# Patient Record
Sex: MALE | Race: WHITE | NOT HISPANIC OR LATINO
[De-identification: names, ages, dates, MRNs, and addresses within clinical notes are randomized per-mention and may not be internally consistent; named-entity substitution may affect disease eponyms.]

---

## 2017-03-03 ENCOUNTER — APPOINTMENT (OUTPATIENT)
Dept: CARDIOLOGY | Facility: CLINIC | Age: 72
End: 2017-03-03

## 2017-03-03 VITALS
WEIGHT: 160 LBS | SYSTOLIC BLOOD PRESSURE: 131 MMHG | DIASTOLIC BLOOD PRESSURE: 69 MMHG | BODY MASS INDEX: 23.7 KG/M2 | HEART RATE: 84 BPM | HEIGHT: 69 IN

## 2017-03-10 ENCOUNTER — APPOINTMENT (OUTPATIENT)
Dept: ULTRASOUND IMAGING | Facility: HOSPITAL | Age: 72
End: 2017-03-10

## 2017-03-10 ENCOUNTER — RESULT REVIEW (OUTPATIENT)
Age: 72
End: 2017-03-10

## 2017-03-10 ENCOUNTER — OUTPATIENT (OUTPATIENT)
Dept: OUTPATIENT SERVICES | Facility: HOSPITAL | Age: 72
LOS: 1 days | End: 2017-03-10

## 2017-03-10 ENCOUNTER — INPATIENT (INPATIENT)
Facility: HOSPITAL | Age: 72
LOS: 1 days | Discharge: HOME | DRG: 616 | End: 2017-03-12
Attending: INTERNAL MEDICINE | Admitting: INTERNAL MEDICINE
Payer: MEDICARE

## 2017-03-10 VITALS
RESPIRATION RATE: 18 BRPM | DIASTOLIC BLOOD PRESSURE: 75 MMHG | SYSTOLIC BLOOD PRESSURE: 148 MMHG | OXYGEN SATURATION: 99 % | HEART RATE: 80 BPM

## 2017-03-10 DIAGNOSIS — E16.2 HYPOGLYCEMIA, UNSPECIFIED: ICD-10-CM

## 2017-03-10 DIAGNOSIS — Z98.890 OTHER SPECIFIED POSTPROCEDURAL STATES: Chronic | ICD-10-CM

## 2017-03-10 DIAGNOSIS — Z95.9 PRESENCE OF CARDIAC AND VASCULAR IMPLANT AND GRAFT, UNSPECIFIED: Chronic | ICD-10-CM

## 2017-03-10 DIAGNOSIS — Z95.0 PRESENCE OF CARDIAC PACEMAKER: Chronic | ICD-10-CM

## 2017-03-10 DIAGNOSIS — Z95.828 PRESENCE OF OTHER VASCULAR IMPLANTS AND GRAFTS: Chronic | ICD-10-CM

## 2017-03-10 DIAGNOSIS — M86.9 OSTEOMYELITIS, UNSPECIFIED: ICD-10-CM

## 2017-03-10 LAB
ALBUMIN SERPL ELPH-MCNC: 3.4 G/DL — SIGNIFICANT CHANGE UP (ref 3.3–5)
ALP SERPL-CCNC: 148 U/L — HIGH (ref 40–120)
ALT FLD-CCNC: 25 U/L RC — SIGNIFICANT CHANGE UP (ref 10–45)
ANION GAP SERPL CALC-SCNC: 12 MMOL/L — SIGNIFICANT CHANGE UP (ref 5–17)
ANION GAP SERPL CALC-SCNC: 12 MMOL/L — SIGNIFICANT CHANGE UP (ref 5–17)
APTT BLD: 38.4 SEC — HIGH (ref 27.5–37.4)
AST SERPL-CCNC: 24 U/L — SIGNIFICANT CHANGE UP (ref 10–40)
BASOPHILS # BLD AUTO: 0.1 K/UL — SIGNIFICANT CHANGE UP (ref 0–0.2)
BASOPHILS NFR BLD AUTO: 1.2 % — SIGNIFICANT CHANGE UP (ref 0–2)
BILIRUB SERPL-MCNC: 0.3 MG/DL — SIGNIFICANT CHANGE UP (ref 0.2–1.2)
BUN SERPL-MCNC: 28 MG/DL — HIGH (ref 7–23)
BUN SERPL-MCNC: 30 MG/DL — HIGH (ref 7–23)
CALCIUM SERPL-MCNC: 8.4 MG/DL — SIGNIFICANT CHANGE UP (ref 8.4–10.5)
CALCIUM SERPL-MCNC: 8.7 MG/DL — SIGNIFICANT CHANGE UP (ref 8.4–10.5)
CHLORIDE SERPL-SCNC: 104 MMOL/L — SIGNIFICANT CHANGE UP (ref 96–108)
CHLORIDE SERPL-SCNC: 105 MMOL/L — SIGNIFICANT CHANGE UP (ref 96–108)
CO2 SERPL-SCNC: 19 MMOL/L — LOW (ref 22–31)
CO2 SERPL-SCNC: 20 MMOL/L — LOW (ref 22–31)
CREAT SERPL-MCNC: 1.2 MG/DL — SIGNIFICANT CHANGE UP (ref 0.5–1.3)
CREAT SERPL-MCNC: 1.23 MG/DL — SIGNIFICANT CHANGE UP (ref 0.5–1.3)
EOSINOPHIL # BLD AUTO: 0.1 K/UL — SIGNIFICANT CHANGE UP (ref 0–0.5)
EOSINOPHIL NFR BLD AUTO: 1.3 % — SIGNIFICANT CHANGE UP (ref 0–6)
GLUCOSE SERPL-MCNC: 265 MG/DL — HIGH (ref 70–99)
GLUCOSE SERPL-MCNC: 333 MG/DL — HIGH (ref 70–99)
HCT VFR BLD CALC: 39 % — SIGNIFICANT CHANGE UP (ref 39–50)
HGB BLD-MCNC: 12.4 G/DL — LOW (ref 13–17)
INR BLD: 1.15 RATIO — SIGNIFICANT CHANGE UP (ref 0.88–1.16)
LYMPHOCYTES # BLD AUTO: 1.8 K/UL — SIGNIFICANT CHANGE UP (ref 1–3.3)
LYMPHOCYTES # BLD AUTO: 20.9 % — SIGNIFICANT CHANGE UP (ref 13–44)
MCHC RBC-ENTMCNC: 29.3 PG — SIGNIFICANT CHANGE UP (ref 27–34)
MCHC RBC-ENTMCNC: 31.7 GM/DL — LOW (ref 32–36)
MCV RBC AUTO: 92.4 FL — SIGNIFICANT CHANGE UP (ref 80–100)
MONOCYTES # BLD AUTO: 0.6 K/UL — SIGNIFICANT CHANGE UP (ref 0–0.9)
MONOCYTES NFR BLD AUTO: 6.8 % — SIGNIFICANT CHANGE UP (ref 2–14)
NEUTROPHILS # BLD AUTO: 5.9 K/UL — SIGNIFICANT CHANGE UP (ref 1.8–7.4)
NEUTROPHILS NFR BLD AUTO: 69.7 % — SIGNIFICANT CHANGE UP (ref 43–77)
PLATELET # BLD AUTO: 304 K/UL — SIGNIFICANT CHANGE UP (ref 150–400)
POTASSIUM SERPL-MCNC: 5 MMOL/L — SIGNIFICANT CHANGE UP (ref 3.5–5.3)
POTASSIUM SERPL-MCNC: 5.9 MMOL/L — HIGH (ref 3.5–5.3)
POTASSIUM SERPL-SCNC: 5 MMOL/L — SIGNIFICANT CHANGE UP (ref 3.5–5.3)
POTASSIUM SERPL-SCNC: 5.9 MMOL/L — HIGH (ref 3.5–5.3)
PROT SERPL-MCNC: 6.4 G/DL — SIGNIFICANT CHANGE UP (ref 6–8.3)
PROTHROM AB SERPL-ACNC: 12.6 SEC — SIGNIFICANT CHANGE UP (ref 10–13.1)
RBC # BLD: 4.23 M/UL — SIGNIFICANT CHANGE UP (ref 4.2–5.8)
RBC # FLD: 13.4 % — SIGNIFICANT CHANGE UP (ref 10.3–14.5)
SODIUM SERPL-SCNC: 135 MMOL/L — SIGNIFICANT CHANGE UP (ref 135–145)
SODIUM SERPL-SCNC: 137 MMOL/L — SIGNIFICANT CHANGE UP (ref 135–145)
WBC # BLD: 8.5 K/UL — SIGNIFICANT CHANGE UP (ref 3.8–10.5)
WBC # FLD AUTO: 8.5 K/UL — SIGNIFICANT CHANGE UP (ref 3.8–10.5)

## 2017-03-10 PROCEDURE — 99285 EMERGENCY DEPT VISIT HI MDM: CPT | Mod: 25,GC

## 2017-03-10 PROCEDURE — 93010 ELECTROCARDIOGRAM REPORT: CPT

## 2017-03-10 RX ORDER — LIDOCAINE HCL 20 MG/ML
0.2 VIAL (ML) INJECTION ONCE
Qty: 0 | Refills: 0 | Status: DISCONTINUED | OUTPATIENT
Start: 2017-03-10 | End: 2017-03-25

## 2017-03-10 RX ORDER — BUMETANIDE 0.25 MG/ML
1 INJECTION INTRAMUSCULAR; INTRAVENOUS DAILY
Qty: 0 | Refills: 0 | Status: DISCONTINUED | OUTPATIENT
Start: 2017-03-10 | End: 2017-03-11

## 2017-03-10 RX ORDER — INSULIN GLARGINE 100 [IU]/ML
30 INJECTION, SOLUTION SUBCUTANEOUS AT BEDTIME
Qty: 0 | Refills: 0 | Status: DISCONTINUED | OUTPATIENT
Start: 2017-03-10 | End: 2017-03-11

## 2017-03-10 RX ORDER — ALBUTEROL 90 UG/1
2.5 AEROSOL, METERED ORAL ONCE
Qty: 0 | Refills: 0 | Status: COMPLETED | OUTPATIENT
Start: 2017-03-10 | End: 2017-03-10

## 2017-03-10 RX ORDER — LISINOPRIL 2.5 MG/1
40 TABLET ORAL DAILY
Qty: 0 | Refills: 0 | Status: DISCONTINUED | OUTPATIENT
Start: 2017-03-10 | End: 2017-03-11

## 2017-03-10 RX ORDER — INSULIN GLARGINE 100 [IU]/ML
15 INJECTION, SOLUTION SUBCUTANEOUS ONCE
Qty: 0 | Refills: 0 | Status: COMPLETED | OUTPATIENT
Start: 2017-03-10 | End: 2017-03-10

## 2017-03-10 RX ORDER — DEXTROSE 50 % IN WATER 50 %
50 SYRINGE (ML) INTRAVENOUS ONCE
Qty: 0 | Refills: 0 | Status: COMPLETED | OUTPATIENT
Start: 2017-03-10 | End: 2017-03-10

## 2017-03-10 RX ORDER — SODIUM CHLORIDE 9 MG/ML
1000 INJECTION INTRAMUSCULAR; INTRAVENOUS; SUBCUTANEOUS
Qty: 0 | Refills: 0 | Status: DISCONTINUED | OUTPATIENT
Start: 2017-03-10 | End: 2017-03-11

## 2017-03-10 RX ORDER — POTASSIUM CHLORIDE 20 MEQ
10 PACKET (EA) ORAL DAILY
Qty: 0 | Refills: 0 | Status: DISCONTINUED | OUTPATIENT
Start: 2017-03-10 | End: 2017-03-11

## 2017-03-10 RX ORDER — SODIUM CHLORIDE 9 MG/ML
1000 INJECTION INTRAMUSCULAR; INTRAVENOUS; SUBCUTANEOUS ONCE
Qty: 0 | Refills: 0 | Status: COMPLETED | OUTPATIENT
Start: 2017-03-10 | End: 2017-03-10

## 2017-03-10 RX ORDER — SODIUM CHLORIDE 9 MG/ML
1000 INJECTION, SOLUTION INTRAVENOUS
Qty: 0 | Refills: 0 | Status: DISCONTINUED | OUTPATIENT
Start: 2017-03-10 | End: 2017-03-11

## 2017-03-10 RX ORDER — SODIUM CHLORIDE 9 MG/ML
1000 INJECTION, SOLUTION INTRAVENOUS
Qty: 0 | Refills: 0 | Status: DISCONTINUED | OUTPATIENT
Start: 2017-03-10 | End: 2017-03-10

## 2017-03-10 RX ORDER — SODIUM CHLORIDE 9 MG/ML
3 INJECTION INTRAMUSCULAR; INTRAVENOUS; SUBCUTANEOUS EVERY 8 HOURS
Qty: 0 | Refills: 0 | Status: DISCONTINUED | OUTPATIENT
Start: 2017-03-10 | End: 2017-03-25

## 2017-03-10 RX ORDER — DEXTROSE 50 % IN WATER 50 %
25 SYRINGE (ML) INTRAVENOUS ONCE
Qty: 0 | Refills: 0 | Status: DISCONTINUED | OUTPATIENT
Start: 2017-03-10 | End: 2017-03-11

## 2017-03-10 RX ORDER — HYDRALAZINE HCL 50 MG
5 TABLET ORAL
Qty: 0 | Refills: 0 | Status: DISCONTINUED | OUTPATIENT
Start: 2017-03-10 | End: 2017-03-11

## 2017-03-10 RX ORDER — GLUCAGON INJECTION, SOLUTION 0.5 MG/.1ML
1 INJECTION, SOLUTION SUBCUTANEOUS ONCE
Qty: 0 | Refills: 0 | Status: DISCONTINUED | OUTPATIENT
Start: 2017-03-10 | End: 2017-03-11

## 2017-03-10 RX ORDER — DEXTROSE 50 % IN WATER 50 %
12.5 SYRINGE (ML) INTRAVENOUS ONCE
Qty: 0 | Refills: 0 | Status: DISCONTINUED | OUTPATIENT
Start: 2017-03-10 | End: 2017-03-11

## 2017-03-10 RX ORDER — INSULIN LISPRO 100/ML
VIAL (ML) SUBCUTANEOUS
Qty: 0 | Refills: 0 | Status: DISCONTINUED | OUTPATIENT
Start: 2017-03-10 | End: 2017-03-11

## 2017-03-10 RX ORDER — DEXTROSE 50 % IN WATER 50 %
1 SYRINGE (ML) INTRAVENOUS ONCE
Qty: 0 | Refills: 0 | Status: DISCONTINUED | OUTPATIENT
Start: 2017-03-10 | End: 2017-03-11

## 2017-03-10 RX ORDER — INSULIN LISPRO 100/ML
VIAL (ML) SUBCUTANEOUS AT BEDTIME
Qty: 0 | Refills: 0 | Status: DISCONTINUED | OUTPATIENT
Start: 2017-03-10 | End: 2017-03-11

## 2017-03-10 RX ORDER — ATORVASTATIN CALCIUM 80 MG/1
20 TABLET, FILM COATED ORAL AT BEDTIME
Qty: 0 | Refills: 0 | Status: DISCONTINUED | OUTPATIENT
Start: 2017-03-10 | End: 2017-03-11

## 2017-03-10 RX ADMIN — ALBUTEROL 2.5 MILLIGRAM(S): 90 AEROSOL, METERED ORAL at 12:54

## 2017-03-10 RX ADMIN — SODIUM CHLORIDE 1000 MILLILITER(S): 9 INJECTION INTRAMUSCULAR; INTRAVENOUS; SUBCUTANEOUS at 12:53

## 2017-03-10 RX ADMIN — Medication 50 MILLILITER(S): at 12:22

## 2017-03-10 RX ADMIN — SODIUM CHLORIDE 50 MILLILITER(S): 9 INJECTION INTRAMUSCULAR; INTRAVENOUS; SUBCUTANEOUS at 22:29

## 2017-03-10 RX ADMIN — SODIUM CHLORIDE 70 MILLILITER(S): 9 INJECTION, SOLUTION INTRAVENOUS at 12:22

## 2017-03-10 RX ADMIN — INSULIN GLARGINE 15 UNIT(S): 100 INJECTION, SOLUTION SUBCUTANEOUS at 23:45

## 2017-03-10 RX ADMIN — Medication 1: at 18:58

## 2017-03-10 RX ADMIN — ATORVASTATIN CALCIUM 20 MILLIGRAM(S): 80 TABLET, FILM COATED ORAL at 22:29

## 2017-03-10 NOTE — ED PROVIDER NOTE - MEDICAL DECISION MAKING DETAILS
Attending MD Sanders: 71M with PMH DM, PVD, CAD s/p stent 2 months ago presents to the ED sent from ambulatory surgery center for AMS with hypoglycemia.  Reports that he was going to get his L 4th toe amputated today secondary to ongoing osteomyelitis.  BIBEMS.  At ASC his FS was 43, given amp D50 with immediate resolution of confusion.  Reports that his last meal was 9pm yesterday and was ice cream.  Reports this AM awoke and FS was 221 so took 5U Lantus.  Reports he is on Novolog but takes Lantus instead because it "works better".  Denies chest pain, shortness of breath, palpitations. Denies abdominal pain, nausea, vomiting, diarrhea, blood in stools. Denies dysuria, hematuria, change in urinary habits including frequency, urgency. Attending MD Sanders: 71M with PMH DM, PVD, CAD s/p stent 2 months ago presents to the ED sent from ambulatory surgery center for AMS with hypoglycemia.  Reports that he was going to get his L 4th toe amputated today secondary to ongoing osteomyelitis.  BIBEMS.  At ASC his FS was 43, given amp D50 with immediate resolution of confusion.  Reports that his last meal was 9pm yesterday and was ice cream.  Reports this AM awoke and FS was 221 so took 5U Lantus.  Reports he is on Novolog but takes Lantus instead because it "works better".  Denies chest pain, shortness of breath, palpitations. Denies abdominal pain, nausea, vomiting, diarrhea, blood in stools. Denies dysuria, hematuria, change in urinary habits including frequency, urgency. Denies fevers, chills.  Meds: Rosuvastatin, Plavix, Hydralazine, Klor Con, Coumadin. Reports finished antibiotic course yesterday (name unknown).  On exam, scattered bruises in different stages of healing on extermities, head NCAT, PERRL, FROM at neck, no tenderness to palpation or stepoffs along length of spine, lungs CTAB with good inspiratory effort, +S1S2, no m/r/g, +PM palpable under skin anterior chest, abdomen soft with +BS, NT, ND, no CVAT, moving all extremities with 5/5 strength bilateral upper and lower extremities, good and equal  strength bilaterally, +4th L toe with small nonhealing ulcer on lateral aspect <1cm, no purulent discharge, no surrounding erythema, no active bleeding; Plan: labs, EKG, serial FS, D50 now for FS 60s and po

## 2017-03-10 NOTE — H&P ADULT. - RS GEN PE MLT RESP DETAILS PC
normal/good air movement/airway patent/clear to auscultation bilaterally/respirations non-labored/no chest wall tenderness

## 2017-03-10 NOTE — ASU PATIENT PROFILE, ADULT - PMH
Chronic atrial fibrillation  on coumadin  CKD (chronic kidney disease) stage 3, GFR 30-59 ml/min    Coronary artery disease of native artery of native heart with stable angina pectoris    Diastolic congestive heart failure, unspecified congestive heart failure chronicity    Gout involving toe, unspecified cause, unspecified chronicity, unspecified laterality    HTN (hypertension), benign    Hyperlipidemia, unspecified hyperlipidemia type    PAD (peripheral artery disease)    Pressure ulcer, unspecified pressure ulcer stage  in lateral rt 4th toe both sides from toe compression  Smoker    Type 2 diabetes mellitus with other circulatory complication, with long-term current use of insulin

## 2017-03-10 NOTE — ASU PATIENT PROFILE, ADULT - PSH
Artificial cardiac pacemaker  2014  S/P angioplasty with stent  vascular stent- left leg  S/P aortobifemoral bypass surgery    S/P hernia repair

## 2017-03-10 NOTE — ED PROVIDER NOTE - ATTENDING CONTRIBUTION TO CARE
Attending MD Sanders: I personally have seen and examined this patient.  Resident note reviewed and agree on plan of care and except where noted.  See MDM for details.

## 2017-03-10 NOTE — ED PROVIDER NOTE - OBJECTIVE STATEMENT
71 year old male presents with confusion, brief and resolved. patient was at an ambulatory surgery center, about to get his left fourth digit removed for osteomyelitis, found to have blood sugar 43.  patient given amp of d50 with immediate resolution of symptoms.  Pt's last meal was 9pm and was ice cream. Woke up this morning and checked his blood sugar found it to be 221 so he took 5 units of lantus. Pt is also on novolog but states he took lantus because it works better. deneis headache, chest pain, nausea/vomiting, fever, cough, dysuria. Finished course of abx yesterday for the osteomyelitis. 71 year old male presents with confusion, brief and resolved. patient was at an ambulatory surgery center, about to get his left fourth digit removed for osteomyelitis, found to have blood sugar 43.  patient given amp of d50 with immediate resolution of symptoms.  Pt's last meal was 9pm and was ice cream. Woke up this morning and checked his blood sugar found it to be 221 so he took 5 units of lantus. Pt is also on novolog but states he took lantus because it works better. deneis headache, chest pain, nausea/vomiting, fever, cough, dysuria. Finished course of abx yesterday for the osteomyelitis.    pmd clari sebastian

## 2017-03-10 NOTE — H&P ADULT. - ASSESSMENT
71 year old male presents with AMS which is resolved. patient was at an ambulatory surgery center, about to get his left fourth digit removed for osteomyelitis, found to have blood sugar 43.  patient was given amp of d50 with immediate resolution of symptoms.  Pt's last meal was 9pm and was ice cream. Woke up this morning and checked his blood sugar found it to be 221 so he took 5 units of lantus. Pt is also on novolog but states he took lantus because it works better. denies headache, chest pain, nausea/vomiting, fever, cough, dysuria. Finished course of abx yesterday for the osteomyelitis.  States he takes Lantus at night and also 3-5 units in the morning.

## 2017-03-10 NOTE — ED ADULT NURSE NOTE - OBJECTIVE STATEMENT
71 yr old male to ed via ems s/p hypoglycemia. Pt type 1 diabetic. NPO before midnight last nite and took lantus this am. Arrived from presurgical with bs 43. 1 amp d50 given in 1000cc ns. Pt received awake alert and orientedx3. Responds approp to verbal and tactile stimuli. BS repeated 67. No slurred speech or facial droop.

## 2017-03-10 NOTE — ED PROVIDER NOTE - PROGRESS NOTE DETAILS
podiatry notified pt will be in cdu.  Pods: Francine Vergara pmd clari le[facility] office phone number off says to mango noonan at [phone]. phone rings for minutes without response will admit to unattached. podiatry notified pt will be in cdu.  Pods: Francine Vergara. pmd clari le[facility] office phone number off says to mango noonan at [phone]. phone rings for minutes without response. attempted multiple times over last half hour, will admit to unattached.

## 2017-03-11 DIAGNOSIS — E16.2 HYPOGLYCEMIA, UNSPECIFIED: ICD-10-CM

## 2017-03-11 DIAGNOSIS — I48.2 CHRONIC ATRIAL FIBRILLATION: ICD-10-CM

## 2017-03-11 DIAGNOSIS — G93.41 METABOLIC ENCEPHALOPATHY: ICD-10-CM

## 2017-03-11 DIAGNOSIS — E11.59 TYPE 2 DIABETES MELLITUS WITH OTHER CIRCULATORY COMPLICATIONS: ICD-10-CM

## 2017-03-11 DIAGNOSIS — M86.9 OSTEOMYELITIS, UNSPECIFIED: ICD-10-CM

## 2017-03-11 DIAGNOSIS — I50.30 UNSPECIFIED DIASTOLIC (CONGESTIVE) HEART FAILURE: ICD-10-CM

## 2017-03-11 DIAGNOSIS — N18.3 CHRONIC KIDNEY DISEASE, STAGE 3 (MODERATE): ICD-10-CM

## 2017-03-11 DIAGNOSIS — I10 ESSENTIAL (PRIMARY) HYPERTENSION: ICD-10-CM

## 2017-03-11 DIAGNOSIS — E78.5 HYPERLIPIDEMIA, UNSPECIFIED: ICD-10-CM

## 2017-03-11 DIAGNOSIS — I25.118 ATHEROSCLEROTIC HEART DISEASE OF NATIVE CORONARY ARTERY WITH OTHER FORMS OF ANGINA PECTORIS: ICD-10-CM

## 2017-03-11 LAB
ANION GAP SERPL CALC-SCNC: 11 MMOL/L — SIGNIFICANT CHANGE UP (ref 5–17)
ANION GAP SERPL CALC-SCNC: 17 MMOL/L — SIGNIFICANT CHANGE UP (ref 5–17)
APPEARANCE UR: CLEAR — SIGNIFICANT CHANGE UP
APTT BLD: 35 SEC — SIGNIFICANT CHANGE UP (ref 27.5–37.4)
BILIRUB UR-MCNC: NEGATIVE — SIGNIFICANT CHANGE UP
BLD GP AB SCN SERPL QL: NEGATIVE — SIGNIFICANT CHANGE UP
BUN SERPL-MCNC: 27 MG/DL — HIGH (ref 7–23)
BUN SERPL-MCNC: 29 MG/DL — HIGH (ref 7–23)
CALCIUM SERPL-MCNC: 8.9 MG/DL — SIGNIFICANT CHANGE UP (ref 8.4–10.5)
CALCIUM SERPL-MCNC: 9.6 MG/DL — SIGNIFICANT CHANGE UP (ref 8.4–10.5)
CHLORIDE SERPL-SCNC: 106 MMOL/L — SIGNIFICANT CHANGE UP (ref 96–108)
CHLORIDE SERPL-SCNC: 107 MMOL/L — SIGNIFICANT CHANGE UP (ref 96–108)
CO2 SERPL-SCNC: 14 MMOL/L — LOW (ref 22–31)
CO2 SERPL-SCNC: 22 MMOL/L — SIGNIFICANT CHANGE UP (ref 22–31)
COLOR SPEC: SIGNIFICANT CHANGE UP
CREAT SERPL-MCNC: 1.31 MG/DL — HIGH (ref 0.5–1.3)
CREAT SERPL-MCNC: 1.33 MG/DL — HIGH (ref 0.5–1.3)
DIFF PNL FLD: NEGATIVE — SIGNIFICANT CHANGE UP
GLUCOSE SERPL-MCNC: 141 MG/DL — HIGH (ref 70–99)
GLUCOSE SERPL-MCNC: 50 MG/DL — LOW (ref 70–99)
GLUCOSE UR QL: NEGATIVE — SIGNIFICANT CHANGE UP
HBA1C BLD-MCNC: 7.9 % — HIGH (ref 4–5.6)
HCT VFR BLD CALC: 38.2 % — LOW (ref 39–50)
HGB BLD-MCNC: 12.3 G/DL — LOW (ref 13–17)
INR BLD: 1.12 RATIO — SIGNIFICANT CHANGE UP (ref 0.88–1.16)
KETONES UR-MCNC: NEGATIVE — SIGNIFICANT CHANGE UP
LACTATE SERPL-SCNC: 1 MMOL/L — SIGNIFICANT CHANGE UP (ref 0.7–2)
LEUKOCYTE ESTERASE UR-ACNC: NEGATIVE — SIGNIFICANT CHANGE UP
MCHC RBC-ENTMCNC: 29.2 PG — SIGNIFICANT CHANGE UP (ref 27–34)
MCHC RBC-ENTMCNC: 32.2 GM/DL — SIGNIFICANT CHANGE UP (ref 32–36)
MCV RBC AUTO: 90.7 FL — SIGNIFICANT CHANGE UP (ref 80–100)
NITRITE UR-MCNC: NEGATIVE — SIGNIFICANT CHANGE UP
PH UR: 5 — SIGNIFICANT CHANGE UP (ref 4.8–8)
PLATELET # BLD AUTO: 311 K/UL — SIGNIFICANT CHANGE UP (ref 150–400)
POTASSIUM SERPL-MCNC: 4.5 MMOL/L — SIGNIFICANT CHANGE UP (ref 3.5–5.3)
POTASSIUM SERPL-MCNC: 4.8 MMOL/L — SIGNIFICANT CHANGE UP (ref 3.5–5.3)
POTASSIUM SERPL-SCNC: 4.5 MMOL/L — SIGNIFICANT CHANGE UP (ref 3.5–5.3)
POTASSIUM SERPL-SCNC: 4.8 MMOL/L — SIGNIFICANT CHANGE UP (ref 3.5–5.3)
PROT UR-MCNC: 30 MG/DL
PROTHROM AB SERPL-ACNC: 12.1 SEC — SIGNIFICANT CHANGE UP (ref 10–13.1)
RBC # BLD: 4.21 M/UL — SIGNIFICANT CHANGE UP (ref 4.2–5.8)
RBC # FLD: 14.6 % — HIGH (ref 10.3–14.5)
RH IG SCN BLD-IMP: NEGATIVE — SIGNIFICANT CHANGE UP
SODIUM SERPL-SCNC: 138 MMOL/L — SIGNIFICANT CHANGE UP (ref 135–145)
SODIUM SERPL-SCNC: 139 MMOL/L — SIGNIFICANT CHANGE UP (ref 135–145)
SP GR SPEC: 1.01 — LOW (ref 1.01–1.02)
UROBILINOGEN FLD QL: NEGATIVE — SIGNIFICANT CHANGE UP
WBC # BLD: 10.5 K/UL — SIGNIFICANT CHANGE UP (ref 3.8–10.5)
WBC # FLD AUTO: 10.5 K/UL — SIGNIFICANT CHANGE UP (ref 3.8–10.5)

## 2017-03-11 PROCEDURE — 88307 TISSUE EXAM BY PATHOLOGIST: CPT | Mod: 26

## 2017-03-11 PROCEDURE — 73630 X-RAY EXAM OF FOOT: CPT | Mod: 26,LT

## 2017-03-11 RX ORDER — LISINOPRIL 2.5 MG/1
40 TABLET ORAL DAILY
Qty: 0 | Refills: 0 | Status: DISCONTINUED | OUTPATIENT
Start: 2017-03-11 | End: 2017-03-12

## 2017-03-11 RX ORDER — DEXTROSE 10 % IN WATER 10 %
1000 INTRAVENOUS SOLUTION INTRAVENOUS
Qty: 0 | Refills: 0 | Status: DISCONTINUED | OUTPATIENT
Start: 2017-03-11 | End: 2017-03-11

## 2017-03-11 RX ORDER — POTASSIUM CHLORIDE 20 MEQ
10 PACKET (EA) ORAL DAILY
Qty: 0 | Refills: 0 | Status: DISCONTINUED | OUTPATIENT
Start: 2017-03-11 | End: 2017-03-12

## 2017-03-11 RX ORDER — HYDRALAZINE HCL 50 MG
5 TABLET ORAL
Qty: 0 | Refills: 0 | Status: DISCONTINUED | OUTPATIENT
Start: 2017-03-11 | End: 2017-03-12

## 2017-03-11 RX ORDER — ACETAMINOPHEN 500 MG
650 TABLET ORAL EVERY 6 HOURS
Qty: 0 | Refills: 0 | Status: DISCONTINUED | OUTPATIENT
Start: 2017-03-11 | End: 2017-03-12

## 2017-03-11 RX ORDER — SODIUM CHLORIDE 9 MG/ML
1000 INJECTION INTRAMUSCULAR; INTRAVENOUS; SUBCUTANEOUS
Qty: 0 | Refills: 0 | Status: DISCONTINUED | OUTPATIENT
Start: 2017-03-11 | End: 2017-03-12

## 2017-03-11 RX ORDER — GLUCAGON INJECTION, SOLUTION 0.5 MG/.1ML
1 INJECTION, SOLUTION SUBCUTANEOUS ONCE
Qty: 0 | Refills: 0 | Status: DISCONTINUED | OUTPATIENT
Start: 2017-03-11 | End: 2017-03-12

## 2017-03-11 RX ORDER — DEXTROSE 50 % IN WATER 50 %
25 SYRINGE (ML) INTRAVENOUS ONCE
Qty: 0 | Refills: 0 | Status: COMPLETED | OUTPATIENT
Start: 2017-03-11 | End: 2017-03-11

## 2017-03-11 RX ORDER — DEXTROSE 50 % IN WATER 50 %
12.5 SYRINGE (ML) INTRAVENOUS ONCE
Qty: 0 | Refills: 0 | Status: COMPLETED | OUTPATIENT
Start: 2017-03-11 | End: 2017-03-11

## 2017-03-11 RX ORDER — MORPHINE SULFATE 50 MG/1
2 CAPSULE, EXTENDED RELEASE ORAL EVERY 4 HOURS
Qty: 0 | Refills: 0 | Status: DISCONTINUED | OUTPATIENT
Start: 2017-03-11 | End: 2017-03-12

## 2017-03-11 RX ORDER — SODIUM CHLORIDE 9 MG/ML
1000 INJECTION, SOLUTION INTRAVENOUS
Qty: 0 | Refills: 0 | Status: DISCONTINUED | OUTPATIENT
Start: 2017-03-11 | End: 2017-03-11

## 2017-03-11 RX ORDER — INSULIN LISPRO 100/ML
VIAL (ML) SUBCUTANEOUS
Qty: 0 | Refills: 0 | Status: DISCONTINUED | OUTPATIENT
Start: 2017-03-11 | End: 2017-03-12

## 2017-03-11 RX ORDER — INSULIN LISPRO 100/ML
VIAL (ML) SUBCUTANEOUS AT BEDTIME
Qty: 0 | Refills: 0 | Status: DISCONTINUED | OUTPATIENT
Start: 2017-03-11 | End: 2017-03-12

## 2017-03-11 RX ADMIN — Medication 12.5 GRAM(S): at 09:33

## 2017-03-11 RX ADMIN — Medication 12.5 GRAM(S): at 11:57

## 2017-03-11 RX ADMIN — SODIUM CHLORIDE 100 MILLILITER(S): 9 INJECTION, SOLUTION INTRAVENOUS at 08:08

## 2017-03-11 RX ADMIN — Medication 25 GRAM(S): at 17:05

## 2017-03-11 RX ADMIN — Medication 5 MILLIGRAM(S): at 23:59

## 2017-03-11 RX ADMIN — Medication 25 GRAM(S): at 04:55

## 2017-03-11 RX ADMIN — Medication 50 MILLILITER(S): at 11:57

## 2017-03-11 RX ADMIN — SODIUM CHLORIDE 50 MILLILITER(S): 9 INJECTION INTRAMUSCULAR; INTRAVENOUS; SUBCUTANEOUS at 19:30

## 2017-03-11 RX ADMIN — BUMETANIDE 1 MILLIGRAM(S): 0.25 INJECTION INTRAMUSCULAR; INTRAVENOUS at 05:53

## 2017-03-11 RX ADMIN — Medication 5 MILLIGRAM(S): at 05:53

## 2017-03-11 RX ADMIN — LISINOPRIL 40 MILLIGRAM(S): 2.5 TABLET ORAL at 05:53

## 2017-03-11 RX ADMIN — Medication 25 GRAM(S): at 13:49

## 2017-03-11 RX ADMIN — SODIUM CHLORIDE 50 MILLILITER(S): 9 INJECTION, SOLUTION INTRAVENOUS at 05:03

## 2017-03-11 RX ADMIN — SODIUM CHLORIDE 50 MILLILITER(S): 9 INJECTION INTRAMUSCULAR; INTRAVENOUS; SUBCUTANEOUS at 23:18

## 2017-03-11 RX ADMIN — Medication 2: at 19:29

## 2017-03-11 NOTE — BRIEF OPERATIVE NOTE - OPERATION/FINDINGS
nonviable necrotic soft tissue and bone of left 4th toe. Healthy 4th metatarsal and proximal soft tissue structures.

## 2017-03-11 NOTE — BRIEF OPERATIVE NOTE - COMMENTS
Pt tolerated procedure and anesthesia well without complication. Pt transported to PACU in stable condition with neurovascular to L foot intact.

## 2017-03-11 NOTE — PROVIDER CONTACT NOTE (MEDICATION) - ACTION/TREATMENT ORDERED:
Dextrose 50%- 25mg IV given. Dextrose 5% IV fluids at 50 going. Two blood sugar came back over 100. Monitoring Blood sugar

## 2017-03-12 ENCOUNTER — TRANSCRIPTION ENCOUNTER (OUTPATIENT)
Age: 72
End: 2017-03-12

## 2017-03-12 VITALS
HEART RATE: 65 BPM | SYSTOLIC BLOOD PRESSURE: 133 MMHG | TEMPERATURE: 98 F | RESPIRATION RATE: 18 BRPM | DIASTOLIC BLOOD PRESSURE: 76 MMHG | OXYGEN SATURATION: 97 %

## 2017-03-12 LAB
ANION GAP SERPL CALC-SCNC: 14 MMOL/L — SIGNIFICANT CHANGE UP (ref 5–17)
BUN SERPL-MCNC: 33 MG/DL — HIGH (ref 7–23)
CALCIUM SERPL-MCNC: 8.9 MG/DL — SIGNIFICANT CHANGE UP (ref 8.4–10.5)
CHLORIDE SERPL-SCNC: 103 MMOL/L — SIGNIFICANT CHANGE UP (ref 96–108)
CO2 SERPL-SCNC: 16 MMOL/L — LOW (ref 22–31)
CREAT SERPL-MCNC: 1.46 MG/DL — HIGH (ref 0.5–1.3)
GLUCOSE SERPL-MCNC: 431 MG/DL — HIGH (ref 70–99)
GRAM STN FLD: SIGNIFICANT CHANGE UP
HCT VFR BLD CALC: 38.7 % — LOW (ref 39–50)
HGB BLD-MCNC: 12.2 G/DL — LOW (ref 13–17)
MAGNESIUM SERPL-MCNC: 1.9 MG/DL — SIGNIFICANT CHANGE UP (ref 1.6–2.6)
MCHC RBC-ENTMCNC: 28.6 PG — SIGNIFICANT CHANGE UP (ref 27–34)
MCHC RBC-ENTMCNC: 31.5 GM/DL — LOW (ref 32–36)
MCV RBC AUTO: 90.6 FL — SIGNIFICANT CHANGE UP (ref 80–100)
NIGHT BLUE STAIN TISS: SIGNIFICANT CHANGE UP
PLATELET # BLD AUTO: 266 K/UL — SIGNIFICANT CHANGE UP (ref 150–400)
POTASSIUM SERPL-MCNC: 5.1 MMOL/L — SIGNIFICANT CHANGE UP (ref 3.5–5.3)
POTASSIUM SERPL-SCNC: 5.1 MMOL/L — SIGNIFICANT CHANGE UP (ref 3.5–5.3)
RBC # BLD: 4.27 M/UL — SIGNIFICANT CHANGE UP (ref 4.2–5.8)
RBC # FLD: 14.8 % — HIGH (ref 10.3–14.5)
SODIUM SERPL-SCNC: 133 MMOL/L — LOW (ref 135–145)
SPECIMEN SOURCE: SIGNIFICANT CHANGE UP
SPECIMEN SOURCE: SIGNIFICANT CHANGE UP
WBC # BLD: 7.35 K/UL — SIGNIFICANT CHANGE UP (ref 3.8–10.5)
WBC # FLD AUTO: 7.35 K/UL — SIGNIFICANT CHANGE UP (ref 3.8–10.5)

## 2017-03-12 PROCEDURE — 94640 AIRWAY INHALATION TREATMENT: CPT

## 2017-03-12 PROCEDURE — 85610 PROTHROMBIN TIME: CPT

## 2017-03-12 PROCEDURE — 86850 RBC ANTIBODY SCREEN: CPT

## 2017-03-12 PROCEDURE — 86900 BLOOD TYPING SEROLOGIC ABO: CPT

## 2017-03-12 PROCEDURE — 73630 X-RAY EXAM OF FOOT: CPT

## 2017-03-12 PROCEDURE — 83735 ASSAY OF MAGNESIUM: CPT

## 2017-03-12 PROCEDURE — 84484 ASSAY OF TROPONIN QUANT: CPT

## 2017-03-12 PROCEDURE — 81001 URINALYSIS AUTO W/SCOPE: CPT

## 2017-03-12 PROCEDURE — 87116 MYCOBACTERIA CULTURE: CPT

## 2017-03-12 PROCEDURE — 83036 HEMOGLOBIN GLYCOSYLATED A1C: CPT

## 2017-03-12 PROCEDURE — 87102 FUNGUS ISOLATION CULTURE: CPT

## 2017-03-12 PROCEDURE — 87015 SPECIMEN INFECT AGNT CONCNTJ: CPT

## 2017-03-12 PROCEDURE — 96374 THER/PROPH/DIAG INJ IV PUSH: CPT

## 2017-03-12 PROCEDURE — 99285 EMERGENCY DEPT VISIT HI MDM: CPT | Mod: 25

## 2017-03-12 PROCEDURE — 88307 TISSUE EXAM BY PATHOLOGIST: CPT

## 2017-03-12 PROCEDURE — 87040 BLOOD CULTURE FOR BACTERIA: CPT

## 2017-03-12 PROCEDURE — 93005 ELECTROCARDIOGRAM TRACING: CPT

## 2017-03-12 PROCEDURE — 80053 COMPREHEN METABOLIC PANEL: CPT

## 2017-03-12 PROCEDURE — 85027 COMPLETE CBC AUTOMATED: CPT

## 2017-03-12 PROCEDURE — 85730 THROMBOPLASTIN TIME PARTIAL: CPT

## 2017-03-12 PROCEDURE — 86901 BLOOD TYPING SEROLOGIC RH(D): CPT

## 2017-03-12 PROCEDURE — 80048 BASIC METABOLIC PNL TOTAL CA: CPT

## 2017-03-12 PROCEDURE — V2790: CPT

## 2017-03-12 PROCEDURE — 83605 ASSAY OF LACTIC ACID: CPT

## 2017-03-12 PROCEDURE — 87206 SMEAR FLUORESCENT/ACID STAI: CPT

## 2017-03-12 PROCEDURE — 87070 CULTURE OTHR SPECIMN AEROBIC: CPT

## 2017-03-12 RX ORDER — SODIUM CHLORIDE 9 MG/ML
1000 INJECTION, SOLUTION INTRAVENOUS
Qty: 0 | Refills: 0 | Status: DISCONTINUED | OUTPATIENT
Start: 2017-03-12 | End: 2017-03-12

## 2017-03-12 RX ORDER — DEXTROSE 50 % IN WATER 50 %
25 SYRINGE (ML) INTRAVENOUS ONCE
Qty: 0 | Refills: 0 | Status: DISCONTINUED | OUTPATIENT
Start: 2017-03-12 | End: 2017-03-12

## 2017-03-12 RX ORDER — INSULIN LISPRO 100/ML
6 VIAL (ML) SUBCUTANEOUS
Qty: 0 | Refills: 0 | Status: DISCONTINUED | OUTPATIENT
Start: 2017-03-12 | End: 2017-03-12

## 2017-03-12 RX ORDER — DEXTROSE 50 % IN WATER 50 %
12.5 SYRINGE (ML) INTRAVENOUS ONCE
Qty: 0 | Refills: 0 | Status: DISCONTINUED | OUTPATIENT
Start: 2017-03-12 | End: 2017-03-12

## 2017-03-12 RX ORDER — INSULIN GLARGINE 100 [IU]/ML
12 INJECTION, SOLUTION SUBCUTANEOUS AT BEDTIME
Qty: 0 | Refills: 0 | Status: DISCONTINUED | OUTPATIENT
Start: 2017-03-12 | End: 2017-03-12

## 2017-03-12 RX ORDER — ACETAMINOPHEN 500 MG
2 TABLET ORAL
Qty: 0 | Refills: 0 | COMMUNITY
Start: 2017-03-12

## 2017-03-12 RX ORDER — DEXTROSE 50 % IN WATER 50 %
1 SYRINGE (ML) INTRAVENOUS ONCE
Qty: 0 | Refills: 0 | Status: DISCONTINUED | OUTPATIENT
Start: 2017-03-12 | End: 2017-03-12

## 2017-03-12 RX ORDER — INSULIN LISPRO 100/ML
2 VIAL (ML) SUBCUTANEOUS ONCE
Qty: 0 | Refills: 0 | Status: COMPLETED | OUTPATIENT
Start: 2017-03-12 | End: 2017-03-12

## 2017-03-12 RX ADMIN — Medication 2 UNIT(S): at 12:47

## 2017-03-12 RX ADMIN — Medication 1 TABLET(S): at 20:49

## 2017-03-12 RX ADMIN — Medication 6: at 12:01

## 2017-03-12 RX ADMIN — Medication 10 MILLIEQUIVALENT(S): at 11:45

## 2017-03-12 RX ADMIN — Medication 5: at 17:47

## 2017-03-12 RX ADMIN — SODIUM CHLORIDE 50 MILLILITER(S): 9 INJECTION INTRAMUSCULAR; INTRAVENOUS; SUBCUTANEOUS at 09:56

## 2017-03-12 RX ADMIN — Medication 2 UNIT(S): at 04:36

## 2017-03-12 RX ADMIN — Medication 6 UNIT(S): at 17:48

## 2017-03-12 RX ADMIN — LISINOPRIL 40 MILLIGRAM(S): 2.5 TABLET ORAL at 07:06

## 2017-03-12 RX ADMIN — Medication 5: at 07:04

## 2017-03-12 NOTE — DISCHARGE NOTE ADULT - NS AS DC FU INST LIST INST
yes yes/You will need to follow up with your cardiologist regarding scheduling your next echocardiogram.

## 2017-03-12 NOTE — DISCHARGE NOTE ADULT - ADDITIONAL INSTRUCTIONS
You will need to follow up with your endocrinologist, Dr. Tillman, [phone] within one week of discharge - please call to make an appointment.    You will hold your coumadin tonight, and have your INR checked tomorrow (3/13/17) by your primary medical doctor, and your Coumadin will be restarted accordingly. You will need to follow up with your endocrinologist, Dr. Tillman, [phone] within one week of discharge - please call to make an appointment.    You will hold your coumadin tonight, and have your INR checked tomorrow (3/13/17) by your primary medical doctor, and your Coumadin will be restarted tomorrow and adjusted accordingly.

## 2017-03-12 NOTE — DISCHARGE NOTE ADULT - HOSPITAL COURSE
To be completed by attending 71 year old male with PMH of HTN, T2DM, PAD, AF coumadin, CKD3,  presents with confusion, brief and resolved. patient was at an ambulatory surgery center, about to get his left fourth digit removed for osteomyelitis, found to have blood sugar 43.  patient given amp of d50 with immediate resolution of symptoms   Dx AMS, Hypoglycemia 43- D50    3/10 In the ER patient was placed on D10 drip,  FS at 1pm 296, BMP BS - 333   therefore IV Fluid D10/NS stopped - placed patient on NS at 50 cc/hr   3/10 continue to hold coumadin and plavix. Hypoglycemia likely as pt took extra dose of lantus last night. No need for endo consult per PMD now. continue to monitor FS   3/11 Night NP: S/P amputation pt  Received Humalog 2 units in OR, 1 hr after F.S. 79, pt give food and juice will change to Q 4 fingerstick to monitor for hypoglycemia, d/c Q 4 hr fingerstick, no further episode of hypoglycemia.

## 2017-03-12 NOTE — DISCHARGE NOTE ADULT - MEDICATION SUMMARY - MEDICATIONS TO STOP TAKING
I will STOP taking the medications listed below when I get home from the hospital:    Coumadin 5 mg oral tablet  -- 1 tab(s) by mouth once a day

## 2017-03-12 NOTE — DISCHARGE NOTE ADULT - MEDICATION SUMMARY - MEDICATIONS TO CHANGE
I will SWITCH the dose or number of times a day I take the medications listed below when I get home from the hospital:    Lantus 100 units/mL subcutaneous solution  -- 30 unit(s) subcutaneous once a day (at bedtime)

## 2017-03-12 NOTE — DISCHARGE NOTE ADULT - MEDICATION SUMMARY - MEDICATIONS TO TAKE
I will START or STAY ON the medications listed below when I get home from the hospital:    acetaminophen 325 mg oral tablet  -- 2 tab(s) by mouth every 6 hours, As needed, Mild Pain (1 - 3)  -- Indication: For Pain    ramipril 10 mg oral capsule  -- 1 cap(s) by mouth once a day  -- Indication: For HTN (hypertension), benign    Lantus 100 units/mL subcutaneous solution  -- 12 unit(s) subcutaneous once a day (at bedtime)  -- Indication: For Type 2 diabetes mellitus with other circulatory complication, with long-term current use of insulin    NovoLOG 100 units/mL subcutaneous solution  -- 6 unit(s) subcutaneous 3 times a day (before meals)  -- Patient states he has Novolog at home  -- Indication: For Type 2 diabetes mellitus with other circulatory complication, with long-term current use of insulin    rosuvastatin 5 mg oral tablet  -- 1 tab(s) by mouth once a day (at bedtime)  -- Indication: For Hyperlipidemia, unspecified hyperlipidemia type    clopidogrel 75 mg oral tablet  -- 1 tab(s) by mouth once a day (at bedtime)  -- Indication: For Coronary artery disease of native artery of native heart with stable angina pectoris    bumetanide 1 mg oral tablet  -- 1 tab(s) by mouth once a day  -- Indication: For Diuretic    Klor-Con 10 mEq oral tablet, extended release  -- 1 tab(s) by mouth once a day  -- Indication: For Supplement    amoxicillin-clavulanate 875 mg-125 mg oral tablet  -- 1 tab(s) by mouth 2 times a day  For a total of 10 days  -- Indication: For Osteomyelitis of toe of left foot    hydrALAZINE 10 mg oral tablet  -- 0.5 tab(s) by mouth 2 times a day  -- Indication: For HTN (hypertension), benign

## 2017-03-12 NOTE — DISCHARGE NOTE ADULT - PLAN OF CARE
left foot surgery Keep dressing clean, dry and intact until appointment with Dr. Vergara  - Follow up with Dr. Vergara early next week, call for an appointment at [phone]  - ambulate with surgical shoe  - finish course of antibiotics symptoms improved You will need to follow up with your endocrinologist, Dr. Tillman, [phone] within one week of discharge - please call to make an appointment. Coronary artery disease is a condition where the arteries the supply the heart muscle get clogges with fatty deposits & puts you at risk for a heart attack  Call your doctor if you have any new pain, pressure, or discomfort in the center of your chest, pain, tingling or discomfort in arms, back, neck, jaw, or stomach, shortness of breath, nausea, vomiting, burping or heartburn, sweating, cold and clammy skin, racing or abnormal heartbeat for more than 10 minutes or if they keep coming & going.  Call 911 and do not tr to get to hospital by care  You can help yourself with lefestyle changes (quitting smoking if you smoke), eat lots of fruits & vegetables & low fat dairy products, not a lot of meat & fatty foods, walk or some form of physical activity most days of the week, lose weight if you are overweight  Take your cardiac medication as prescribed to lower cholesterol, to lower blood pressure, aspirin to prevent blood clots, and diabetes control  Make sure to keep appointments with doctor for cardiac follow up care Avoid taking (NSAIDs) - (ex: Ibuprofen, Advil, Celebrex, Naprosyn)  Avoid taking any nephrotoxic agents (can harm kidneys) - Intravenous contrast for diagnostic testing, combination cold medications.  Have all medications adjusted for your renal function by your Health Care Provider.  Blood pressure control is important.  Take all medication as prescribed. Low salt diet  Activity as tolerated.  Take all medication as prescribed.  Follow up with your medical doctor for routine blood pressure monitoring at your next visit.  Notify your doctor if you have any of the following symptoms:   Dizziness, Lightheadedness, Blurry vision, Headache, Chest pain, Shortness of breath You will hold your coumadin tonight, and have your INR checked tomorrow (3/13/17) by your primary medical doctor, and your Coumadin will be restarted accordingly.  Atrial fibrillation is the most common heart rhythm problem.  The condition puts you at risk for has stroke and heart attack  It helps if you control your blood pressure, not drink more than 1-2 alcohol drinks per day, cut down on caffeine, getting treatment for over active thyroid gland, and get regular exercise  Call your doctor if you feel your heart racing or beating unusually, chest tightness or pain, lightheaded, faint, shortness of breath especially with exercise  It is important to take your heart medication as prescribed  You may be on anticoagulation which is very important to take as directed - you may need blood work to monitor drug levels HgA1C this admission - 7.9  Make sure you get your HgA1c checked every three months.  If you take oral diabetes medications, check your blood glucose two times a day.  If you take insulin, check your blood glucose before meals and at bedtime.  It's important not to skip any meals.  Keep a log of your blood glucose results and always take it with you to your doctor appointments.  Keep a list of your current medications including injectables and over the counter medications and bring this medication list with you to all your doctor appointments.  If you have not seen your ophthalmologist this year call for appointment.  Check your feet daily for redness, sores, or openings. Do not self treat. If no improvement in two days call your primary care physician for an appointment.  Low blood sugar (hypoglycemia) is a blood sugar below 70mg/dl. Check your blood sugar if you feel signs/symptoms of hypoglycemia. If your blood sugar is below 70 take 15 grams of carbohydrates (ex 4 oz of apple juice, 3-4 glucose tablets, or 4-6 oz of regular soda) wait 15 minutes and repeat blood sugar to make sure it comes up above 70.  If your blood sugar is above 70 and you are due for a meal, have a meal.  If you are not due for a meal have a snack.  This snack helps keeps your blood sugar at a safe range. You will need to follow up with your endocrinologist, Dr. Tillman, [phone] within one week of discharge - please call to make an appointment.  You stated that you have Lantus and Novolog insulins at home.  You will take Lantus 12units subcutaneous at bedtime, and Novolog 6 units subcutaneous before each meal.

## 2017-03-12 NOTE — DISCHARGE NOTE ADULT - CARE PROVIDER_API CALL
Blossom Tillman), EndocrinologyMetabDiabetes; Internal Medicine  [id number] [place] [address]  Phone: [phone]  Fax: [phone]

## 2017-03-12 NOTE — DISCHARGE NOTE ADULT - PATIENT PORTAL LINK FT
“You can access the FollowHealth Patient Portal, offered by [facility], by registering with the following website: [email or web address]”

## 2017-03-12 NOTE — DISCHARGE NOTE ADULT - SECONDARY DIAGNOSIS.
Osteomyelitis of toe of left foot Chronic atrial fibrillation Type 2 diabetes mellitus with other circulatory complication, with long-term current use of insulin HTN (hypertension), benign CKD (chronic kidney disease) stage 3, GFR 30-59 ml/min Coronary artery disease of native artery of native heart with stable angina pectoris

## 2017-03-15 LAB — SURGICAL PATHOLOGY STUDY: SIGNIFICANT CHANGE UP

## 2017-03-16 LAB
CULTURE RESULTS: SIGNIFICANT CHANGE UP
SPECIMEN SOURCE: SIGNIFICANT CHANGE UP

## 2017-04-12 LAB
CULTURE RESULTS: SIGNIFICANT CHANGE UP
SPECIMEN SOURCE: SIGNIFICANT CHANGE UP

## 2017-04-26 LAB
CULTURE RESULTS: SIGNIFICANT CHANGE UP
SPECIMEN SOURCE: SIGNIFICANT CHANGE UP

## 2019-02-03 ENCOUNTER — INPATIENT (INPATIENT)
Facility: HOSPITAL | Age: 74
LOS: 15 days | DRG: 853 | End: 2019-02-19
Attending: INTERNAL MEDICINE | Admitting: HOSPITALIST
Payer: MEDICARE

## 2019-02-03 VITALS
WEIGHT: 134.92 LBS | DIASTOLIC BLOOD PRESSURE: 62 MMHG | OXYGEN SATURATION: 98 % | RESPIRATION RATE: 19 BRPM | SYSTOLIC BLOOD PRESSURE: 106 MMHG | HEART RATE: 76 BPM

## 2019-02-03 DIAGNOSIS — A41.9 SEPSIS, UNSPECIFIED ORGANISM: ICD-10-CM

## 2019-02-03 DIAGNOSIS — Z29.9 ENCOUNTER FOR PROPHYLACTIC MEASURES, UNSPECIFIED: ICD-10-CM

## 2019-02-03 DIAGNOSIS — Z79.899 OTHER LONG TERM (CURRENT) DRUG THERAPY: ICD-10-CM

## 2019-02-03 DIAGNOSIS — E87.0 HYPEROSMOLALITY AND HYPERNATREMIA: ICD-10-CM

## 2019-02-03 DIAGNOSIS — I50.9 HEART FAILURE, UNSPECIFIED: ICD-10-CM

## 2019-02-03 DIAGNOSIS — R74.8 ABNORMAL LEVELS OF OTHER SERUM ENZYMES: ICD-10-CM

## 2019-02-03 DIAGNOSIS — Z98.890 OTHER SPECIFIED POSTPROCEDURAL STATES: Chronic | ICD-10-CM

## 2019-02-03 DIAGNOSIS — E87.3 ALKALOSIS: ICD-10-CM

## 2019-02-03 DIAGNOSIS — N17.9 ACUTE KIDNEY FAILURE, UNSPECIFIED: ICD-10-CM

## 2019-02-03 DIAGNOSIS — Z95.828 PRESENCE OF OTHER VASCULAR IMPLANTS AND GRAFTS: Chronic | ICD-10-CM

## 2019-02-03 DIAGNOSIS — Z95.0 PRESENCE OF CARDIAC PACEMAKER: Chronic | ICD-10-CM

## 2019-02-03 DIAGNOSIS — I48.2 CHRONIC ATRIAL FIBRILLATION: ICD-10-CM

## 2019-02-03 DIAGNOSIS — I24.8 OTHER FORMS OF ACUTE ISCHEMIC HEART DISEASE: ICD-10-CM

## 2019-02-03 DIAGNOSIS — S22.080A WEDGE COMPRESSION FRACTURE OF T11-T12 VERTEBRA, INITIAL ENCOUNTER FOR CLOSED FRACTURE: ICD-10-CM

## 2019-02-03 DIAGNOSIS — Z95.9 PRESENCE OF CARDIAC AND VASCULAR IMPLANT AND GRAFT, UNSPECIFIED: Chronic | ICD-10-CM

## 2019-02-03 DIAGNOSIS — M79.89 OTHER SPECIFIED SOFT TISSUE DISORDERS: ICD-10-CM

## 2019-02-03 LAB
ALBUMIN SERPL ELPH-MCNC: 2.2 G/DL — LOW (ref 3.3–5)
ALP SERPL-CCNC: 254 U/L — HIGH (ref 40–120)
ALT FLD-CCNC: 19 U/L — SIGNIFICANT CHANGE UP (ref 10–45)
ANION GAP SERPL CALC-SCNC: 10 MMOL/L — SIGNIFICANT CHANGE UP (ref 5–17)
APPEARANCE UR: ABNORMAL
APTT BLD: 47.6 SEC — HIGH (ref 27.5–36.3)
AST SERPL-CCNC: 16 U/L — SIGNIFICANT CHANGE UP (ref 10–40)
BASOPHILS # BLD AUTO: 0 K/UL — SIGNIFICANT CHANGE UP (ref 0–0.2)
BASOPHILS NFR BLD AUTO: 0.2 % — SIGNIFICANT CHANGE UP (ref 0–2)
BILIRUB SERPL-MCNC: 0.3 MG/DL — SIGNIFICANT CHANGE UP (ref 0.2–1.2)
BILIRUB UR-MCNC: NEGATIVE — SIGNIFICANT CHANGE UP
BUN SERPL-MCNC: 69 MG/DL — HIGH (ref 7–23)
CALCIUM SERPL-MCNC: 8.6 MG/DL — SIGNIFICANT CHANGE UP (ref 8.4–10.5)
CHLORIDE SERPL-SCNC: 105 MMOL/L — SIGNIFICANT CHANGE UP (ref 96–108)
CK SERPL-CCNC: 31 U/L — SIGNIFICANT CHANGE UP (ref 30–200)
CO2 SERPL-SCNC: 38 MMOL/L — HIGH (ref 22–31)
COLOR SPEC: YELLOW — SIGNIFICANT CHANGE UP
CREAT SERPL-MCNC: 1.78 MG/DL — HIGH (ref 0.5–1.3)
DIFF PNL FLD: ABNORMAL
EOSINOPHIL # BLD AUTO: 0.1 K/UL — SIGNIFICANT CHANGE UP (ref 0–0.5)
EOSINOPHIL NFR BLD AUTO: 0.6 % — SIGNIFICANT CHANGE UP (ref 0–6)
ERYTHROCYTE [SEDIMENTATION RATE] IN BLOOD: 66 MM/HR — HIGH (ref 0–20)
GAS PNL BLDV: SIGNIFICANT CHANGE UP
GLUCOSE SERPL-MCNC: 241 MG/DL — HIGH (ref 70–99)
GLUCOSE UR QL: NEGATIVE — SIGNIFICANT CHANGE UP
HCT VFR BLD CALC: 29.9 % — LOW (ref 39–50)
HGB BLD-MCNC: 9.1 G/DL — LOW (ref 13–17)
INR BLD: 3.29 RATIO — HIGH (ref 0.88–1.16)
KETONES UR-MCNC: NEGATIVE — SIGNIFICANT CHANGE UP
LACTATE BLDV-MCNC: 1.5 MMOL/L — SIGNIFICANT CHANGE UP (ref 0.7–2)
LEUKOCYTE ESTERASE UR-ACNC: ABNORMAL
LYMPHOCYTES # BLD AUTO: 1.3 K/UL — SIGNIFICANT CHANGE UP (ref 1–3.3)
LYMPHOCYTES # BLD AUTO: 8.8 % — LOW (ref 13–44)
MAGNESIUM SERPL-MCNC: 2.7 MG/DL — HIGH (ref 1.6–2.6)
MCHC RBC-ENTMCNC: 28.9 PG — SIGNIFICANT CHANGE UP (ref 27–34)
MCHC RBC-ENTMCNC: 30.4 GM/DL — LOW (ref 32–36)
MCV RBC AUTO: 95.1 FL — SIGNIFICANT CHANGE UP (ref 80–100)
MONOCYTES # BLD AUTO: 0.7 K/UL — SIGNIFICANT CHANGE UP (ref 0–0.9)
MONOCYTES NFR BLD AUTO: 4.5 % — SIGNIFICANT CHANGE UP (ref 2–14)
NEUTROPHILS # BLD AUTO: 13.1 K/UL — HIGH (ref 1.8–7.4)
NEUTROPHILS NFR BLD AUTO: 86 % — HIGH (ref 43–77)
NITRITE UR-MCNC: NEGATIVE — SIGNIFICANT CHANGE UP
PH UR: 7 — SIGNIFICANT CHANGE UP (ref 5–8)
PLATELET # BLD AUTO: 254 K/UL — SIGNIFICANT CHANGE UP (ref 150–400)
POTASSIUM SERPL-MCNC: 3.5 MMOL/L — SIGNIFICANT CHANGE UP (ref 3.5–5.3)
POTASSIUM SERPL-SCNC: 3.5 MMOL/L — SIGNIFICANT CHANGE UP (ref 3.5–5.3)
PROT SERPL-MCNC: 5.9 G/DL — LOW (ref 6–8.3)
PROT UR-MCNC: ABNORMAL
PROTHROM AB SERPL-ACNC: 38.9 SEC — HIGH (ref 10–12.9)
RBC # BLD: 3.15 M/UL — LOW (ref 4.2–5.8)
RBC # FLD: 18.2 % — HIGH (ref 10.3–14.5)
SODIUM SERPL-SCNC: 153 MMOL/L — HIGH (ref 135–145)
SP GR SPEC: 1.02 — SIGNIFICANT CHANGE UP (ref 1.01–1.02)
TROPONIN T, HIGH SENSITIVITY RESULT: 164 NG/L — HIGH (ref 0–51)
TROPONIN T, HIGH SENSITIVITY RESULT: 176 NG/L — HIGH (ref 0–51)
UROBILINOGEN FLD QL: NEGATIVE — SIGNIFICANT CHANGE UP
WBC # BLD: 15.3 K/UL — HIGH (ref 3.8–10.5)
WBC # FLD AUTO: 15.3 K/UL — HIGH (ref 3.8–10.5)

## 2019-02-03 PROCEDURE — 99223 1ST HOSP IP/OBS HIGH 75: CPT | Mod: GC

## 2019-02-03 PROCEDURE — 72131 CT LUMBAR SPINE W/O DYE: CPT | Mod: 26

## 2019-02-03 PROCEDURE — 71045 X-RAY EXAM CHEST 1 VIEW: CPT | Mod: 26

## 2019-02-03 PROCEDURE — 93971 EXTREMITY STUDY: CPT | Mod: 26,RT

## 2019-02-03 PROCEDURE — 99285 EMERGENCY DEPT VISIT HI MDM: CPT

## 2019-02-03 RX ORDER — SODIUM CHLORIDE 9 MG/ML
1000 INJECTION, SOLUTION INTRAVENOUS
Qty: 0 | Refills: 0 | Status: DISCONTINUED | OUTPATIENT
Start: 2019-02-03 | End: 2019-02-04

## 2019-02-03 RX ORDER — GLUCAGON INJECTION, SOLUTION 0.5 MG/.1ML
1 INJECTION, SOLUTION SUBCUTANEOUS ONCE
Qty: 0 | Refills: 0 | Status: DISCONTINUED | OUTPATIENT
Start: 2019-02-03 | End: 2019-02-06

## 2019-02-03 RX ORDER — DEXTROSE 50 % IN WATER 50 %
12.5 SYRINGE (ML) INTRAVENOUS ONCE
Qty: 0 | Refills: 0 | Status: DISCONTINUED | OUTPATIENT
Start: 2019-02-03 | End: 2019-02-06

## 2019-02-03 RX ORDER — DEXTROSE 50 % IN WATER 50 %
15 SYRINGE (ML) INTRAVENOUS ONCE
Qty: 0 | Refills: 0 | Status: DISCONTINUED | OUTPATIENT
Start: 2019-02-03 | End: 2019-02-06

## 2019-02-03 RX ORDER — COLLAGENASE CLOSTRIDIUM HIST. 250 UNIT/G
1 OINTMENT (GRAM) TOPICAL
Qty: 0 | Refills: 0 | COMMUNITY

## 2019-02-03 RX ORDER — DOXAZOSIN MESYLATE 4 MG
1 TABLET ORAL
Qty: 0 | Refills: 0 | COMMUNITY

## 2019-02-03 RX ORDER — ACETAMINOPHEN 500 MG
650 TABLET ORAL EVERY 6 HOURS
Qty: 0 | Refills: 0 | Status: DISCONTINUED | OUTPATIENT
Start: 2019-02-03 | End: 2019-02-19

## 2019-02-03 RX ORDER — SENNA PLUS 8.6 MG/1
2 TABLET ORAL AT BEDTIME
Qty: 0 | Refills: 0 | Status: DISCONTINUED | OUTPATIENT
Start: 2019-02-03 | End: 2019-02-06

## 2019-02-03 RX ORDER — ONDANSETRON 8 MG/1
4 TABLET, FILM COATED ORAL EVERY 8 HOURS
Qty: 0 | Refills: 0 | Status: DISCONTINUED | OUTPATIENT
Start: 2019-02-03 | End: 2019-02-07

## 2019-02-03 RX ORDER — MAGNESIUM HYDROXIDE 400 MG/1
30 TABLET, CHEWABLE ORAL
Qty: 0 | Refills: 0 | COMMUNITY

## 2019-02-03 RX ORDER — INSULIN GLARGINE 100 [IU]/ML
12 INJECTION, SOLUTION SUBCUTANEOUS
Qty: 0 | Refills: 0 | COMMUNITY

## 2019-02-03 RX ORDER — INSULIN LISPRO 100/ML
3 VIAL (ML) SUBCUTANEOUS EVERY 6 HOURS
Qty: 0 | Refills: 0 | Status: DISCONTINUED | OUTPATIENT
Start: 2019-02-03 | End: 2019-02-04

## 2019-02-03 RX ORDER — INSULIN ASPART 100 [IU]/ML
6 INJECTION, SOLUTION SUBCUTANEOUS
Qty: 0 | Refills: 0 | COMMUNITY

## 2019-02-03 RX ORDER — INSULIN GLARGINE 100 [IU]/ML
10 INJECTION, SOLUTION SUBCUTANEOUS AT BEDTIME
Qty: 0 | Refills: 0 | Status: DISCONTINUED | OUTPATIENT
Start: 2019-02-03 | End: 2019-02-05

## 2019-02-03 RX ORDER — MAGNESIUM OXIDE 400 MG ORAL TABLET 241.3 MG
1 TABLET ORAL
Qty: 0 | Refills: 0 | COMMUNITY

## 2019-02-03 RX ORDER — MICONAZOLE NITRATE 2 %
1 CREAM (GRAM) TOPICAL
Qty: 0 | Refills: 0 | COMMUNITY

## 2019-02-03 RX ORDER — FUROSEMIDE 40 MG
1 TABLET ORAL
Qty: 0 | Refills: 0 | COMMUNITY

## 2019-02-03 RX ORDER — DOCUSATE SODIUM 100 MG
100 CAPSULE ORAL
Qty: 0 | Refills: 0 | Status: DISCONTINUED | OUTPATIENT
Start: 2019-02-03 | End: 2019-02-06

## 2019-02-03 RX ORDER — DEXTROSE 50 % IN WATER 50 %
25 SYRINGE (ML) INTRAVENOUS ONCE
Qty: 0 | Refills: 0 | Status: DISCONTINUED | OUTPATIENT
Start: 2019-02-03 | End: 2019-02-06

## 2019-02-03 RX ORDER — ONDANSETRON 8 MG/1
8 TABLET, FILM COATED ORAL THREE TIMES A DAY
Qty: 0 | Refills: 0 | Status: DISCONTINUED | OUTPATIENT
Start: 2019-02-03 | End: 2019-02-06

## 2019-02-03 RX ORDER — VANCOMYCIN HCL 1 G
1000 VIAL (EA) INTRAVENOUS ONCE
Qty: 0 | Refills: 0 | Status: COMPLETED | OUTPATIENT
Start: 2019-02-03 | End: 2019-02-03

## 2019-02-03 RX ORDER — INSULIN LISPRO 100/ML
0 VIAL (ML) SUBCUTANEOUS
Qty: 0 | Refills: 0 | COMMUNITY

## 2019-02-03 RX ORDER — PIPERACILLIN AND TAZOBACTAM 4; .5 G/20ML; G/20ML
3.38 INJECTION, POWDER, LYOPHILIZED, FOR SOLUTION INTRAVENOUS ONCE
Qty: 0 | Refills: 0 | Status: COMPLETED | OUTPATIENT
Start: 2019-02-03 | End: 2019-02-03

## 2019-02-03 RX ORDER — SODIUM HYPOCHLORITE 0.125 %
1 SOLUTION, NON-ORAL MISCELLANEOUS
Qty: 0 | Refills: 0 | COMMUNITY

## 2019-02-03 RX ORDER — POTASSIUM CHLORIDE 20 MEQ
1 PACKET (EA) ORAL
Qty: 0 | Refills: 0 | COMMUNITY

## 2019-02-03 RX ORDER — ACETAMINOPHEN 500 MG
2 TABLET ORAL
Qty: 0 | Refills: 0 | COMMUNITY

## 2019-02-03 RX ORDER — METOPROLOL TARTRATE 50 MG
0.5 TABLET ORAL
Qty: 0 | Refills: 0 | COMMUNITY

## 2019-02-03 RX ORDER — CEFEPIME 1 G/1
1000 INJECTION, POWDER, FOR SOLUTION INTRAMUSCULAR; INTRAVENOUS EVERY 12 HOURS
Qty: 0 | Refills: 0 | Status: DISCONTINUED | OUTPATIENT
Start: 2019-02-03 | End: 2019-02-05

## 2019-02-03 RX ORDER — FAMOTIDINE 10 MG/ML
1 INJECTION INTRAVENOUS
Qty: 0 | Refills: 0 | COMMUNITY

## 2019-02-03 RX ORDER — SODIUM CHLORIDE 9 MG/ML
1000 INJECTION, SOLUTION INTRAVENOUS
Qty: 0 | Refills: 0 | Status: DISCONTINUED | OUTPATIENT
Start: 2019-02-03 | End: 2019-02-06

## 2019-02-03 RX ORDER — SODIUM CHLORIDE 9 MG/ML
500 INJECTION INTRAMUSCULAR; INTRAVENOUS; SUBCUTANEOUS ONCE
Qty: 0 | Refills: 0 | Status: DISCONTINUED | OUTPATIENT
Start: 2019-02-03 | End: 2019-02-03

## 2019-02-03 RX ORDER — VANCOMYCIN HCL 1 G
1000 VIAL (EA) INTRAVENOUS DAILY
Qty: 0 | Refills: 0 | Status: DISCONTINUED | OUTPATIENT
Start: 2019-02-03 | End: 2019-02-08

## 2019-02-03 RX ORDER — INSULIN LISPRO 100/ML
VIAL (ML) SUBCUTANEOUS EVERY 6 HOURS
Qty: 0 | Refills: 0 | Status: DISCONTINUED | OUTPATIENT
Start: 2019-02-03 | End: 2019-02-05

## 2019-02-03 RX ORDER — FERROUS SULFATE 325(65) MG
1 TABLET ORAL
Qty: 0 | Refills: 0 | COMMUNITY

## 2019-02-03 RX ADMIN — Medication 6: at 22:01

## 2019-02-03 RX ADMIN — SODIUM CHLORIDE 75 MILLILITER(S): 9 INJECTION, SOLUTION INTRAVENOUS at 19:29

## 2019-02-03 RX ADMIN — PIPERACILLIN AND TAZOBACTAM 3.38 GRAM(S): 4; .5 INJECTION, POWDER, LYOPHILIZED, FOR SOLUTION INTRAVENOUS at 15:56

## 2019-02-03 RX ADMIN — PIPERACILLIN AND TAZOBACTAM 200 GRAM(S): 4; .5 INJECTION, POWDER, LYOPHILIZED, FOR SOLUTION INTRAVENOUS at 14:29

## 2019-02-03 RX ADMIN — INSULIN GLARGINE 10 UNIT(S): 100 INJECTION, SOLUTION SUBCUTANEOUS at 22:00

## 2019-02-03 RX ADMIN — Medication 250 MILLIGRAM(S): at 15:56

## 2019-02-03 NOTE — ED ADULT NURSE NOTE - NSIMPLEMENTINTERV_GEN_ALL_ED
Implemented All Fall with Harm Risk Interventions:  [place] to call system. Call bell, personal items and telephone within reach. Instruct patient to call for assistance. Room bathroom lighting operational. Non-slip footwear when patient is off stretcher. Physically safe environment: no spills, clutter or unnecessary equipment. Stretcher in lowest position, wheels locked, appropriate side rails in place. Provide visual cue, wrist band, yellow gown, etc. Monitor gait and stability. Monitor for mental status changes and reorient to person, place, and time. Review medications for side effects contributing to fall risk. Reinforce activity limits and safety measures with patient and family. Provide visual clues: red socks.

## 2019-02-03 NOTE — H&P ADULT - PROBLEM SELECTOR PLAN 4
- F/u [facility] US - Compression fracture of the T 12 vertebrae  - Ortho c/s in the am. No need for urgent consult  - No acute pain/radiculopathy  - Will need a PT consult

## 2019-02-03 NOTE — ED PROVIDER NOTE - MEDICAL DECISION MAKING DETAILS
73M with hx of T2DM, HTN, HLD, CAD, Afib (on Coumadin), PPM, CKD (not on dialysis), PAD (s/p stents), G-tube (for decreased po intake), in-dwelling [place] presenting from nursing home for elevated white count and increased confusion. Plan - basics, blood cultures, ua/uc, trop, ekg, cxr, imaging to assess for extent of decubitus ulcer.

## 2019-02-03 NOTE — H&P ADULT - NSHPLABSRESULTS_GEN_ALL_CORE
The Labs were reviewed by me   The Radiology was reviewed by me    EKG tracing reviewed by me        153<H>  |  105  |  69<H>  ----------------------------<  241<H>  3.5   |  38<H>  |  1.78<H>    Ca    8.6      2019 13:07  Phos  3.0       Mg     2.7         TPro  5.9<L>  /  Alb  2.2<L>  /  TBili  0.3  /  DBili  x   /  AST  16  /  ALT  19  /  AlkPhos  254<H>      Magnesium, Serum: 2.7 mg/dL (19 @ 13:07)    Phosphorus Level, Serum: 3.0 mg/dL (19 @ 13:07)      PT/INR - ( 2019 13:07 )   PT: 38.9 sec;   INR: 3.29 ratio         PTT - ( 2019 13:07 )  PTT:47.6 sec              Urinalysis Basic - ( 2019 13:07 )    Color: Yellow / Appearance: Slightly Turbid / S.022 / pH: x  Gluc: x / Ketone: Negative  / Bili: Negative / Urobili: Negative   Blood: x / Protein: 30 mg/dL / Nitrite: Negative   Leuk Esterase: Large / RBC: 40 /hpf /  /HPF   Sq Epi: x / Non Sq Epi: 1 / Bacteria: Negative                              9.1    15.3  )-----------( 254      ( 2019 13:07 )             29.9     CAPILLARY BLOOD GLUCOSE      POCT Blood Glucose.: 319 mg/dL (2019 11:57)    Blood Gas Source Venous: Venous (19 @ 13:07)

## 2019-02-03 NOTE — H&P ADULT - ASSESSMENT
74 yo admitted for sepsis of unknown etiology with concerns for a hoskins infection or 2/2 to decubitus ulcers c/b SHINE. 72 yo male  CAD c/b CABG, A fib (on coumadin), CKD (not on HD), PAD s/p stents, PEG tube, in-dwelling [place], with a hx of an extended hospital stay s/p hip fracture (re-admitted to [facility] for de-cubitus ulcers) admitted for metabolic encephalopathy 2/2 sepsis 2/2 to catheter associated UTI vs infected sacral decubitus ulcer vs osteo. Patient noted to have SHINE superimposed on CKD with worsening anemia and hypernatremia and underlying osteomyelitis with thoracic fracture

## 2019-02-03 NOTE — H&P ADULT - PROBLEM SELECTOR PLAN 3
- Free water via PEG tube  - IVF - Hypernatremia 2/2 to poor po intake and volume depletion (1.3 L free water deficit)  - Free water via PEG tube (200 cc q 4 hrs)  - Gentle IVF hydration with 1/2 NS  - F/u BMP q 12 hrs now. Will change to BMP q Day once sodium downtrends

## 2019-02-03 NOTE — H&P ADULT - HISTORY OF PRESENT ILLNESS
74 yo male with CAD c/b CABG, A fib (on coumadin), CKD (not on HD), PAD s/p stents, PEG tube, in-dwelling [place] who presents to the nursing home for increased confusion and elevated WBC. History was taken from wife at bedside.	Per wife, patient had been fully functional up until November. He had a hypoglycemic episode, fell, and broke his hip in November. He was admitted to the hospital for surgery, which was uneventful and sent to rehab (in early December). During the rehab stay, he developed numerous ulcers, including a decubitus ulcer. He was sent back to [facility] for sepsis from the decubitus ulcer. He was treated with abx and returned to rehab for a second time. This second hospital course was complicated by acute renal failure requiring dialysis, IV abx, and "surgery" to clean the ulcer.    In the ED, he received Vanc/Zosyn. He was admitted to medicine for further management. 72 yo male with CAD c/b CABG, A fib (on coumadin), CKD stage 3, PAD s/p stents, PEG tube, in-dwelling [place] who presents to the nursing home for increased confusion and elevated WBC. History was taken from wife at bedside.	Per wife, patient had been fully functional up until November. He had a hypoglycemic episode, fell, and broke his hip in November. He was admitted to the hospital for surgery, which was uneventful and sent to rehab (in early December). During the rehab stay, he developed numerous ulcers, including a decubitus ulcer. He was sent back to [facility] for sepsis from the decubitus ulcer. He was treated with abx and returned to rehab for a second time. This second hospital course was complicated by acute renal failure requiring dialysis, IV abx, and "surgery" to clean the ulcer.    In the ED, he received Vanc/Zosyn. He was admitted to medicine for further management.

## 2019-02-03 NOTE — ED ADULT NURSE NOTE - CHIEF COMPLAINT QUOTE
wound to left arm and coccyx - coming from nursing home- as per md at [facility] wound is infected

## 2019-02-03 NOTE — H&P ADULT - PROBLEM SELECTOR PLAN 5
- Elevated cardiac enzymes in setting of sepsis  - Continue to monitor - Rate controlled for now  - INR supratherapeutic  - Will repeat INR in the am and dose Coumadin goals of 2-3. - likely 2/2 to contraction from volume depletion and compensation from respiratory acidosis  - Will re-check BMP in am  - IVF and free water for hydration

## 2019-02-03 NOTE — ED ADULT NURSE REASSESSMENT NOTE - NS ED NURSE REASSESS COMMENT FT1
Report given to holding NATHAN Kearns. Patient aware. Patient currently comfortable. back from US  Report given to holding NATHAN Kearns. Patient aware. Patient currently comfortable.

## 2019-02-03 NOTE — H&P ADULT - PROBLEM SELECTOR PLAN 7
- Elevated BNP  - Be conservative with fluid hydration for now - Elevated cardiac enzymes in setting of sepsis  - Continue to monitor - Elevated BNP, pulmonary edema, CXR, but clinically the patient does not look volume overloaded  - Will hold off on diuresis for now  - Echo  - Admit to Tele

## 2019-02-03 NOTE — H&P ADULT - PROBLEM SELECTOR PLAN 1
Vanc/Cefepime  F/u cultures  UA positive for blood and leukocyte esterase  - Given CT finding concerning for osteomyeltitis, will c/w ID in the am. - Given leukocytosis tachypnea, patient meets SIRS criteria with numerous sources including catheter associated UTI, decubitus ulcers, and osteomyelitis  - Will start on Vanc/Cefepime to cover for MRSA and pseudomonas   - F/u Blood cultures  - F/u Urine cultures  - Send of Lactate and ESR  - Change Tejeda  - Wound care C/s and ID c/s given Osteomyelitis in the am.

## 2019-02-03 NOTE — H&P ADULT - FAMILY HISTORY
No pertinent family history in first degree relatives Father  Still living? Unknown  Family history of hypertension, Age at diagnosis: Age Unknown

## 2019-02-03 NOTE — H&P ADULT - NSHPPHYSICALEXAM_GEN_ALL_CORE
ICU Vital Signs Last 24 Hrs  T(C): 36.1 (03 Feb 2019 12:20), Max: 36.1 (03 Feb 2019 12:20)  T(F): 97 (03 Feb 2019 12:20), Max: 97 (03 Feb 2019 12:20)  HR: 82 (03 Feb 2019 12:20) (76 - 82)  BP: 115/68 (03 Feb 2019 12:20) (106/62 - 115/68)  BP(mean): --  ABP: --  ABP(mean): --  RR: 26 (03 Feb 2019 12:20) (19 - 26)  SpO2: 98% (03 Feb 2019 12:20) (98% - 98%)    PHYSICAL EXAM:  GENERAL: resting in bed, uncomfortable, pulling off nasal cannula  HEAD:  Atraumatic, Normocephalic  EYES: EOMI, PERRLA, conjunctiva and sclera clear  NECK: Supple, No JVD  CHEST/LUNG: Clear to auscultation bilaterally; No wheeze  HEART: Regular rate and rhythm; No murmurs, rubs, or gallops  ABDOMEN: Soft, Nontender, Nondistended; Bowel sounds present  EXTREMITIES:  2+ Peripheral Pulses, No clubbing, cyanosis, or edema  PSYCH: Unable to assess  NEUROLOGY: non-focal, alert, oriented x 1  SKIN: eschar on right heal, + sacral decubitus ulcer,

## 2019-02-03 NOTE — H&P ADULT - NSHPSOCIALHISTORY_GEN_ALL_CORE
Former smoker (quit since Hip fracture)  Denies EtOH use or IVDU  Formally independent in all ADLs (worsening decline since Saturday)

## 2019-02-03 NOTE — ED PROVIDER NOTE - PHYSICAL EXAMINATION
GENERAL: cachectic, rectal temp of 97F  HEAD: NCAT; no C-spine tenderness, PERRLA  HEENT: Normal conjunctiva, oral mucosa dry  CARDIAC: paced, rate in 80s  PULM: lung sounds diminished bilaterally with soft crackles  GI: + G-tube; large surrounding erythematous rash, nontender; no pain to palpation of abdomen  NEURO: AAO x 1 (only to name)  MSK: moves all 4 extremities; well-healing right hip scar  SKIN: large decubitus ulcer stage 4, 10 cm x 10 cm; multiple eschars over lower extremities bilaterally; multiple excoriations over bilateral arms with areas of early skin breakdown  PSYCH: Appropriate mood and affect

## 2019-02-03 NOTE — H&P ADULT - PROBLEM SELECTOR PLAN 10
#DM  - Started on Lantus 6 and SSI  DVT: on coumadin for DVT prophylaxis  Diet: PEG feeds  Dispo: PT consult  Pending resolution of symptoms

## 2019-02-03 NOTE — ED ADULT NURSE REASSESSMENT NOTE - NS ED NURSE REASSESS COMMENT FT1
Report received from NATHAN Mensah. Patient speech garbled, difficult to understand but oriented to person and place. Patient with no current complaints. Admitted and awaiting bed assignment.

## 2019-02-03 NOTE — ED PROVIDER NOTE - OBJECTIVE STATEMENT
73M with hx of T2DM, HTN, HLD, CAD, Afib (on Coumadin), PPM, CKD (not on dialysis), PAD (s/p stents), G-tube (for decreased po intake), in-dwelling [place] presenting from nursing home for elevated white count and increased confusion. Wife at bedside providing history. Stated that patient was axo x3, performing ADLs up until December last year. Had a hypoglycemic event, had a traumatic fall leading to right hip fracture. Subsequently placed in rehab, developed a bed sore and became septic. Since then, has been in rehab. Patient currently denying any pain, except back pain.     PMD: Dr. Federico Sood (cell : [phone])

## 2019-02-03 NOTE — ED PROVIDER NOTE - ATTENDING CONTRIBUTION TO CARE
Patient presenting from nursing home for rising WBC count on outpatient labs.  History provided by wife.  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  Reportedly developed bed sores while recovering from surgical repair of hip (surgery/rehab not at [facility]), then became septic from bedsores and went into renal failure requiring dialysis.  Has had debridement procedure of sacral bedsore also at another hospital.  Now baseline is A&Ox1.  Patient stopped eating during same course and had G tube placed for nutrition.  Current rehab facility had been obtaining labs with rising WBC count - sent to Emergency Department for evaluation of source of infection.  No fevers at rehab however with reporting that he did not run fever with prior episode of sepsis at OSH.    Unable to obtain reliable ROS due to mental status.    Exam:  General: Patient chronically ill appearing, normal O2 sat on 2L NC but desats and becomes tachypneic if off otherwise vital signs within normal limits  HEENT: airway patent with moist mucous membranes  Cardiac: RRR S1/S2 no peripheral edema  Respiratory: lungs with faint crackles at bases  GI: abdomen soft, non tender, G tube in place  : no erythema/crepitus, hoskins catheter in place  Neuro: moving all extremities  Skin: stage IV pressure ulcer to sacrum with palpable bone present, unstageable pressure ulcers to bilateral heels and R shin.  splotchy erythmatous eruption on stomach which seems possibly fungal - was underneath moist bandages surounding g tube site  Psych: normal mood and affect    Patient from rehab facility with numerous pressure ulcers and rising WBC count concerning for infection, differential is extensive - UTI, pneumonia, sacral osteomyelitis.  Discussed with wife, given extent of pressure ulcerations long term prognosis of these wounds seems poor.  Plan for repeat labs, blood cultures, CXR, UA, UC, CT L/S spine to evaluate for osteomyelitis clinically, likely admission on IV antibiotics pending blood cultures also for ID consultation, wound care consultation.

## 2019-02-03 NOTE — ED ADULT NURSE NOTE - OBJECTIVE STATEMENT
73y m pt arrived via ems from rehab; emt pt broke hip and has been in rehab; pt has developed pressure ulcers and facility told emt pt white count is elevated; wife at bedside states pt fell and broke right hip 11/25/2018; taken to [facility] for surgery and the to rehab; while in rehab pt has developed pressure ulcers, became septic, kidneys shut down, had to have 3 days of dialysis, pt has been confused since katheryn; pt has stopped eating 73y m pt arrived via ems from rehab; emt pt broke hip and has been in rehab; pt has developed pressure ulcers and facility told emt pt white count is elevated; wife at bedside states pt fell and broke right hip 11/25/2018; taken to [facility] for surgery and the to rehab; while in rehab pt has developed pressure ulcers, became septic, kidneys shut down, had to have 3 days of dialysis, pt has been confused since katheryn; pt has stopped eating so feeding tube was placed x 2 weeks; pt had sacral pressure ulcer debrided 2 weeks ago; pt has hx of afib, pacemaker, quadruple bypass and diabetes; wife states sugar has been all over the place; area around peg is wet with rash; peg opening oozing yellow drainage 73y m pt arrived via ems from rehab; emt pt broke hip and has been in rehab; pt has developed pressure ulcers and facility told emt pt white count is elevated; wife at bedside states pt fell and broke right hip 11/25/2018; taken to [facility] for surgery and the to rehab; while in rehab pt has developed pressure ulcers, became septic, kidneys shut down, had to have 3 days of dialysis, pt has been confused since christmas; pt has stopped eating so feeding tube was placed x 2 weeks; pt had sacral pressure ulcer debrided 2 weeks ago; pt has hx of afib, pacemaker, quadruple bypass and diabetes; wife states sugar has been all over the place; area around peg is wet with rash; peg opening oozing yellow drainage; hoskins catheter in place from rehab; pus at meatus with some dark blood; cloudy yellow urine with sediment in tube; ekg done; finger stick done; iv placed; labs drawn per md order; d/c'd indwelling hoskins catheter; hoskins catheter replaced per md order using sterile technique; pt tolerated procedure well; 30ml dark yellow urine returned; skin dry with multiple bruises and skin tears; healing skin care to inside of right arm, lower right and left arms all with dressings; safety/comfort maintained; placed pt on left side to keep pressure off sacrum

## 2019-02-03 NOTE — H&P ADULT - PROBLEM SELECTOR PLAN 2
Dose Coumadin for Afib - SHINE on CKD stage 3 (GFR 48) with anemia, pre-renal azotemia 2/2 to po intake  - Likely 2/2 to poor po intake. Will send off urine lytes  - Will start IVF hydration and free water via peg tube  - Anemia likely anemia of chronic disease. F/u Iron Panel, Folic Acid, and stool guiac.  - trend Cr  - avoid nephrotoxic drugs, renally dose meds

## 2019-02-03 NOTE — ED ADULT TRIAGE NOTE - CHIEF COMPLAINT QUOTE
wound to left arm and coccyx - coming from nursing home- as per md at [facility] wound is infected

## 2019-02-03 NOTE — ED PROVIDER NOTE - PROGRESS NOTE DETAILS
Tong: patient tba for sepsis likely 2/2 decubitus ulcer. Keith: patient admitted for sepsis likely 2/2 decubitus ulcer. Stable for admission, has been on 2L NC, but vitals wnl since being here.

## 2019-02-04 LAB
ALBUMIN SERPL ELPH-MCNC: 2.2 G/DL — LOW (ref 3.3–5)
ALP SERPL-CCNC: 231 U/L — HIGH (ref 40–120)
ALT FLD-CCNC: 19 U/L — SIGNIFICANT CHANGE UP (ref 10–45)
ANION GAP SERPL CALC-SCNC: 13 MMOL/L — SIGNIFICANT CHANGE UP (ref 5–17)
APTT BLD: 45.8 SEC — HIGH (ref 27.5–36.3)
AST SERPL-CCNC: 16 U/L — SIGNIFICANT CHANGE UP (ref 10–40)
BASOPHILS # BLD AUTO: 0.01 K/UL — SIGNIFICANT CHANGE UP (ref 0–0.2)
BASOPHILS NFR BLD AUTO: 0.1 % — SIGNIFICANT CHANGE UP (ref 0–2)
BILIRUB SERPL-MCNC: 0.4 MG/DL — SIGNIFICANT CHANGE UP (ref 0.2–1.2)
BUN SERPL-MCNC: 60 MG/DL — HIGH (ref 7–23)
CALCIUM SERPL-MCNC: 8.4 MG/DL — SIGNIFICANT CHANGE UP (ref 8.4–10.5)
CHLORIDE SERPL-SCNC: 106 MMOL/L — SIGNIFICANT CHANGE UP (ref 96–108)
CO2 SERPL-SCNC: 34 MMOL/L — HIGH (ref 22–31)
CREAT SERPL-MCNC: 1.67 MG/DL — HIGH (ref 0.5–1.3)
CULTURE RESULTS: SIGNIFICANT CHANGE UP
EOSINOPHIL # BLD AUTO: 0.04 K/UL — SIGNIFICANT CHANGE UP (ref 0–0.5)
EOSINOPHIL NFR BLD AUTO: 0.2 % — SIGNIFICANT CHANGE UP (ref 0–6)
FERRITIN SERPL-MCNC: 262 NG/ML — SIGNIFICANT CHANGE UP (ref 30–400)
FOLATE SERPL-MCNC: >20 NG/ML — SIGNIFICANT CHANGE UP
GAS PNL BLDA: SIGNIFICANT CHANGE UP
GLUCOSE SERPL-MCNC: 54 MG/DL — LOW (ref 70–99)
HBA1C BLD-MCNC: 7.4 % — HIGH (ref 4–5.6)
HCT VFR BLD CALC: 29 % — LOW (ref 39–50)
HGB BLD-MCNC: 8.3 G/DL — LOW (ref 13–17)
IMM GRANULOCYTES NFR BLD AUTO: 0.4 % — SIGNIFICANT CHANGE UP (ref 0–1.5)
INR BLD: 2.64 RATIO — HIGH (ref 0.88–1.16)
IRON SATN MFR SERPL: 13 % — LOW (ref 16–55)
IRON SATN MFR SERPL: 18 UG/DL — LOW (ref 45–165)
LYMPHOCYTES # BLD AUTO: 1.35 K/UL — SIGNIFICANT CHANGE UP (ref 1–3.3)
LYMPHOCYTES # BLD AUTO: 7.9 % — LOW (ref 13–44)
MAGNESIUM SERPL-MCNC: 2.5 MG/DL — SIGNIFICANT CHANGE UP (ref 1.6–2.6)
MCHC RBC-ENTMCNC: 27.5 PG — SIGNIFICANT CHANGE UP (ref 27–34)
MCHC RBC-ENTMCNC: 28.6 GM/DL — LOW (ref 32–36)
MCV RBC AUTO: 96 FL — SIGNIFICANT CHANGE UP (ref 80–100)
MONOCYTES # BLD AUTO: 0.89 K/UL — SIGNIFICANT CHANGE UP (ref 0–0.9)
MONOCYTES NFR BLD AUTO: 5.2 % — SIGNIFICANT CHANGE UP (ref 2–14)
NEUTROPHILS # BLD AUTO: 14.7 K/UL — HIGH (ref 1.8–7.4)
NEUTROPHILS NFR BLD AUTO: 86.2 % — HIGH (ref 43–77)
PHOSPHATE SERPL-MCNC: 2.6 MG/DL — SIGNIFICANT CHANGE UP (ref 2.5–4.5)
PLATELET # BLD AUTO: 306 K/UL — SIGNIFICANT CHANGE UP (ref 150–400)
POTASSIUM SERPL-MCNC: 3.3 MMOL/L — LOW (ref 3.5–5.3)
POTASSIUM SERPL-SCNC: 3.3 MMOL/L — LOW (ref 3.5–5.3)
PROT SERPL-MCNC: 5.8 G/DL — LOW (ref 6–8.3)
PROTHROM AB SERPL-ACNC: 31 SEC — HIGH (ref 10–13.1)
RBC # BLD: 3.02 M/UL — LOW (ref 4.2–5.8)
RBC # FLD: 20.9 % — HIGH (ref 10.3–14.5)
SODIUM SERPL-SCNC: 153 MMOL/L — HIGH (ref 135–145)
SPECIMEN SOURCE: SIGNIFICANT CHANGE UP
TIBC SERPL-MCNC: 143 UG/DL — LOW (ref 220–430)
UIBC SERPL-MCNC: 125 UG/DL — SIGNIFICANT CHANGE UP (ref 110–370)
WBC # BLD: 17.06 K/UL — HIGH (ref 3.8–10.5)
WBC # FLD AUTO: 17.06 K/UL — HIGH (ref 3.8–10.5)

## 2019-02-04 PROCEDURE — 93306 TTE W/DOPPLER COMPLETE: CPT | Mod: 26

## 2019-02-04 PROCEDURE — 99233 SBSQ HOSP IP/OBS HIGH 50: CPT | Mod: GC

## 2019-02-04 PROCEDURE — 71045 X-RAY EXAM CHEST 1 VIEW: CPT | Mod: 26

## 2019-02-04 PROCEDURE — 99232 SBSQ HOSP IP/OBS MODERATE 35: CPT

## 2019-02-04 PROCEDURE — 99222 1ST HOSP IP/OBS MODERATE 55: CPT

## 2019-02-04 RX ORDER — DEXTROSE 50 % IN WATER 50 %
12.5 SYRINGE (ML) INTRAVENOUS ONCE
Qty: 0 | Refills: 0 | Status: COMPLETED | OUTPATIENT
Start: 2019-02-04 | End: 2019-02-04

## 2019-02-04 RX ORDER — SODIUM CHLORIDE 9 MG/ML
1000 INJECTION, SOLUTION INTRAVENOUS
Qty: 0 | Refills: 0 | Status: DISCONTINUED | OUTPATIENT
Start: 2019-02-04 | End: 2019-02-05

## 2019-02-04 RX ORDER — DEXTROSE 50 % IN WATER 50 %
25 SYRINGE (ML) INTRAVENOUS ONCE
Qty: 0 | Refills: 0 | Status: COMPLETED | OUTPATIENT
Start: 2019-02-04 | End: 2019-02-04

## 2019-02-04 RX ORDER — POTASSIUM CHLORIDE 20 MEQ
20 PACKET (EA) ORAL ONCE
Qty: 0 | Refills: 0 | Status: DISCONTINUED | OUTPATIENT
Start: 2019-02-04 | End: 2019-02-04

## 2019-02-04 RX ORDER — SODIUM HYPOCHLORITE 0.125 %
1 SOLUTION, NON-ORAL MISCELLANEOUS THREE TIMES A DAY
Qty: 0 | Refills: 0 | Status: DISCONTINUED | OUTPATIENT
Start: 2019-02-04 | End: 2019-02-19

## 2019-02-04 RX ORDER — LANOLIN ALCOHOL/MO/W.PET/CERES
5 CREAM (GRAM) TOPICAL AT BEDTIME
Qty: 0 | Refills: 0 | Status: DISCONTINUED | OUTPATIENT
Start: 2019-02-04 | End: 2019-02-04

## 2019-02-04 RX ORDER — POTASSIUM CHLORIDE 20 MEQ
10 PACKET (EA) ORAL
Qty: 0 | Refills: 0 | Status: COMPLETED | OUTPATIENT
Start: 2019-02-04 | End: 2019-02-04

## 2019-02-04 RX ADMIN — Medication 25 GRAM(S): at 15:04

## 2019-02-04 RX ADMIN — Medication 12.5 GRAM(S): at 07:24

## 2019-02-04 RX ADMIN — Medication 1: at 18:12

## 2019-02-04 RX ADMIN — Medication 100 MILLIEQUIVALENT(S): at 15:38

## 2019-02-04 RX ADMIN — CEFEPIME 100 MILLIGRAM(S): 1 INJECTION, POWDER, FOR SOLUTION INTRAMUSCULAR; INTRAVENOUS at 19:38

## 2019-02-04 RX ADMIN — SODIUM CHLORIDE 100 MILLILITER(S): 9 INJECTION, SOLUTION INTRAVENOUS at 15:31

## 2019-02-04 RX ADMIN — Medication 100 MILLIEQUIVALENT(S): at 17:53

## 2019-02-04 RX ADMIN — Medication 100 MILLIEQUIVALENT(S): at 14:23

## 2019-02-04 RX ADMIN — CEFEPIME 100 MILLIGRAM(S): 1 INJECTION, POWDER, FOR SOLUTION INTRAMUSCULAR; INTRAVENOUS at 05:40

## 2019-02-04 RX ADMIN — Medication 12.5 GRAM(S): at 14:42

## 2019-02-04 RX ADMIN — Medication 250 MILLIGRAM(S): at 19:37

## 2019-02-04 NOTE — PROGRESS NOTE ADULT - PROBLEM SELECTOR PLAN 4
- Compression fracture of the T 12 vertebrae  - Neurosurgery c/s  - No acute pain/radiculopathy  - F/u PT consult.

## 2019-02-04 NOTE — PROVIDER CONTACT NOTE (OTHER) - ASSESSMENT
Pt is in bed. No acute s/s of respiratory Pt is in bed. No acute s/s of respiratory distress. 113/55.

## 2019-02-04 NOTE — CONSULT NOTE ADULT - ASSESSMENT
A/P: 72 yo male  CAD c/b CABG, A fib (on coumadin), CKD (not on HD), PAD s/p stents, PEG tube, in-dwelling Te[place], with a hx of an extended hospital stay s/p hip fracture (re-admitted to [facility] for de-cubitus ulcers) admitted for metabolic encephalopathy 2/2 sepsis 2/2 to catheter associated UTI vs infected sacral decubitus ulcer vs osteo. Patient noted to have SHINE superimposed on CKD with worsening anemia and hypernatremia and underlying osteomyelitis with thoracic fracture    SACRAL stage 4 pressure injury- DAKINS packing  BUE w/ partial thickness skin tears- ADAPTIC  BLE PAD w/ Dry eschar/ gangrene- BETADINE  Consider Vascular  Consider SHERIE/PVR, XRay, Duplex, CT  BLE elevation  Abx per Medicine/ ID  Moisturize intact skin w/ SWEEN cream BID  con't Nutrition (as tolerated), Nutrition Consult  con't Offloading   con't Pericare  Care as per medicine will follow w/ you  Upon discharge f/u as outpatient at [facility] [address] [place] [address] [phone]  Seen w/ attng and D/w team  Thank you for this consult  Ledy Anderson PA-C CWS [id number]

## 2019-02-04 NOTE — CHART NOTE - NSCHARTNOTEFT_GEN_A_CORE
73y Male with multiple co-morbidities including chronic indwelling hoskins catheter and stage IV sacral decubitus ulcer presents with altered mental status and sepsis. Pt had a CT Lumbar Spine that showed sacral decubitus ulcer with underlying sacral osteomyelitis. Interventional Radiology was consulted for sacral bone biopsy. Case discussed with IR attending Dr. Manzo. Upon review of pt's labs and imaging, given that it's an open wound exposed to air, a bone biopsy would be very low yield at this time. If patient's clinical status doesn't improve with antibiotics IR can be reconsulted at that time for a bone biopsy.    Please call IR at extension [phone] with any questions, concerns, or issues regarding above.

## 2019-02-04 NOTE — CONSULT NOTE ADULT - SUBJECTIVE AND OBJECTIVE BOX
Yazmin Moses - [phone]    Patient is a 73y old  Male who presents with a chief complaint of Sepsis (2019 11:36)    HPI:  73M with CAD s/p stent, angioplasty, CABG, A fib (on coumadin), CKD stage 3, PAD s/p left leg stent, s/p aortobifemoral bypass.  He is s/p fall November c/b R hip fracture.  S/p ORIF.  Discharged to rehab.  Since then, course complicated development of several pressure ulcers.  Was on antibiotics and sent to [facility] at some point.  Details are not clear.  Yesterday, he was seen in the ER for altered mental status and leukocytosis.  Per the wife, he has not been himself since December.  Seen by wound care who noted large sacral decub with exposed bone.  CT performed shows sacral OM.  IR contacted for bone biopsy but not amenable for bone biopsy/culture.  Vancomycin/zosyn - changed to vancomycin/cefepime started.  ID asked to evaluate.    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 650 every 6 hours PRN  docusate sodium 100 two times a day  glucagon  Injectable 1 once PRN  insulin glargine Injectable (LANTUS) 10 at bedtime  insulin lispro (HumaLOG) corrective regimen sliding scale  every 6 hours  ondansetron   Solution 8 three times a day  senna 2 at bedtime    SOCIAL HISTORY:   former smoker; elevator consultant;     FAMILY HISTORY:  Family history of hypertension (Father)  mother  86 of stroke; father was 76 - unknown cause of death    REVIEW OF SYSTEMS  [  ] ROS unobtainable because:    [x  ] All other systems negative except as noted below:	    Constitutional:  [ ] fever [ ] chills  [x ] weight loss  [ ] weakness  Skin:  [ ] rash [ ] phlebitis	  Eyes: [ ] icterus [ ] pain  [ ] discharge	  ENMT: [ ] sore throat  [ ] thrush [ ] ulcers [ ] exudates  Respiratory: [ ] dyspnea [ ] hemoptysis [ ] cough [ ] sputum	  Cardiovascular:  [ ] chest pain [ ] palpitations [x ] edema	  Gastrointestinal:  [ ] nausea [ ] vomiting [ ] diarrhea [ ] constipation [ ] pain	  Genitourinary:  [ ] dysuria [ ] frequency [ ] hematuria [ ] discharge [ ] flank pain  [ ] incontinence  Musculoskeletal:  [ ] myalgias [ ] arthralgias [ ] arthritis  [ ] back pain  Neurological:  [ ] headache [ ] seizures  [x ] confusion/altered mental status  Psychiatric:  [ ] anxiety [ ] depression	  Hematology/Lymphatics:  [ ] lymphadenopathy  Endocrine:  [ ] adrenal [ ] thyroid  Allergic/Immunologic:	 [ ] transplant [ ] seasonal    Vital Signs Last 24 Hrs  T(F): 97.4 (19 @ 10:48), Max: 98 (19 @ 19:15)    Vital Signs Last 24 Hrs  HR: 118 (19 @ 10:48) (95 - 118)  BP: 93/59 (19 @ 10:48) (93/59 - 118/60)  RR: 19 (19 @ 10:48)  SpO2: 97% (19 @ 10:48) (97% - 100%)  Wt(kg): --    PHYSICAL EXAM:  General: non-toxic; wife at bedside  HEAD/EYES: anicteric  ENT:  supple  Cardiovascular:   S1, S2  Respiratory:  clear bilaterally  GI:  soft, non-tender, normal bowel sounds; peg  :  no hoskins  Musculoskeletal:  no synovitis  Neurologic:  awake; confused  Skin: was not able to evaluated sacral decub as patient in the hallway in ER; eschar R lower leg; R hip healing  Psychiatric:  confused  Vascular:  no phlebitis    Sedimentation Rate, Erythrocyte: 66 (19)    WBC Count: 17.06 (19 @ 08:56)  WBC Count: 15.3 (19 @ 13:07)                        8.3    17.06 )-----------( 306      ( 2019 08:56 )             29.0     153<H>  |  106  |  60<H>  ----------------------------<  54<L>  3.3<L>   |  34<H>  |  1.67<H>    Ca    8.4      2019 07:15  Phos  2.6     02-  Mg     2.5     -    TPro  5.8<L>  /  Alb  2.2<L>  /  TBili  0.4  /  DBili  x   /  AST  16  /  ALT  19  /  AlkPhos  231<H>  -  Urinalysis Basic - ( 2019 13:07 )  Color: Yellow / Appearance: Slightly Turbid / S.022 / pH: x  Gluc: x / Ketone: Negative  / Bili: Negative / Urobili: Negative   Blood: x / Protein: 30 mg/dL / Nitrite: Negative   Leuk Esterase: Large / RBC: 40 /hpf /  /HPF   Sq Epi: x / Non Sq Epi: 1 / Bacteria: Negative    MICROBIOLOGY:  BC/UC pending    RADIOLOGY:  imaging below personally reviewed    CT Lumbar Spine No Cont (19 @ 15:34) >  A large sacral decubitus ulcer is noted overlying the mid-lower sacrum and coccyx. Areas of cortical discontinuity are noted involving the dorsal sacrum most consistent with osteomyelitis.

## 2019-02-04 NOTE — PROGRESS NOTE ADULT - PROBLEM SELECTOR PLAN 1
- Given leukocytosis tachypnea, patient meets SIRS criteria with numerous sources including catheter associated UTI, decubitus ulcers, and osteomyelitis  - C/w Vanc/Cefepime to cover for MRSA and pseudomonas   - F/u Blood cultures  - F/u Urine cultures  - Send of Lactate and ESR  - Change Te[place]eda  - Wound care C/s and ID c/s given Osteomyelitis in the am. - Given leukocytosis tachypnea, patient meets SIRS criteria with numerous sources including catheter associated UTI, decubitus ulcers, and osteomyelitis  - C/w Vanc/Cefepime to cover for MRSA and pseudomonas   - F/u Blood cultures  - F/u Urine cultures  - Wound care C/s and ID c/s given Osteomyelitis in the am. Patient will need a bone bx for further abx management.

## 2019-02-04 NOTE — CONSULT NOTE ADULT - SUBJECTIVE AND OBJECTIVE BOX
p ([id number])     HPI:  74 yo male with CAD c/b CABG, A fib (on coumadin), CKD stage 3, PAD s/p stents, PEG tube, in-dwelling [place] who presents to the nursing home for increased confusion and elevated WBC. History was taken from wife at bedside.	Per wife, patient had been fully functional up until November. He had a hypoglycemic episode, fell, and broke his hip in November. He was admitted to the hospital for surgery, which was uneventful and sent to rehab (in early December). During the rehab stay, he developed numerous ulcers, including a decubitus ulcer. He was sent back to [facility] for sepsis from the decubitus ulcer. He was treated with abx and returned to rehab for a second time. This second hospital course was complicated by acute renal failure requiring dialysis, IV abx, and "surgery" to clean the ulcer.    In the ED, he received Vanc/Zosyn. He was admitted to medicine for further management. (03 Feb 2019 17:37)    Imaging:  Severe T12 compression fx of unknown age, sacral osteo      Exam:  AOx1, mumbling incoherently, MUSE anti gravity, intermittently FC, several skin ulcerations throughout body.    --Anticoagulation:    =====================  PAST MEDICAL HISTORY   CKD (chronic kidney disease) stage 3, GFR 30-59 ml/min  Pressure ulcer, unspecified pressure ulcer stage  Coronary artery disease of native artery of native heart with stable angina pectoris  Diastolic congestive heart failure, unspecified congestive heart failure chronicity  Smoker  Gout involving toe, unspecified cause, unspecified chronicity, unspecified laterality  PAD (peripheral artery disease)  Chronic atrial fibrillation  Hyperlipidemia, unspecified hyperlipidemia type  HTN (hypertension), benign  Type 2 diabetes mellitus with other circulatory complication, with long-term current use of insulin    PAST SURGICAL HISTORY   S/P angioplasty with stent  S/P hernia repair  Artificial cardiac pacemaker  S/P aortobifemoral bypass surgery        MEDICATIONS:  Antibiotics:  cefepime   IVPB 1000 milliGRAM(s) IV Intermittent every 12 hours  vancomycin  IVPB 1000 milliGRAM(s) IV Intermittent daily    Neuro:  acetaminophen   Tablet .. 650 milliGRAM(s) Oral every 6 hours PRN  ondansetron   Solution 8 milliGRAM(s) Oral three times a day  ondansetron Injectable 4 milliGRAM(s) IV Push every 8 hours PRN    Other:  dextrose 40% Gel 15 Gram(s) Oral once PRN  dextrose 5% + sodium chloride 0.45%. 1000 milliLiter(s) IV Continuous <Continuous>  dextrose 5%. 1000 milliLiter(s) IV Continuous <Continuous>  dextrose 50% Injectable 12.5 Gram(s) IV Push once  dextrose 50% Injectable 25 Gram(s) IV Push once  dextrose 50% Injectable 25 Gram(s) IV Push once  docusate sodium 100 milliGRAM(s) Oral two times a day  glucagon  Injectable 1 milliGRAM(s) IntraMuscular once PRN  insulin glargine Injectable (LANTUS) 10 Unit(s) SubCutaneous at bedtime  insulin lispro (HumaLOG) corrective regimen sliding scale   SubCutaneous every 6 hours  potassium chloride  10 mEq/100 mL IVPB 10 milliEquivalent(s) IV Intermittent every 1 hour  senna 2 Tablet(s) Oral at bedtime      SOCIAL HISTORY:   Occupation:   Marital Status:     FAMILY HISTORY:  Family history of hypertension (Father)  No pertinent family history in first degree relatives      ROS: Negative except per HPI    LABS:  PT/INR - ( 04 Feb 2019 08:53 )   PT: 31.0 sec;   INR: 2.64 ratio         PTT - ( 04 Feb 2019 08:53 )  PTT:45.8 sec                        8.3    17.06 )-----------( 306      ( 04 Feb 2019 08:56 )             29.0     02-04    153<H>  |  106  |  60<H>  ----------------------------<  54<L>  3.3<L>   |  34<H>  |  1.67<H>    Ca    8.4      04 Feb 2019 07:15  Phos  2.6     02-04  Mg     2.5     02-04    TPro  5.8<L>  /  Alb  2.2<L>  /  TBili  0.4  /  DBili  x   /  AST  16  /  ALT  19  /  AlkPhos  231<H>  02-04

## 2019-02-04 NOTE — CONSULT NOTE ADULT - SUBJECTIVE AND OBJECTIVE BOX
Wound SURGERY CONSULT NOTE    HPI:  72 yo male with CAD c/b CABG, A fib (on coumadin), CKD stage 3, PAD s/p stents, PEG tube, in-dwelling [place] who presents from the nursing home for increased confusion and elevated WBC.   Wife called and spoken to by MD and all questions asked and answered to her satisfaction.  Pt had been functional up until November. He had a hypoglycemic episode, fell, and broke his hip in November. He was admitted to the hospital for surgery, which was uneventful and sent to rehab (in early December). During the rehab stay, he developed numerous ulcers, including a decubitus ulcer. He was sent back to [facility] for sepsis from the decubitus ulcer. He was treated with abx and returned to rehab for a second time. This second hospital course was complicated by acute renal failure requiring dialysis, IV abx, and "surgery" to clean the ulcer.  Wound consult requested to assist w/ management of multiple wounds.  DSD placed awaiting consult. No redness, warmth, pain, f/c/s noted. Drainage not managed by dressing.  (+) odor.   (+)incontinent (+)sedentary. Offloading & pericare initiated upon admission.          PAST MEDICAL & SURGICAL HISTORY:  CKD (chronic kidney disease) stage 3, GFR 30-59 ml/min  Stage 4 Sacral pressure ulcer  s/p Lt 4th toe amp  Coronary artery disease of native artery of native heart with stable angina pectoris  Diastolic congestive heart failure, unspecified congestive heart failure chronicity  Gout involving toe, unspecified cause, unspecified chronicity, unspecified laterality  PAD (peripheral artery disease)  Chronic atrial fibrillation: on coumadin  Hyperlipidemia, unspecified hyperlipidemia type  HTN (hypertension), benign  Type 2 diabetes mellitus with other circulatory complication, with long-term current use of insulin  S/P angioplasty with stent: vascular stent- left leg  S/P hernia repair  s/p Artificial cardiac pacemaker:   S/P aortobifemoral bypass surgery      REVIEW OF SYSTEMS  Skin/ MSK: see HPI  All other systems negative    MEDICATIONS  (STANDING):  cefepime   IVPB 1000 milliGRAM(s) IV Intermittent every 12 hours  dextrose 5% + sodium chloride 0.45%. 1000 milliLiter(s) (100 mL/Hr) IV Continuous <Continuous>  dextrose 5%. 1000 milliLiter(s) (50 mL/Hr) IV Continuous <Continuous>  dextrose 50% Injectable 25 Gram(s) IV Push once  dextrose 50% Injectable 12.5 Gram(s) IV Push once  dextrose 50% Injectable 25 Gram(s) IV Push once  dextrose 50% Injectable 25 Gram(s) IV Push once  docusate sodium 100 milliGRAM(s) Oral two times a day  insulin glargine Injectable (LANTUS) 10 Unit(s) SubCutaneous at bedtime  insulin lispro (HumaLOG) corrective regimen sliding scale   SubCutaneous every 6 hours  ondansetron   Solution 8 milliGRAM(s) Oral three times a day  potassium chloride  10 mEq/100 mL IVPB 10 milliEquivalent(s) IV Intermittent every 1 hour  senna 2 Tablet(s) Oral at bedtime  vancomycin  IVPB 1000 milliGRAM(s) IV Intermittent daily    MEDICATIONS  (PRN):  acetaminophen   Tablet .. 650 milliGRAM(s) Oral every 6 hours PRN Temp greater or equal to 38C (100.4F)  dextrose 40% Gel 15 Gram(s) Oral once PRN Blood Glucose LESS THAN 70 milliGRAM(s)/deciliter  glucagon  Injectable 1 milliGRAM(s) IntraMuscular once PRN Glucose LESS THAN 70 milligrams/deciliter  ondansetron Injectable 4 milliGRAM(s) IV Push every 8 hours PRN Nausea and/or Vomiting    No Known Allergies    SOCIAL HISTORY:  ; pt at [facility]; Current smoker, Denies ETOH, drugs    FAMILY HISTORY:  Family history of hypertension (Father)      Vital Signs Last 24 Hrs  T(C): 36.3 (2019 10:48), Max: 36.7 (2019 19:15)  T(F): 97.4 (2019 10:48), Max: 98 (2019 19:15)  HR: 118 (2019 10:48) (95 - 118)  BP: 93/59 (2019 10:48) (93/59 - 118/60)  BP(mean): --  RR: 19 (2019 10:48) (16 - 21)  SpO2: 97% (2019 10:48) (97% - 100%)    NAD / Alert/ Confused  cachectic/  frail  WD/ WN/ Disheveled  in ED    Cardiovascular: RRR    Respiratory: CTA    Gastrointestinal soft NT/ND (+)BS  (+)PEG W/ fungal rash over abdominal wall on surrounding skin    Neurology  weakened strength & sensation grossly intact    Musculoskeletal/Vascular:  FROM x4  no BLE edema   no DP/PT pulses palpable  BLE equally cool  no acute ischemia noted    Multiple dry eschars- dry gangrene  Lt lateral heel  1cm x 1cm x 0cm  RLE shin 4.5cm x 2.5cm x 0cm  Rt dorsal foot 4cm x 3.5cm x 0cm  Rt Heel 5cm x 7cm x 0cm sunken  Rt Lateral malleolus 1cm x 1cm x 0cm  No odor, erythema, increased warmth, tenderness, induration, fluctuance    Skin:  frail,  ecchymosis w/o hematoma  BUE w/ multiple superficial partial thickness skin loss   scant serosanguinous drainage  No odor, erythema, increased warmth, tenderness, induration, fluctuance    Sacral stage 4 pressure injury  exposed bone & slough w/ necrotic dermis  (+)seropurulent drainage  (+) odor  No, erythema, increased warmth, tenderness, induration, fluctuance          LABS:      153<H>  |  106  |  60<H>  ----------------------------<  54<L>  3.3<L>   |  34<H>  |  1.67<H>    Ca    8.4      2019 07:15  Phos  2.6       Mg     2.5         TPro  5.8<L>  /  Alb  2.2<L>  /  TBili  0.4  /  DBili  x   /  AST  16  /  ALT  19  /  AlkPhos  231<H>                            8.3    17.06 )-----------( 306      ( 2019 08:56 )             29.0     PT/INR - ( 2019 08:53 )   PT: 31.0 sec;   INR: 2.64 ratio    PTT - ( 2019 08:53 )  PTT:45.8 sec      Urinalysis Basic - ( 2019 13:07 )    Color: Yellow / Appearance: Slightly Turbid / S.022 / pH: x  Gluc: x / Ketone: Negative  / Bili: Negative / Urobili: Negative   Blood: x / Protein: 30 mg/dL / Nitrite: Negative   Leuk Esterase: Large / RBC: 40 /hpf /  /HPF   Sq Epi: x / Non Sq Epi: 1 / Bacteria: Negative        RADIOLOGY & ADDITIONAL STUDIES:      < from: CT Lumbar Spine No Cont (19 @ 13:36) >    IMPRESSION:    The patient's described sacral decubitus ulcer does not appear to be   covered on this lumbar spine CT (which extends to cover the upper   sacrum). The upper sacrum appears grossly unremarkable. Consider   follow-up sacral CT or MRI if clinically warranted.     Severe compression fracture of T12.    Chronic bilateral spondylolysis defects are present at the L5-S1 level   without associated spondylolisthesis.    Free fluid within the pelvis is of unclear etiology. Consider follow-up   dedicated abdomen and pelvic imaging if clinically warranted.    < from: Xray Chest 1 View- PORTABLE-Urgent (19 @ 03:31) >  IMPRESSION: The patient is status post CABG and heart valve repair. A   left cardiac device is noted. There is no pneumothorax. There are small   bilateral pleural effusions with associated pulmonary edema demonstrating   overall interval improvement since the prior study.

## 2019-02-04 NOTE — CONSULT NOTE ADULT - ATTENDING COMMENTS
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  PEG with surrounding excoriation  Currently, patient in ED  Exam in an ED room confirms cachexia  There  is a Stage 4 sacral decubitus with bone in wound, and 25% necrotic tissue present  Both legs are cool with bilat shotty femoral pulses, and no distal pulses noted   There is a healed left 4th toe amputation  Wife reports a h/o "leg stents", confirming a clinical Dx of PVD with wounds of legs and heels    Wife appraised of patient's status and need for f/u  Contact info provided 74 yo diabetic male, current smoker,  a/w SIRS, from rehab  History obtained from wife by phone- patient not able  In November 2018, patient underwent ORIF of right hip Fx at OSH  and subsequently has had a downhill course, c/b readmission for debridement of sacral decubitus, weight loss, PEG insertion and multiple wounds  he is currently non ambulatory and confused  [place] in place  He continues to smoke on a daily basis  left arm has been swollen since CABG June 2018, after which , 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    Wife appraised of patient's status and need for f/u  Contact info provided 72 yo diabetic male, current smoker,  a/w SIRS, from rehab  History obtained from wife by phone- patient not able  In November 2018, patient underwent ORIF of right hip Fx at OSH  and subsequently has had a downhill course, c/b readmission for debridement of sacral decubitus, weight loss, PEG insertion and multiple wounds  Wife reports that patient was discharged "with 4 bedsores" following right ORIF, done Nov 26, 2018  he is currently non ambulatory and confused  [place] in place  He continues to smoke on a daily basis  left arm has been swollen since CABG June 2018, after which , 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    Wife appraised of patient's status and need for f/u  Contact info provided 72 yo diabetic male, current smoker,  a/w SIRS, from rehab  History obtained from wife by phone- patient not able  Had AF bypass in past and a CABG, several months ago  Following this , in November 2018, patient underwent ORIF of right hip Fx at OSH  and subsequently has had a downhill course, c/b readmission for debridement of sacral decubitus, weight loss, PEG insertion and multiple wounds  Wife reports that patient was discharged "with 4 bedsores" following right ORIF, done Nov 26, 2018  he is currently non ambulatory and confused  [place] in place  He continues to smoke on a daily basis  left arm has been swollen since CABG June 2018, after which , 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    Wife appraised of patient's status and need for f/u  Contact info provided  I spoke with Dr Sood , LARRY, today by phone(2/5/19)

## 2019-02-04 NOTE — PROGRESS NOTE ADULT - PROBLEM SELECTOR PLAN 7
- Elevated BNP, pulmonary edema, CXR, but clinically the patient does not look volume overloaded  - Will hold off on diuresis for now  - F/u Echo  - Admit to Tele

## 2019-02-04 NOTE — PROGRESS NOTE ADULT - PROBLEM SELECTOR PLAN 2
- SHINE on CKD stage 3 (GFR 48) with anemia, pre-renal azotemia 2/2 to po intake  - Likely 2/2 to poor po intake. Will send off urine lytes  - C/w IIVF hydration and free water via peg tube  - Anemia likely anemia of chronic disease. F/u Iron Panel, Folic Acid, and stool guiac.  - trend Cr  - avoid nephrotoxic drugs, renally dose meds - SHINE on CKD stage 3 (GFR 48) with anemia, pre-renal azotemia 2/2 to po intake  - Likely 2/2 to poor po intake. Will send off urine lytes  - C/w IIVF hydration and free water via peg tube  - Anemia likely anemia of chronic disease.  - trend Cr  - avoid nephrotoxic drugs, renally dose meds - SHINE on CKD stage 3 (GFR 48) with anemia, pre-renal azotemia 2/2 to po intake  - Likely 2/2 to poor po intake. Will send off urine lytes  - C/w IVF hydration and free water via peg tube  - Anemia likely anemia of chronic disease.  - trend Cr  - avoid nephrotoxic drugs, renally dose meds

## 2019-02-04 NOTE — PROGRESS NOTE ADULT - PROBLEM SELECTOR PLAN 3
- Hypernatremia 2/2 to poor po intake and volume depletion (1.3 L free water deficit)  - Free water via PEG tube (200 cc q 4 hrs)  - Gentle IVF hydration with 1/2 NS  - F/u BMP q 12 hrs now. Will change to BMP q Day once sodium downtrends

## 2019-02-04 NOTE — PROGRESS NOTE ADULT - ATTENDING COMMENTS
Pt with multiple comorbidities, chronic wounds including stage 4 sacral decub, presents with worsening mental status found to have sepsis 2/2 OM, UTI. started on vanc, cefepime  -appreciate wound care, IR, ID consult. Continue IV abx, and obtain wound culture  -appreciate nsg recs on T12 compression fracture  -PT consult once pt more alert, oriented    Kimberly Moses MD  Division of Hospital Medicine  Pager: [phone]  Office: [phone]

## 2019-02-04 NOTE — CONSULT NOTE ADULT - ASSESSMENT
73M with multiple medical problems (CAD, CABG, PAD, aorto-bifemoral bypass, CKD), R hip fracture s/p ORIF, s/p PEG/hoskins here with sacral osteomyelitis with sepsis - likely source is osteomyelitis.  Not clear if this more of a chronic OM or acute.  Per IR, bone biopsy not going to be done.  Still think it may help determine if acute or chronic.  Need records from [facility] (cultures, radiology, etc).    Suggest:  - vancomycin okay for now  - cefepime okay for now as well  - follow vancomycin levels  - trend inflammatory markers  - f/u BC/UC  - please obtain old records    above d/w [id number]

## 2019-02-04 NOTE — CONSULT NOTE ADULT - ASSESSMENT
Yoni Catsle  73M CAD c/b CABG, A fib (on coumadin), CKD, PAD s/p stents, PEG tube, in-dwelling [place], with a hx of an extended hospital stay s/p hip fracture (re-admitted to [place] for de-cubitus ulcers) admitted for sepsis 2/2 to catheter associated UTI vs infected sacral decubitus ulcer vs osteo. Patient noted to have SHINE superimposed on CKD with worsening anemia and hypernatremia and underlying osteomyelitis found to have severe T12 compression fracture. On exam AOx1, MUSE ag, mumbling incoherently.  - Given patient's poor health, not a surgical candidate  - Patient cannot get MRI due to pacer  - Pain control, TLSO brace for comfort PRN  - Care per primary team for sepsis and sacral decub

## 2019-02-04 NOTE — CHART NOTE - NSCHARTNOTEFT_GEN_A_CORE
I was called to the bedside as pt was hypoxic to S02 of 60% on NC 2L and HR of 30-40s bpm. On examination, pt is AAOx1, denies any symptoms. His vitals were: afebrile, HR 88 bpm, /55 mmHg, RR 18,and SO2 100% on 6L; decreased airflow b/l, no crackles or wheezings; irregular rate and rhythm with normal S1 and S2 and no gallops; No JVD, and no LE edema. EKG was done which showed afib w/o RVR. ABG was done which showed pH 7.5, HCO3 34, and CO2 45. Lactate of CXR was ordered. Currently, pt is asymptomatic, sating well on 2L of oxygen. Will continue to monitor on fluids and day to follow up.

## 2019-02-04 NOTE — PROGRESS NOTE ADULT - PROBLEM SELECTOR PLAN 5
- likely 2/2 to contraction from volume depletion and compensation from respiratory acidosis  - Will re-check BMP in am  - IVF and free water for hydration

## 2019-02-04 NOTE — PROGRESS NOTE ADULT - PROBLEM SELECTOR PLAN 6
- Rate controlled for now  - INR supratherapeutic  - Will repeat INR in the am and dose Coumadin goals of 2-3.

## 2019-02-04 NOTE — PROGRESS NOTE ADULT - ASSESSMENT
72 yo male  CAD c/b CABG, A fib (on coumadin), CKD (not on HD), PAD s/p stents, PEG tube, in-dwelling [place], with a hx of an extended hospital stay s/p hip fracture (re-admitted to [place] for de-cubitus ulcers) admitted for metabolic encephalopathy 2/2 sepsis 2/2 to catheter associated UTI vs infected sacral decubitus ulcer vs osteo. Patient noted to have SHINE superimposed on CKD with worsening anemia and hypernatremia and underlying osteomyelitis with thoracic fracture 74 yo male  CAD c/b CABG, A fib (on coumadin), CKD (not on HD), PAD s/p stents, PEG tube, in-dwelling [place], with a hx of an extended hospital stay s/p hip fracture (re-admitted to [place] for de-cubitus ulcers) admitted for metabolic encephalopathy 2/2 sepsis 2/2 to catheter associated UTI vs infected sacral decubitus ulcer vs osteo. Patient noted to have SHINE superimposed on CKD with worsening anemia and hypernatremia and underlying osteomyelitis with thoracic fracture.

## 2019-02-04 NOTE — PROGRESS NOTE ADULT - PROBLEM SELECTOR PLAN 10
#DM  - Started on Lantus 6 and SSI    DVT: on coumadin for DVT prophylaxis  Diet: PEG feeds  Dispo: PT consult  Pending resolution of symptoms #DM  - C/w on Lantus 6 and SSI    DVT: on coumadin for DVT prophylaxis  Diet: PEG feeds  Dispo: PT consult  Pending resolution of symptoms

## 2019-02-04 NOTE — PROGRESS NOTE ADULT - SUBJECTIVE AND OBJECTIVE BOX
BASIA GLORIA  73y  Male      Subjective:     No acute overnight events.  This am, patient continues       Meds    MEDICATIONS  (STANDING):  cefepime   IVPB 1000 milliGRAM(s) IV Intermittent every 12 hours  dextrose 5%. 1000 milliLiter(s) (50 mL/Hr) IV Continuous <Continuous>  dextrose 50% Injectable 12.5 Gram(s) IV Push once  dextrose 50% Injectable 25 Gram(s) IV Push once  dextrose 50% Injectable 25 Gram(s) IV Push once  docusate sodium 100 milliGRAM(s) Oral two times a day  insulin glargine Injectable (LANTUS) 10 Unit(s) SubCutaneous at bedtime  insulin lispro (HumaLOG) corrective regimen sliding scale   SubCutaneous every 6 hours  insulin lispro Injectable (HumaLOG) 3 Unit(s) SubCutaneous every 6 hours  ondansetron   Solution 8 milliGRAM(s) Oral three times a day  potassium chloride    Tablet ER 20 milliEquivalent(s) Oral once  senna 2 Tablet(s) Oral at bedtime  sodium chloride 0.45%. 1000 milliLiter(s) (75 mL/Hr) IV Continuous <Continuous>  vancomycin  IVPB 1000 milliGRAM(s) IV Intermittent daily    MEDICATIONS  (PRN):  acetaminophen   Tablet .. 650 milliGRAM(s) Oral every 6 hours PRN Temp greater or equal to 38C (100.4F)  dextrose 40% Gel 15 Gram(s) Oral once PRN Blood Glucose LESS THAN 70 milliGRAM(s)/deciliter  glucagon  Injectable 1 milliGRAM(s) IntraMuscular once PRN Glucose LESS THAN 70 milligrams/deciliter  ondansetron Injectable 4 milliGRAM(s) IV Push every 8 hours PRN Nausea and/or Vomiting        Vital Signs Last 24 Hrs  T(C): 36.6 (04 Feb 2019 04:15), Max: 36.7 (03 Feb 2019 19:15)  T(F): 97.9 (04 Feb 2019 04:15), Max: 98 (03 Feb 2019 19:15)  HR: 96 (04 Feb 2019 04:15) (76 - 97)  BP: 118/60 (04 Feb 2019 04:15) (98/61 - 118/60)  BP(mean): --  RR: 17 (04 Feb 2019 04:15) (16 - 26)  SpO2: 97% (04 Feb 2019 04:15) (97% - 100%)    PHYSICAL EXAM:  GENERAL: NAD, well-groomed, well-developed  HEENT - NC/AT, pupils equal and reactive to light,  ; Moist mucous membranes, Good dentition, No lesions  NECK: Supple, No JVD  CHEST/LUNG: Clear to auscultation bilaterally; No rales, rhonchi, wheezing  HEART: Regular rate and rhythm; No murmurs, rubs, or gallops  ABDOMEN: Soft, Nontender, Nondistended; Bowel sounds present  EXTREMITIES:  2+ Peripheral Pulses, No clubbing, cyanosis, or edema  NEURO:  No Focal deficits, sensory and motor intact  SKIN: No rashes or lesions    Consultant(s) Notes Reviewed:  [x ] YES  [ ] NO  Care Discussed with Consultants/Other Providers [ x] YES  [ ] NO    LABS:          RADIOLOGY & ADDITIONAL TESTS: FAIZANLANAN  73y  Male      Subjective:     No acute overnight events.  This am, patient continues to feel better. He is oriented x2. Denies f/c/n/v/d/HA.      Meds    MEDICATIONS  (STANDING):  cefepime   IVPB 1000 milliGRAM(s) IV Intermittent every 12 hours  dextrose 5%. 1000 milliLiter(s) (50 mL/Hr) IV Continuous <Continuous>  dextrose 50% Injectable 12.5 Gram(s) IV Push once  dextrose 50% Injectable 25 Gram(s) IV Push once  dextrose 50% Injectable 25 Gram(s) IV Push once  docusate sodium 100 milliGRAM(s) Oral two times a day  insulin glargine Injectable (LANTUS) 10 Unit(s) SubCutaneous at bedtime  insulin lispro (HumaLOG) corrective regimen sliding scale   SubCutaneous every 6 hours  insulin lispro Injectable (HumaLOG) 3 Unit(s) SubCutaneous every 6 hours  ondansetron   Solution 8 milliGRAM(s) Oral three times a day  potassium chloride    Tablet ER 20 milliEquivalent(s) Oral once  senna 2 Tablet(s) Oral at bedtime  sodium chloride 0.45%. 1000 milliLiter(s) (75 mL/Hr) IV Continuous <Continuous>  vancomycin  IVPB 1000 milliGRAM(s) IV Intermittent daily    MEDICATIONS  (PRN):  acetaminophen   Tablet .. 650 milliGRAM(s) Oral every 6 hours PRN Temp greater or equal to 38C (100.4F)  dextrose 40% Gel 15 Gram(s) Oral once PRN Blood Glucose LESS THAN 70 milliGRAM(s)/deciliter  glucagon  Injectable 1 milliGRAM(s) IntraMuscular once PRN Glucose LESS THAN 70 milligrams/deciliter  ondansetron Injectable 4 milliGRAM(s) IV Push every 8 hours PRN Nausea and/or Vomiting    Vital Signs Last 24 Hrs  T(C): 36.6 (2019 04:15), Max: 36.7 (2019 19:15)  T(F): 97.9 (2019 04:15), Max: 98 (2019 19:15)  HR: 96 (2019 04:15) (76 - 97)  BP: 118/60 (2019 04:15) (98/61 - 118/60)  BP(mean): --  RR: 17 (2019 04:15) (16 - 26)  SpO2: 97% (2019 04:15) (97% - 100%)    PHYSICAL EXAM:  GENERAL: NAD, well-groomed, well-developed  HEENT - NC/AT, pupils equal and reactive to light  NECK: Supple, No JVD  CHEST/LUNG: Clear to auscultation bilaterally; No rales, rhonchi, wheezing  HEART: Regular rate and rhythm; No murmurs, rubs, or gallops  ABDOMEN: Soft, Nontender, Nondistended; Bowel sounds present  EXTREMITIES:  2+ Peripheral Pulses, No clubbing, cyanosis, or edema  NEURO: Alert and oriented by 2  SKIN: No rashes or lesions    Consultant(s) Notes Reviewed:  [x ] YES  [ ] NO  Care Discussed with Consultants/Other Providers [ x] YES  [ ] NO    LABS:      The Labs were reviewed by me   The Radiology was reviewed by me    EKG tracing reviewed by me        153<H>  |  106  |  60<H>  ----------------------------<  54<L>  3.3<L>   |  34<H>  |  1.67<H>      153<H>  |  105  |  69<H>  ----------------------------<  241<H>  3.5   |  38<H>  |  1.78<H>    Ca    8.4      2019 07:15  Ca    8.6      2019 13:07  Phos  2.6       Mg     2.5         TPro  5.8<L>  /  Alb  2.2<L>  /  TBili  0.4  /  DBili  x   /  AST  16  /  ALT  19  /  AlkPhos  231<H>    TPro  5.9<L>  /  Alb  2.2<L>  /  TBili  0.3  /  DBili  x   /  AST  16  /  ALT  19  /  AlkPhos  254<H>      Magnesium, Serum: 2.5 mg/dL (19 @ 07:15)  Magnesium, Serum: 2.7 mg/dL (19 @ 13:07)    Phosphorus Level, Serum: 2.6 mg/dL (19 @ 07:15)  Phosphorus Level, Serum: 3.0 mg/dL (19 @ 13:07)      PT/INR - ( 2019 08:53 )   PT: 31.0 sec;   INR: 2.64 ratio         PTT - ( 2019 08:53 )  PTT:45.8 sec              Urinalysis Basic - ( 2019 13:07 )    Color: Yellow / Appearance: Slightly Turbid / S.022 / pH: x  Gluc: x / Ketone: Negative  / Bili: Negative / Urobili: Negative   Blood: x / Protein: 30 mg/dL / Nitrite: Negative   Leuk Esterase: Large / RBC: 40 /hpf /  /HPF   Sq Epi: x / Non Sq Epi: 1 / Bacteria: Negative      ABG - ( 2019 01:43 )  pH, Arterial: 7.50  pH, Blood: x     /  pCO2: 45    /  pO2: 196   / HCO3: 34    / Base Excess: 10.1  /  SaO2: 100                                     8.3    17.06 )-----------( 306      ( 2019 08:56 )             29.0                         9.1    15.3  )-----------( 254      ( 2019 13:07 )             29.9     CAPILLARY BLOOD GLUCOSE      POCT Blood Glucose.: 99 mg/dL (2019 07:58)  POCT Blood Glucose.: 105 mg/dL (2019 07:39)  POCT Blood Glucose.: 69 mg/dL (2019 06:58)  POCT Blood Glucose.: 241 mg/dL (2019 01:10)  POCT Blood Glucose.: 437 mg/dL (2019 21:41)  POCT Blood Glucose.: 319 mg/dL (2019 11:57)    Blood Gas Source Venous: Venous (19 @ 13:07)        RADIOLOGY & ADDITIONAL TESTS: FAIZANLANAN  73y  Male      Subjective:     No acute overnight events.  This am, patient continues to feel better. He is oriented x2. Denies f/c/n/v/d/HA.      Meds    MEDICATIONS  (STANDING):  cefepime   IVPB 1000 milliGRAM(s) IV Intermittent every 12 hours  dextrose 5%. 1000 milliLiter(s) (50 mL/Hr) IV Continuous <Continuous>  dextrose 50% Injectable 12.5 Gram(s) IV Push once  dextrose 50% Injectable 25 Gram(s) IV Push once  dextrose 50% Injectable 25 Gram(s) IV Push once  docusate sodium 100 milliGRAM(s) Oral two times a day  insulin glargine Injectable (LANTUS) 10 Unit(s) SubCutaneous at bedtime  insulin lispro (HumaLOG) corrective regimen sliding scale   SubCutaneous every 6 hours  insulin lispro Injectable (HumaLOG) 3 Unit(s) SubCutaneous every 6 hours  ondansetron   Solution 8 milliGRAM(s) Oral three times a day  potassium chloride    Tablet ER 20 milliEquivalent(s) Oral once  senna 2 Tablet(s) Oral at bedtime  sodium chloride 0.45%. 1000 milliLiter(s) (75 mL/Hr) IV Continuous <Continuous>  vancomycin  IVPB 1000 milliGRAM(s) IV Intermittent daily    MEDICATIONS  (PRN):  acetaminophen   Tablet .. 650 milliGRAM(s) Oral every 6 hours PRN Temp greater or equal to 38C (100.4F)  dextrose 40% Gel 15 Gram(s) Oral once PRN Blood Glucose LESS THAN 70 milliGRAM(s)/deciliter  glucagon  Injectable 1 milliGRAM(s) IntraMuscular once PRN Glucose LESS THAN 70 milligrams/deciliter  ondansetron Injectable 4 milliGRAM(s) IV Push every 8 hours PRN Nausea and/or Vomiting    Vital Signs Last 24 Hrs  T(C): 36.6 (2019 04:15), Max: 36.7 (2019 19:15)  T(F): 97.9 (2019 04:15), Max: 98 (2019 19:15)  HR: 96 (2019 04:15) (76 - 97)  BP: 118/60 (2019 04:15) (98/61 - 118/60)  BP(mean): --  RR: 17 (2019 04:15) (16 - 26)  SpO2: 97% (2019 04:15) (97% - 100%)    PHYSICAL EXAM:  GENERAL: NAD, well-groomed, well-developed  HEENT - NC/AT, pupils equal and reactive to light  NECK: Supple, No JVD  CHEST/LUNG: Clear to auscultation bilaterally; No rales, rhonchi, wheezing  HEART: Regular rate and rhythm; No murmurs, rubs, or gallops  ABDOMEN: Soft, Nontender, Nondistended; Bowel sounds present  EXTREMITIES:  2+ Peripheral Pulses, No clubbing, cyanosis, or edema  NEURO: Alert and oriented by 2  SKIN: No rashes or lesions; sacral decub ulcer that is erythematous    Consultant(s) Notes Reviewed:  [x ] YES  [ ] NO  Care Discussed with Consultants/Other Providers [ x] YES  [ ] NO    LABS:      The Labs were reviewed by me   The Radiology was reviewed by me    EKG tracing reviewed by me        153<H>  |  106  |  60<H>  ----------------------------<  54<L>  3.3<L>   |  34<H>  |  1.67<H>      153<H>  |  105  |  69<H>  ----------------------------<  241<H>  3.5   |  38<H>  |  1.78<H>    Ca    8.4      2019 07:15  Ca    8.6      2019 13:07  Phos  2.6       Mg     2.5         TPro  5.8<L>  /  Alb  2.2<L>  /  TBili  0.4  /  DBili  x   /  AST  16  /  ALT  19  /  AlkPhos  231<H>    TPro  5.9<L>  /  Alb  2.2<L>  /  TBili  0.3  /  DBili  x   /  AST  16  /  ALT  19  /  AlkPhos  254<H>      Magnesium, Serum: 2.5 mg/dL (19 @ 07:15)  Magnesium, Serum: 2.7 mg/dL (19 @ 13:07)    Phosphorus Level, Serum: 2.6 mg/dL (19 @ 07:15)  Phosphorus Level, Serum: 3.0 mg/dL (19 @ 13:07)      PT/INR - ( 2019 08:53 )   PT: 31.0 sec;   INR: 2.64 ratio         PTT - ( 2019 08:53 )  PTT:45.8 sec              Urinalysis Basic - ( 2019 13:07 )    Color: Yellow / Appearance: Slightly Turbid / S.022 / pH: x  Gluc: x / Ketone: Negative  / Bili: Negative / Urobili: Negative   Blood: x / Protein: 30 mg/dL / Nitrite: Negative   Leuk Esterase: Large / RBC: 40 /hpf /  /HPF   Sq Epi: x / Non Sq Epi: 1 / Bacteria: Negative      ABG - ( 2019 01:43 )  pH, Arterial: 7.50  pH, Blood: x     /  pCO2: 45    /  pO2: 196   / HCO3: 34    / Base Excess: 10.1  /  SaO2: 100                                     8.3    17.06 )-----------( 306      ( 2019 08:56 )             29.0                         9.1    15.3  )-----------( 254      ( 2019 13:07 )             29.9     CAPILLARY BLOOD GLUCOSE      POCT Blood Glucose.: 99 mg/dL (2019 07:58)  POCT Blood Glucose.: 105 mg/dL (2019 07:39)  POCT Blood Glucose.: 69 mg/dL (2019 06:58)  POCT Blood Glucose.: 241 mg/dL (2019 01:10)  POCT Blood Glucose.: 437 mg/dL (2019 21:41)  POCT Blood Glucose.: 319 mg/dL (2019 11:57)    Blood Gas Source Venous: Venous (19 @ 13:07)        RADIOLOGY & ADDITIONAL TESTS:

## 2019-02-05 DIAGNOSIS — N17.9 ACUTE KIDNEY FAILURE, UNSPECIFIED: ICD-10-CM

## 2019-02-05 DIAGNOSIS — E11.649 TYPE 2 DIABETES MELLITUS WITH HYPOGLYCEMIA WITHOUT COMA: ICD-10-CM

## 2019-02-05 DIAGNOSIS — E87.6 HYPOKALEMIA: ICD-10-CM

## 2019-02-05 LAB
ALBUMIN SERPL ELPH-MCNC: 2.1 G/DL — LOW (ref 3.3–5)
ALP SERPL-CCNC: 197 U/L — HIGH (ref 40–120)
ALT FLD-CCNC: 14 U/L — SIGNIFICANT CHANGE UP (ref 10–45)
ANION GAP SERPL CALC-SCNC: 10 MMOL/L — SIGNIFICANT CHANGE UP (ref 5–17)
ANION GAP SERPL CALC-SCNC: 11 MMOL/L — SIGNIFICANT CHANGE UP (ref 5–17)
APTT BLD: 40.8 SEC — HIGH (ref 27.5–36.3)
AST SERPL-CCNC: 15 U/L — SIGNIFICANT CHANGE UP (ref 10–40)
BASOPHILS # BLD AUTO: 0 K/UL — SIGNIFICANT CHANGE UP (ref 0–0.2)
BASOPHILS NFR BLD AUTO: 0.1 % — SIGNIFICANT CHANGE UP (ref 0–2)
BILIRUB SERPL-MCNC: 0.5 MG/DL — SIGNIFICANT CHANGE UP (ref 0.2–1.2)
BUN SERPL-MCNC: 51 MG/DL — HIGH (ref 7–23)
BUN SERPL-MCNC: 55 MG/DL — HIGH (ref 7–23)
CALCIUM SERPL-MCNC: 7.7 MG/DL — LOW (ref 8.4–10.5)
CALCIUM SERPL-MCNC: 8.2 MG/DL — LOW (ref 8.4–10.5)
CHLORIDE SERPL-SCNC: 109 MMOL/L — HIGH (ref 96–108)
CHLORIDE SERPL-SCNC: 110 MMOL/L — HIGH (ref 96–108)
CO2 SERPL-SCNC: 32 MMOL/L — HIGH (ref 22–31)
CO2 SERPL-SCNC: 33 MMOL/L — HIGH (ref 22–31)
CREAT SERPL-MCNC: 1.68 MG/DL — HIGH (ref 0.5–1.3)
CREAT SERPL-MCNC: 1.78 MG/DL — HIGH (ref 0.5–1.3)
EOSINOPHIL # BLD AUTO: 0.1 K/UL — SIGNIFICANT CHANGE UP (ref 0–0.5)
EOSINOPHIL NFR BLD AUTO: 0.2 % — SIGNIFICANT CHANGE UP (ref 0–6)
GAS PNL BLDV: SIGNIFICANT CHANGE UP
GAS PNL BLDV: SIGNIFICANT CHANGE UP
GLUCOSE SERPL-MCNC: 166 MG/DL — HIGH (ref 70–99)
GLUCOSE SERPL-MCNC: 32 MG/DL — CRITICAL LOW (ref 70–99)
HCT VFR BLD CALC: 29.1 % — LOW (ref 39–50)
HGB BLD-MCNC: 8.9 G/DL — LOW (ref 13–17)
INR BLD: 2.44 RATIO — HIGH (ref 0.88–1.16)
LYMPHOCYTES # BLD AUTO: 1.3 K/UL — SIGNIFICANT CHANGE UP (ref 1–3.3)
LYMPHOCYTES # BLD AUTO: 5.6 % — LOW (ref 13–44)
MAGNESIUM SERPL-MCNC: 2.3 MG/DL — SIGNIFICANT CHANGE UP (ref 1.6–2.6)
MAGNESIUM SERPL-MCNC: 2.4 MG/DL — SIGNIFICANT CHANGE UP (ref 1.6–2.6)
MCHC RBC-ENTMCNC: 28.6 PG — SIGNIFICANT CHANGE UP (ref 27–34)
MCHC RBC-ENTMCNC: 30.4 GM/DL — LOW (ref 32–36)
MCV RBC AUTO: 94 FL — SIGNIFICANT CHANGE UP (ref 80–100)
MONOCYTES # BLD AUTO: 1 K/UL — HIGH (ref 0–0.9)
MONOCYTES NFR BLD AUTO: 4 % — SIGNIFICANT CHANGE UP (ref 2–14)
NEUTROPHILS # BLD AUTO: 21.3 K/UL — HIGH (ref 1.8–7.4)
NEUTROPHILS NFR BLD AUTO: 90.1 % — HIGH (ref 43–77)
PHOSPHATE SERPL-MCNC: 2.7 MG/DL — SIGNIFICANT CHANGE UP (ref 2.5–4.5)
PHOSPHATE SERPL-MCNC: 2.8 MG/DL — SIGNIFICANT CHANGE UP (ref 2.5–4.5)
PLATELET # BLD AUTO: 277 K/UL — SIGNIFICANT CHANGE UP (ref 150–400)
POTASSIUM SERPL-MCNC: 3 MMOL/L — LOW (ref 3.5–5.3)
POTASSIUM SERPL-MCNC: 3.6 MMOL/L — SIGNIFICANT CHANGE UP (ref 3.5–5.3)
POTASSIUM SERPL-SCNC: 3 MMOL/L — LOW (ref 3.5–5.3)
POTASSIUM SERPL-SCNC: 3.6 MMOL/L — SIGNIFICANT CHANGE UP (ref 3.5–5.3)
PROT SERPL-MCNC: 5.8 G/DL — LOW (ref 6–8.3)
PROTHROM AB SERPL-ACNC: 28.6 SEC — HIGH (ref 10–12.9)
RBC # BLD: 3.1 M/UL — LOW (ref 4.2–5.8)
RBC # FLD: 18 % — HIGH (ref 10.3–14.5)
SODIUM SERPL-SCNC: 152 MMOL/L — HIGH (ref 135–145)
SODIUM SERPL-SCNC: 153 MMOL/L — HIGH (ref 135–145)
WBC # BLD: 23.6 K/UL — HIGH (ref 3.8–10.5)
WBC # FLD AUTO: 23.6 K/UL — HIGH (ref 3.8–10.5)

## 2019-02-05 PROCEDURE — 99223 1ST HOSP IP/OBS HIGH 75: CPT | Mod: GC

## 2019-02-05 PROCEDURE — 99233 SBSQ HOSP IP/OBS HIGH 50: CPT | Mod: GC

## 2019-02-05 PROCEDURE — 99232 SBSQ HOSP IP/OBS MODERATE 35: CPT

## 2019-02-05 PROCEDURE — 71045 X-RAY EXAM CHEST 1 VIEW: CPT | Mod: 26

## 2019-02-05 RX ORDER — ERTAPENEM SODIUM 1 G/1
1000 INJECTION, POWDER, LYOPHILIZED, FOR SOLUTION INTRAMUSCULAR; INTRAVENOUS ONCE
Qty: 0 | Refills: 0 | Status: COMPLETED | OUTPATIENT
Start: 2019-02-05 | End: 2019-02-05

## 2019-02-05 RX ORDER — DEXTROSE 50 % IN WATER 50 %
50 SYRINGE (ML) INTRAVENOUS ONCE
Qty: 0 | Refills: 0 | Status: DISCONTINUED | OUTPATIENT
Start: 2019-02-05 | End: 2019-02-06

## 2019-02-05 RX ORDER — ERTAPENEM SODIUM 1 G/1
1000 INJECTION, POWDER, LYOPHILIZED, FOR SOLUTION INTRAMUSCULAR; INTRAVENOUS EVERY 24 HOURS
Qty: 0 | Refills: 0 | Status: DISCONTINUED | OUTPATIENT
Start: 2019-02-06 | End: 2019-02-08

## 2019-02-05 RX ORDER — DEXTROSE 10 % IN WATER 10 %
1000 INTRAVENOUS SOLUTION INTRAVENOUS
Qty: 0 | Refills: 0 | Status: DISCONTINUED | OUTPATIENT
Start: 2019-02-05 | End: 2019-02-05

## 2019-02-05 RX ORDER — DEXTROSE 50 % IN WATER 50 %
25 SYRINGE (ML) INTRAVENOUS ONCE
Qty: 0 | Refills: 0 | Status: COMPLETED | OUTPATIENT
Start: 2019-02-05 | End: 2019-02-05

## 2019-02-05 RX ORDER — ACETAMINOPHEN 500 MG
1000 TABLET ORAL ONCE
Qty: 0 | Refills: 0 | Status: COMPLETED | OUTPATIENT
Start: 2019-02-05 | End: 2019-02-05

## 2019-02-05 RX ORDER — POTASSIUM CHLORIDE 20 MEQ
20 PACKET (EA) ORAL
Qty: 0 | Refills: 0 | Status: COMPLETED | OUTPATIENT
Start: 2019-02-05 | End: 2019-02-05

## 2019-02-05 RX ORDER — DEXTROSE 50 % IN WATER 50 %
50 SYRINGE (ML) INTRAVENOUS ONCE
Qty: 0 | Refills: 0 | Status: COMPLETED | OUTPATIENT
Start: 2019-02-05 | End: 2019-02-05

## 2019-02-05 RX ORDER — DEXTROSE 10 % IN WATER 10 %
1000 INTRAVENOUS SOLUTION INTRAVENOUS
Qty: 0 | Refills: 0 | Status: DISCONTINUED | OUTPATIENT
Start: 2019-02-05 | End: 2019-02-06

## 2019-02-05 RX ORDER — ERTAPENEM SODIUM 1 G/1
INJECTION, POWDER, LYOPHILIZED, FOR SOLUTION INTRAMUSCULAR; INTRAVENOUS
Qty: 0 | Refills: 0 | Status: DISCONTINUED | OUTPATIENT
Start: 2019-02-05 | End: 2019-02-08

## 2019-02-05 RX ADMIN — Medication 400 MILLIGRAM(S): at 13:40

## 2019-02-05 RX ADMIN — Medication 50 MILLILITER(S): at 15:29

## 2019-02-05 RX ADMIN — Medication 1000 MILLIGRAM(S): at 15:52

## 2019-02-05 RX ADMIN — Medication 75 MILLILITER(S): at 16:15

## 2019-02-05 RX ADMIN — Medication 1 APPLICATION(S): at 16:15

## 2019-02-05 RX ADMIN — Medication 25 GRAM(S): at 06:14

## 2019-02-05 RX ADMIN — Medication 50 MILLIEQUIVALENT(S): at 17:54

## 2019-02-05 RX ADMIN — Medication 250 MILLIGRAM(S): at 18:01

## 2019-02-05 RX ADMIN — Medication 1 APPLICATION(S): at 05:35

## 2019-02-05 RX ADMIN — CEFEPIME 100 MILLIGRAM(S): 1 INJECTION, POWDER, FOR SOLUTION INTRAMUSCULAR; INTRAVENOUS at 05:34

## 2019-02-05 RX ADMIN — Medication 50 MILLIEQUIVALENT(S): at 20:27

## 2019-02-05 RX ADMIN — INSULIN GLARGINE 10 UNIT(S): 100 INJECTION, SOLUTION SUBCUTANEOUS at 00:05

## 2019-02-05 RX ADMIN — Medication 50 MILLIEQUIVALENT(S): at 22:50

## 2019-02-05 RX ADMIN — Medication 1 APPLICATION(S): at 00:04

## 2019-02-05 RX ADMIN — Medication 50 MILLILITER(S): at 13:03

## 2019-02-05 RX ADMIN — Medication 1 APPLICATION(S): at 22:23

## 2019-02-05 RX ADMIN — ERTAPENEM SODIUM 120 MILLIGRAM(S): 1 INJECTION, POWDER, LYOPHILIZED, FOR SOLUTION INTRAMUSCULAR; INTRAVENOUS at 15:49

## 2019-02-05 RX ADMIN — Medication 3: at 00:04

## 2019-02-05 NOTE — DIETITIAN INITIAL EVALUATION ADULT. - PROBLEM SELECTOR PLAN 4
- Compression fracture of the T 12 vertebrae  - Ortho c/s in the am. No need for urgent consult  - No acute pain/radiculopathy  - Will need a PT consult

## 2019-02-05 NOTE — DIETITIAN INITIAL EVALUATION ADULT. - PROBLEM SELECTOR PLAN 7
- Elevated BNP, pulmonary edema, CXR, but clinically the patient does not look volume overloaded  - Will hold off on diuresis for now  - Echo  - Admit to Tele

## 2019-02-05 NOTE — DIETITIAN INITIAL EVALUATION ADULT. - ETIOLOGY
in setting of increased needs due to sepsis inadequate protein-energy intake with increased protein-energy needs in setting of sepsis

## 2019-02-05 NOTE — DIETITIAN INITIAL EVALUATION ADULT. - OTHER INFO
Pt seen for nutrition consult. Pt's tube feeds currently held for f/u abdominal x-ray to evaluate PEG. As per pt's chart from rehab facility, pt weight noted as 136 pounds (1/30). Pt's current dosing weight noted as 134.15 pounds. No reported nausea/vomiting, diarrhea/constipation. Last noted bowel movement 2/5. Pt's wife denied pt taking any nutritional supplement PTA. NKFA. Pt seen for nutrition consult. Pt's tube feeds currently held for f/u abdominal x-ray to evaluate PEG. Pt's wife reports pt's usual body weight as 180 pounds. As per pt's chart from rehab facility, pt weight noted as 136 pounds (1/30). Pt's current dosing weight noted as 134.15 pounds. No reported nausea/vomiting, diarrhea/constipation. Last noted bowel movement 2/5. Pt's wife denied pt taking any nutritional supplement PTA. NKFA.

## 2019-02-05 NOTE — CONSULT NOTE ADULT - PROBLEM SELECTOR RECOMMENDATION 9
Patient with SHINE on CKD in setting of hypotension and sepsis. Baseline Scr. is unknown. On review of previous labs, last Scr. was noted to be elevated at 1.46 in 3/2017. During current admission, Scr. remains stable at 1.78 today. UA shows proteinuria with hematuria and pyuria. Patient with hemodynamically mediated SHINE due to volume depletion? Patient with underlying CKD with Scr. at baseline?? Recommend to start on IV NS for volume resuscitation for volume resuscitation( can run it through different peripheral IV line) and IV antibiotics. Check renal ultrasound when patient is stable. Monitor Scr, I/O, and electrolytes. Avoid NSAIDs, RCAs, and ACE-I/ARBs at this time.

## 2019-02-05 NOTE — DIETITIAN INITIAL EVALUATION ADULT. - NS AS NUTRI INTERV ENTERAL NUTRITION
Recommend Glucerna 1.2 via PEG @ 65 ml/hr x 24hrs to provide 1560 ml formula, 1872 kcal/d, 93 gm protein/day, and 1256 ml free water (this provides 30 kcal/day, and 1.5 gm protein based on current weight of 60.7kg). Pt receiving 200 ml free every 4 hours, recommend adding 100 ml free water every 8 hours. Recommend Glucerna 1.2 via PEG @ 65 ml/hr x 24hrs to provide 1560 ml formula, 1872 kcal/d, 93 gm protein/day, and 1256 ml free water (this provides 30 kcal/day, and 1.5 gm protein based on current weight of 60.7kg). Pt receiving 200 ml free water every 4 hours, recommend changing to 100 ml free water every 8 hours (total of 1556ml free water).

## 2019-02-05 NOTE — PROGRESS NOTE ADULT - PROBLEM SELECTOR PLAN 6
- likely 2/2 to contraction from volume depletion and compensation from respiratory acidosis  - IVF and free water for hydration

## 2019-02-05 NOTE — PROGRESS NOTE ADULT - ATTENDING COMMENTS
* late entry  pt seen and examined with Dr. Meléndez and Dr. Schwarz on 2/5 9:30am  RRT called for unresponsiveness 2/2 severe hypoglycemia - D50 given and started on D10  Continue IV abx for OM  wound care, ID, NSG input appreciated  PT consult pending  poor prognosis    Kimberly Moses MD  Division of Hospital Medicine  Pager: [phone]  Office: [phone]

## 2019-02-05 NOTE — PROGRESS NOTE ADULT - SUBJECTIVE AND OBJECTIVE BOX
f/u OM sacrum    Interval History/ROS:  events noted.  s/p RRT due to hypoglycemia.  no fever.  Unable to obtain ROS - patient sedated, confused.    PAST MEDICAL & SURGICAL HISTORY:  PAD s/p S/P aortobifemoral bypass surgery; S/P angioplasty with stent: vascular stent- left leg  CAD s/p angioplasty/stent; CABG  PPM   CKD (chronic kidney disease) stage 3, GFR 30-59 ml/min  Diastolic congestive heart failure, unspecified congestive heart failure chronicity  Gout  Chronic atrial fibrillation: on coumadin  Hyperlipidemia  HTN   Type 2 diabetes mellitus  OM s/p L 4th toe amputation  Pressure ulcer, unspecified pressure ulcer stage: in lateral rt 4th toe both sides from toe compression  S/P hernia repair    Allergies  No Known Allergies    ANTIMICROBIALS:    cefepime   IVPB 1000 every 12 hours (-)  vancomycin  IVPB 1000 daily (2/3-)    MEDICATIONS  (STANDING):  acetaminophen  IVPB .. 1000 once  docusate sodium 100 two times a day  ondansetron   Solution 8 three times a day  senna 2 at bedtime    Vital Signs Last 24 Hrs  T(F): 97.9 (19 @ 11:50), Max: 98.1 (19 @ 04:19)  HR: 83 (19 @ 11:50)  BP: 116/91 (19 @ 11:50)  RR: 20 (19 @ 11:50)  SpO2: 99% (19 @ 07:44) (95% - 100%)    PHYSICAL EXAM:  General: non-toxic; sleeping in bed  HEAD/EYES: anicteric  ENT:  supple  Cardiovascular:   S1, S2  Respiratory:  clear bilaterally  GI:  soft, non-tender, normal bowel sounds; peg  :  catheter  Musculoskeletal:  no synovitis  Neurologic:  awake; confused  Skin:  large; deep; purulent sacral ulcer  eschar R lower leg; R hip healing  Psychiatric:  confused  Vascular:  no phlebitis - PIV L shoulder                        8.3    17.06 )-----------( 306      ( 2019 08:56 )             29.0 -    153  |  106  |  60  ----------------------------<  54  3.3   |  34  |  1.67  Ca    8.4      2019 07:15Phos  2.6     Mg     2.5       TPro  5.8  /  Alb  2.2  /  TBili  0.4  /  DBili  x   /  AST  16  /  ALT  19  /  AlkPhos  231      Sedimentation Rate, Erythrocyte: 66 (19)    TPro  5.8<L>  /  Alb  2.2<L>  /  TBili  0.4  /  DBili  x   /  AST  16  /  ALT  19  /  AlkPhos  231<H>    Urinalysis Basic - ( 2019 13:07 )  Color: Yellow / Appearance: Slightly Turbid / S.022 / pH: x  Gluc: x / Ketone: Negative  / Bili: Negative / Urobili: Negative   Blood: x / Protein: 30 mg/dL / Nitrite: Negative   Leuk Esterase: Large / RBC: 40 /hpf /  /HPF   Sq Epi: x / Non Sq Epi: 1 / Bacteria: Negative    MICROBIOLOGY:  Culture - Blood (19 @ 17:52)    Specimen Source: .Blood Blood-Venous    Culture Results:   No growth to date.    Culture - Blood (19 @ 17:52)    Specimen Source: .Blood Blood-Peripheral    Culture Results:   No growth to date.    Culture - Urine (19 @ 16:44)    Specimen Source: .Urine Catheterized    Culture Results:   <10,000 CFU/ml Normal Urogenital nany present    RADIOLOGY:  Xray Chest 1 View- PORTABLE-Urgent (19 @ 03:31) >  IMPRESSION: The patient is status post CABG and heart valve repair. A left cardiac device is noted. There is no pneumothorax. There are small bilateral pleural effusions with associated pulmonary edema demonstrating overall interval improvement since the prior study.     CT Lumbar Spine No Cont (19 @ 15:34) >  A large sacral decubitus ulcer is noted overlying the mid-lower sacrum and coccyx. Areas of cortical discontinuity are noted involving the dorsal sacrum most consistent with osteomyelitis. No periosteal reaction   is visualized. No large fluid collections are appreciated within the limitations of this noncontrast study.  Severe compression deformity of the T12 vertebral body with mild retropulsion of bone with a mild bony central canal stenosis as previously described. Bilateral spondylolysis defects are again noted at theL5-S1 levels. Multilevel spondylosis. Diffuse demineralization of the bones.  Atheromatous disease with redemonstration of aortobifemoral bypass which is incompletely evaluated with absence of intravenous contrast.  Fluid within the pelvis ofunknown etiology. Tejeda catheter balloon within the bladder.  The patient is status post right hip replacement.  IMPRESSION:  A large sacral decubitus ulcer is noted overlying the mid-lower sacrum and coccyx. Areas of cortical discontinuity are noted involving the dorsal sacrum most consistent with osteomyelitis.

## 2019-02-05 NOTE — DIETITIAN INITIAL EVALUATION ADULT. - ENERGY NEEDS
Height: 5 feet 9 inches, Weight: 134 pounds  BMI: 19.7 kg/m2 IBW: 160 pounds (+/-10%), %IBW: 83%  Pertinent Info: Pt is a 74 yo male with PMH of CAD c/b CABG, Afib, CKD stage 3, PAD s/p stents, PEG tube, in-dwelling hoskins, and T2DM. Pt admitted with sepsis. Pt currently not receiving tube feeds due to PEG malfunction. Pt with SHINE on CKD with worsening anemia and hypernatremia, thoracic spine fracture with RRT due to hypoglycemia. Pt being followed by wound care. As per flowsheet edema 2+ left arm, and pressure injuries: sacrum unstageable, right heel unstageable, top right foot unstageable, right shin unstageable, and left lateral heel unstageable noted at this time.

## 2019-02-05 NOTE — STUDENT SIGN OFF DOCUMENT - DOCUMENTS STUDENTS ARE SIGNED OFF ON
Vital Signs/Assessment and Intervention/Input and Output/Plan of Care Dietitian Initial Evaluation/Patient Profile/Provider Contact Note

## 2019-02-05 NOTE — PROGRESS NOTE ADULT - PROBLEM SELECTOR PLAN 10
DVT: on coumadin for DVT prophylaxis  Diet: PEG feeds  Dispo: PT consult  Pending resolution of symptoms

## 2019-02-05 NOTE — DIETITIAN INITIAL EVALUATION ADULT. - ORAL INTAKE PTA
Pt with PEG tube receiving Suplena @ 75 ml/hr x 16 hours. Pt's wife reports pt with loose stools on Suplena./n/a Pt with PEG tube receiving Suplena @ 75 ml/hr x 16 hours per transfer records from [facility]. Pt receiving liquid protein 30ml. Pt's wife reports pt with loose stools on Suplena./n/a

## 2019-02-05 NOTE — PROGRESS NOTE ADULT - SUBJECTIVE AND OBJECTIVE BOX
BASIA GLORIA  73y  Male      Subjective:           Meds    MEDICATIONS  (STANDING):  cefepime   IVPB 1000 milliGRAM(s) IV Intermittent every 12 hours  Dakins Solution - 1/4 Strength 1 Application(s) Topical three times a day  dextrose 5% + sodium chloride 0.45%. 1000 milliLiter(s) (100 mL/Hr) IV Continuous <Continuous>  dextrose 5%. 1000 milliLiter(s) (50 mL/Hr) IV Continuous <Continuous>  dextrose 50% Injectable 12.5 Gram(s) IV Push once  dextrose 50% Injectable 25 Gram(s) IV Push once  dextrose 50% Injectable 25 Gram(s) IV Push once  docusate sodium 100 milliGRAM(s) Oral two times a day  ondansetron   Solution 8 milliGRAM(s) Oral three times a day  senna 2 Tablet(s) Oral at bedtime  vancomycin  IVPB 1000 milliGRAM(s) IV Intermittent daily    MEDICATIONS  (PRN):  acetaminophen   Tablet .. 650 milliGRAM(s) Oral every 6 hours PRN Temp greater or equal to 38C (100.4F)  dextrose 40% Gel 15 Gram(s) Oral once PRN Blood Glucose LESS THAN 70 milliGRAM(s)/deciliter  glucagon  Injectable 1 milliGRAM(s) IntraMuscular once PRN Glucose LESS THAN 70 milligrams/deciliter  ondansetron Injectable 4 milliGRAM(s) IV Push every 8 hours PRN Nausea and/or Vomiting        Vital Signs Last 24 Hrs  T(C): 36.6 (05 Feb 2019 07:44), Max: 36.7 (04 Feb 2019 22:35)  T(F): 97.9 (05 Feb 2019 07:44), Max: 98.1 (05 Feb 2019 04:19)  HR: 84 (05 Feb 2019 07:44) (80 - 118)  BP: 120/62 (05 Feb 2019 07:44) (93/59 - 122/58)  BP(mean): --  RR: 20 (05 Feb 2019 07:44) (18 - 31)  SpO2: 99% (05 Feb 2019 07:44) (95% - 100%)    PHYSICAL EXAM:  GENERAL: NAD, well-groomed, well-developed  HEENT - NC/AT, pupils equal and reactive to light,  ; Moist mucous membranes, Good dentition, No lesions  NECK: Supple, No JVD  CHEST/LUNG: Clear to auscultation bilaterally; No rales, rhonchi, wheezing  HEART: Regular rate and rhythm; No murmurs, rubs, or gallops  ABDOMEN: Soft, Nontender, Nondistended; Bowel sounds present  EXTREMITIES:  2+ Peripheral Pulses, No clubbing, cyanosis, or edema  NEURO:  No Focal deficits, sensory and motor intact  SKIN: No rashes or lesions    Consultant(s) Notes Reviewed:  [x ] YES  [ ] NO  Care Discussed with Consultants/Other Providers [ x] YES  [ ] NO    LABS:          RADIOLOGY & ADDITIONAL TESTS: BASIA GLORIA  73y  Male      Subjective:     Overnight: Patient was aggitated, but re-directable. Patient placed on soft mittens. Patient was also hypoglycemic around 6 am.    Upon my examination, patient was not aroussable to sternal rub.         Meds    MEDICATIONS  (STANDING):  cefepime   IVPB 1000 milliGRAM(s) IV Intermittent every 12 hours  Dakins Solution - 1/4 Strength 1 Application(s) Topical three times a day  dextrose 5% + sodium chloride 0.45%. 1000 milliLiter(s) (100 mL/Hr) IV Continuous <Continuous>  dextrose 5%. 1000 milliLiter(s) (50 mL/Hr) IV Continuous <Continuous>  dextrose 50% Injectable 12.5 Gram(s) IV Push once  dextrose 50% Injectable 25 Gram(s) IV Push once  dextrose 50% Injectable 25 Gram(s) IV Push once  docusate sodium 100 milliGRAM(s) Oral two times a day  ondansetron   Solution 8 milliGRAM(s) Oral three times a day  senna 2 Tablet(s) Oral at bedtime  vancomycin  IVPB 1000 milliGRAM(s) IV Intermittent daily    MEDICATIONS  (PRN):  acetaminophen   Tablet .. 650 milliGRAM(s) Oral every 6 hours PRN Temp greater or equal to 38C (100.4F)  dextrose 40% Gel 15 Gram(s) Oral once PRN Blood Glucose LESS THAN 70 milliGRAM(s)/deciliter  glucagon  Injectable 1 milliGRAM(s) IntraMuscular once PRN Glucose LESS THAN 70 milligrams/deciliter  ondansetron Injectable 4 milliGRAM(s) IV Push every 8 hours PRN Nausea and/or Vomiting        Vital Signs Last 24 Hrs  T(C): 36.6 (05 Feb 2019 07:44), Max: 36.7 (04 Feb 2019 22:35)  T(F): 97.9 (05 Feb 2019 07:44), Max: 98.1 (05 Feb 2019 04:19)  HR: 84 (05 Feb 2019 07:44) (80 - 118)  BP: 120/62 (05 Feb 2019 07:44) (93/59 - 122/58)  BP(mean): --  RR: 20 (05 Feb 2019 07:44) (18 - 31)  SpO2: 99% (05 Feb 2019 07:44) (95% - 100%)    PHYSICAL EXAM:  GENERAL: gasping for air  HEENT - NC/AT, pupils equal and reactive to light; dry membranes  NECK: Supple, No JVD  CHEST/LUNG: soft breath sounds  HEART: Regular rate and rhythm  ABDOMEN: NABSx4  EXTREMITIES:  2+ Peripheral Pulses, No clubbing, cyanosis, or edema  NEURO:  patient not responsive, not communicative to sternal rub  SKIN: No rashes or lesions    Consultant(s) Notes Reviewed:  [x ] YES  [ ] NO  Care Discussed with Consultants/Other Providers [ x] YES  [ ] NO    LABS:      The Labs were reviewed by me   The Radiology was reviewed by me    EKG tracing reviewed by me    02-05    153<H>  |  109<H>  |  55<H>  ----------------------------<  32<LL>  3.0<L>   |  33<H>  |  1.78<H>  02-04    153<H>  |  106  |  60<H>  ----------------------------<  54<L>  3.3<L>   |  34<H>  |  1.67<H>  02-03    153<H>  |  105  |  69<H>  ----------------------------<  241<H>  3.5   |  38<H>  |  1.78<H>    Ca    8.2<L>      05 Feb 2019 15:08  Ca    8.4      04 Feb 2019 07:15  Ca    8.6      03 Feb 2019 13:07  Phos  2.7     02-05  Mg     2.4     02-05    TPro  5.8<L>  /  Alb  2.1<L>  /  TBili  0.5  /  DBili  x   /  AST  15  /  ALT  14  /  AlkPhos  197<H>  02-05  TPro  5.8<L>  /  Alb  2.2<L>  /  TBili  0.4  /  DBili  x   /  AST  16  /  ALT  19  /  AlkPhos  231<H>  02-04  TPro  5.9<L>  /  Alb  2.2<L>  /  TBili  0.3  /  DBili  x   /  AST  16  /  ALT  19  /  AlkPhos  254<H>  02-03    Magnesium, Serum: 2.4 mg/dL (02-05-19 @ 15:08)  Magnesium, Serum: 2.5 mg/dL (02-04-19 @ 07:15)  Magnesium, Serum: 2.7 mg/dL (02-03-19 @ 13:07)    Phosphorus Level, Serum: 2.7 mg/dL (02-05-19 @ 15:08)  Phosphorus Level, Serum: 2.6 mg/dL (02-04-19 @ 07:15)  Phosphorus Level, Serum: 3.0 mg/dL (02-03-19 @ 13:07)      PT/INR - ( 05 Feb 2019 15:08 )   PT: 28.6 sec;   INR: 2.44 ratio         PTT - ( 05 Feb 2019 15:08 )  PTT:40.8 sec                ABG - ( 04 Feb 2019 01:43 )  pH, Arterial: 7.50  pH, Blood: x     /  pCO2: 45    /  pO2: 196   / HCO3: 34    / Base Excess: 10.1  /  SaO2: 100                                     8.9    23.6  )-----------( 277      ( 05 Feb 2019 15:08 )             29.1                         8.3    17.06 )-----------( 306      ( 04 Feb 2019 08:56 )             29.0                         9.1    15.3  )-----------( 254      ( 03 Feb 2019 13:07 )             29.9     CAPILLARY BLOOD GLUCOSE      POCT Blood Glucose.: 173 mg/dL (05 Feb 2019 15:31)  POCT Blood Glucose.: 42 mg/dL (05 Feb 2019 15:09)  POCT Blood Glucose.: 47 mg/dL (05 Feb 2019 15:07)  POCT Blood Glucose.: 127 mg/dL (05 Feb 2019 10:04)  POCT Blood Glucose.: 187 mg/dL (05 Feb 2019 09:18)  POCT Blood Glucose.: 28 mg/dL (05 Feb 2019 09:08)  POCT Blood Glucose.: 27 mg/dL (05 Feb 2019 09:07)  POCT Blood Glucose.: 131 mg/dL (05 Feb 2019 06:26)  POCT Blood Glucose.: 35 mg/dL (05 Feb 2019 06:00)  POCT Blood Glucose.: 52 mg/dL (05 Feb 2019 05:52)  POCT Blood Glucose.: 36 mg/dL (05 Feb 2019 05:50)  POCT Blood Glucose.: 269 mg/dL (04 Feb 2019 23:38)  POCT Blood Glucose.: 159 mg/dL (04 Feb 2019 17:20)    Blood Gas Source Venous: Venous (02-03-19 @ 13:07)        RADIOLOGY & ADDITIONAL TESTS:

## 2019-02-05 NOTE — CONSULT NOTE ADULT - SUBJECTIVE AND OBJECTIVE BOX
[facility] Division of Kidney Diseases & Hypertension  INITIAL CONSULT NOTE  [phone]--------------------------------------------------------------------------------    HPI: 73 year old male with h/o CKD, CAD, DM, HTN was sent from nursing home for altered mental status. Patient is currently admitted for management of sepsis secondary to infected decubiti ulcer/UTI. Nephrology was consulted for SHINE and metabolic alkalosis. On review of previous labs in [facility], SCr. was noted to be WNL in 2016. However during admission to [facility] in March 2017, Scr. was noted to be elevated with last Scr. on discharge being 1.46.     Patient seen and examined. History is limited due to mental status. On chart review, patient had history of admission to [facility] recently for infected decubiti ulcer and had episode of SHINE requiring dialysis. No NSAIDs, diuretics, or contrast was noted to be given during this admission. No episodes of vomiting or diarrhea as per nurse.         PAST HISTORY  --------------------------------------------------------------------------------  PAST MEDICAL & SURGICAL HISTORY:  CKD (chronic kidney disease) stage 3, GFR 30-59 ml/min  Pressure ulcer, unspecified pressure ulcer stage: in lateral rt 4th toe both sides from toe compression  Coronary artery disease of native artery of native heart with stable angina pectoris  Diastolic congestive heart failure, unspecified congestive heart failure chronicity  Smoker  Gout involving toe, unspecified cause, unspecified chronicity, unspecified laterality  PAD (peripheral artery disease)  Chronic atrial fibrillation: on coumadin  Hyperlipidemia, unspecified hyperlipidemia type  HTN (hypertension), benign  Type 2 diabetes mellitus with other circulatory complication, with long-term current use of insulin  S/P angioplasty with stent: vascular stent- left leg  S/P hernia repair  Artificial cardiac pacemaker: 2014  S/P aortobifemoral bypass surgery    FAMILY HISTORY:  Family history of hypertension (Father)    PAST SOCIAL HISTORY:    ALLERGIES & MEDICATIONS  --------------------------------------------------------------------------------  Allergies    No Known Allergies    Intolerances      Standing Inpatient Medications  Dakins Solution - 1/4 Strength 1 Application(s) Topical three times a day  dextrose 10%. 1000 milliLiter(s) IV Continuous <Continuous>  dextrose 5%. 1000 milliLiter(s) IV Continuous <Continuous>  dextrose 50% Injectable 50 milliLiter(s) IV Push once  dextrose 50% Injectable 12.5 Gram(s) IV Push once  dextrose 50% Injectable 25 Gram(s) IV Push once  dextrose 50% Injectable 25 Gram(s) IV Push once  docusate sodium 100 milliGRAM(s) Oral two times a day  ertapenem  IVPB      ondansetron   Solution 8 milliGRAM(s) Oral three times a day  potassium chloride  20 mEq/100 mL IVPB 20 milliEquivalent(s) IV Intermittent every 2 hours  senna 2 Tablet(s) Oral at bedtime  vancomycin  IVPB 1000 milliGRAM(s) IV Intermittent daily    PRN Inpatient Medications  acetaminophen   Tablet .. 650 milliGRAM(s) Oral every 6 hours PRN  dextrose 40% Gel 15 Gram(s) Oral once PRN  glucagon  Injectable 1 milliGRAM(s) IntraMuscular once PRN  ondansetron Injectable 4 milliGRAM(s) IV Push every 8 hours PRN      REVIEW OF SYSTEMS  --------------------------------------------------------------------------------  Unable to obtain.    VITALS/PHYSICAL EXAM  --------------------------------------------------------------------------------  T(C): 36.6 (02-05-19 @ 11:50), Max: 36.7 (02-04-19 @ 22:35)  HR: 83 (02-05-19 @ 11:50) (80 - 84)  BP: 116/91 (02-05-19 @ 11:50) (115/62 - 122/58)  RR: 20 (02-05-19 @ 11:50) (18 - 31)  SpO2: 99% (02-05-19 @ 07:44) (95% - 100%)  Wt(kg): --        02-05-19 @ 07:01  -  02-05-19 @ 16:36  --------------------------------------------------------  IN: 0 mL / OUT: 350 mL / NET: -350 mL      Physical Exam:  	Gen: Elderly male  	HEENT: Dry mucus membranes   	Pulm: CTA B/L  	CV:  S1S2  	Abd: +BS, soft               : hoskins present with dark colored urine present.   	Ext: No B/L Lower ext edema; healed ulcers present over LE.   	Neuro: Sedated.   	Skin: Warm and dry    LABS/STUDIES  --------------------------------------------------------------------------------              8.9    23.6  >-----------<  277      [02-05-19 @ 15:08]              29.1     153  |  109  |  55  ----------------------------<  32      [02-05-19 @ 15:08]  3.0   |  33  |  1.78        Ca     8.2     [02-05-19 @ 15:08]      Mg     2.4     [02-05-19 @ 15:08]      Phos  2.7     [02-05-19 @ 15:08]    TPro  5.8  /  Alb  2.1  /  TBili  0.5  /  DBili  x   /  AST  15  /  ALT  14  /  AlkPhos  197  [02-05-19 @ 15:08]    PT/INR: PT 28.6 , INR 2.44       [02-05-19 @ 15:08]  PTT: 40.8       [02-05-19 @ 15:08]      Creatinine Trend:  SCr 1.78 [02-05 @ 15:08]  SCr 1.67 [02-04 @ 07:15]  SCr 1.78 [02-03 @ 13:07]    Urinalysis - [02-03-19 @ 13:07]      Color Yellow / Appearance Slightly Turbid / SG 1.022 / pH 7.0      Gluc Negative / Ketone Negative  / Bili Negative / Urobili Negative       Blood Moderate / Protein 30 mg/dL / Leuk Est Large / Nitrite Negative      RBC 40 /  / Hyaline 21 / Gran  / Sq Epi  / Non Sq Epi 1 / Bacteria Negative      Iron 18, TIBC 143, %sat 13      [02-04-19 @ 08:58]  Ferritin 262      [02-04-19 @ 08:58]  HbA1c 7.4      [02-04-19 @ 08:56]

## 2019-02-05 NOTE — PROGRESS NOTE ADULT - PROBLEM SELECTOR PLAN 1
- Given leukocytosis tachypnea, patient meets SIRS criteria with numerous sources including catheter associated UTI, decubitus ulcers, and osteomyelitis  - C/w Vanc/Cefepime to cover for MRSA and pseudomonas   - F/u Blood cultures  - F/u Urine cultures  - Wound care C/s and ID c/s given Osteomyelitis in the am. Will get wound culture and work on records for outpatient.

## 2019-02-05 NOTE — CONSULT NOTE ADULT - PROBLEM SELECTOR RECOMMENDATION 4
Patient with hypernatremia in setting of impaired free water intake. Latest serum sodium elevated but stable at 153. Patient currently on IV D10. Can also give free water via PEG tube as well. Monitor serum sodium level.

## 2019-02-05 NOTE — PROGRESS NOTE ADULT - PROBLEM SELECTOR PLAN 2
- SHINE on CKD stage 3 (GFR 48) with anemia, pre-renal azotemia 2/2 to po intake  - Likely 2/2 to poor po intake. Will send off urine lytes  - C/w IVF hydration and free water via peg tube  - Anemia likely anemia of chronic disease.  - trend Cr  - avoid nephrotoxic drugs, renally dose meds

## 2019-02-05 NOTE — DIETITIAN INITIAL EVALUATION ADULT. - ADHERENCE
Pt with PMH of T2DM, as per wife, pt's finger sticks checked twice daily. Pt's wife unable to report blood sugar ranged. Pt taking lantus and novolog PTA./n/a

## 2019-02-05 NOTE — CONSULT NOTE ADULT - PROBLEM SELECTOR RECOMMENDATION 2
Patient with elevated serum CO2 level in setting of volume depletion. Serum CO2 noted to be elevated at 38 which then improved to 33 today. ABG done on 2/4 shows metabolic alkalosis. Patient most likely with metabolic alkalosis secondary to volume depletion. Check urine chloride and urine potassium. Recommend to start on IV NS for volume resuscitation. However would need aggressive potassium repletion to help with correction of alkalosis. Monitor serum CO2 level.

## 2019-02-05 NOTE — CONSULT NOTE ADULT - PROBLEM SELECTOR RECOMMENDATION 3
Patient with hypokalemia in setting of metabolic alkalosis. Latest serum K noted to be low at 3. Check urine potassium to determine whether there is renal loss of potassium. Continue to monitor serum K and supplement as needed.

## 2019-02-05 NOTE — PROGRESS NOTE ADULT - ASSESSMENT
73M with multiple medical problems (CAD, CABG, PAD, aorto-bifemoral bypass, CKD), R hip fracture s/p ORIF, s/p PEG/hoskins here with sacral osteomyelitis with sepsis - likely source is osteomyelitis.  Not clear if this more of a chronic OM or acute.  Per IR, bone biopsy not going to be done.  Still think it may help determine if acute or chronic.  Need records from [facility] (cultures, radiology, etc).      Suggest:  - vancomycin dose, however, please check trough  - change cefepime to ertapenem 1g daily  - please send culture of purulent drainage  - trend inflammatory markers  - please obtain old records    above d/w housestaff on 5monti today

## 2019-02-05 NOTE — CONSULT NOTE ADULT - ATTENDING COMMENTS
73 year old male with sacral osteomyelitis, SHINE, hypokalemia, metabolic alkalosis     Hypernatremia- volume depleted. Along with D10 for hypoglycemia, will benefit from Na containing solution since hypovolemic as well- start LR at 100cc/hr.     Hypokalemia- sec to diuretic use/ alkalosis. IF able to use PEG- Give 40 meq KCL q2 hrs times 2 doses, otherwise needs iv KCl 10 meq/hr times 3 doses     SHINE - Baseline creat in 2017 was 1.48. IN 2018, was admitted to [facility] with SHINE needing RRT. Don't have records from there. currently admitted with a creat of 1.78 which maybe near his baseline. Nonetheless, he appears dry and will need iv fluids.     avoid cefepime in this elderly man with renal failure as can accumulate quickly and cause AMS.

## 2019-02-05 NOTE — PROGRESS NOTE ADULT - PROBLEM SELECTOR PLAN 3
- Hypoglycemic this am. All insulin medications held on patient.  - C/w hypoglycemia protocol  - Restart tube feeds after gastrografin x-ray

## 2019-02-05 NOTE — CHART NOTE - NSCHARTNOTEFT_GEN_A_CORE
74 yo male  CAD c/b CABG, A fib (on coumadin), CKD (not on HD), PAD s/p stents, PEG tube, in-dwelling [place], with a hx of an extended hospital stay s/p hip fracture (re-admitted to [facility] for de-cubitus ulcers) admitted for metabolic encephalopathy 2/2 sepsis 2/2 to catheter associated UTI vs sacral decubitus ulcer with underlying sacral osteomyelitis. Patient noted to have SHINE superimposed on CKD with worsening anemia and hypernatremia, thoracic spine fracture with RRT called for hypoglycemia to the 20's now on D10 at 50 cc/hr.       PAST MEDICAL & SURGICAL HISTORY:  CKD (chronic kidney disease) stage 3, GFR 30-59 ml/min  Pressure ulcer, unspecified pressure ulcer stage: in lateral rt 4th toe both sides from toe compression  Coronary artery disease of native artery of native heart with stable angina pectoris  Diastolic congestive heart failure, unspecified congestive heart failure chronicity  Smoker  Gout involving toe, unspecified cause, unspecified chronicity, unspecified laterality  PAD (peripheral artery disease)  Chronic atrial fibrillation: on coumadin  Hyperlipidemia, unspecified hyperlipidemia type  HTN (hypertension), benign  Type 2 diabetes mellitus with other circulatory complication, with long-term current use of insulin  S/P angioplasty with stent: vascular stent- left leg  S/P hernia repair  Artificial cardiac pacemaker: 2014  S/P aortobifemoral bypass surgery      Social Hx:     Allergies: Allergies    No Known Allergies    Intolerances        Medications Standing   MEDICATIONS  (STANDING):  acetaminophen  IVPB .. 1000 milliGRAM(s) IV Intermittent once  cefepime   IVPB 1000 milliGRAM(s) IV Intermittent every 12 hours  Dakins Solution - 1/4 Strength 1 Application(s) Topical three times a day  dextrose 10%. 1000 milliLiter(s) (50 mL/Hr) IV Continuous <Continuous>  dextrose 5%. 1000 milliLiter(s) (50 mL/Hr) IV Continuous <Continuous>  dextrose 50% Injectable 12.5 Gram(s) IV Push once  dextrose 50% Injectable 25 Gram(s) IV Push once  dextrose 50% Injectable 25 Gram(s) IV Push once  docusate sodium 100 milliGRAM(s) Oral two times a day  ondansetron   Solution 8 milliGRAM(s) Oral three times a day  senna 2 Tablet(s) Oral at bedtime  vancomycin  IVPB 1000 milliGRAM(s) IV Intermittent daily      Medications PRN   MEDICATIONS  (PRN):  acetaminophen   Tablet .. 650 milliGRAM(s) Oral every 6 hours PRN Temp greater or equal to 38C (100.4F)  dextrose 40% Gel 15 Gram(s) Oral once PRN Blood Glucose LESS THAN 70 milliGRAM(s)/deciliter  glucagon  Injectable 1 milliGRAM(s) IntraMuscular once PRN Glucose LESS THAN 70 milligrams/deciliter  ondansetron Injectable 4 milliGRAM(s) IV Push every 8 hours PRN Nausea and/or Vomiting      Review of Systems  unable to answer ROS questions    PHYSICAL EXAM:  GENERAL: responsive only to namecall  HEAD:  Atraumatic, Normocephalic  EYES: PERRLA, conjunctiva and sclera clear  NECK: Supple, no lymphadenopathy, no JVD  CHEST/LUNG: CTAB; No wheezes, rales, or rhonchi; appears to be using accessory muscles to breathe, but saturating 100% on 3L NC  HEART: Regular rate and rhythm; No murmurs, rubs, or gallops  ABDOMEN: Soft, non-tender, non-distended; normal bowel sounds, no organomegaly  EXTREMITIES:  2+ peripheral pulses b/l, No clubbing, cyanosis, or edema  NEUROLOGY: A&O x 3, no focal deficits  SKIN: Skin rashes/peeling with evident wounds over extremities,         LABS   CBC                       8.3    17.06 )-----------( 306      ( 04 Feb 2019 08:56 )             29.0     CMP 02-04    153<H>  |  106  |  60<H>  ----------------------------<  54<L>  3.3<L>   |  34<H>  |  1.67<H>    Ca    8.4      04 Feb 2019 07:15  Phos  2.6     02-04  Mg     2.5     02-04    TPro  5.8<L>  /  Alb  2.2<L>  /  TBili  0.4  /  DBili  x   /  AST  16  /  ALT  19  /  AlkPhos  231<H>  02-04    PT/INR - ( 04 Feb 2019 08:53 )   PT: 31.0 sec;   INR: 2.64 ratio         PTT - ( 04 Feb 2019 08:53 )  PTT:45.8 sec    Radiology:     Assessment:     74 yo male  CAD c/b CABG, A fib (on coumadin), CKD (not on HD), PAD s/p stents, PEG tube, in-dwelling [place], with a hx of an extended hospital stay s/p hip fracture (re-admitted to [facility] for de-cubitus ulcers) admitted for metabolic encephalopathy 2/2 sepsis 2/2 to catheter associated UTI vs sacral decubitus ulcer with underlying sacral osteomyelitis. Patient noted to have SHINE superimposed on CKD with worsening anemia and hypernatremia, thoracic spine fracture with RRT called for hypoglycemia to the 20's now on D10 at 50 cc/hr.     #Sepsis  - Given leukocytosis and tachypnea, patient meets SIRS criteria  - Sources including catheter associated UTI, decubitus ulcers, and osteomyelitis  - C/w Vanc/Cefepime to cover for MRSA and pseudomonas   - F/u Blood cultures  - F/u Urine cultures  - Wound care C/s and ID c/s given Osteomyelitis in the am. Patient will need a bone bx for further abx management.      Problem/Plan - 2:  ·  Problem: Acute kidney injury superimposed on CKD.  Plan: - SHINE on CKD stage 3 (GFR 48) with anemia, pre-renal azotemia 2/2 to po intake  - Likely 2/2 to poor po intake. Will send off urine lytes  - C/w IVF hydration and free water via peg tube  - Anemia likely anemia of chronic disease.  - trend Cr  - avoid nephrotoxic drugs, renally dose meds.      Problem/Plan - 3:  ·  Problem: Hypernatremia.  Plan: - Hypernatremia 2/2 to poor po intake and volume depletion (1.3 L free water deficit)  - Free water via PEG tube (200 cc q 4 hrs)  - Gentle IVF hydration with 1/2 NS  - F/u BMP q 12 hrs now. Will change to BMP q Day once sodium downtrends.      Problem/Plan - 4:  ·  Problem: Compression fracture of T12 vertebra.  Plan: - Compression fracture of the T 12 vertebrae  - Neurosurgery c/s  - No acute pain/radiculopathy  - F/u PT consult.      Problem/Plan - 5:  ·  Problem: Metabolic alkalosis.  Plan: - likely 2/2 to contraction from volume depletion and compensation from respiratory acidosis  - Will re-check BMP in am  - IVF and free water for hydration.      Problem/Plan - 6:  Problem: Chronic atrial fibrillation. Plan: - Rate controlled for now  - INR supratherapeutic  - Will repeat INR in the am and dose Coumadin goals of 2-3.     Problem/Plan - 7:  ·  Problem: Heart failure.  Plan: - Elevated BNP, pulmonary edema, CXR, but clinically the patient does not look volume overloaded  - Will hold off on diuresis for now  - F/u Echo  - Admit to Tele.      Problem/Plan - 8:  ·  Problem: Left arm swelling.  Plan: - F/u ultrasound to r/o DVT.      Problem/Plan - 9:  ·  Problem: Medication management.  Plan: - F/u medication reconciliation in the AM. Family uncertain about medications.      Problem/Plan - 10:  Problem: Need for prophylactic measure. Plan; #DM  - C/w on Lantus 6 and SSI    DVT: on coumadin for DVT prophylaxis  Diet: PEG feeds  Dispo: PT consult  Pending resolution of symptoms.    Plan: 72 yo male  CAD c/b CABG, A fib (on coumadin), CKD (not on HD), PAD s/p stents, PEG tube, in-dwelling [place], with a hx of an extended hospital stay s/p hip fracture (re-admitted to [facility] for de-cubitus ulcers) admitted for metabolic encephalopathy 2/2 sepsis 2/2 to catheter associated UTI vs sacral decubitus ulcer with underlying sacral osteomyelitis. Patient noted to have SHINE superimposed on CKD with worsening anemia and hypernatremia, thoracic spine fracture with RRT called for hypoglycemia to the 20's now on D10 at 50 cc/hr.       PAST MEDICAL & SURGICAL HISTORY:  CKD (chronic kidney disease) stage 3, GFR 30-59 ml/min  Pressure ulcer, unspecified pressure ulcer stage: in lateral rt 4th toe both sides from toe compression  Coronary artery disease of native artery of native heart with stable angina pectoris  Diastolic congestive heart failure, unspecified congestive heart failure chronicity  Smoker  Gout involving toe, unspecified cause, unspecified chronicity, unspecified laterality  PAD (peripheral artery disease)  Chronic atrial fibrillation: on coumadin  Hyperlipidemia, unspecified hyperlipidemia type  HTN (hypertension), benign  Type 2 diabetes mellitus with other circulatory complication, with long-term current use of insulin  S/P angioplasty with stent: vascular stent- left leg  S/P hernia repair  Artificial cardiac pacemaker: 2014  S/P aortobifemoral bypass surgery      Social Hx:     Allergies: Allergies    No Known Allergies    Intolerances        Medications Standing   MEDICATIONS  (STANDING):  acetaminophen  IVPB .. 1000 milliGRAM(s) IV Intermittent once  cefepime   IVPB 1000 milliGRAM(s) IV Intermittent every 12 hours  Dakins Solution - 1/4 Strength 1 Application(s) Topical three times a day  dextrose 10%. 1000 milliLiter(s) (50 mL/Hr) IV Continuous <Continuous>  dextrose 5%. 1000 milliLiter(s) (50 mL/Hr) IV Continuous <Continuous>  dextrose 50% Injectable 12.5 Gram(s) IV Push once  dextrose 50% Injectable 25 Gram(s) IV Push once  dextrose 50% Injectable 25 Gram(s) IV Push once  docusate sodium 100 milliGRAM(s) Oral two times a day  ondansetron   Solution 8 milliGRAM(s) Oral three times a day  senna 2 Tablet(s) Oral at bedtime  vancomycin  IVPB 1000 milliGRAM(s) IV Intermittent daily      Medications PRN   MEDICATIONS  (PRN):  acetaminophen   Tablet .. 650 milliGRAM(s) Oral every 6 hours PRN Temp greater or equal to 38C (100.4F)  dextrose 40% Gel 15 Gram(s) Oral once PRN Blood Glucose LESS THAN 70 milliGRAM(s)/deciliter  glucagon  Injectable 1 milliGRAM(s) IntraMuscular once PRN Glucose LESS THAN 70 milligrams/deciliter  ondansetron Injectable 4 milliGRAM(s) IV Push every 8 hours PRN Nausea and/or Vomiting      Review of Systems  unable to answer ROS questions    PHYSICAL EXAM:  GENERAL: responsive only to name call  HEAD:  Atraumatic, Normocephalic  EYES: PERRLA, conjunctiva and sclera clear  NECK: Supple, no lymphadenopathy, no JVD  CHEST/LUNG: CTAB; No wheezes, rales, or rhonchi; appears to be using accessory muscles to breathe, but saturating 100% on 3L NC  HEART: Regular rate and rhythm; No murmurs, rubs, or gallops  ABDOMEN: Soft, non-tender, non-distended; normal bowel sounds, no organomegaly  BACK: sacral decubitus covered with gauze  EXTREMITIES:  2+ peripheral pulses b/l, No clubbing, cyanosis, or edema  NEUROLOGY: A&O x 3, no focal deficits  SKIN: Skin rashes/peeling with evident wounds over extremities        LABS   CBC                       8.3    17.06 )-----------( 306      ( 04 Feb 2019 08:56 )             29.0     CMP 02-04    153<H>  |  106  |  60<H>  ----------------------------<  54<L>  3.3<L>   |  34<H>  |  1.67<H>    Ca    8.4      04 Feb 2019 07:15  Phos  2.6     02-04  Mg     2.5     02-04    TPro  5.8<L>  /  Alb  2.2<L>  /  TBili  0.4  /  DBili  x   /  AST  16  /  ALT  19  /  AlkPhos  231<H>  02-04    PT/INR - ( 04 Feb 2019 08:53 )   PT: 31.0 sec;   INR: 2.64 ratio         PTT - ( 04 Feb 2019 08:53 )  PTT:45.8 sec    Radiology:     Assessment:     72 yo male  CAD c/b CABG, A fib (on coumadin), CKD (not on HD), PAD s/p stents, PEG tube, in-dwelling [place], with a hx of an extended hospital stay s/p hip fracture (re-admitted to [facility] for de-cubitus ulcers) admitted for metabolic encephalopathy 2/2 sepsis 2/2 to catheter associated UTI vs sacral decubitus ulcer with underlying sacral osteomyelitis. Patient noted to have SHINE superimposed on CKD with worsening anemia and hypernatremia, thoracic spine fracture with RRT called for hypoglycemia to the 20's now on D10 at 50 cc/hr.     #Sepsis  - Given leukocytosis and tachypnea, patient meets SIRS criteria  - Sources include catheter associated UTI and sacral decubitus ulcer with underlying osteomyelitis  - will send wound culture of sacral decubitus ulcer  - ID recs appreciated- will continue Vancomycin, switch Cefepime to Ertapenem; will attempt to obtain records from [facility] in [place] to determine if chronic osteomyelitis (as per wife, OM developed after hip replacement in December 2018)  - IR recs appreciated for bone biopsy- low yield given the wound is open, exposed to air; can re-consult if remains septic on antibiotics  - F/u Blood and Urine cultures    #Hypoglycemia  - s/p RRT for hypoglycemia to 20's, with rise to 187 after D50 pushes  - continue on D10 at 50 cc until PEG tube confirmed  - keep off ISS for now   - monitor FS q 4  - patient with PEG In place for feeds- keep off feeds for now until placement confirmed    #Acute kidney injury superimposed on CKD.   - SHINE on CKD stage 3 (GFR 48) with anemia, pre-renal azotemia 2/2 to po intake  - Likely 2/2 to poor po intake  - f/u urine lytes   - continue IVF D510 at 50 cc/hr  - trend Cr  - patient with chronic indwelling [place]os[place] for retention, changed today     #Anemia  - Anemia likely anemia of chronic disease (MCV normocytic)   - avoid nephrotoxic drugs, renally dose meds  - iron studies demonstrate low total iron, low TIBC, and low ferritin  -consider iron replacement therapy     #Hypernatremia  - Hypernatremia 2/2 to poor po intake and volume depletion (1.3 L free water deficit)  - continue D10 at 50 cc/hr     #Compression fracture of T12 vertebra.    - Compression fracture of the T12 vertebrae  - Neurosurgery c/s- not a surgical candidate, medical management at this time   - No acute pain/radiculopathy  - F/u PT consult.   - control pain with IV Tylenol (avoid narcotic medications)     #Metabolic alkalosis  - likely 2/2 to contraction from volume depletion and compensation from respiratory acidosis  - f/u all repeat labs including CBC, BMP, INR/PT/PTT, Mag, Phos, VBG (ABG if cannot obtain VBG)    #Chronic atrial fibrillation  - Rate controlled for now  - INR supratherapeutic  - Will repeat INR in the am and dose Coumadin goals of 2-3.    #Heart failure  - Elevated BNP, pulmonary edema, CXR, but clinically the patient does not look volume overloaded  - Will hold off on diuresis for now  - F/u Echo    #Need for prophylactic measure  -DVT ppx: supratherapeutic on coumadin  -Diet: PEG feeds, hold for now to confirm PEG placement   -Dispo: PT consult, Pending resolution of symptoms.    #GOC  -family would like patient to be full code for now; as per discussion with wife, she will talk with her family members and bring in patient's advanced directives.     Anita Scanlon, PGY1  Pager [phone] 74 yo male  CAD c/b CABG, A fib (on coumadin), CKD (not on HD), PAD s/p stents, PEG tube, in-dwelling [place], with a hx of an extended hospital stay s/p hip fracture (re-admitted to [facility] for de-cubitus ulcers) admitted for metabolic encephalopathy 2/2 sepsis 2/2 to catheter associated UTI vs sacral decubitus ulcer with underlying sacral osteomyelitis. Patient noted to have SHINE superimposed on CKD with worsening anemia and hypernatremia, thoracic spine fracture with RRT called for hypoglycemia to the 20's now on D10 at 50 cc/hr.       PAST MEDICAL & SURGICAL HISTORY:  CKD (chronic kidney disease) stage 3, GFR 30-59 ml/min  Pressure ulcer, unspecified pressure ulcer stage: in lateral rt 4th toe both sides from toe compression  Coronary artery disease of native artery of native heart with stable angina pectoris  Diastolic congestive heart failure, unspecified congestive heart failure chronicity  Smoker  Gout involving toe, unspecified cause, unspecified chronicity, unspecified laterality  PAD (peripheral artery disease)  Chronic atrial fibrillation: on coumadin  Hyperlipidemia, unspecified hyperlipidemia type  HTN (hypertension), benign  Type 2 diabetes mellitus with other circulatory complication, with long-term current use of insulin  S/P angioplasty with stent: vascular stent- left leg  S/P hernia repair  Artificial cardiac pacemaker: 2014  S/P aortobifemoral bypass surgery      Social Hx:     Allergies: Allergies    No Known Allergies    Intolerances        Medications Standing   MEDICATIONS  (STANDING):  acetaminophen  IVPB .. 1000 milliGRAM(s) IV Intermittent once  cefepime   IVPB 1000 milliGRAM(s) IV Intermittent every 12 hours  Dakins Solution - 1/4 Strength 1 Application(s) Topical three times a day  dextrose 10%. 1000 milliLiter(s) (50 mL/Hr) IV Continuous <Continuous>  dextrose 5%. 1000 milliLiter(s) (50 mL/Hr) IV Continuous <Continuous>  dextrose 50% Injectable 12.5 Gram(s) IV Push once  dextrose 50% Injectable 25 Gram(s) IV Push once  dextrose 50% Injectable 25 Gram(s) IV Push once  docusate sodium 100 milliGRAM(s) Oral two times a day  ondansetron   Solution 8 milliGRAM(s) Oral three times a day  senna 2 Tablet(s) Oral at bedtime  vancomycin  IVPB 1000 milliGRAM(s) IV Intermittent daily      Medications PRN   MEDICATIONS  (PRN):  acetaminophen   Tablet .. 650 milliGRAM(s) Oral every 6 hours PRN Temp greater or equal to 38C (100.4F)  dextrose 40% Gel 15 Gram(s) Oral once PRN Blood Glucose LESS THAN 70 milliGRAM(s)/deciliter  glucagon  Injectable 1 milliGRAM(s) IntraMuscular once PRN Glucose LESS THAN 70 milligrams/deciliter  ondansetron Injectable 4 milliGRAM(s) IV Push every 8 hours PRN Nausea and/or Vomiting      Review of Systems  unable to answer ROS questions    PHYSICAL EXAM:  GENERAL: responsive only to name call  HEAD:  Atraumatic, Normocephalic  EYES: PERRLA, conjunctiva and sclera clear  NECK: Supple, no lymphadenopathy, no JVD  CHEST/LUNG: CTAB; No wheezes, rales, or rhonchi; appears to be using accessory muscles to breathe, but saturating 100% on 3L NC  HEART: Regular rate and rhythm; No murmurs, rubs, or gallops  ABDOMEN: Soft, non-tender, non-distended; normal bowel sounds, no organomegaly  BACK: sacral decubitus covered with gauze  EXTREMITIES:  2+ peripheral pulses b/l, No clubbing, cyanosis, or edema  NEUROLOGY: A&O x 3, no focal deficits  SKIN: Skin rashes/peeling with evident wounds over extremities        LABS   CBC                       8.3    17.06 )-----------( 306      ( 04 Feb 2019 08:56 )             29.0     CMP 02-04    153<H>  |  106  |  60<H>  ----------------------------<  54<L>  3.3<L>   |  34<H>  |  1.67<H>    Ca    8.4      04 Feb 2019 07:15  Phos  2.6     02-04  Mg     2.5     02-04    TPro  5.8<L>  /  Alb  2.2<L>  /  TBili  0.4  /  DBili  x   /  AST  16  /  ALT  19  /  AlkPhos  231<H>  02-04    PT/INR - ( 04 Feb 2019 08:53 )   PT: 31.0 sec;   INR: 2.64 ratio         PTT - ( 04 Feb 2019 08:53 )  PTT:45.8 sec    Radiology:     Assessment:     74 yo male  CAD c/b CABG, A fib (on coumadin), CKD (not on HD), PAD s/p stents, PEG tube, in-dwelling [place], with a hx of an extended hospital stay s/p hip fracture (re-admitted to [facility] for de-cubitus ulcers) admitted for metabolic encephalopathy 2/2 sepsis 2/2 to catheter associated UTI vs sacral decubitus ulcer with underlying sacral osteomyelitis. Patient noted to have SHINE superimposed on CKD with worsening anemia and hypernatremia, thoracic spine fracture with RRT called for hypoglycemia to the 20's now on D10 at 50 cc/hr.     #Sepsis  - Given leukocytosis and tachypnea, patient meets SIRS criteria  - Sources include catheter associated UTI and sacral decubitus ulcer with underlying osteomyelitis  - will send wound culture of sacral decubitus ulcer  - ID recs appreciated- will continue Vancomycin, switch Cefepime to Ertapenem; will attempt to obtain records from [facility] in [place] to determine if chronic osteomyelitis (as per wife, OM developed after hip replacement in December 2018)  - IR recs appreciated for bone biopsy- low yield given the wound is open, exposed to air; can re-consult if remains septic on antibiotics  - F/u Blood and Urine cultures    #Sacral decubitus ulcer with underlying osteomyelitis  - continue Abx regimen as above  - wound care following with dressings recommended  - wound care also recommending possible vascular surgery consult given decreased femoral pulses     #Hypoglycemia  - s/p RRT for hypoglycemia to 20's, with rise to 187 after D50 pushes  - continue on D10 at 50 cc until PEG tube confirmed  - keep off ISS for now   - monitor FS q 4  - patient with PEG In place for feeds- keep off feeds for now until placement confirmed    #Acute kidney injury superimposed on CKD.   - SHINE on CKD stage 3 (GFR 48) with anemia, pre-renal azotemia 2/2 to po intake  - Likely 2/2 to poor po intake  - f/u urine lytes   - continue IVF D510 at 50 cc/hr  - trend Cr  - patient with chronic indwelling [place]os[place] for retention, changed today     #Anemia  - Anemia likely anemia of chronic disease (MCV normocytic)   - avoid nephrotoxic drugs, renally dose meds  - iron studies demonstrate low total iron, low TIBC, and low ferritin  -consider iron replacement therapy     #Hypernatremia  - Hypernatremia 2/2 to poor po intake and volume depletion (1.3 L free water deficit)  - continue D10 at 50 cc/hr     #Compression fracture of T12 vertebra.    - Compression fracture of the T12 vertebrae  - Neurosurgery c/s- not a surgical candidate, medical management at this time   - No acute pain/radiculopathy  - F/u PT consult.   - control pain with IV Tylenol (avoid narcotic medications)     #Metabolic alkalosis  - likely 2/2 to contraction from volume depletion and compensation from respiratory acidosis  - f/u all repeat labs including CBC, BMP, INR/PT/PTT, Mag, Phos, VBG (ABG if cannot obtain VBG)    #Chronic atrial fibrillation  - Rate controlled for now  - INR supratherapeutic  - Will repeat INR in the am and dose Coumadin goals of 2-3.    #Heart failure  - Elevated BNP, pulmonary edema, CXR, but clinically the patient does not look volume overloaded  - Will hold off on diuresis for now  - F/u Echo    #Need for prophylactic measure  -DVT ppx: supratherapeutic on coumadin  -Diet: PEG feeds, hold for now to confirm PEG placement   -Dispo: PT consult, Pending resolution of symptoms.    #GOC  -family would like patient to be full code for now; as per discussion with wife, she will talk with her family members and bring in patient's advanced directives.     Anita Scanlon, PGY1  Pager [phone] 72 yo male  CAD c/b CABG, A fib (on coumadin), CKD (not on HD), PAD s/p stents, PEG tube, in-dwelling [place], with a hx of an extended hospital stay s/p hip fracture (re-admitted to [facility] for de-cubitus ulcers) admitted for metabolic encephalopathy 2/2 sepsis 2/2 to catheter associated UTI vs sacral decubitus ulcer with underlying sacral osteomyelitis. Patient noted to have SHINE superimposed on CKD with worsening anemia and hypernatremia, thoracic spine fracture with RRT called for hypoglycemia to the 20's now on D10 at 50 cc/hr.       PAST MEDICAL & SURGICAL HISTORY:  CKD (chronic kidney disease) stage 3, GFR 30-59 ml/min  Pressure ulcer, unspecified pressure ulcer stage: in lateral rt 4th toe both sides from toe compression  Coronary artery disease of native artery of native heart with stable angina pectoris  Diastolic congestive heart failure, unspecified congestive heart failure chronicity  Smoker  Gout involving toe, unspecified cause, unspecified chronicity, unspecified laterality  PAD (peripheral artery disease)  Chronic atrial fibrillation: on coumadin  Hyperlipidemia, unspecified hyperlipidemia type  HTN (hypertension), benign  Type 2 diabetes mellitus with other circulatory complication, with long-term current use of insulin  S/P angioplasty with stent: vascular stent- left leg  S/P hernia repair  Artificial cardiac pacemaker: 2014  S/P aortobifemoral bypass surgery      Social Hx:     Allergies: Allergies    No Known Allergies    Intolerances        Medications Standing   MEDICATIONS  (STANDING):  acetaminophen  IVPB .. 1000 milliGRAM(s) IV Intermittent once  cefepime   IVPB 1000 milliGRAM(s) IV Intermittent every 12 hours  Dakins Solution - 1/4 Strength 1 Application(s) Topical three times a day  dextrose 10%. 1000 milliLiter(s) (50 mL/Hr) IV Continuous <Continuous>  dextrose 5%. 1000 milliLiter(s) (50 mL/Hr) IV Continuous <Continuous>  dextrose 50% Injectable 12.5 Gram(s) IV Push once  dextrose 50% Injectable 25 Gram(s) IV Push once  dextrose 50% Injectable 25 Gram(s) IV Push once  docusate sodium 100 milliGRAM(s) Oral two times a day  ondansetron   Solution 8 milliGRAM(s) Oral three times a day  senna 2 Tablet(s) Oral at bedtime  vancomycin  IVPB 1000 milliGRAM(s) IV Intermittent daily      Medications PRN   MEDICATIONS  (PRN):  acetaminophen   Tablet .. 650 milliGRAM(s) Oral every 6 hours PRN Temp greater or equal to 38C (100.4F)  dextrose 40% Gel 15 Gram(s) Oral once PRN Blood Glucose LESS THAN 70 milliGRAM(s)/deciliter  glucagon  Injectable 1 milliGRAM(s) IntraMuscular once PRN Glucose LESS THAN 70 milligrams/deciliter  ondansetron Injectable 4 milliGRAM(s) IV Push every 8 hours PRN Nausea and/or Vomiting      Review of Systems  unable to answer ROS questions    PHYSICAL EXAM:  GENERAL: responsive only to name call  HEAD:  Atraumatic, Normocephalic  EYES: PERRLA, conjunctiva and sclera clear  NECK: Supple, no lymphadenopathy, no JVD  CHEST/LUNG: CTAB; No wheezes, rales, or rhonchi; appears to be using accessory muscles to breathe, but saturating 100% on 3L NC  HEART: Regular rate and rhythm; No murmurs, rubs, or gallops  ABDOMEN: Soft, non-tender, non-distended; normal bowel sounds, no organomegaly  BACK: sacral decubitus covered with gauze  EXTREMITIES:  2+ peripheral pulses b/l, No clubbing, cyanosis, or edema  NEUROLOGY: A&O x 3, no focal deficits  SKIN: Skin rashes/peeling with evident wounds over extremities        LABS   CBC                       8.3    17.06 )-----------( 306      ( 04 Feb 2019 08:56 )             29.0     CMP 02-04    153<H>  |  106  |  60<H>  ----------------------------<  54<L>  3.3<L>   |  34<H>  |  1.67<H>    Ca    8.4      04 Feb 2019 07:15  Phos  2.6     02-04  Mg     2.5     02-04    TPro  5.8<L>  /  Alb  2.2<L>  /  TBili  0.4  /  DBili  x   /  AST  16  /  ALT  19  /  AlkPhos  231<H>  02-04    PT/INR - ( 04 Feb 2019 08:53 )   PT: 31.0 sec;   INR: 2.64 ratio         PTT - ( 04 Feb 2019 08:53 )  PTT:45.8 sec    Radiology:     Assessment:     72 yo male  CAD c/b CABG, A fib (on coumadin), CKD (not on HD), PAD s/p stents, PEG tube, in-dwelling [place], with a hx of an extended hospital stay s/p hip fracture (re-admitted to [facility] for de-cubitus ulcers) admitted for metabolic encephalopathy 2/2 sepsis 2/2 to catheter associated UTI vs sacral decubitus ulcer with underlying sacral osteomyelitis. Patient noted to have SHINE superimposed on CKD with worsening anemia and hypernatremia, thoracic spine fracture with RRT called for hypoglycemia to the 20's now on D10 at 50 cc/hr.     #Sepsis  - Given leukocytosis and tachypnea, patient meets SIRS criteria  - Sources include catheter associated UTI and sacral decubitus ulcer with underlying osteomyelitis  - will send wound culture of sacral decubitus ulcer  - ID recs appreciated- will continue Vancomycin, switch Cefepime to Ertapenem; will attempt to obtain records from [facility] in [place] to determine if chronic osteomyelitis (as per wife, OM developed after hip replacement in December 2018)  - IR recs appreciated for bone biopsy- low yield given the wound is open, exposed to air; can re-consult if remains septic on antibiotics  - F/u Blood and Urine cultures    #Sacral decubitus ulcer with underlying osteomyelitis  - continue Abx regimen as above  - wound care following with dressings recommended  - wound care also recommending possible vascular surgery consult given decreased femoral pulses     #Hypoglycemia  - s/p RRT for hypoglycemia to 20's, with rise to 187 after D50 pushes  - continue on D10 at 50 cc until PEG tube confirmed  - keep off ISS for now   - monitor FS q 4  - patient with PEG In place for feeds- keep off feeds for now until placement confirmed    #Acute kidney injury superimposed on CKD.   - SHINE on CKD stage 3 (GFR 48) with anemia, pre-renal azotemia 2/2 to po intake  - Likely 2/2 to poor po intake  - f/u urine lytes   - continue IVF D510 at 50 cc/hr  - trend Cr  - patient with chronic indwelling hoskins for retention, changed today   - nephrology consulted: hypokalemia, hypernatremia with metabolic alkalosis and respiratory compensation (inc PCO2 of 58 on ABG)- mineralcorticoid excess?     #Anemia  - Anemia likely anemia of chronic disease (MCV normocytic)   - avoid nephrotoxic drugs, renally dose meds  - iron studies demonstrate low total iron, low TIBC, and low ferritin  -consider iron replacement therapy     #Hypernatremia  - Hypernatremia 2/2 to poor po intake and volume depletion (1.3 L free water deficit)  - continue D10 at 50 cc/hr     #Compression fracture of T12 vertebra.    - Compression fracture of the T12 vertebrae  - Neurosurgery c/s- not a surgical candidate, medical management at this time   - No acute pain/radiculopathy  - F/u PT consult.   - control pain with IV Tylenol (avoid narcotic medications)     #Metabolic alkalosis  - likely 2/2 to contraction from volume depletion and compensation from respiratory acidosis  - f/u all repeat labs including CBC, BMP, INR/PT/PTT, Mag, Phos, VBG (ABG if cannot obtain VBG)    #Chronic atrial fibrillation  - Rate controlled for now  - INR supratherapeutic  - Will repeat INR in the am and dose Coumadin goals of 2-3.    #Heart failure  - Elevated BNP, pulmonary edema, CXR, but clinically the patient does not look volume overloaded  - Will hold off on diuresis for now  - F/u Echo    #Need for prophylactic measure  -DVT ppx: supratherapeutic on coumadin  -Diet: PEG feeds, hold for now to confirm PEG placement   -Dispo: PT consult, Pending resolution of symptoms.    #GOC  -family would like patient to be full code for now; as per discussion with wife, she will talk with her family members and bring in patient's advanced directives.     Anita Scanlon, PGY1  Pager [phone] 72 yo male  CAD c/b CABG, A fib (on coumadin), CKD (not on HD), PAD s/p stents, PEG tube, in-dwelling [place], with a hx of an extended hospital stay s/p hip fracture (re-admitted to [facility] for de-cubitus ulcers) admitted for metabolic encephalopathy 2/2 sepsis 2/2 to catheter associated UTI vs sacral decubitus ulcer with underlying sacral osteomyelitis. Patient noted to have SHINE superimposed on CKD with worsening anemia and hypernatremia, thoracic spine fracture with RRT called for hypoglycemia to the 20's now on D10 at 50 cc/hr.       PAST MEDICAL & SURGICAL HISTORY:  CKD (chronic kidney disease) stage 3, GFR 30-59 ml/min  Pressure ulcer, unspecified pressure ulcer stage: in lateral rt 4th toe both sides from toe compression  Coronary artery disease of native artery of native heart with stable angina pectoris  Diastolic congestive heart failure, unspecified congestive heart failure chronicity  Smoker  Gout involving toe, unspecified cause, unspecified chronicity, unspecified laterality  PAD (peripheral artery disease)  Chronic atrial fibrillation: on coumadin  Hyperlipidemia, unspecified hyperlipidemia type  HTN (hypertension), benign  Type 2 diabetes mellitus with other circulatory complication, with long-term current use of insulin  S/P angioplasty with stent: vascular stent- left leg  S/P hernia repair  Artificial cardiac pacemaker: 2014  S/P aortobifemoral bypass surgery      Social Hx:     Allergies: Allergies    No Known Allergies    Intolerances        Medications Standing   MEDICATIONS  (STANDING):  acetaminophen  IVPB .. 1000 milliGRAM(s) IV Intermittent once  cefepime   IVPB 1000 milliGRAM(s) IV Intermittent every 12 hours  Dakins Solution - 1/4 Strength 1 Application(s) Topical three times a day  dextrose 10%. 1000 milliLiter(s) (50 mL/Hr) IV Continuous <Continuous>  dextrose 5%. 1000 milliLiter(s) (50 mL/Hr) IV Continuous <Continuous>  dextrose 50% Injectable 12.5 Gram(s) IV Push once  dextrose 50% Injectable 25 Gram(s) IV Push once  dextrose 50% Injectable 25 Gram(s) IV Push once  docusate sodium 100 milliGRAM(s) Oral two times a day  ondansetron   Solution 8 milliGRAM(s) Oral three times a day  senna 2 Tablet(s) Oral at bedtime  vancomycin  IVPB 1000 milliGRAM(s) IV Intermittent daily      Medications PRN   MEDICATIONS  (PRN):  acetaminophen   Tablet .. 650 milliGRAM(s) Oral every 6 hours PRN Temp greater or equal to 38C (100.4F)  dextrose 40% Gel 15 Gram(s) Oral once PRN Blood Glucose LESS THAN 70 milliGRAM(s)/deciliter  glucagon  Injectable 1 milliGRAM(s) IntraMuscular once PRN Glucose LESS THAN 70 milligrams/deciliter  ondansetron Injectable 4 milliGRAM(s) IV Push every 8 hours PRN Nausea and/or Vomiting      Review of Systems  unable to answer ROS questions    PHYSICAL EXAM:  GENERAL: responsive only to name call  HEAD:  Atraumatic, Normocephalic  EYES: PERRLA, conjunctiva and sclera clear  NECK: Supple, no lymphadenopathy, no JVD  CHEST/LUNG: CTAB; No wheezes, rales, or rhonchi; appears to be using accessory muscles to breathe, but saturating 100% on 3L NC  HEART: Regular rate and rhythm; No murmurs, rubs, or gallops  ABDOMEN: Soft, non-tender, non-distended; normal bowel sounds, no organomegaly  BACK: sacral decubitus covered with gauze  EXTREMITIES:  2+ peripheral pulses b/l, No clubbing, cyanosis, or edema  NEUROLOGY: A&O x 3, no focal deficits  SKIN: Skin rashes/peeling with evident wounds over extremities        LABS   CBC                       8.3    17.06 )-----------( 306      ( 04 Feb 2019 08:56 )             29.0     CMP 02-04    153<H>  |  106  |  60<H>  ----------------------------<  54<L>  3.3<L>   |  34<H>  |  1.67<H>    Ca    8.4      04 Feb 2019 07:15  Phos  2.6     02-04  Mg     2.5     02-04    TPro  5.8<L>  /  Alb  2.2<L>  /  TBili  0.4  /  DBili  x   /  AST  16  /  ALT  19  /  AlkPhos  231<H>  02-04    PT/INR - ( 04 Feb 2019 08:53 )   PT: 31.0 sec;   INR: 2.64 ratio         PTT - ( 04 Feb 2019 08:53 )  PTT:45.8 sec    Radiology:     Assessment:     72 yo male  CAD c/b CABG, A fib (on coumadin), CKD (not on HD), PAD s/p stents, PEG tube, in-dwelling [place], with a hx of an extended hospital stay s/p hip fracture (re-admitted to [facility] for de-cubitus ulcers) admitted for metabolic encephalopathy 2/2 sepsis 2/2 to catheter associated UTI vs sacral decubitus ulcer with underlying sacral osteomyelitis. Patient noted to have SHINE superimposed on CKD with worsening anemia and hypernatremia, thoracic spine fracture with RRT called for hypoglycemia to the 20's now on D10 at 50 cc/hr.     #Sepsis  - Given leukocytosis and tachypnea, patient meets SIRS criteria  - Sources include catheter associated UTI and sacral decubitus ulcer with underlying osteomyelitis  - will send wound culture of sacral decubitus ulcer  - ID recs appreciated- will continue Vancomycin, switch Cefepime to Ertapenem; will attempt to obtain records from [facility] in [place] to determine if chronic osteomyelitis (as per wife, OM developed after hip replacement in December 2018)  - IR recs appreciated for bone biopsy- low yield given the wound is open, exposed to air; can re-consult if remains septic on antibiotics  - F/u Blood and Urine cultures    #Sacral decubitus ulcer with underlying osteomyelitis  - continue Abx regimen as above  - wound care following with dressings recommended  - wound care also recommending possible vascular surgery consult given decreased femoral pulses     #Hypoglycemia  - s/p RRT for hypoglycemia to 20's, with rise to 187 after D50 pushes  - continue on D10 at 50 cc until PEG tube confirmed  - keep off ISS for now   - monitor FS q 2  - patient with PEG In place for feeds- keep off feeds for now until placement confirmed    #Acute kidney injury superimposed on CKD.   - SHINE on CKD stage 3 (GFR 48) with anemia, pre-renal azotemia 2/2 to po intake  - Likely 2/2 to poor po intake  - f/u urine lytes   - continue IVF D510 at 50 cc/hr  - trend Cr  - patient with chronic indwelling hoskins for retention, changed today   - nephrology consulted: hypokalemia, hypernatremia with metabolic alkalosis and respiratory compensation (inc PCO2 of 58 on ABG)- mineralcorticoid excess?     #Anemia  - Anemia likely anemia of chronic disease (MCV normocytic)   - avoid nephrotoxic drugs, renally dose meds  - iron studies demonstrate low total iron, low TIBC, and low ferritin  -consider iron replacement therapy     #Hypernatremia  - Hypernatremia 2/2 to poor po intake and volume depletion (1.3 L free water deficit)  - continue D10 at 50 cc/hr     #Compression fracture of T12 vertebra.    - Compression fracture of the T12 vertebrae  - Neurosurgery c/s- not a surgical candidate, medical management at this time   - No acute pain/radiculopathy  - F/u PT consult.   - control pain with IV Tylenol (avoid narcotic medications)     #Metabolic alkalosis  - likely 2/2 to contraction from volume depletion and compensation from respiratory acidosis  - f/u all repeat labs including CBC, BMP, INR/PT/PTT, Mag, Phos, VBG (ABG if cannot obtain VBG)- q 8 hour activatable labs    #Chronic atrial fibrillation  - Rate controlled for now  - INR supratherapeutic  - Will repeat INR in the am and dose Coumadin goals of 2-3.    #Heart failure  - Elevated BNP, pulmonary edema, CXR, but clinically the patient does not look volume overloaded  - Will hold off on diuresis for now  - F/u Echo    #Need for prophylactic measure  -DVT ppx: supratherapeutic on coumadin  -Diet: PEG feeds, hold for now to confirm PEG placement   -Dispo: PT consult, Pending resolution of symptoms.    #GOC  -family would like patient to be full code for now; as per discussion with wife, she will talk with her family members and bring in patient's advanced directives.     Anita Scanlon, PGY1  Pager [phone] 74 yo male  CAD c/b CABG, A fib (on coumadin), CKD (not on HD), PAD s/p stents, PEG tube, in-dwelling [place], with a hx of an extended hospital stay s/p hip fracture (re-admitted to [facility] for de-cubitus ulcers) admitted for metabolic encephalopathy 2/2 sepsis 2/2 to catheter associated UTI vs sacral decubitus ulcer with underlying sacral osteomyelitis. Patient noted to have SHINE superimposed on CKD with worsening anemia and hypernatremia, thoracic spine fracture with RRT called for hypoglycemia to the 20's now on D10 at 50 cc/hr.       PAST MEDICAL & SURGICAL HISTORY:  CKD (chronic kidney disease) stage 3, GFR 30-59 ml/min  Pressure ulcer, unspecified pressure ulcer stage: in lateral rt 4th toe both sides from toe compression  Coronary artery disease of native artery of native heart with stable angina pectoris  Diastolic congestive heart failure, unspecified congestive heart failure chronicity  Smoker  Gout involving toe, unspecified cause, unspecified chronicity, unspecified laterality  PAD (peripheral artery disease)  Chronic atrial fibrillation: on coumadin  Hyperlipidemia, unspecified hyperlipidemia type  HTN (hypertension), benign  Type 2 diabetes mellitus with other circulatory complication, with long-term current use of insulin  S/P angioplasty with stent: vascular stent- left leg  S/P hernia repair  Artificial cardiac pacemaker: 2014  S/P aortobifemoral bypass surgery      Social Hx:     Allergies: Allergies    No Known Allergies    Intolerances        Medications Standing   MEDICATIONS  (STANDING):  acetaminophen  IVPB .. 1000 milliGRAM(s) IV Intermittent once  cefepime   IVPB 1000 milliGRAM(s) IV Intermittent every 12 hours  Dakins Solution - 1/4 Strength 1 Application(s) Topical three times a day  dextrose 10%. 1000 milliLiter(s) (50 mL/Hr) IV Continuous <Continuous>  dextrose 5%. 1000 milliLiter(s) (50 mL/Hr) IV Continuous <Continuous>  dextrose 50% Injectable 12.5 Gram(s) IV Push once  dextrose 50% Injectable 25 Gram(s) IV Push once  dextrose 50% Injectable 25 Gram(s) IV Push once  docusate sodium 100 milliGRAM(s) Oral two times a day  ondansetron   Solution 8 milliGRAM(s) Oral three times a day  senna 2 Tablet(s) Oral at bedtime  vancomycin  IVPB 1000 milliGRAM(s) IV Intermittent daily      Medications PRN   MEDICATIONS  (PRN):  acetaminophen   Tablet .. 650 milliGRAM(s) Oral every 6 hours PRN Temp greater or equal to 38C (100.4F)  dextrose 40% Gel 15 Gram(s) Oral once PRN Blood Glucose LESS THAN 70 milliGRAM(s)/deciliter  glucagon  Injectable 1 milliGRAM(s) IntraMuscular once PRN Glucose LESS THAN 70 milligrams/deciliter  ondansetron Injectable 4 milliGRAM(s) IV Push every 8 hours PRN Nausea and/or Vomiting      Review of Systems  unable to answer ROS questions    PHYSICAL EXAM:  GENERAL: responsive only to name call  HEAD:  Atraumatic, Normocephalic  EYES: conjunctiva and sclera clear  NECK: Supple, no lymphadenopathy, no JVD  CHEST/LUNG: CTAB; No wheezes, rales, or rhonchi; appears to be using accessory muscles to breathe, but saturating 100% on 3L NC  HEART: Regular rate and rhythm; No murmurs, rubs, or gallops  ABDOMEN: Soft, non-tender, non-distended; normal bowel sounds, no organomegaly  BACK: sacral decubitus covered with gauze  EXTREMITIES:  No clubbing, cyanosis, or edema , pos chronic skin changes   SKIN: Skin rashes/peeling with evident wounds over extremities with necrotic scabs.          LABS   CBC                       8.3    17.06 )-----------( 306      ( 04 Feb 2019 08:56 )             29.0     CMP 02-04    153<H>  |  106  |  60<H>  ----------------------------<  54<L>  3.3<L>   |  34<H>  |  1.67<H>    Ca    8.4      04 Feb 2019 07:15  Phos  2.6     02-04  Mg     2.5     02-04    TPro  5.8<L>  /  Alb  2.2<L>  /  TBili  0.4  /  DBili  x   /  AST  16  /  ALT  19  /  AlkPhos  231<H>  02-04    PT/INR - ( 04 Feb 2019 08:53 )   PT: 31.0 sec;   INR: 2.64 ratio         PTT - ( 04 Feb 2019 08:53 )  PTT:45.8 sec    Radiology:     Assessment:     74 yo male  CAD c/b CABG, A fib (on coumadin), CKD (not on HD), PAD s/p stents, PEG tube, in-dwelling [place], with a hx of an extended hospital stay s/p hip fracture (re-admitted to [facility] for de-cubitus ulcers) admitted for metabolic encephalopathy 2/2 sepsis 2/2 to catheter associated UTI vs sacral decubitus ulcer with underlying sacral osteomyelitis. Patient noted to have SHINE superimposed on CKD with worsening anemia and hypernatremia, thoracic spine fracture with RRT called for hypoglycemia to the 20's now on D10 at 50 cc/hr.     #Sepsis  - Given leukocytosis and tachypnea, patient meets SIRS criteria  - Sources include catheter associated UTI and sacral decubitus ulcer with underlying osteomyelitis  - will send wound culture of sacral decubitus ulcer  - ID recs appreciated- will continue Vancomycin, switch Cefepime to Ertapenem; will attempt to obtain records from [facility] in [place] to determine if chronic osteomyelitis (as per wife, OM developed after hip replacement in December 2018)  - IR recs appreciated for bone biopsy- low yield given the wound is open, exposed to air; can re-consult if remains septic on antibiotics  - F/u Blood and Urine cultures    #Sacral decubitus ulcer with underlying osteomyelitis  - continue Abx regimen as above  - wound care following with dressings recommended  - wound care also recommending possible vascular surgery consult given decreased femoral pulses     #Hypoglycemia  - s/p RRT for hypoglycemia to 20's, with rise to 187 after D50 pushes  - continue on D10 at 50 cc until PEG tube confirmed  - keep off ISS for now   - monitor FS q 2  - patient with PEG In place for feeds- keep off feeds for now until placement confirmed    #Acute kidney injury superimposed on CKD.   - SHINE on CKD stage 3 (GFR 48) with anemia, pre-renal azotemia 2/2 to po intake  - Likely 2/2 to poor po intake  - f/u urine lytes   - continue IVF D510 at 50 cc/hr  - trend Cr  - patient with chronic indwelling hoskins for retention, changed today   - nephrology consulted: hypokalemia, hypernatremia with metabolic alkalosis and respiratory compensation (inc PCO2 of 58 on ABG)- mineralcorticoid excess?     #Anemia  - Anemia likely anemia of chronic disease (MCV normocytic)   - avoid nephrotoxic drugs, renally dose meds  - iron studies demonstrate low total iron, low TIBC, and low ferritin  -consider iron replacement therapy     #Hypernatremia  - Hypernatremia 2/2 to poor po intake and volume depletion (1.3 L free water deficit)  - continue D10 at 50 cc/hr     #Compression fracture of T12 vertebra.    - Compression fracture of the T12 vertebrae  - Neurosurgery c/s- not a surgical candidate, medical management at this time   - No acute pain/radiculopathy  - F/u PT consult.   - control pain with IV Tylenol (avoid narcotic medications)     #Metabolic alkalosis  - likely 2/2 to contraction from volume depletion and compensation from respiratory acidosis  - f/u all repeat labs including CBC, BMP, INR/PT/PTT, Mag, Phos, VBG (ABG if cannot obtain VBG)- q 8 hour activatable labs    #Chronic atrial fibrillation  - Rate controlled for now  - INR = 2.44/ since UNSURE if PEG tube is working / will confirm about PEG tube and if working will restart Coumadin tomorrow and monitor INR   - Will repeat INR in the am and dose Coumadin goals of 2-3.    #Heart failure  - Elevated BNP, pulmonary edema, CXR, but clinically the patient does not look volume overloaded  - Will hold off on diuresis for now  - F/u Echo    #Need for prophylactic measure  -DVT ppx: supratherapeutic on coumadin  -Diet: PEG feeds, hold for now to confirm PEG placement   -Dispo: PT consult, Pending resolution of symptoms.    #GOC  -family would like patient to be full code for now; as per discussion with wife, she will talk with her family members and bring in patient's advanced directives.     Anita Scanlon, PGY1  Pager [phone]

## 2019-02-05 NOTE — DIETITIAN INITIAL EVALUATION ADULT. - PROBLEM SELECTOR PLAN 2
- SHINE on CKD stage 3 (GFR 48) with anemia, pre-renal azotemia 2/2 to po intake  - Likely 2/2 to poor po intake. Will send off urine lytes  - Will start IVF hydration and free water via peg tube  - Anemia likely anemia of chronic disease. F/u Iron Panel, Folic Acid, and stool guiac.  - trend Cr  - avoid nephrotoxic drugs, renally dose meds

## 2019-02-05 NOTE — CONSULT NOTE ADULT - ASSESSMENT
73 year old male with SHINE on CKD, hypokalemia and metabolic alkalosis in setting of hypotension and sepsis.

## 2019-02-05 NOTE — PROGRESS NOTE ADULT - ASSESSMENT
74 yo male  CAD c/b CABG, A fib (on coumadin), CKD (not on HD), PAD s/p stents, PEG tube, in-dwelling [place], with a hx of an extended hospital stay s/p hip fracture (re-admitted to [facility] for de-cubitus ulcers) admitted for metabolic encephalopathy 2/2 sepsis 2/2 to catheter associated UTI vs infected sacral decubitus ulcer vs osteo. Patient noted to have SHINE superimposed on CKD with worsening anemia and hypernatremia and underlying osteomyelitis with thoracic fracture.

## 2019-02-05 NOTE — DIETITIAN INITIAL EVALUATION ADULT. - SIGNS/SYMPTOMS
Pt's feeds being held, pt with wounds, severe muscle/fat loss, weight loss of 25% in 3 months, sepsis, unstageable wounds

## 2019-02-05 NOTE — CHART NOTE - NSCHARTNOTEFT_GEN_A_CORE
MAR RRT Note:    Time of RRT: 9:12 AM    74 yo M PMHx CAD s/p CABG, PAD s/p stents, atrial fibrillation (on coumadin), CKD, PEG tube, in-dwelling [place], extended hospital stay s/p hip fracture (re-admitted to [facility] for de-cubitus ulcers) admitted for metabolic encephalopathy 2/2 sepsis 2/2 multiple potential sources of infection (catheter associated UTI, sacral decubitus ulcer, possible osteomyelitis), SHINE on CKD, electrolyte abnormalities and FFT.     RRT was called for hypoglycemia of 20s with limited IV access. During RRT glucagon and D50 was given. VS hemodynamically stable. He was mildly hypoxic with poor wave form and started on nasal cannula. During RRT was responsive, however not at baseline mental status per primary team. FSG improved to 187 and RRT was ended.     Plan:  [ ] Closely monitor FSG, will likely need D5 mIVF until PEG tube is reassessed  [ ] Repeat FSG at 15 minutes, closely monitor throughout day.  [ ] Readdress PEG tube  [ ] Will need additional IV access  [ ] Consider ABG given change in reported mental status and mild tachypnea     Deedee Luz MD  Internal Medicine, PGY-3  Pager [phone]

## 2019-02-05 NOTE — DIETITIAN INITIAL EVALUATION ADULT. - PROBLEM SELECTOR PLAN 1
- Given leukocytosis tachypnea, patient meets SIRS criteria with numerous sources including catheter associated UTI, decubitus ulcers, and osteomyelitis  - Will start on Vanc/Cefepime to cover for MRSA and pseudomonas   - F/u Blood cultures  - F/u Urine cultures  - Send of Lactate and ESR  - Change [place]  - Wound care C/s and ID c/s given Osteomyelitis in the am.

## 2019-02-05 NOTE — DIETITIAN INITIAL EVALUATION ADULT. - PHYSICAL APPEARANCE
Pt's wife consented to Nutrition Focused Physical Assessment, performed with Dietitian and dietetic intern. Findings include: severe temporal and clavicle muscle loss and severe orbital and buccal fat wasting./underweight

## 2019-02-05 NOTE — CHART NOTE - NSCHARTNOTEFT_GEN_A_CORE
Upon Nutritional Assessment by the Registered Dietitian your patient was determined to meet criteria / has evidence of the following diagnosis/diagnoses:          [ ]  Mild Protein Calorie Malnutrition        [ ]  Moderate Protein Calorie Malnutrition        [ x] Severe Protein Calorie Malnutrition        [ ] Unspecified Protein Calorie Malnutrition        [ ] Underweight / BMI <19        [ ] Morbid Obesity / BMI > 40      Findings as based on:  [x ] Comprehensive nutrition assessment   [ ] Nutrition Focused Physical Exam  [x ] Other: severe muscle/fat loss, weight loss of 25% in 3 months, sepsis, unstageable pressure injuries      Nutrition Plan/Recommendations:  1) Recommend Glucerna 1.2 via PEG @ 65 ml/hr x 24hrs to provide 1560 ml formula. Recommend changing to 100 ml free water every 8 hours (total of 1556ml free water). 2) Recommend nephrovite once daily and vitamin C for pressure injuries.        PROVIDER Section:     By signing this assessment you are acknowledging and agree with the diagnosis/diagnoses assigned by the Registered Dietitian    Comments:

## 2019-02-06 DIAGNOSIS — M46.28 OSTEOMYELITIS OF VERTEBRA, SACRAL AND SACROCOCCYGEAL REGION: ICD-10-CM

## 2019-02-06 DIAGNOSIS — I48.2 CHRONIC ATRIAL FIBRILLATION: ICD-10-CM

## 2019-02-06 DIAGNOSIS — E16.2 HYPOGLYCEMIA, UNSPECIFIED: ICD-10-CM

## 2019-02-06 DIAGNOSIS — E87.8 OTHER DISORDERS OF ELECTROLYTE AND FLUID BALANCE, NOT ELSEWHERE CLASSIFIED: ICD-10-CM

## 2019-02-06 DIAGNOSIS — D64.9 ANEMIA, UNSPECIFIED: ICD-10-CM

## 2019-02-06 LAB
ALBUMIN SERPL ELPH-MCNC: 2 G/DL — LOW (ref 3.3–5)
ALP SERPL-CCNC: 203 U/L — HIGH (ref 40–120)
ALT FLD-CCNC: 13 U/L — SIGNIFICANT CHANGE UP (ref 10–45)
ANION GAP SERPL CALC-SCNC: 9 MMOL/L — SIGNIFICANT CHANGE UP (ref 5–17)
AST SERPL-CCNC: 13 U/L — SIGNIFICANT CHANGE UP (ref 10–40)
BILIRUB SERPL-MCNC: 0.4 MG/DL — SIGNIFICANT CHANGE UP (ref 0.2–1.2)
BUN SERPL-MCNC: 50 MG/DL — HIGH (ref 7–23)
CALCIUM SERPL-MCNC: 8.2 MG/DL — LOW (ref 8.4–10.5)
CHLORIDE SERPL-SCNC: 107 MMOL/L — SIGNIFICANT CHANGE UP (ref 96–108)
CHLORIDE UR-SCNC: <35 MMOL/L — SIGNIFICANT CHANGE UP
CO2 SERPL-SCNC: 32 MMOL/L — HIGH (ref 22–31)
CREAT ?TM UR-MCNC: 108 MG/DL — SIGNIFICANT CHANGE UP
CREAT SERPL-MCNC: 1.63 MG/DL — HIGH (ref 0.5–1.3)
GAS PNL BLDV: SIGNIFICANT CHANGE UP
GLUCOSE BLDC GLUCOMTR-MCNC: 290 MG/DL — HIGH (ref 70–99)
GLUCOSE BLDC GLUCOMTR-MCNC: 367 MG/DL — HIGH (ref 70–99)
GLUCOSE BLDC GLUCOMTR-MCNC: 425 MG/DL — HIGH (ref 70–99)
GLUCOSE BLDC GLUCOMTR-MCNC: 426 MG/DL — HIGH (ref 70–99)
GLUCOSE SERPL-MCNC: 332 MG/DL — HIGH (ref 70–99)
HCT VFR BLD CALC: 28.1 % — LOW (ref 39–50)
HGB BLD-MCNC: 8.9 G/DL — LOW (ref 13–17)
INR BLD: 2.74 RATIO — HIGH (ref 0.88–1.16)
MAGNESIUM SERPL-MCNC: 2.2 MG/DL — SIGNIFICANT CHANGE UP (ref 1.6–2.6)
MCHC RBC-ENTMCNC: 29.4 PG — SIGNIFICANT CHANGE UP (ref 27–34)
MCHC RBC-ENTMCNC: 31.5 GM/DL — LOW (ref 32–36)
MCV RBC AUTO: 93.4 FL — SIGNIFICANT CHANGE UP (ref 80–100)
OSMOLALITY UR: 485 MOS/KG — SIGNIFICANT CHANGE UP (ref 300–900)
PHOSPHATE SERPL-MCNC: 3.2 MG/DL — SIGNIFICANT CHANGE UP (ref 2.5–4.5)
PLATELET # BLD AUTO: 311 K/UL — SIGNIFICANT CHANGE UP (ref 150–400)
POTASSIUM SERPL-MCNC: 4.3 MMOL/L — SIGNIFICANT CHANGE UP (ref 3.5–5.3)
POTASSIUM SERPL-SCNC: 4.3 MMOL/L — SIGNIFICANT CHANGE UP (ref 3.5–5.3)
POTASSIUM UR-SCNC: 43 MMOL/L — SIGNIFICANT CHANGE UP
PROT SERPL-MCNC: 5.6 G/DL — LOW (ref 6–8.3)
PROTHROM AB SERPL-ACNC: 32.5 SEC — HIGH (ref 10–12.9)
RBC # BLD: 3.01 M/UL — LOW (ref 4.2–5.8)
RBC # FLD: 18 % — HIGH (ref 10.3–14.5)
SODIUM SERPL-SCNC: 148 MMOL/L — HIGH (ref 135–145)
SODIUM UR-SCNC: <20 MMOL/L — SIGNIFICANT CHANGE UP
WBC # BLD: 21.1 K/UL — HIGH (ref 3.8–10.5)
WBC # FLD AUTO: 21.1 K/UL — HIGH (ref 3.8–10.5)

## 2019-02-06 PROCEDURE — 99232 SBSQ HOSP IP/OBS MODERATE 35: CPT | Mod: GC

## 2019-02-06 PROCEDURE — 99223 1ST HOSP IP/OBS HIGH 75: CPT

## 2019-02-06 PROCEDURE — 99232 SBSQ HOSP IP/OBS MODERATE 35: CPT

## 2019-02-06 PROCEDURE — 49465 FLUORO EXAM OF G/COLON TUBE: CPT

## 2019-02-06 PROCEDURE — 76770 US EXAM ABDO BACK WALL COMP: CPT | Mod: 26

## 2019-02-06 RX ORDER — POLYETHYLENE GLYCOL 3350 17 G/17G
17 POWDER, FOR SOLUTION ORAL DAILY
Qty: 0 | Refills: 0 | Status: DISCONTINUED | OUTPATIENT
Start: 2019-02-06 | End: 2019-02-19

## 2019-02-06 RX ORDER — SODIUM CHLORIDE 9 MG/ML
1000 INJECTION, SOLUTION INTRAVENOUS
Qty: 0 | Refills: 0 | Status: DISCONTINUED | OUTPATIENT
Start: 2019-02-06 | End: 2019-02-06

## 2019-02-06 RX ORDER — DEXTROSE 50 % IN WATER 50 %
25 SYRINGE (ML) INTRAVENOUS ONCE
Qty: 0 | Refills: 0 | Status: DISCONTINUED | OUTPATIENT
Start: 2019-02-06 | End: 2019-02-19

## 2019-02-06 RX ORDER — SODIUM CHLORIDE 9 MG/ML
1000 INJECTION INTRAMUSCULAR; INTRAVENOUS; SUBCUTANEOUS
Qty: 0 | Refills: 0 | Status: DISCONTINUED | OUTPATIENT
Start: 2019-02-06 | End: 2019-02-07

## 2019-02-06 RX ORDER — WARFARIN SODIUM 2.5 MG/1
5 TABLET ORAL ONCE
Qty: 0 | Refills: 0 | Status: COMPLETED | OUTPATIENT
Start: 2019-02-06 | End: 2019-02-06

## 2019-02-06 RX ORDER — DEXTROSE 50 % IN WATER 50 %
15 SYRINGE (ML) INTRAVENOUS ONCE
Qty: 0 | Refills: 0 | Status: DISCONTINUED | OUTPATIENT
Start: 2019-02-06 | End: 2019-02-19

## 2019-02-06 RX ORDER — SENNA PLUS 8.6 MG/1
5 TABLET ORAL AT BEDTIME
Qty: 0 | Refills: 0 | Status: DISCONTINUED | OUTPATIENT
Start: 2019-02-06 | End: 2019-02-19

## 2019-02-06 RX ORDER — DEXTROSE 10 % IN WATER 10 %
1000 INTRAVENOUS SOLUTION INTRAVENOUS
Qty: 0 | Refills: 0 | Status: DISCONTINUED | OUTPATIENT
Start: 2019-02-06 | End: 2019-02-06

## 2019-02-06 RX ORDER — INSULIN GLARGINE 100 [IU]/ML
12 INJECTION, SOLUTION SUBCUTANEOUS AT BEDTIME
Qty: 0 | Refills: 0 | Status: DISCONTINUED | OUTPATIENT
Start: 2019-02-06 | End: 2019-02-07

## 2019-02-06 RX ORDER — DEXTROSE 50 % IN WATER 50 %
12.5 SYRINGE (ML) INTRAVENOUS ONCE
Qty: 0 | Refills: 0 | Status: DISCONTINUED | OUTPATIENT
Start: 2019-02-06 | End: 2019-02-19

## 2019-02-06 RX ORDER — SODIUM CHLORIDE 9 MG/ML
1000 INJECTION, SOLUTION INTRAVENOUS
Qty: 0 | Refills: 0 | Status: DISCONTINUED | OUTPATIENT
Start: 2019-02-06 | End: 2019-02-19

## 2019-02-06 RX ORDER — SODIUM CHLORIDE 9 MG/ML
500 INJECTION INTRAMUSCULAR; INTRAVENOUS; SUBCUTANEOUS ONCE
Qty: 0 | Refills: 0 | Status: COMPLETED | OUTPATIENT
Start: 2019-02-06 | End: 2019-02-06

## 2019-02-06 RX ORDER — INSULIN LISPRO 100/ML
VIAL (ML) SUBCUTANEOUS EVERY 6 HOURS
Qty: 0 | Refills: 0 | Status: DISCONTINUED | OUTPATIENT
Start: 2019-02-06 | End: 2019-02-07

## 2019-02-06 RX ORDER — GLUCAGON INJECTION, SOLUTION 0.5 MG/.1ML
1 INJECTION, SOLUTION SUBCUTANEOUS ONCE
Qty: 0 | Refills: 0 | Status: DISCONTINUED | OUTPATIENT
Start: 2019-02-06 | End: 2019-02-19

## 2019-02-06 RX ADMIN — WARFARIN SODIUM 5 MILLIGRAM(S): 2.5 TABLET ORAL at 23:32

## 2019-02-06 RX ADMIN — Medication 6: at 18:32

## 2019-02-06 RX ADMIN — Medication 1 APPLICATION(S): at 07:02

## 2019-02-06 RX ADMIN — INSULIN GLARGINE 12 UNIT(S): 100 INJECTION, SOLUTION SUBCUTANEOUS at 23:29

## 2019-02-06 RX ADMIN — Medication 3: at 23:29

## 2019-02-06 RX ADMIN — Medication 1 APPLICATION(S): at 16:49

## 2019-02-06 RX ADMIN — SODIUM CHLORIDE 100 MILLILITER(S): 9 INJECTION, SOLUTION INTRAVENOUS at 03:09

## 2019-02-06 RX ADMIN — Medication 1 APPLICATION(S): at 23:32

## 2019-02-06 RX ADMIN — POLYETHYLENE GLYCOL 3350 17 GRAM(S): 17 POWDER, FOR SOLUTION ORAL at 16:51

## 2019-02-06 RX ADMIN — SODIUM CHLORIDE 100 MILLILITER(S): 9 INJECTION, SOLUTION INTRAVENOUS at 06:50

## 2019-02-06 RX ADMIN — ERTAPENEM SODIUM 120 MILLIGRAM(S): 1 INJECTION, POWDER, LYOPHILIZED, FOR SOLUTION INTRAMUSCULAR; INTRAVENOUS at 16:48

## 2019-02-06 RX ADMIN — Medication 250 MILLIGRAM(S): at 18:33

## 2019-02-06 RX ADMIN — SODIUM CHLORIDE 75 MILLILITER(S): 9 INJECTION, SOLUTION INTRAVENOUS at 12:34

## 2019-02-06 NOTE — PROGRESS NOTE ADULT - PROBLEM SELECTOR PLAN 5
- Compression fracture of the T 12 vertebrae  - Neurosurgery c/s  - No acute pain/radiculopathy  - F/u PT consult. - Hypernatremia 2/2 to poor po intake and volume depletion (1.3 L free water deficit)  - continue D10 at 50 cc/hr *hypernatremia and hypokalemia due to poor PO intake and metabolic alkalosis   - Hypernatremia 2/2 to poor po intake and volume depletion (1.3 L free water deficit)  - continue LR at 100 cc/hr  - Na went from 152 --> 148 in 9 hours, can dec rate of LR to 75 cc/hr  - replete potassium as needed, normalized to 3.6 today *hypernatremia and hypokalemia due to poor PO intake and metabolic alkalosis   - continue LR at 100 cc/hr; appropriately correcting Na   - replete potassium as needed, normalized to 3.6 today

## 2019-02-06 NOTE — PROGRESS NOTE ADULT - PROBLEM SELECTOR PLAN 6
- likely 2/2 to contraction from volume depletion and compensation from respiratory acidosis  - IVF and free water for hydration - Elevated BNP, pulmonary edema, CXR, but clinically the patient does not look volume overloaded  - Will hold off on diuresis for now  - F/u Echo - Elevated BNP, pulmonary edema, CXR, but clinically the patient does not look volume overloaded  - Will hold off on diuresis for now  - ECHO was inconclusive given difficult exam, can re-order as patient is more cooperative now

## 2019-02-06 NOTE — PROGRESS NOTE ADULT - PROBLEM SELECTOR PLAN 3
Patient with hypokalemia in setting of metabolic alkalosis which has resolved. Continue to monitor serum K and supplement as needed.

## 2019-02-06 NOTE — PROGRESS NOTE ADULT - PROBLEM SELECTOR PLAN 2
Patient with metabolic alkalosis in setting of volume depletion. Latest serum CO2 remains elevated but stable at 32. Urine chloride is less than 35 which is suggestive of contraction/saline responsive alkalosis. Recommend to continue with LR @ 100 cc/hr. Monitor serum CO2 level.

## 2019-02-06 NOTE — CONSULT NOTE ADULT - SUBJECTIVE AND OBJECTIVE BOX
CHIEF COMPLAINT:Patient is a 73y old  Male who presents with a chief complaint of Sepsis (06 Feb 2019 14:35)      HPI:  74 yo male with CAD c/b CABG, A fib (on coumadin), CKD stage 3, PAD s/p stents, PEG tube, in-dwelling [place] who presents to the nursing home for increased confusion and elevated WBC. History was taken from wife at bedside.	Per wife, patient had been fully functional up until November. He had a hypoglycemic episode, fell, and broke his hip in November. He was admitted to the hospital for surgery, which was uneventful and sent to rehab (in early December). During the rehab stay, he developed numerous ulcers, including a decubitus ulcer. He was sent back to [facility] for sepsis from the decubitus ulcer. He was treated with abx and returned to rehab for a second time. This second hospital course was complicated by acute renal failure requiring dialysis, IV abx, and "surgery" to clean the ulcer.    In the ED, he received Vanc/Zosyn. He was admitted to medicine for further management. (03 Feb 2019 17:37)      PAST MEDICAL & SURGICAL HISTORY:  CKD (chronic kidney disease) stage 3, GFR 30-59 ml/min  Pressure ulcer, unspecified pressure ulcer stage: in lateral rt 4th toe both sides from toe compression  Coronary artery disease of native artery of native heart with stable angina pectoris  Diastolic congestive heart failure, unspecified congestive heart failure chronicity  Smoker  Gout involving toe, unspecified cause, unspecified chronicity, unspecified laterality  PAD (peripheral artery disease)  Chronic atrial fibrillation: on coumadin  Hyperlipidemia, unspecified hyperlipidemia type  HTN (hypertension), benign  Type 2 diabetes mellitus with other circulatory complication, with long-term current use of insulin  S/P angioplasty with stent: vascular stent- left leg  S/P hernia repair  Artificial cardiac pacemaker: 2014  S/P aortobifemoral bypass surgery      MEDICATIONS  (STANDING):  Dakins Solution - 1/4 Strength 1 Application(s) Topical three times a day  dextrose 5%. 1000 milliLiter(s) (50 mL/Hr) IV Continuous <Continuous>  dextrose 50% Injectable 12.5 Gram(s) IV Push once  dextrose 50% Injectable 25 Gram(s) IV Push once  dextrose 50% Injectable 25 Gram(s) IV Push once  ertapenem  IVPB      ertapenem  IVPB 1000 milliGRAM(s) IV Intermittent every 24 hours  insulin glargine Injectable (LANTUS) 12 Unit(s) SubCutaneous at bedtime  insulin lispro (HumaLOG) corrective regimen sliding scale   SubCutaneous every 6 hours  polyethylene glycol 3350 17 Gram(s) Oral daily  senna Syrup 5 milliLiter(s) Oral at bedtime  vancomycin  IVPB 1000 milliGRAM(s) IV Intermittent daily  warfarin 5 milliGRAM(s) Oral once    MEDICATIONS  (PRN):  acetaminophen   Tablet .. 650 milliGRAM(s) Oral every 6 hours PRN Temp greater or equal to 38C (100.4F)  dextrose 40% Gel 15 Gram(s) Oral once PRN Blood Glucose LESS THAN 70 milliGRAM(s)/deciliter  glucagon  Injectable 1 milliGRAM(s) IntraMuscular once PRN Glucose LESS THAN 70 milligrams/deciliter  ondansetron Injectable 4 milliGRAM(s) IV Push every 8 hours PRN Nausea and/or Vomiting      FAMILY HISTORY:  Family history of hypertension (Father)      SOCIAL HISTORY:    [ ] Non-smoker  [ ] Smoker  [ ] Alcohol    Allergies    No Known Allergies    Intolerances    	    REVIEW OF SYSTEMS: ? dementia  CONSTITUTIONAL: No fever, weight loss, or fatigue  EYES: No eye pain, visual disturbances, or discharge  ENT:  No difficulty hearing, tinnitus, vertigo; No sinus or throat pain  NECK: No pain or stiffness  RESPIRATORY: No cough, wheezing, chills or hemoptysis;+ Shortness of Breath  CARDIOVASCULAR: No chest pain, palpitations, passing out, dizziness, or leg swelling  GASTROINTESTINAL: No abdominal or epigastric pain. No nausea, vomiting, or hematemesis; No diarrhea or constipation. No melena or hematochezia.  GENITOURINARY: No dysuria, frequency, hematuria, or incontinence  NEUROLOGICAL: No headaches, memory loss, loss of strength, numbness, or tremors  SKIN: No itching, burning, rashes, or lesions   LYMPH Nodes: No enlarged glands  ENDOCRINE: No heat or cold intolerance; No hair loss  MUSCULOSKELETAL: No joint pain or swelling; No muscle, back, or extremity pain  PSYCHIATRIC: No depression, anxiety, mood swings, or difficulty sleeping  HEME/LYMPH: No easy bruising, or bleeding gums  ALLERGY AND IMMUNOLOGIC: No hives or eczema	    [ ] All others negative	  [ ] Unable to obtain    PHYSICAL EXAM:  T(C): 36.3 (02-06-19 @ 06:08), Max: 36.8 (02-05-19 @ 21:14)  HR: 89 (02-06-19 @ 06:08) (77 - 89)  BP: 124/79 (02-06-19 @ 06:08) (110/62 - 124/79)  RR: 18 (02-06-19 @ 06:08) (18 - 18)  SpO2: 99% (02-06-19 @ 06:08) (97% - 99%)  Wt(kg): --  I&O's Summary    05 Feb 2019 07:01  -  06 Feb 2019 07:00  --------------------------------------------------------  IN: 0 mL / OUT: 1000 mL / NET: -1000 mL    06 Feb 2019 07:01  -  06 Feb 2019 18:22  --------------------------------------------------------  IN: 230 mL / OUT: 250 mL / NET: -20 mL        Appearance: Normal	  HEENT:   Normal oral mucosa, PERRL, EOMI	  Lymphatic: No lymphadenopathy  Cardiovascular: Normal S1 S2, No JVD,+ murmurs, +edema  Respiratory: decrease bs	  Psychiatry: A & O x 3, Mood & affect appropriate  Gastrointestinal:  Soft, Non-tender, + BS	, + peg  Skin: No rashes, No ecchymoses, No cyanosis	  Neurologic: Non-focal  Extremities: Normal range of motion, No clubbing, cyanosis or edema  Vascular: Peripheral pulses palpable 2+ bilaterally    TELEMETRY: 	    ECG:  	  RADIOLOGY:  OTHER: 	  	  LABS:	 	    CARDIAC MARKERS:                              8.7    20.0  )-----------( 261      ( 06 Feb 2019 07:00 )             29.2     02-06    148<H>  |  107  |  50<H>  ----------------------------<  332<H>  4.3   |  32<H>  |  1.63<H>    Ca    8.2<L>      06 Feb 2019 07:00  Phos  3.2     02-06  Mg     2.2     02-06    TPro  5.6<L>  /  Alb  2.0<L>  /  TBili  0.4  /  DBili  x   /  AST  13  /  ALT  13  /  AlkPhos  203<H>  02-06    proBNP:   Lipid Profile:   HgA1c:   TSH:   PT/INR - ( 06 Feb 2019 15:40 )   PT: 32.5 sec;   INR: 2.74 ratio         PTT - ( 05 Feb 2019 15:08 )  PTT:40.8 sec    PREVIOUS DIAGNOSTIC TESTING:    < from: Xray Chest 1 View- PORTABLE-Urgent (02.05.19 @ 16:04) >  Bilateral pleuraleffusions and dense left retrocardiac opacity which   represent atelectasis or pneumonia are unchanged. Stable pulmonary   vascular congestion. No pneumothorax.    < from: CT Lumbar Spine No Cont (02.03.19 @ 15:34) >  A large sacral decubitus ulcer is noted overlying the mid-lower sacrum   and coccyx. Areas of cortical discontinuity are noted involving the   dorsal sacrum most consistent with osteomyelitis.    < from: 12 Lead ECG (02.04.19 @ 01:15) >  Diagnosis Line Atrial-sensed ventricular-paced rhythm  ABNORMAL ECG    < end of copied text >

## 2019-02-06 NOTE — PROGRESS NOTE ADULT - ATTENDING COMMENTS
74 yo male  CAD c/b CABG, A fib (on coumadin), CKD (not on HD), PAD s/p stents, PEG tube, in-dwelling [place], with a hx of an extended hospital stay s/p hip fracture (re-admitted to [facility] for de-cubitus ulcers) admitted for metabolic encephalopathy 2/2 sepsis 2/2 to catheter associated UTI vs infected sacral decubitus ulcer vs osteo. Patient noted to have SHINE superimposed on CKD with worsening anemia and hypernatremia and underlying osteomyelitis with thoracic fracture.    Plan: Continue LR 75/h, free water thru PEG tube will help correct hypernatremia. Resume PEG feeds, continue IV Invancz 1 gram/day and   IV Vancomycin for sacral decubitus. PT eval. SHINE from dehydration improving. Elevated WBC count likely from sacral decubitus. Wound care on   case. Out of bed daily. CXR portable in AM to reeval hypoxemia.  Discussed plan with resident at bedside in detail. Nikhil changed this admission. 72 yo male  CAD c/b CABG, A fib (on coumadin), CKD (not on HD), PAD s/p stents, PEG tube, in-dwelling [place], with a hx of an extended hospital stay s/p hip fracture (re-admitted to [facility] for de-cubitus ulcers) admitted for metabolic encephalopathy 2/2 sepsis 2/2 to catheter associated UTI vs infected sacral decubitus ulcer vs osteo. Patient noted to have SHINE superimposed on CKD with worsening anemia and hypernatremia and underlying osteomyelitis with thoracic fracture.    Plan: Hold LR given PVC and pleural effusions seen on CXR.  Free water thru PEG tube will help correct hypernatremia. Resume PEG feeds, continue IV Invancz 1 gram/day and IV Vancomycin for sacral decubitus. PT eval. SHINE from dehydration improving. Elevated WBC count likely from sacral decubitus. Wound care on case. Out of bed daily. CXR portable in AM to reeval hypoxemia.  Discussed plan with resident at bedside in detail. Nikhil changed this admission.

## 2019-02-06 NOTE — PROGRESS NOTE ADULT - PROBLEM SELECTOR PLAN 7
- Rate controlled for now  - INR supratherapeutic  - Will repeat INR in the am and dose Coumadin goals of 2-3. - Rate controlled for now  - INR = 2.44/ since UNSURE if PEG tube is working / will confirm about PEG tube and if working will restart Coumadin tomorrow and monitor INR   - Will repeat INR in the am and dose Coumadin goals of 2-3. - Rate controlled for now  - INR = 2.44; repeat this AM and re-dose Coumadin - Rate controlled for now  - INR = 2.44  -hold off on Coumadin given unclear PEG placement; no heparin gtt as of now as INR is therapeutic

## 2019-02-06 NOTE — CONSULT NOTE ADULT - ASSESSMENT
72 yo man with a hx of CAD s/p CABG, Afib on coumadin, CKD stage 3, PAD, aortobifem bypass ~2012 by Dr. 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 sacral decubitus w/ osteomyelitis, with cool right leg and chronic ulcers to the foot, heel, and shin.    - please obtain SHERIE/PVRs  - will plan for CT angio abdomen with runoff to assess lower extremity vasculature once Cr improved  - care per primary team  - vascular will cont to follow, [phone]    Patient discussed with Dr. Hooks and Dr. Zamora

## 2019-02-06 NOTE — PROGRESS NOTE ADULT - PROBLEM SELECTOR PLAN 10
DVT: on coumadin for DVT prophylaxis  Diet: PEG feeds  Dispo: PT consult  Pending resolution of symptoms - Compression fracture of the T12 vertebrae  - Neurosurgery c/s- not a surgical candidate, medical management at this time   - No acute pain/radiculopathy    #Need for prophylactic measure  -DVT ppx: supratherapeutic on coumadin  -Diet: PEG feeds, hold for now to confirm PEG placement   -Dispo: PT consult, Pending resolution of symptoms.    #GOC  -family would like patient to be full code for now; as per discussion with wife, she will talk with her family members and bring in patient's advanced directives.       - F/u PT consult.   - control pain with IV Tylenol (avoid narcotic medications) - Compression fracture of the T12 vertebrae  - Neurosurgery c/s- not a surgical candidate, medical management at this time   - No acute pain/radiculopathy  - F/u PT consult.   - control pain with IV Tylenol (avoid narcotic medications)    #Need for prophylactic measure  -DVT ppx: supratherapeutic on coumadin  -Diet: PEG feeds, hold for now to confirm PEG placement   -Dispo: PT consult, Pending resolution of symptoms.    #GOC  -family would like patient to be full code for now; as per discussion with wife, she will talk with her family members and bring in patient's advanced directives.

## 2019-02-06 NOTE — PROGRESS NOTE ADULT - ASSESSMENT
72 yo male  CAD c/b CABG, A fib (on coumadin), CKD (not on HD), PAD s/p stents, PEG tube, in-dwelling [place], with a hx of an extended hospital stay s/p hip fracture (re-admitted to [facility] for de-cubitus ulcers) admitted for metabolic encephalopathy 2/2 sepsis 2/2 to catheter associated UTI vs infected sacral decubitus ulcer vs osteo. Patient noted to have SHINE superimposed on CKD with worsening anemia and hypernatremia and underlying osteomyelitis with thoracic fracture.

## 2019-02-06 NOTE — PROGRESS NOTE ADULT - PROBLEM SELECTOR PLAN 4
Patient with hypernatremia in setting of impaired free water intake and volume depletion. Latest serum sodium has improved to 148 today. Recommend to continue with free water supplementation via PEG tube. Monitor serum sodium level.

## 2019-02-06 NOTE — PROGRESS NOTE ADULT - PROBLEM SELECTOR PLAN 8
- Elevated BNP, pulmonary edema, CXR, but clinically the patient does not look volume overloaded  - Will hold off on diuresis for now  - F/u Echo  - Admit to Tele - s/p RRT for hypoglycemia to 20's, with rise to 187 after D50 pushes  - continue on D10 at 50 cc until PEG tube confirmed  - keep off ISS for now   - monitor FS q 2  - patient with PEG In place for feeds- keep off feeds for now until placement confirmed - s/p RRT for hypoglycemia to 20's, with rise to 187 after D50 pushes  - now s/p D10 drip with FS elevation to 300's  - drip stopped, monitor FS q 4 hrs  - will check PEG function prior to resuming feeds   - keep off ISS for now   - monitor FS q 2 - s/p RRT for hypoglycemia to 20's, with rise to 187 after D50 pushes  - now s/p D10 drip with FS elevation to 300's  - drip stopped, monitor FS q 4 hrs  - will check PEG function prior to resuming feeds   - keep off ISS for now   - monitor FS q 4

## 2019-02-06 NOTE — PROGRESS NOTE ADULT - PROBLEM SELECTOR PLAN 4
- Hypernatremia 2/2 to poor po intake and volume depletion (1.3 L free water deficit)  - Free water via PEG tube (200 cc q 4 hrs)  - Gentle IVF hydration with 1/2 NS  - F/u BMP q 12 hrs now. Will change to BMP q Day once sodium downtrends - likely 2/2 to contraction from volume depletion and compensation from respiratory acidosis  - f/u all repeat labs including CBC, BMP, INR/PT/PTT, Mag, Phos, VBG (ABG if cannot obtain VBG)- q 8 hour activatable labs -pH of 7.43 with inc bicarb   - likely 2/2 to SHINE caused by contraction from volume depletion as well as urinary chloride loss, with inc renal bicarb absorption  -ABG demonstrating appropriate respiratory compensation, however improved this AM, with bicarb and pCO2 downtrending  - f/u all repeat labs including CBC, BMP, INR/PT/PTT, Mag, Phos, VBG (ABG if cannot obtain VBG)- q 8 hours  -continue fluid resuscitation with LR as above to fix SHINE -pH of 7.43 with inc bicarb   -ABG demonstrating appropriate respiratory compensation, however improved this AM, with bicarb and pCO2 downtrending  -continue fluid resuscitation with LR as above

## 2019-02-06 NOTE — PROGRESS NOTE ADULT - ASSESSMENT
73M with multiple medical problems (CAD, CABG, PAD, aorto-bifemoral bypass, CKD), R hip fracture s/p ORIF, s/p PEG/hoskins here with sacral osteomyelitis with sepsis - likely source is osteomyelitis.  As per medicine team, patient was hospitalized at [facility] and sacral wound was debrided.  Polymicrobial (E coli, Kpna, enterococcus - all "pan-sensitive").  bone scan was negative at that time late December for OM.  Still would like bone biopsy.      Suggest:  - continue vancomycin and ertapenem 1g daily  - trend vancomycin trough and inflammatory makers  - please send bone for path and culture     above d/w housestaff on 5monti

## 2019-02-06 NOTE — PROGRESS NOTE ADULT - PROBLEM SELECTOR PLAN 9
- F/u ultrasound to r/o DVT - Anemia likely anemia of chronic disease (MCV normocytic)   - avoid nephrotoxic drugs, renally dose meds  - iron studies demonstrate low total iron, low TIBC, and low ferritin  -consider iron replacement therapy - Anemia likely anemia of chronic disease (MCV normocytic)   - avoid nephrotoxic drugs, renally dose meds  - iron studies demonstrate low total iron, low TIBC, and low ferritin  - consider iron replacement therapy - Anemia likely anemia of chronic disease (MCV normocytic)   - iron studies demonstrate low total iron, low TIBC, and normal ferritin

## 2019-02-06 NOTE — PROGRESS NOTE ADULT - PROBLEM SELECTOR PLAN 1
- Given leukocytosis tachypnea, patient meets SIRS criteria with numerous sources including catheter associated UTI, decubitus ulcers, and osteomyelitis  - C/w Vanc/Cefepime to cover for MRSA and pseudomonas   - F/u Blood cultures  - F/u Urine cultures  - Wound care C/s and ID c/s given Osteomyelitis in the am. Will get wound culture and work on records for outpatient. - continue Abx regimen as above  - wound care following with dressings recommended  - wound care also recommending possible vascular surgery consult given decreased femoral pulses - CT scan of lumbar spine demonstrating osteo underlying sacral decubitus ulcer, likely cause of sepsis and as below  - IR consulted- no bone bx at this time given low utility as it is exposed to air   - ID recs appreciated- will continue Vancomycin, switch Cefepime to Ertapenem; will attempt to obtain records from [facility] to determine if chronic osteomyelitis (as per wife, OM developed after hip replacement in December 2018)  - wound care following with dressings recommended  - wound care also recommending possible vascular surgery consult given decreased femoral pulses  - will send sacral wound culture - CT scan of lumbar spine demonstrating osteo underlying sacral decubitus ulcer, likely cause of sepsis and as below  - IR consulted- no bone bx at this time given low utility as it is exposed to air   - ID recs appreciated- will continue Vancomycin, switch Cefepime to Ertapenem; will attempt to obtain records from [facility] to determine if chronic osteomyelitis (as per wife, OM developed after hip replacement in December 2018)  - wound care following with dressings recommended; may need plastic surgery   - will send sacral wound culture  - dec peripheral pulses with cold extremities- check arterial dopplers and call vascular given hx of LE stenting - CT scan of lumbar spine demonstrating osteo underlying sacral decubitus ulcer, likely cause of sepsis and as below  - IR consulted- no bone bx at this time given low utility as it is exposed to air   - ID recs appreciated- will continue Vancomycin, switch Cefepime to Ertapenem; will attempt to obtain records from [facility] to determine if chronic osteomyelitis (as per wife, OM developed after hip replacement in December 2018)  - wound care following with dressings recommended; may need plastic surgery   - will send sacral wound culture  - dec peripheral pulses with cold extremities- check arterial dopplers and call vascular given hx of LE stenting (stent to the left SFA, POBA to the left distal anterior tibial and left distal dorsalis pedis in 2016, as well as aorto-fem bypass 7-8 yrs ago) - CT scan of lumbar spine demonstrating osteo underlying sacral decubitus ulcer, likely cause of sepsis and as below  - IR consulted- no bone bx at this time given low utility as it is exposed to air   - ID recs appreciated- will continue Vancomycin, switch Cefepime to Ertapenem; as per discussion with physician at [facility], patient had debridement of sacral wound on 12/29, with cx demonstrating pan-sensitive E. coli, Klebsiella, and Enterococcus; sacral bone scan at that time did not demonstrate OM.   - wound care following with dressings recommended; may need plastic surgery   - will send sacral wound culture  - dec peripheral pulses with cold extremities- check arterial dopplers and call vascular given hx of LE stenting (stent to the left SFA, POBA to the left distal anterior tibial and left distal dorsalis pedis in 2016, as well as aorto-fem bypass 7-8 yrs ago)

## 2019-02-06 NOTE — CONSULT NOTE ADULT - SUBJECTIVE AND OBJECTIVE BOX
CONSULT RESULT - SURGERY    Consulting surgical team: Vascular, [phone]  Consulting attending: Dr. Hooks    HPI:  74 yo man with a hx of CAD s/p CABG, Afib on coumadin, CKD stage 3, PAD, aortobifem bypass ~2012 by Dr. Guallpa at [facility], CEA (patient and wife unsure of side or date), POBA L distal AT/DP (2016) SFA stent (2017), left 4th toe amputation, presenting from nursing home with AMS and leukocytosis believed to be due to UTI vs. sacral decubitus ulcer with underlying osteomyelitis. The patient was reportedly fully functional and employed until November 2018, when he fell and broke his hip. Since that time, he has been discharged to rehab and readmitted several times, including an admission at [facility] for sepsis 2/2 sacral ulcer complicated by SHINE requiring HD. He is not currently requiring HD. Throughout the course of these admissions, he has requiring placement of PEG and chronic hoskins. The patient has been seen by ID and wound care, and is currently receiving vancomycin ertapenem and dakins solution to the sacral decubitus ulcer. Blood and urine cultures have thus far been negative.    The patient's wife is at bedside but is unable to provide details about his history. She does report he has lost a great deal of weight over the course of this illness. The patient currently denies any complaints including pain, numbness, or weakness in the legs.      PAST MEDICAL HISTORY:  CKD (chronic kidney disease) stage 3, GFR 30-59 ml/min  Pressure ulcer, unspecified pressure ulcer stage  Coronary artery disease of native artery of native heart with stable angina pectoris  Diastolic congestive heart failure, unspecified congestive heart failure chronicity  Smoker  Gout involving toe, unspecified cause, unspecified chronicity, unspecified laterality  Gouty nephropathy  PAD (peripheral artery disease)  Chronic atrial fibrillation  Hyperlipidemia, unspecified hyperlipidemia type  HTN (hypertension), benign  Type 2 diabetes mellitus with other circulatory complication, with long-term current use of insulin      PAST SURGICAL HISTORY:  S/P angioplasty with stent  S/P hernia repair  Artificial cardiac pacemaker  S/P aortobifemoral bypass surgery  CEA      MEDICATIONS:  acetaminophen   Tablet .. 650 milliGRAM(s) Oral every 6 hours PRN  Dakins Solution - 1/4 Strength 1 Application(s) Topical three times a day  dextrose 40% Gel 15 Gram(s) Oral once PRN  dextrose 5%. 1000 milliLiter(s) IV Continuous <Continuous>  dextrose 50% Injectable 12.5 Gram(s) IV Push once  dextrose 50% Injectable 25 Gram(s) IV Push once  dextrose 50% Injectable 25 Gram(s) IV Push once  ertapenem  IVPB      ertapenem  IVPB 1000 milliGRAM(s) IV Intermittent every 24 hours  glucagon  Injectable 1 milliGRAM(s) IntraMuscular once PRN  insulin lispro (HumaLOG) corrective regimen sliding scale   SubCutaneous every 6 hours  ondansetron Injectable 4 milliGRAM(s) IV Push every 8 hours PRN  polyethylene glycol 3350 17 Gram(s) Oral daily  senna Syrup 5 milliLiter(s) Oral at bedtime  vancomycin  IVPB 1000 milliGRAM(s) IV Intermittent daily      ALLERGIES:  No Known Allergies      VITALS & I/Os:  Vital Signs Last 24 Hrs  T(C): 36.3 (06 Feb 2019 06:08), Max: 36.8 (05 Feb 2019 21:14)  T(F): 97.4 (06 Feb 2019 06:08), Max: 98.3 (05 Feb 2019 21:14)  HR: 89 (06 Feb 2019 06:08) (77 - 89)  BP: 124/79 (06 Feb 2019 06:08) (110/62 - 124/79)  BP(mean): --  RR: 18 (06 Feb 2019 06:08) (18 - 18)  SpO2: 99% (06 Feb 2019 06:08) (97% - 99%)    I&O's Summary    05 Feb 2019 07:01  -  06 Feb 2019 07:00  --------------------------------------------------------  IN: 0 mL / OUT: 1000 mL / NET: -1000 mL    06 Feb 2019 07:01  -  06 Feb 2019 14:35  --------------------------------------------------------  IN: 150 mL / OUT: 0 mL / NET: 150 mL        PHYSICAL EXAM:  GEN: NAD, resting quietly, cachectic  PULM: symmetric chest rise bilaterally, no increased WOB  CV: regular rate  ABD: soft, ND, PEG in place with surrounding erythematous rash, well-healed midline laparotomy incision and L inguinal hernia incision  EXTR:  Right: dry ulcers to anterior chin, dorsum of foot, lateral malleolus, and heel, cool to touch, dopplerable PT signal, no DP signal  Left: dopplerable PT signal, no DP signal, no wounds or ulcers  Palpable femoral pulses bilaterally, no popliteal pulses    LABS:                        8.7    20.0  )-----------( 261      ( 06 Feb 2019 07:00 )             29.2     02-06    148<H>  |  107  |  50<H>  ----------------------------<  332<H>  4.3   |  32<H>  |  1.63<H>    Ca    8.2<L>      06 Feb 2019 07:00  Phos  3.2     02-06  Mg     2.2     02-06    TPro  5.6<L>  /  Alb  2.0<L>  /  TBili  0.4  /  DBili  x   /  AST  13  /  ALT  13  /  AlkPhos  203<H>  02-06    Lactate: Lactate, Blood: 1.2 mmol/L (02-05 @ 16:35)   02-05 @ 21:49  1.4  02-05 @ 14:59  1.3    PT/INR - ( 05 Feb 2019 15:08 )   PT: 28.6 sec;   INR: 2.44 ratio      PTT - ( 05 Feb 2019 15:08 )  PTT:40.8 sec      IMAGING:  no new imaging

## 2019-02-06 NOTE — PROGRESS NOTE ADULT - PROBLEM SELECTOR PLAN 3
- Hypoglycemic this am. All insulin medications held on patient.  - C/w hypoglycemia protocol  - Restart tube feeds after gastrografin x-ray - SHINE on CKD stage 3 (GFR 48) with anemia, pre-renal azotemia 2/2 to po intake  - Likely 2/2 to poor po intake  - f/u urine lytes   - continue IVF D510 at 50 cc/hr  - trend Cr  - patient with chronic indwelling hoskins for retention, changed today   - nephrology consulted: hypokalemia, hypernatremia with metabolic alkalosis and respiratory compensation (inc PCO2 of 58 on ABG)- mineralcorticoid excess? - SHINE on CKD stage 3 (GFR 48) with anemia, pre-renal azotemia 2/2 to po intake  - FeNa demonstrating pre-renal  - Nephrology recs appreciated- pre-renal SHINE likely causing metabolic alkalosis (also with urinary chloride and potassium loss)  - fluid resuscitation with LR at 75 cc/hr - SHINE on CKD stage 3 (GFR 48)  - likely 2/2 to dec PO intake/ACE-i use causing pre-renal SHINE - causing urinary chloride loss with inc bicarb resorption leading to metabolica alkalosis with respiratory compensation as seen on ABG   - Nephrology recs appreciated- pre-renal SHINE likely causing metabolic alkalosis  - Renal U/S demonstrating no pathology   - fluid resuscitation with LR at 100 cc/hr  - f/u all repeat labs including CBC, BMP, INR/PT/PTT, Mag, Phos, VBG (ABG if cannot obtain VBG)- q 8 hours

## 2019-02-06 NOTE — PROGRESS NOTE ADULT - PROBLEM SELECTOR PLAN 1
Patient with SHINE on CKD in setting of hypotension and sepsis. Baseline Scr. is unknown. Scr. on admission was elevated at 1.78 which then improved to 1.63 today. Patient is non oliguric. Urine electrolytes are suggestive of pre renal azotemia. Patient with hemodynamically mediated SHINE due to volume depletion. Recommend to continue with LR @ 100 cc/hr. Monitor Scr, I/O, and electrolytes daily. Avoid NSAIDs, RCAs, and other nephrotoxins.

## 2019-02-06 NOTE — PROGRESS NOTE ADULT - SUBJECTIVE AND OBJECTIVE BOX
Anita Scanlon, PGY1   Contact/Pager - [phone] / [phone]    SUBJECTIVE / OVERNIGHT EVENTS:  -no acute events overnight  -denies any complaints including CP, SOB, abdominal pain, N/V, diarrhea, constipation, headache, confusion, fever/chills      MEDICATIONS  (STANDING):  Dakins Solution - 1/4 Strength 1 Application(s) Topical three times a day  dextrose 5%. 1000 milliLiter(s) (50 mL/Hr) IV Continuous <Continuous>  dextrose 50% Injectable 50 milliLiter(s) IV Push once  dextrose 50% Injectable 12.5 Gram(s) IV Push once  dextrose 50% Injectable 25 Gram(s) IV Push once  dextrose 50% Injectable 25 Gram(s) IV Push once  docusate sodium 100 milliGRAM(s) Oral two times a day  ertapenem  IVPB      ertapenem  IVPB 1000 milliGRAM(s) IV Intermittent every 24 hours  lactated ringers. 1000 milliLiter(s) (100 mL/Hr) IV Continuous <Continuous>  ondansetron   Solution 8 milliGRAM(s) Oral three times a day  senna 2 Tablet(s) Oral at bedtime  vancomycin  IVPB 1000 milliGRAM(s) IV Intermittent daily    MEDICATIONS  (PRN):  acetaminophen   Tablet .. 650 milliGRAM(s) Oral every 6 hours PRN Temp greater or equal to 38C (100.4F)  dextrose 40% Gel 15 Gram(s) Oral once PRN Blood Glucose LESS THAN 70 milliGRAM(s)/deciliter  glucagon  Injectable 1 milliGRAM(s) IntraMuscular once PRN Glucose LESS THAN 70 milligrams/deciliter  ondansetron Injectable 4 milliGRAM(s) IV Push every 8 hours PRN Nausea and/or Vomiting      Allergies    No Known Allergies    Intolerances          Vital Signs Last 24 Hrs  T(C): 36.3 (06 Feb 2019 06:08), Max: 36.8 (05 Feb 2019 21:14)  T(F): 97.4 (06 Feb 2019 06:08), Max: 98.3 (05 Feb 2019 21:14)  HR: 89 (06 Feb 2019 06:08) (77 - 89)  BP: 124/79 (06 Feb 2019 06:08) (110/62 - 124/79)  BP(mean): --  RR: 18 (06 Feb 2019 06:08) (18 - 20)  SpO2: 99% (06 Feb 2019 06:08) (97% - 99%)  CAPILLARY BLOOD GLUCOSE      POCT Blood Glucose.: 305 mg/dL (06 Feb 2019 05:58)  POCT Blood Glucose.: 266 mg/dL (06 Feb 2019 03:32)  POCT Blood Glucose.: 246 mg/dL (06 Feb 2019 01:32)  POCT Blood Glucose.: 195 mg/dL (05 Feb 2019 23:30)  POCT Blood Glucose.: 178 mg/dL (05 Feb 2019 21:28)  POCT Blood Glucose.: 142 mg/dL (05 Feb 2019 19:53)  POCT Blood Glucose.: 124 mg/dL (05 Feb 2019 17:38)  POCT Blood Glucose.: 173 mg/dL (05 Feb 2019 15:31)  POCT Blood Glucose.: 42 mg/dL (05 Feb 2019 15:09)  POCT Blood Glucose.: 47 mg/dL (05 Feb 2019 15:07)  POCT Blood Glucose.: 127 mg/dL (05 Feb 2019 10:04)  POCT Blood Glucose.: 187 mg/dL (05 Feb 2019 09:18)  POCT Blood Glucose.: 28 mg/dL (05 Feb 2019 09:08)  POCT Blood Glucose.: 27 mg/dL (05 Feb 2019 09:07)    I&O's Summary    05 Feb 2019 07:01  -  06 Feb 2019 07:00  --------------------------------------------------------  IN: 0 mL / OUT: 1000 mL / NET: -1000 mL          PHYSICAL EXAM:  GENERAL: NAD, well-developed  HEAD:  AT, NC  EYES: EOMI, PERRLA, conjunctiva and sclera clear  ENMT: Airway patent. MMM. Good dentition, no lesions.  NECK: Supple, No JVD  CHEST/LUNG: CTABL; No wheezing, rhonci, or rales  HEART: RRR; Normal S1, S2. No murmurs, rubs, or gallops  ABDOMEN: Soft, NT, ND; Bowel sounds present. No organomegaly  EXTREMITIES:  2+ Peripheral Pulses, No clubbing, cyanosis, or edema  PSYCH: AAOx3  NEUROLOGY: non-focal  SKIN: Warm, dry, intact; No rashes or lesions      LABS:                        8.9    23.6  )-----------( 277      ( 05 Feb 2019 15:08 )             29.1     02-06    148<H>  |  107  |  50<H>  ----------------------------<  332<H>  4.3   |  32<H>  |  1.63<H>    Ca    8.2<L>      06 Feb 2019 07:00  Phos  3.2     02-06  Mg     2.2     02-06    TPro  5.6<L>  /  Alb  2.0<L>  /  TBili  0.4  /  DBili  x   /  AST  13  /  ALT  13  /  AlkPhos  203<H>  02-06    LIVER FUNCTIONS - ( 06 Feb 2019 07:00 )  Alb: 2.0 g/dL / Pro: 5.6 g/dL / ALK PHOS: 203 U/L / ALT: 13 U/L / AST: 13 U/L / GGT: x           PT/INR - ( 05 Feb 2019 15:08 )   PT: 28.6 sec;   INR: 2.44 ratio         PTT - ( 05 Feb 2019 15:08 )  PTT:40.8 sec            Culture - Blood (collected 03 Feb 2019 17:52)  Source: .Blood Blood-Venous  Preliminary Report (04 Feb 2019 18:01):    No growth to date.    Culture - Blood (collected 03 Feb 2019 17:52)  Source: .Blood Blood-Peripheral  Preliminary Report (04 Feb 2019 18:01):    No growth to date.    Culture - Urine (collected 03 Feb 2019 16:44)  Source: .Urine Catheterized  Final Report (04 Feb 2019 22:08):    <10,000 CFU/ml Normal Urogenital nany present          RADIOLOGY & ADDITIONAL TESTS:    Imaging Personally Reviewed: YES    Consultant(s) Notes Reviewed: YES    Care Discussed with Consultants/Other Providers: YES Anita Scanlon, PGY1   Contact/Pager - [phone] / [phone]    SUBJECTIVE / OVERNIGHT EVENTS:  -no acute events overnight  -patient is more awake and alert this AM; complains of dry mouth but no pain   -denies any complaints including CP, SOB, abdominal pain, N/V, diarrhea, constipation, headache, confusion, fever/chills      MEDICATIONS  (STANDING):  Dakins Solution - 1/4 Strength 1 Application(s) Topical three times a day  dextrose 5%. 1000 milliLiter(s) (50 mL/Hr) IV Continuous <Continuous>  dextrose 50% Injectable 50 milliLiter(s) IV Push once  dextrose 50% Injectable 12.5 Gram(s) IV Push once  dextrose 50% Injectable 25 Gram(s) IV Push once  dextrose 50% Injectable 25 Gram(s) IV Push once  docusate sodium 100 milliGRAM(s) Oral two times a day  ertapenem  IVPB      ertapenem  IVPB 1000 milliGRAM(s) IV Intermittent every 24 hours  lactated ringers. 1000 milliLiter(s) (100 mL/Hr) IV Continuous <Continuous>  ondansetron   Solution 8 milliGRAM(s) Oral three times a day  senna 2 Tablet(s) Oral at bedtime  vancomycin  IVPB 1000 milliGRAM(s) IV Intermittent daily    MEDICATIONS  (PRN):  acetaminophen   Tablet .. 650 milliGRAM(s) Oral every 6 hours PRN Temp greater or equal to 38C (100.4F)  dextrose 40% Gel 15 Gram(s) Oral once PRN Blood Glucose LESS THAN 70 milliGRAM(s)/deciliter  glucagon  Injectable 1 milliGRAM(s) IntraMuscular once PRN Glucose LESS THAN 70 milligrams/deciliter  ondansetron Injectable 4 milliGRAM(s) IV Push every 8 hours PRN Nausea and/or Vomiting      Allergies    No Known Allergies    Intolerances          Vital Signs Last 24 Hrs  T(C): 36.3 (06 Feb 2019 06:08), Max: 36.8 (05 Feb 2019 21:14)  T(F): 97.4 (06 Feb 2019 06:08), Max: 98.3 (05 Feb 2019 21:14)  HR: 89 (06 Feb 2019 06:08) (77 - 89)  BP: 124/79 (06 Feb 2019 06:08) (110/62 - 124/79)  BP(mean): --  RR: 18 (06 Feb 2019 06:08) (18 - 20)  SpO2: 99% (06 Feb 2019 06:08) (97% - 99%)  CAPILLARY BLOOD GLUCOSE      POCT Blood Glucose.: 305 mg/dL (06 Feb 2019 05:58)  POCT Blood Glucose.: 266 mg/dL (06 Feb 2019 03:32)  POCT Blood Glucose.: 246 mg/dL (06 Feb 2019 01:32)  POCT Blood Glucose.: 195 mg/dL (05 Feb 2019 23:30)  POCT Blood Glucose.: 178 mg/dL (05 Feb 2019 21:28)  POCT Blood Glucose.: 142 mg/dL (05 Feb 2019 19:53)  POCT Blood Glucose.: 124 mg/dL (05 Feb 2019 17:38)  POCT Blood Glucose.: 173 mg/dL (05 Feb 2019 15:31)  POCT Blood Glucose.: 42 mg/dL (05 Feb 2019 15:09)  POCT Blood Glucose.: 47 mg/dL (05 Feb 2019 15:07)  POCT Blood Glucose.: 127 mg/dL (05 Feb 2019 10:04)  POCT Blood Glucose.: 187 mg/dL (05 Feb 2019 09:18)  POCT Blood Glucose.: 28 mg/dL (05 Feb 2019 09:08)  POCT Blood Glucose.: 27 mg/dL (05 Feb 2019 09:07)    I&O's Summary    05 Feb 2019 07:01  -  06 Feb 2019 07:00  --------------------------------------------------------  IN: 0 mL / OUT: 1000 mL / NET: -1000 mL          PHYSICAL EXAM:  GENERAL: AAO x 3 this AM  HEAD:  Atraumatic, Normocephalic  EYES: conjunctiva and sclera clear  NECK: Supple, no lymphadenopathy, no JVD  CHEST/LUNG: CTAB; No wheezes, rales, or rhonchi  HEART: Regular rate and rhythm; No murmurs, rubs, or gallops  ABDOMEN: Soft, non-tender, non-distended; normal bowel sounds, no organomegaly  BACK: sacral decubitus covered with gauze  EXTREMITIES:  No clubbing, cyanosis, or edema , pos chronic skin changes   SKIN: Skin rashes/peeling with evident wounds over extremities with necrotic scabs.       LABS:                        8.9    23.6  )-----------( 277      ( 05 Feb 2019 15:08 )             29.1     02-06    148<H>  |  107  |  50<H>  ----------------------------<  332<H>  4.3   |  32<H>  |  1.63<H>    Ca    8.2<L>      06 Feb 2019 07:00  Phos  3.2     02-06  Mg     2.2     02-06    TPro  5.6<L>  /  Alb  2.0<L>  /  TBili  0.4  /  DBili  x   /  AST  13  /  ALT  13  /  AlkPhos  203<H>  02-06    LIVER FUNCTIONS - ( 06 Feb 2019 07:00 )  Alb: 2.0 g/dL / Pro: 5.6 g/dL / ALK PHOS: 203 U/L / ALT: 13 U/L / AST: 13 U/L / GGT: x           PT/INR - ( 05 Feb 2019 15:08 )   PT: 28.6 sec;   INR: 2.44 ratio         PTT - ( 05 Feb 2019 15:08 )  PTT:40.8 sec            Culture - Blood (collected 03 Feb 2019 17:52)  Source: .Blood Blood-Venous  Preliminary Report (04 Feb 2019 18:01):    No growth to date.    Culture - Blood (collected 03 Feb 2019 17:52)  Source: .Blood Blood-Peripheral  Preliminary Report (04 Feb 2019 18:01):    No growth to date.    Culture - Urine (collected 03 Feb 2019 16:44)  Source: .Urine Catheterized  Final Report (04 Feb 2019 22:08):    <10,000 CFU/ml Normal Urogenital nany present          RADIOLOGY & ADDITIONAL TESTS:    Imaging Personally Reviewed: YES    < from: US Kidney and Bladder (02.06.19 @ 09:54) >  IMPRESSION:     Renal parenchymal disease. No hydronephrosis. Trace perinephric fluid   bilaterally.    Trace to small abdominal pelvic free fluid.    Cholelithiasis. 3 mm gallbladder polyp. Follow-upcan be performed in one   year to assess for interval change in size.      < end of copied text >      Consultant(s) Notes Reviewed: YES    Care Discussed with Consultants/Other Providers: YES

## 2019-02-06 NOTE — CONSULT NOTE ADULT - ASSESSMENT
74 yo male with hx of htn ,cad, s/p cabg/chronic a.fib on ac ,s/p ppm with ?sepsis and found to have increasing sob on the floor.  transfer to tele  cardiac enzyme  agree with broad spectrum abx for possible pneumoniae.  ac for a.fib  will adjust cardiac meds

## 2019-02-06 NOTE — PROGRESS NOTE ADULT - SUBJECTIVE AND OBJECTIVE BOX
f/u OM sacrum    Interval History/ROS:  awake and more alert today.  wife at bedside.  some pain of the backside.  no fever.  Remainder of ROS otherwise negative.    PAST MEDICAL & SURGICAL HISTORY:  PAD s/p S/P aortobifemoral bypass surgery; S/P angioplasty with stent: vascular stent- left leg ()  CAD s/p angioplasty/stent; CABG 2018  PPM   CKD (chronic kidney disease) stage 3, GFR 30-59 ml/min  Diastolic congestive heart failure, unspecified congestive heart failure chronicity  Gout  Chronic atrial fibrillation: on coumadin  Hyperlipidemia  HTN   Type 2 diabetes mellitus  OM s/p L 4th toe amputation  Pressure ulcer, unspecified pressure ulcer stage: in lateral rt 4th toe both sides from toe compression  S/P hernia repair    Allergies  No Known Allergies    ANTIMICROBIALS:    vancomycin  IVPB 1000 daily (2/3-)  ertapenem  IVPB 1000 every 24 hours    MEDICATIONS  (STANDING):  insulin lispro (HumaLOG) corrective regimen sliding scale  every 6 hours  polyethylene glycol 3350 17 daily  senna Syrup 5 at bedtime    Vital Signs Last 24 Hrs  T(F): 97.4 (19 @ 06:08), Max: 98.3 (19 @ 21:14)  HR: 89 (19 @ 06:08)  BP: 124/79 (19 @ 06:08)  RR: 18 (19 @ 06:08)  SpO2: 99% (19 @ 06:08) (97% - 99%)    PHYSICAL EXAM:  General: non-toxic; in bed  HEAD/EYES: anicteric  ENT:  supple  Cardiovascular:   S1, S2  Respiratory:  clear bilaterally  GI:  soft, non-tender, normal bowel sounds; peg  :  catheter  Musculoskeletal:  no synovitis  Neurologic:  awake  Skin:  multiple eschars of the legs b/l; did not look at sacral ulcer today  R hip healing  Psychiatric:  affect normal  Vascular:  no phlebitis - PIV L shoulder                          8.7    20.0  )-----------( 261      ( 2019 07:00 )             29.2 02-    148  |  107  |  50  ----------------------------<  332  4.3   |  32  |  1.63  Ca    8.2      2019 07:00Phos  3.2     02Mg     2.2       TPro  5.6  /  Alb  2.0  /  TBili  0.4  /  DBili  x   /  AST  13  /  ALT  13  /  AlkPhos  203      Sedimentation Rate, Erythrocyte: 66 (-)    TPro  5.8<L>  /  Alb  2.2<L>  /  TBili  0.4  /  DBili  x   /  AST  16  /  ALT  19  /  AlkPhos  231<H>    Urinalysis Basic - ( 2019 13:07 )  Color: Yellow / Appearance: Slightly Turbid / S.022 / pH: x  Gluc: x / Ketone: Negative  / Bili: Negative / Urobili: Negative   Blood: x / Protein: 30 mg/dL / Nitrite: Negative   Leuk Esterase: Large / RBC: 40 /hpf /  /HPF   Sq Epi: x / Non Sq Epi: 1 / Bacteria: Negative    MICROBIOLOGY:  Culture - Blood (19 @ 17:52)    Specimen Source: .Blood Blood-Venous    Culture Results:   No growth to date.    Culture - Blood (19 @ 17:52)    Specimen Source: .Blood Blood-Peripheral    Culture Results:   No growth to date.    Culture - Urine (19 @ 16:44)    Specimen Source: .Urine Catheterized    Culture Results:   <10,000 CFU/ml Normal Urogenital nany present    RADIOLOGY:  US Kidney and Bladder (19 @ 09:54) >  IMPRESSION:  Renal parenchymal disease. No hydronephrosis. Trace perinephric fluid bilaterally.  Trace to small abdominal pelvic free fluid.  Cholelithiasis. 3 mm gallbladder polyp. Follow-upcan be performed in one year to assess for interval change in size.    Xray Chest 1 View- PORTABLE-Urgent (19 @ 03:31) >  IMPRESSION: The patient is status post CABG and heart valve repair. A left cardiac device is noted. There is no pneumothorax. There are small bilateral pleural effusions with associated pulmonary edema demonstrating overall interval improvement since the prior study.     CT Lumbar Spine No Cont (19 @ 15:34) >  A large sacral decubitus ulcer is noted overlying the mid-lower sacrum and coccyx. Areas of cortical discontinuity are noted involving the dorsal sacrum most consistent with osteomyelitis. No periosteal reaction   is visualized. No large fluid collections are appreciated within the limitations of this noncontrast study.  Severe compression deformity of the T12 vertebral body with mild retropulsion of bone with a mild bony central canal stenosis as previously described. Bilateral spondylolysis defects are again noted at theL5-S1 levels. Multilevel spondylosis. Diffuse demineralization of the bones.  Atheromatous disease with redemonstration of aortobifemoral bypass which is incompletely evaluated with absence of intravenous contrast.  Fluid within the pelvis ofunknown etiology. Tejeda catheter balloon within the bladder.  The patient is status post right hip replacement.  IMPRESSION:  A large sacral decubitus ulcer is noted overlying the mid-lower sacrum and coccyx. Areas of cortical discontinuity are noted involving the dorsal sacrum most consistent with osteomyelitis.

## 2019-02-06 NOTE — PROGRESS NOTE ADULT - SUBJECTIVE AND OBJECTIVE BOX
[facility] Division of Kidney Diseases & Hypertension  FOLLOW UP NOTE  [phone]--------------------------------------------------------------------------------  Chief Complaint: SHINE       24 hour events/subjective:    Patient seen and examined.  No acute events noted overnight.     PAST HISTORY  --------------------------------------------------------------------------------  No significant changes to PMH, PSH, FHx, SHx, unless otherwise noted    ALLERGIES & MEDICATIONS  --------------------------------------------------------------------------------  Allergies    No Known Allergies    Intolerances      Standing Inpatient Medications  Dakins Solution - 1/4 Strength 1 Application(s) Topical three times a day  dextrose 5%. 1000 milliLiter(s) IV Continuous <Continuous>  dextrose 5%. 1000 milliLiter(s) IV Continuous <Continuous>  dextrose 50% Injectable 12.5 Gram(s) IV Push once  dextrose 50% Injectable 25 Gram(s) IV Push once  dextrose 50% Injectable 25 Gram(s) IV Push once  ertapenem  IVPB      ertapenem  IVPB 1000 milliGRAM(s) IV Intermittent every 24 hours  insulin lispro (HumaLOG) corrective regimen sliding scale   SubCutaneous every 6 hours  polyethylene glycol 3350 17 Gram(s) Oral daily  senna Syrup 5 milliLiter(s) Oral at bedtime  vancomycin  IVPB 1000 milliGRAM(s) IV Intermittent daily    PRN Inpatient Medications  acetaminophen   Tablet .. 650 milliGRAM(s) Oral every 6 hours PRN  dextrose 40% Gel 15 Gram(s) Oral once PRN  glucagon  Injectable 1 milliGRAM(s) IntraMuscular once PRN  ondansetron Injectable 4 milliGRAM(s) IV Push every 8 hours PRN      REVIEW OF SYSTEMS  --------------------------------------------------------------------------------  Gen: No  fevers/chills  Skin: No rashes  Head/Eyes/Ears/Mouth: No headache; Normal hearing; Normal vision w/o blurriness  Respiratory: No dyspnea, cough, wheezing, hemoptysis  CV: No chest pain, PND, orthopnea  GI: No abdominal pain, diarrhea, constipation, nausea, vomiting  : No increased frequency, dysuria, hematuria, nocturia  MSK: No joint pain/swelling; no back pain; no edema  Neuro: No dizziness/lightheadedness, weakness, seizures, numbness, tingling      All other systems were reviewed and are negative, except as noted.    VITALS/PHYSICAL EXAM  --------------------------------------------------------------------------------  T(C): 36.3 (02-06-19 @ 06:08), Max: 36.8 (02-05-19 @ 21:14)  HR: 89 (02-06-19 @ 06:08) (77 - 89)  BP: 124/79 (02-06-19 @ 06:08) (110/62 - 124/79)  RR: 18 (02-06-19 @ 06:08) (18 - 18)  SpO2: 99% (02-06-19 @ 06:08) (97% - 99%)  Wt(kg): --        02-05-19 @ 07:01  -  02-06-19 @ 07:00  --------------------------------------------------------  IN: 0 mL / OUT: 1000 mL / NET: -1000 mL    02-06-19 @ 07:01  -  02-06-19 @ 13:06  --------------------------------------------------------  IN: 150 mL / OUT: 0 mL / NET: 150 mL      Physical Exam:  	  Gen: Elderly male  	HEENT: Dry mucus membranes   	Pulm: CTA B/L  	CV:  S1S2  	Abd: +BS, soft               : hoskins present with dark colored urine present.   	Ext: No B/L Lower ext edema; healed ulcers present over LE.   	Neuro: Sedated.   	Skin: Warm and dry    LABS/STUDIES  --------------------------------------------------------------------------------              8.7    20.0  >-----------<  261      [02-06-19 @ 07:00]              29.2     148  |  107  |  50  ----------------------------<  332      [02-06-19 @ 07:00]  4.3   |  32  |  1.63        Ca     8.2     [02-06-19 @ 07:00]      Mg     2.2     [02-06-19 @ 07:00]      Phos  3.2     [02-06-19 @ 07:00]    TPro  5.6  /  Alb  2.0  /  TBili  0.4  /  DBili  x   /  AST  13  /  ALT  13  /  AlkPhos  203  [02-06-19 @ 07:00]    PT/INR: PT 28.6 , INR 2.44       [02-05-19 @ 15:08]  PTT: 40.8       [02-05-19 @ 15:08]      Creatinine Trend:  SCr 1.63 [02-06 @ 07:00]  SCr 1.68 [02-05 @ 21:43]  SCr 1.78 [02-05 @ 15:08]  SCr 1.67 [02-04 @ 07:15]  SCr 1.78 [02-03 @ 13:07]    Urinalysis - [02-03-19 @ 13:07]      Color Yellow / Appearance Slightly Turbid / SG 1.022 / pH 7.0      Gluc Negative / Ketone Negative  / Bili Negative / Urobili Negative       Blood Moderate / Protein 30 mg/dL / Leuk Est Large / Nitrite Negative      RBC 40 /  / Hyaline 21 / Gran  / Sq Epi  / Non Sq Epi 1 / Bacteria Negative    Urine Creatinine 108      [02-06-19 @ 03:09]  Urine Sodium <20      [02-06-19 @ 03:09]  Urine Potassium 43      [02-06-19 @ 04:32]  Urine Chloride <35      [02-06-19 @ 04:32]  Urine Osmolality 485      [02-06-19 @ 03:09]    Iron 18, TIBC 143, %sat 13      [02-04-19 @ 08:58]  Ferritin 262      [02-04-19 @ 08:58]  HbA1c 7.4      [02-04-19 @ 08:56]

## 2019-02-06 NOTE — PROGRESS NOTE ADULT - PROBLEM SELECTOR PLAN 2
- SHINE on CKD stage 3 (GFR 48) with anemia, pre-renal azotemia 2/2 to po intake  - Likely 2/2 to poor po intake. Will send off urine lytes  - C/w IVF hydration and free water via peg tube  - Anemia likely anemia of chronic disease.  - trend Cr  - avoid nephrotoxic drugs, renally dose meds - Given leukocytosis and tachypnea, patient meets SIRS criteria  - Sources include catheter associated UTI and sacral decubitus ulcer with underlying osteomyelitis  - will send wound culture of sacral decubitus ulcer  - ID recs appreciated- will continue Vancomycin, switch Cefepime to Ertapenem; will attempt to obtain records from [facility] to determine if chronic osteomyelitis (as per wife, OM developed after hip replacement in December 2018)  - IR recs appreciated for bone biopsy- low yield given the wound is open, exposed to air; can re-consult if remains septic on antibiotics  - F/u Blood and Urine cultures - Given leukocytosis and tachypnea, patient meets SIRS criteria- likely contributing to original encephalopathy which has improved (AO x 3)  - likely 2/2 to OM underlying sacral decubitus (see management as above)  - will send wound culture of sacral decubitus ulcer  - Blood and urine cx from 2/3 show NGTD

## 2019-02-07 DIAGNOSIS — E11.649 TYPE 2 DIABETES MELLITUS WITH HYPOGLYCEMIA WITHOUT COMA: ICD-10-CM

## 2019-02-07 LAB
ALBUMIN SERPL ELPH-MCNC: 2.2 G/DL — LOW (ref 3.3–5)
ALP SERPL-CCNC: 241 U/L — HIGH (ref 40–120)
ALT FLD-CCNC: 17 U/L — SIGNIFICANT CHANGE UP (ref 10–45)
ANION GAP SERPL CALC-SCNC: 12 MMOL/L — SIGNIFICANT CHANGE UP (ref 5–17)
ANION GAP SERPL CALC-SCNC: 16 MMOL/L — SIGNIFICANT CHANGE UP (ref 5–17)
APTT BLD: 40.7 SEC — HIGH (ref 27.5–36.3)
AST SERPL-CCNC: 19 U/L — SIGNIFICANT CHANGE UP (ref 10–40)
BASOPHILS # BLD AUTO: 0 K/UL — SIGNIFICANT CHANGE UP (ref 0–0.2)
BASOPHILS # BLD AUTO: 0.02 K/UL — SIGNIFICANT CHANGE UP (ref 0–0.2)
BASOPHILS NFR BLD AUTO: 0 % — SIGNIFICANT CHANGE UP (ref 0–2)
BASOPHILS NFR BLD AUTO: 0.1 % — SIGNIFICANT CHANGE UP (ref 0–2)
BILIRUB SERPL-MCNC: 0.3 MG/DL — SIGNIFICANT CHANGE UP (ref 0.2–1.2)
BUN SERPL-MCNC: 50 MG/DL — HIGH (ref 7–23)
BUN SERPL-MCNC: 51 MG/DL — HIGH (ref 7–23)
CALCIUM SERPL-MCNC: 8.1 MG/DL — LOW (ref 8.4–10.5)
CALCIUM SERPL-MCNC: 8.3 MG/DL — LOW (ref 8.4–10.5)
CHLORIDE SERPL-SCNC: 107 MMOL/L — SIGNIFICANT CHANGE UP (ref 96–108)
CHLORIDE SERPL-SCNC: 109 MMOL/L — HIGH (ref 96–108)
CK MB BLD-MCNC: 14.4 % — HIGH (ref 0–3.5)
CK MB CFR SERPL CALC: 2.6 NG/ML — SIGNIFICANT CHANGE UP (ref 0–6.7)
CK MB CFR SERPL CALC: 2.8 NG/ML — SIGNIFICANT CHANGE UP (ref 0–6.7)
CK SERPL-CCNC: 18 U/L — LOW (ref 30–200)
CK SERPL-CCNC: 26 U/L — LOW (ref 30–200)
CO2 SERPL-SCNC: 27 MMOL/L — SIGNIFICANT CHANGE UP (ref 22–31)
CO2 SERPL-SCNC: 29 MMOL/L — SIGNIFICANT CHANGE UP (ref 22–31)
CREAT SERPL-MCNC: 1.66 MG/DL — HIGH (ref 0.5–1.3)
CREAT SERPL-MCNC: 1.76 MG/DL — HIGH (ref 0.5–1.3)
EOSINOPHIL # BLD AUTO: 0 K/UL — SIGNIFICANT CHANGE UP (ref 0–0.5)
EOSINOPHIL # BLD AUTO: 0.16 K/UL — SIGNIFICANT CHANGE UP (ref 0–0.5)
EOSINOPHIL NFR BLD AUTO: 0 % — SIGNIFICANT CHANGE UP (ref 0–6)
EOSINOPHIL NFR BLD AUTO: 0.8 % — SIGNIFICANT CHANGE UP (ref 0–6)
GLUCOSE BLDC GLUCOMTR-MCNC: 100 MG/DL — HIGH (ref 70–99)
GLUCOSE BLDC GLUCOMTR-MCNC: 120 MG/DL — HIGH (ref 70–99)
GLUCOSE BLDC GLUCOMTR-MCNC: 124 MG/DL — HIGH (ref 70–99)
GLUCOSE BLDC GLUCOMTR-MCNC: 134 MG/DL — HIGH (ref 70–99)
GLUCOSE BLDC GLUCOMTR-MCNC: 138 MG/DL — HIGH (ref 70–99)
GLUCOSE BLDC GLUCOMTR-MCNC: 157 MG/DL — HIGH (ref 70–99)
GLUCOSE BLDC GLUCOMTR-MCNC: 220 MG/DL — HIGH (ref 70–99)
GLUCOSE BLDC GLUCOMTR-MCNC: 30 MG/DL — CRITICAL LOW (ref 70–99)
GLUCOSE BLDC GLUCOMTR-MCNC: 35 MG/DL — CRITICAL LOW (ref 70–99)
GLUCOSE BLDC GLUCOMTR-MCNC: 38 MG/DL — CRITICAL LOW (ref 70–99)
GLUCOSE BLDC GLUCOMTR-MCNC: 42 MG/DL — CRITICAL LOW (ref 70–99)
GLUCOSE SERPL-MCNC: 29 MG/DL — CRITICAL LOW (ref 70–99)
GLUCOSE SERPL-MCNC: 309 MG/DL — HIGH (ref 70–99)
HCT VFR BLD CALC: 29.5 % — LOW (ref 39–50)
HGB BLD-MCNC: 8.5 G/DL — LOW (ref 13–17)
IMM GRANULOCYTES NFR BLD AUTO: 0.4 % — SIGNIFICANT CHANGE UP (ref 0–1.5)
INR BLD: 3.11 RATIO — HIGH (ref 0.88–1.16)
LACTATE SERPL-SCNC: 2.5 MMOL/L — HIGH (ref 0.7–2)
LYMPHOCYTES # BLD AUTO: 11 % — LOW (ref 13–44)
LYMPHOCYTES # BLD AUTO: 14 % — SIGNIFICANT CHANGE UP (ref 13–44)
LYMPHOCYTES # BLD AUTO: 2.18 K/UL — SIGNIFICANT CHANGE UP (ref 1–3.3)
LYMPHOCYTES # BLD AUTO: SIGNIFICANT CHANGE UP (ref 1–3.3)
MAGNESIUM SERPL-MCNC: 2.2 MG/DL — SIGNIFICANT CHANGE UP (ref 1.6–2.6)
MAGNESIUM SERPL-MCNC: 2.2 MG/DL — SIGNIFICANT CHANGE UP (ref 1.6–2.6)
MCHC RBC-ENTMCNC: 27.8 PG — SIGNIFICANT CHANGE UP (ref 27–34)
MCHC RBC-ENTMCNC: 28.8 GM/DL — LOW (ref 32–36)
MCV RBC AUTO: 96.4 FL — SIGNIFICANT CHANGE UP (ref 80–100)
MONOCYTES # BLD AUTO: 0.7 K/UL — SIGNIFICANT CHANGE UP (ref 0–0.9)
MONOCYTES # BLD AUTO: 1.04 K/UL — HIGH (ref 0–0.9)
MONOCYTES NFR BLD AUTO: 3 % — SIGNIFICANT CHANGE UP (ref 2–14)
MONOCYTES NFR BLD AUTO: 5.2 % — SIGNIFICANT CHANGE UP (ref 2–14)
NEUTROPHILS # BLD AUTO: 16.36 K/UL — HIGH (ref 1.8–7.4)
NEUTROPHILS # BLD AUTO: 18.8 K/UL — HIGH (ref 1.8–7.4)
NEUTROPHILS NFR BLD AUTO: 82.5 % — HIGH (ref 43–77)
NEUTROPHILS NFR BLD AUTO: 83 % — HIGH (ref 43–77)
PHOSPHATE SERPL-MCNC: 2.2 MG/DL — LOW (ref 2.5–4.5)
PHOSPHATE SERPL-MCNC: 3.1 MG/DL — SIGNIFICANT CHANGE UP (ref 2.5–4.5)
PLATELET # BLD AUTO: 353 K/UL — SIGNIFICANT CHANGE UP (ref 150–400)
POTASSIUM SERPL-MCNC: 3.7 MMOL/L — SIGNIFICANT CHANGE UP (ref 3.5–5.3)
POTASSIUM SERPL-MCNC: 4.6 MMOL/L — SIGNIFICANT CHANGE UP (ref 3.5–5.3)
POTASSIUM SERPL-SCNC: 3.7 MMOL/L — SIGNIFICANT CHANGE UP (ref 3.5–5.3)
POTASSIUM SERPL-SCNC: 4.6 MMOL/L — SIGNIFICANT CHANGE UP (ref 3.5–5.3)
PROT SERPL-MCNC: 5.9 G/DL — LOW (ref 6–8.3)
PROTHROM AB SERPL-ACNC: 36.7 SEC — HIGH (ref 10–13.1)
RBC # BLD: 3.06 M/UL — LOW (ref 4.2–5.8)
RBC # FLD: 20.2 % — HIGH (ref 10.3–14.5)
SODIUM SERPL-SCNC: 150 MMOL/L — HIGH (ref 135–145)
SODIUM SERPL-SCNC: 150 MMOL/L — HIGH (ref 135–145)
TROPONIN T, HIGH SENSITIVITY RESULT: 162 NG/L — HIGH (ref 0–51)
VANCOMYCIN TROUGH SERPL-MCNC: 20.5 UG/ML — HIGH (ref 10–20)
VANCOMYCIN TROUGH SERPL-MCNC: 21.1 UG/ML — HIGH (ref 10–20)
WBC # BLD: 19.84 K/UL — HIGH (ref 3.8–10.5)
WBC # FLD AUTO: 19.84 K/UL — HIGH (ref 3.8–10.5)

## 2019-02-07 PROCEDURE — 71250 CT THORAX DX C-: CPT | Mod: 26

## 2019-02-07 PROCEDURE — 99222 1ST HOSP IP/OBS MODERATE 55: CPT

## 2019-02-07 PROCEDURE — 99232 SBSQ HOSP IP/OBS MODERATE 35: CPT

## 2019-02-07 RX ORDER — DEXTROSE 10 % IN WATER 10 %
1000 INTRAVENOUS SOLUTION INTRAVENOUS
Qty: 0 | Refills: 0 | Status: DISCONTINUED | OUTPATIENT
Start: 2019-02-07 | End: 2019-02-08

## 2019-02-07 RX ORDER — INSULIN LISPRO 100/ML
VIAL (ML) SUBCUTANEOUS EVERY 6 HOURS
Qty: 0 | Refills: 0 | Status: DISCONTINUED | OUTPATIENT
Start: 2019-02-07 | End: 2019-02-11

## 2019-02-07 RX ORDER — FUROSEMIDE 40 MG
40 TABLET ORAL DAILY
Qty: 0 | Refills: 0 | Status: DISCONTINUED | OUTPATIENT
Start: 2019-02-07 | End: 2019-02-08

## 2019-02-07 RX ORDER — DEXTROSE 50 % IN WATER 50 %
25 SYRINGE (ML) INTRAVENOUS ONCE
Qty: 0 | Refills: 0 | Status: COMPLETED | OUTPATIENT
Start: 2019-02-07 | End: 2019-02-07

## 2019-02-07 RX ORDER — ZINC OXIDE 200 MG/G
1 OINTMENT TOPICAL THREE TIMES A DAY
Qty: 0 | Refills: 0 | Status: DISCONTINUED | OUTPATIENT
Start: 2019-02-07 | End: 2019-02-19

## 2019-02-07 RX ORDER — SODIUM CHLORIDE 9 MG/ML
1000 INJECTION, SOLUTION INTRAVENOUS
Qty: 0 | Refills: 0 | Status: DISCONTINUED | OUTPATIENT
Start: 2019-02-07 | End: 2019-02-07

## 2019-02-07 RX ORDER — LACTOBACILLUS ACIDOPHILUS 100MM CELL
1 CAPSULE ORAL
Qty: 0 | Refills: 0 | Status: DISCONTINUED | OUTPATIENT
Start: 2019-02-07 | End: 2019-02-15

## 2019-02-07 RX ORDER — ZINC OXIDE 200 MG/G
1 OINTMENT TOPICAL THREE TIMES A DAY
Qty: 0 | Refills: 0 | Status: DISCONTINUED | OUTPATIENT
Start: 2019-02-07 | End: 2019-02-07

## 2019-02-07 RX ADMIN — Medication 50 MILLILITER(S): at 18:56

## 2019-02-07 RX ADMIN — Medication 50 MILLILITER(S): at 08:11

## 2019-02-07 RX ADMIN — Medication 25 GRAM(S): at 05:59

## 2019-02-07 RX ADMIN — Medication 40 MILLIGRAM(S): at 22:36

## 2019-02-07 RX ADMIN — SENNA PLUS 5 MILLILITER(S): 8.6 TABLET ORAL at 22:36

## 2019-02-07 RX ADMIN — SODIUM CHLORIDE 100 MILLILITER(S): 9 INJECTION, SOLUTION INTRAVENOUS at 01:15

## 2019-02-07 RX ADMIN — SODIUM CHLORIDE 500 MILLILITER(S): 9 INJECTION INTRAMUSCULAR; INTRAVENOUS; SUBCUTANEOUS at 00:00

## 2019-02-07 RX ADMIN — Medication 1 APPLICATION(S): at 05:00

## 2019-02-07 RX ADMIN — Medication 25 GRAM(S): at 06:50

## 2019-02-07 RX ADMIN — Medication 650 MILLIGRAM(S): at 15:50

## 2019-02-07 RX ADMIN — Medication 1 APPLICATION(S): at 13:25

## 2019-02-07 RX ADMIN — ZINC OXIDE 1 APPLICATION(S): 200 OINTMENT TOPICAL at 22:36

## 2019-02-07 RX ADMIN — Medication 650 MILLIGRAM(S): at 15:24

## 2019-02-07 RX ADMIN — ERTAPENEM SODIUM 120 MILLIGRAM(S): 1 INJECTION, POWDER, LYOPHILIZED, FOR SOLUTION INTRAMUSCULAR; INTRAVENOUS at 13:28

## 2019-02-07 RX ADMIN — Medication 1 APPLICATION(S): at 22:35

## 2019-02-07 NOTE — PROGRESS NOTE ADULT - ASSESSMENT
74 yo man with a hx of CAD s/p CABG, Afib on coumadin, CKD stage 3, PAD, aortobifem bypass ~2012 by Dr. Guallpa at [place], CEA, POBA L distal AT/DP (2016) SFA stent (2017), and left 4th toe amputation, admitted with sepsis 2/2 UTI vs. sacral decubitus w/ osteomyelitis, with cool right leg and chronic ulcers to the foot, heel, and shin.    - SHERIE/PVRs, ordered, will follow up  - will plan for CT angio abdomen with runoff to assess lower extremity vasculature once Cr improved  - care per primary team  - vascular will cont to follow, [phone]    Patient discussed with Dr. Zamora

## 2019-02-07 NOTE — PROGRESS NOTE ADULT - SUBJECTIVE AND OBJECTIVE BOX
f/u OM sacrum    Interval History/ROS:   transferred to telemetry.  confused.  no fever.  Unable to obtain ROS - patient confused.    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  Pressure ulcer, unspecified pressure ulcer stage: in lateral rt 4th toe both sides from toe compression  S/P hernia repair    Allergies  No Known Allergies    ANTIMICROBIALS:    vancomycin  IVPB 1000 daily (2/3-)  ertapenem  IVPB 1000 every 24 hours    MEDICATIONS  (STANDING):  polyethylene glycol 3350 17 daily  senna Syrup 5 at bedtime    Vital Signs Last 24 Hrs  T(F): 97.6 (02-07-19 @ 04:20), Max: 97.6 (02-06-19 @ 18:59)  HR: 94 (02-07-19 @ 04:20)  BP: 130/71 (02-07-19 @ 04:20)  RR: 18 (02-07-19 @ 04:20)  SpO2: 100% (02-07-19 @ 04:20) (100% - 100%)    PHYSICAL EXAM:  General: non-toxic; in bed  HEAD/EYES: anicteric  ENT:  supple  Cardiovascular:   S1, S2  Respiratory:  clear bilaterally  GI:  soft, non-tender, normal bowel sounds; peg  :  catheter  Musculoskeletal:  no synovitis  Neurologic:  awake, confused  Skin:  multiple eschars of the legs b/l; did not look at sacral ulcer  R hip healing  Psychiatric:  affect normal  Vascular:  no phlebitis                         8.5    19.84 )-----------( 353      ( 07 Feb 2019 07:52 )             29.5 02-07    150  |  109  |  51  ----------------------------<  29  3.7   |  29  |  1.66  Ca    8.1      07 Feb 2019 05:54Phos  2.2     02-07Mg     2.2     02-07  TPro  5.9  /  Alb  2.2  /  TBili  0.3  /  DBili  x   /  AST  19  /  ALT  17  /  AlkPhos  241  02-07    Sedimentation Rate, Erythrocyte: 66 (02-03-19)    MICROBIOLOGY:  Culture - Blood (02.03.19 @ 17:52)    Specimen Source: .Blood Blood-Venous    Culture Results:   No growth to date.    Culture - Blood (02.03.19 @ 17:52)    Specimen Source: .Blood Blood-Peripheral    Culture Results:   No growth to date.    Culture - Urine (02.03.19 @ 16:44)    Specimen Source: .Urine Catheterized    Culture Results:   <10,000 CFU/ml Normal Urogenital nany present    RADIOLOGY:  US Kidney and Bladder (02.06.19 @ 09:54) >  IMPRESSION:  Renal parenchymal disease. No hydronephrosis. Trace perinephric fluid bilaterally.  Trace to small abdominal pelvic free fluid.  Cholelithiasis. 3 mm gallbladder polyp. Follow-upcan be performed in one year to assess for interval change in size.    Xray Chest 1 View- PORTABLE-Urgent (02.04.19 @ 03:31) >  IMPRESSION: The patient is status post CABG and heart valve repair. A left cardiac device is noted. There is no pneumothorax. There are small bilateral pleural effusions with associated pulmonary edema demonstrating overall interval improvement since the prior study.     CT Lumbar Spine No Cont (02.03.19 @ 15:34) >  A large sacral decubitus ulcer is noted overlying the mid-lower sacrum and coccyx. Areas of cortical discontinuity are noted involving the dorsal sacrum most consistent with osteomyelitis. No periosteal reaction   is visualized. No large fluid collections are appreciated within the limitations of this noncontrast study.  Severe compression deformity of the T12 vertebral body with mild retropulsion of bone with a mild bony central canal stenosis as previously described. Bilateral spondylolysis defects are again noted at theL5-S1 levels. Multilevel spondylosis. Diffuse demineralization of the bones.  Atheromatous disease with redemonstration of aortobifemoral bypass which is incompletely evaluated with absence of intravenous contrast.  Fluid within the pelvis ofunknown etiology. Tejeda catheter balloon within the bladder.  The patient is status post right hip replacement.  IMPRESSION:  A large sacral decubitus ulcer is noted overlying the mid-lower sacrum and coccyx. Areas of cortical discontinuity are noted involving the dorsal sacrum most consistent with osteomyelitis.

## 2019-02-07 NOTE — PROGRESS NOTE ADULT - SUBJECTIVE AND OBJECTIVE BOX
CARDIOLOGY     PROGRESS  NOTE   ________________________________________________    CHIEF COMPLAINT:Patient is a 73y old  Male who presents with a chief complaint of Sepsis (06 Feb 2019 18:21)    	  REVIEW OF SYSTEMS:  CONSTITUTIONAL: No fever, weight loss, or fatigue  EYES: No eye pain, visual disturbances, or discharge  ENT:  No difficulty hearing, tinnitus, vertigo; No sinus or throat pain  NECK: No pain or stiffness  RESPIRATORY: No cough, wheezing, chills or hemoptysis; + Shortness of Breath  CARDIOVASCULAR: No chest pain, palpitations, passing out, dizziness, or leg swelling  GASTROINTESTINAL: No abdominal or epigastric pain. No nausea, vomiting, or hematemesis; No diarrhea or constipation. No melena or hematochezia.  GENITOURINARY: No dysuria, frequency, hematuria, or incontinence  NEUROLOGICAL: No headaches, memory loss, loss of strength, numbness, or tremors  SKIN: No itching, burning, rashes, or lesions   LYMPH Nodes: No enlarged glands  ENDOCRINE: No heat or cold intolerance; No hair loss  MUSCULOSKELETAL: No joint pain or swelling; No muscle, back, or extremity pain  PSYCHIATRIC: No depression, anxiety, mood swings, or difficulty sleeping  HEME/LYMPH: No easy bruising, or bleeding gums  ALLERGY AND IMMUNOLOGIC: No hives or eczema	    [ ] All others negative	  [ ] Unable to obtain    PHYSICAL EXAM:  T(C): 36.4 (02-07-19 @ 04:20), Max: 36.4 (02-06-19 @ 18:59)  HR: 94 (02-07-19 @ 04:20) (76 - 98)  BP: 130/71 (02-07-19 @ 04:20) (104/76 - 130/71)  RR: 18 (02-07-19 @ 04:20) (18 - 18)  SpO2: 100% (02-07-19 @ 04:20) (100% - 100%)  Wt(kg): --  I&O's Summary    06 Feb 2019 07:01  -  07 Feb 2019 07:00  --------------------------------------------------------  IN: 1330 mL / OUT: 600 mL / NET: 730 mL        Appearance: Normal	  HEENT:   Normal oral mucosa, PERRL, EOMI	  Lymphatic: No lymphadenopathy  Cardiovascular: Normal S1 S2, No JVD,+ murmurs, No edema  Respiratory: Lungs clear to auscultation	  Psychiatry: A & O x 3, Mood & affect appropriate  Gastrointestinal:  Soft, Non-tender, + BS	  Skin: No rashes, No ecchymoses, No cyanosis	  Neurologic: Non-focal  Extremities: Normal range of motion, No clubbing, cyanosis or edema  Vascular: Peripheral pulses palpable 2+ bilaterally    MEDICATIONS  (STANDING):  Dakins Solution - 1/4 Strength 1 Application(s) Topical three times a day  dextrose 10%. 1000 milliLiter(s) (50 mL/Hr) IV Continuous <Continuous>  dextrose 5%. 1000 milliLiter(s) (50 mL/Hr) IV Continuous <Continuous>  dextrose 50% Injectable 12.5 Gram(s) IV Push once  dextrose 50% Injectable 25 Gram(s) IV Push once  dextrose 50% Injectable 25 Gram(s) IV Push once  ertapenem  IVPB      ertapenem  IVPB 1000 milliGRAM(s) IV Intermittent every 24 hours  polyethylene glycol 3350 17 Gram(s) Oral daily  senna Syrup 5 milliLiter(s) Oral at bedtime  sodium chloride 0.45%. 1000 milliLiter(s) (100 mL/Hr) IV Continuous <Continuous>  vancomycin  IVPB 1000 milliGRAM(s) IV Intermittent daily  zinc oxide 40% Ointment 1 Application(s) Topical three times a day      TELEMETRY: 	    ECG:  	  RADIOLOGY:  OTHER: 	  	  LABS:	 	    CARDIAC MARKERS:  CARDIAC MARKERS ( 07 Feb 2019 05:54 )  x     / x     / 18 U/L / x     / 2.6 ng/mL  CARDIAC MARKERS ( 06 Feb 2019 23:28 )  x     / x     / 26 U/L / x     / 2.8 ng/mL                                8.9    21.1  )-----------( 311      ( 06 Feb 2019 23:28 )             28.1     02-07    150<H>  |  109<H>  |  51<H>  ----------------------------<  29<LL>  3.7   |  29  |  1.66<H>    Ca    8.1<L>      07 Feb 2019 05:54  Phos  2.2     02-07  Mg     2.2     02-07    TPro  5.9<L>  /  Alb  2.2<L>  /  TBili  0.3  /  DBili  x   /  AST  19  /  ALT  17  /  AlkPhos  241<H>  02-07    proBNP: Serum Pro-Brain Natriuretic Peptide: 49892 pg/mL (02-03 @ 13:07)    Lipid Profile:   HgA1c: Hemoglobin A1C, Whole Blood: 7.4 % (02-04 @ 08:56)    TSH:   PT/INR - ( 06 Feb 2019 15:40 )   PT: 32.5 sec;   INR: 2.74 ratio         PTT - ( 05 Feb 2019 15:08 )  PTT:40.8 sec  < from: Transthoracic Echocardiogram (02.04.19 @ 12:19) >  Incomplete and therefore uninterpretable exam. Patient  confused and uncooperative and did not permit completion of  a full exam.    < end of copied text >    < from: Xray Chest 1 View- PORTABLE-Urgent (02.05.19 @ 16:04) >  Bilateral pleuraleffusions and dense left retrocardiac opacity which   represent atelectasis or pneumonia are unchanged. Stable pulmonary   vascular congestion. No pneumothorax.    Sternotomy and mitral valve replacement. Stable left pacer.    < end of copied text >    Assessment and plan  ---------------------------  ? chf systolic acute on chronic may consider ct chest  sepsis, broad spectrum abx  cardiac enzymes are negative  feeding sec to hypoglycemia  dvt prophylaxis  echo today

## 2019-02-07 NOTE — PROGRESS NOTE ADULT - ASSESSMENT
73M with multiple medical problems (CAD, CABG, PAD, aorto-bifemoral bypass, CKD), R hip fracture s/p ORIF, s/p PEG/hoskins here with sacral osteomyelitis with sepsis - likely source is osteomyelitis.  Prior history  - patient was hospitalized at [place] and sacral wound was debrided.  Polymicrobial (E coli, Kpna, enterococcus - all "pan-sensitive").  bone scan was negative at that time late December for OM.  Still would like bone biopsy.      Suggest:  - continue vancomycin and ertapenem 1g daily  - please check vancomycin trough  - CRP and repeat ESR  - please send bone for path and culture - wound care to see today 73M with multiple medical problems (CAD, CABG, PAD, aorto-bifemoral bypass, CKD), R hip fracture s/p ORIF, s/p PEG/hoskins here with sacral osteomyelitis with sepsis - likely source is osteomyelitis.  Prior history  - patient was hospitalized at [place] and sacral wound was debrided.  Polymicrobial (E coli, Kpna, enterococcus - all "pan-sensitive").  bone scan was negative at that time late December for OM.  Still would like bone biopsy.      Suggest:  - continue vancomycin and ertapenem 1g daily  - please check vancomycin trough  - CRP and repeat ESR  - please send bone for path and culture - wound care to see today    above d/w NP on 4M

## 2019-02-07 NOTE — CHART NOTE - NSCHARTNOTEFT_GEN_A_CORE
Notified by RN that FS = 30, 35. Followed hypoglycemia protocol and given D50 IVP. Patient seen and examined. Patient asymptomatic and alert and awake. Repeat FS = 38. IV found not to be working. New IV line put in and D50 IVP given.     F/u repeat FS.   Will continue to monitor.   Will endorse to day team.     Milana Elliott PA-C   [id number] Notified by RN that FS = 30, 35. Followed hypoglycemia protocol and given D50 IVP. Patient seen and examined. Patient asymptomatic and alert and awake. Repeat FS = 38. IV found not to be working. New IV line put in and D50 IVP given.     F/u repeat FS.   FS every hour for the next 4 hours  Will continue to monitor.   D10 IVF started   repeat lactate = 2.5   D/w Dr. Gilliland and endocrine consult called   Will endorse to day team.     Milana Elliott PA-C   [id number] Notified by RN that FS = 30, 35. Followed hypoglycemia protocol and given D50 IVP. Patient seen and examined. Patient asymptomatic and alert and awake. Repeat FS = 38. IV found not to be working. New IV line put in and D50 IVP given.     F/u repeat FS.   FS every hour for the next 4 hours  Will continue to monitor.   D10 IVF started   repeat lactate = 2.5   D/w Dr. Gilliland and endocrine consult called   Will endorse to day team.     Milana Elliott PA-C   [id number]    Addendum:   spoke with endocrine and recommended to continue D50 IVF and d/c lantus and ISS. Will see patient today and order appropriate orders.   Endorsed to day team.     Milana Elliott PA-C

## 2019-02-07 NOTE — PROVIDER CONTACT NOTE (OTHER) - ASSESSMENT
Pt laying in bed, alert, denies any weakness, sweats or shakes. Pt currently NPO w/ TF however peg tube dislodged and leaking at site.

## 2019-02-07 NOTE — PROGRESS NOTE ADULT - SUBJECTIVE AND OBJECTIVE BOX
SUBJECTIVE:  No acute overnight events. Patient denying pain in right leg.    OBJECTIVE:  Vital Signs Last 24 Hrs  T(C): 36.3 (2019 11:32), Max: 36.4 (2019 20:51)  T(F): 97.3 (2019 11:32), Max: 97.6 (2019 20:51)  HR: 91 (2019 18:25) (90 - 98)  BP: 112/65 (2019 18:25) (112/65 - 131/72)  BP(mean): --  RR: 18 (2019 11:32) (18 - 18)  SpO2: 100% (2019 18:25) (100% - 100%)    I&O's Detail    2019 07:01  -  2019 07:00  --------------------------------------------------------  IN:    dextrose 5%.: 150 mL    Glucerna: 30 mL    IV PiggyBack: 50 mL    sodium chloride 0.45%: 600 mL    Sodium Chloride 0.9% IV Bolus: 500 mL  Total IN: 1330 mL    OUT:    Indwelling Catheter - Urethral: 600 mL  Total OUT: 600 mL    Total NET: 730 mL      2019 07:01  -  2019 20:14  --------------------------------------------------------  IN:    dextrose 10%.: 550 mL    Glucerna: 280 mL  Total IN: 830 mL    OUT:    Incontinent per Collection Ba mL    Indwelling Catheter - Urethral: 100 mL  Total OUT: 101 mL    Total NET: 729 mL          Inpatient Medications:  MEDICATIONS  (STANDING):  Dakins Solution - 1/4 Strength 1 Application(s) Topical three times a day  dextrose 10%. 1000 milliLiter(s) (50 mL/Hr) IV Continuous <Continuous>  dextrose 5%. 1000 milliLiter(s) (50 mL/Hr) IV Continuous <Continuous>  dextrose 50% Injectable 12.5 Gram(s) IV Push once  dextrose 50% Injectable 25 Gram(s) IV Push once  dextrose 50% Injectable 25 Gram(s) IV Push once  ertapenem  IVPB      ertapenem  IVPB 1000 milliGRAM(s) IV Intermittent every 24 hours  furosemide    Tablet 40 milliGRAM(s) Oral daily  insulin lispro (HumaLOG) corrective regimen sliding scale   SubCutaneous every 6 hours  lactobacillus acidophilus 1 Tablet(s) Oral two times a day with meals  polyethylene glycol 3350 17 Gram(s) Oral daily  senna Syrup 5 milliLiter(s) Oral at bedtime  vancomycin  IVPB 1000 milliGRAM(s) IV Intermittent daily  zinc oxide 20% Ointment 1 Application(s) Topical three times a day    MEDICATIONS  (PRN):  acetaminophen   Tablet .. 650 milliGRAM(s) Oral every 6 hours PRN Temp greater or equal to 38C (100.4F)  dextrose 40% Gel 15 Gram(s) Oral once PRN Blood Glucose LESS THAN 70 milliGRAM(s)/deciliter  glucagon  Injectable 1 milliGRAM(s) IntraMuscular once PRN Glucose LESS THAN 70 milligrams/deciliter      Physical Examination:  GEN: NAD, resting quietly, cachectic  PULM: symmetric chest rise bilaterally, no increased WOB  CV: regular rate  ABD: soft, ND, PEG in place with surrounding erythematous rash, well-healed midline laparotomy incision and L inguinal hernia incision  EXTR:  Right: dry ulcers to anterior chin, dorsum of foot, lateral malleolus, and heel, cool to touch, dopplerable PT signal, no DP signal  Left: dopplerable PT signal, no DP signal, no wounds or ulcers  Palpable femoral pulses bilaterally, no popliteal pulses    LABS:                        8.5    19.84 )-----------( 353      ( 2019 07:52 )             29.5       02-07    150<H>  |  109<H>  |  51<H>  ----------------------------<  29<LL>  3.7   |  29  |  1.66<H>    Ca    8.1<L>      2019 05:54  Phos  2.2     02-07  Mg     2.2     02-07    TPro  5.9<L>  /  Alb  2.2<L>  /  TBili  0.3  /  DBili  x   /  AST  19  /  ALT  17  /  AlkPhos  241<H>        CULTURES:  .Blood Blood-Peripheral   @ 17:52   No growth to date.  --  --      .Urine Catheterized   @ 16:44   <10,000 CFU/ml Normal Urogenital nany present  --  --      IMAGING:  no new imaging

## 2019-02-07 NOTE — PHYSICAL THERAPY INITIAL EVALUATION ADULT - IMPAIRMENTS FOUND, PT EVAL
muscle strength/posture/aerobic capacity/endurance/gross motor/gait, locomotion, and balance/poor safety awareness

## 2019-02-07 NOTE — PHYSICAL THERAPY INITIAL EVALUATION ADULT - PRECAUTIONS/LIMITATIONS, REHAB EVAL
continued from above:  SHINE on CKD, electrolyte abnormalities and FFT. Pt s/ RRT for hypoglycemia on 2/5 CTChest 2/7: Bilateral pleural effusions and atelectasis.Mosaic attenuation attenuation the lung is likely due to air trapping.CT Lumbar Spine 2/3:A large sacral decubitus ulcer is noted overlying the mid-lower sacrum and coccyx. Areas of cortical discontinuity are noted involving the dorsal sacrum most consistent with osteomyelitis.

## 2019-02-07 NOTE — CONSULT NOTE ADULT - PROBLEM SELECTOR RECOMMENDATION 9
Diabetes Education and Nutrition Eval  Hold all standing insulin for now given recent hypoglycemia.  Monitor on low correction scale q6  Goal glucose 100-200. No need for tight glycemic control.   Outpt. endo follow-up  Outpt. optho, podiatry, nephrology  Discharge plan to be based on insulin requirements.

## 2019-02-07 NOTE — PHYSICAL THERAPY INITIAL EVALUATION ADULT - ADDITIONAL COMMENTS
Pt is a poor historian: as per CM note pt admitted from subacute rehab requiring assistance w/ ADLs.

## 2019-02-07 NOTE — CONSULT NOTE ADULT - SUBJECTIVE AND OBJECTIVE BOX
HPI:  72 y/o M w/ hx of Type 2 DM x 40 years. Most of hx obtained from wife as patient has episodes of confusion. Diabetes complications by nephropathy, CAD, neuropathy, and retinopathy. Was recently in rehab s/p hospitalization for sepsis with mutiple ulcers. Insulin being given at the rehab. Pt. and wife do not know glucose values there but reports rare hypoglycemia. Pt. has not been eating po, now on tube feeds. +weight loss of 30 lb. since 12/2018. +dry mouth and polydipsia without reported polyuria.   Also hx of CAD c/b CABG, A fib (on coumadin), CKD stage 3, PAD s/p stents, PEG tube, in-dwelling Tejeda who presented from nursing home for increased confusion and elevated WBC. Per wife, patient had been fully functional up until November. He had a hypoglycemic episode, fell, and broke his hip in November. He was admitted to the hospital for surgery, which was uneventful and sent to rehab (in early December). During the rehab stay, he developed numerous ulcers, including a decubitus ulcer. He was sent back to [facility] for sepsis from the decubitus ulcer. He was treated with abx and returned to rehab for a second time. This second hospital course was complicated by acute renal failure requiring dialysis, IV abx, and "surgery" to clean the ulcer.      PAST MEDICAL & SURGICAL HISTORY:  CKD (chronic kidney disease) stage 3, GFR 30-59 ml/min  Pressure ulcer, unspecified pressure ulcer stage: in lateral rt 4th toe both sides from toe compression  Coronary artery disease of native artery of native heart with stable angina pectoris  Diastolic congestive heart failure, unspecified congestive heart failure chronicity  Smoker  Gout involving toe, unspecified cause, unspecified chronicity, unspecified laterality  PAD (peripheral artery disease)  Chronic atrial fibrillation: on coumadin  Hyperlipidemia, unspecified hyperlipidemia type  HTN (hypertension), benign  Type 2 diabetes mellitus with other circulatory complication, with long-term current use of insulin  S/P angioplasty with stent: vascular stent- left leg  S/P hernia repair  Artificial cardiac pacemaker: 2014  S/P aortobifemoral bypass surgery      FAMILY HISTORY:  Family history of hypertension (Father)  Son- Type 2 DM       Social History: +tobacco use and social alcohol use    Outpatient Medications:  · 	acetaminophen 325 mg oral tablet: 2 tab(s) orally every 6 hours, As needed, Mild Pain (1 - 3)  · 	amoxicillin-clavulanate 875 mg-125 mg oral tablet: 1 tab(s) orally 2 times a day  	For a total of 10 days  · 	rosuvastatin 5 mg oral tablet: 1 tab(s) orally once a day (at bedtime)  · 	ramipril 10 mg oral capsule: 1 cap(s) orally once a day  · 	hydrALAZINE 10 mg oral tablet: 0.5 tab(s) orally 2 times a day  · 	Klor-Con 10 mEq oral tablet, extended release: 1 tab(s) orally once a day  · 	bumetanide 1 mg oral tablet: 1 tab(s) orally once a day  · 	clopidogrel 75 mg oral tablet: 1 tab(s) orally once a day (at bedtime)  · 	Lantus 100 units/mL subcutaneous solution: 12 unit(s) subcutaneous once a day (at bedtime)  · 	NovoLOG 100 units/mL subcutaneous solution: 6 unit(s) subcutaneous 3 times a day (before meals)    MEDICATIONS  (STANDING):  Dakins Solution - 1/4 Strength 1 Application(s) Topical three times a day  dextrose 10%. 1000 milliLiter(s) (50 mL/Hr) IV Continuous <Continuous>  dextrose 5%. 1000 milliLiter(s) (50 mL/Hr) IV Continuous <Continuous>  dextrose 50% Injectable 12.5 Gram(s) IV Push once  dextrose 50% Injectable 25 Gram(s) IV Push once  dextrose 50% Injectable 25 Gram(s) IV Push once  ertapenem  IVPB      ertapenem  IVPB 1000 milliGRAM(s) IV Intermittent every 24 hours  furosemide    Tablet 40 milliGRAM(s) Oral daily  lactobacillus acidophilus 1 Tablet(s) Oral two times a day with meals  polyethylene glycol 3350 17 Gram(s) Oral daily  senna Syrup 5 milliLiter(s) Oral at bedtime  vancomycin  IVPB 1000 milliGRAM(s) IV Intermittent daily  zinc oxide 20% Ointment 1 Application(s) Topical three times a day    MEDICATIONS  (PRN):  acetaminophen   Tablet .. 650 milliGRAM(s) Oral every 6 hours PRN Temp greater or equal to 38C (100.4F)  dextrose 40% Gel 15 Gram(s) Oral once PRN Blood Glucose LESS THAN 70 milliGRAM(s)/deciliter  glucagon  Injectable 1 milliGRAM(s) IntraMuscular once PRN Glucose LESS THAN 70 milligrams/deciliter      Allergies    No Known Allergies    Intolerances      Review of Systems:  Constitutional: No fever, +weight loss  Eyes: +retinopathy  Neuro: No headache, +neuropathy  HEENT: No throat pain  Cardiovascular: No chest pain  Respiratory: No SOB  GI: No nausea or vomiting  : No polyuria  Skin: no rash +ulcers  Psych: no depression +confusion  Endocrine: + polydipsia, No heat or cold intolerance, rest as noted in HPI  Hem/lymph: no swelling    All other review of systems negative      PHYSICAL EXAM:  VITALS: T(C): 36.3 (02-07-19 @ 11:32)  T(F): 97.3 (02-07-19 @ 11:32), Max: 97.6 (02-06-19 @ 18:59)  HR: 90 (02-07-19 @ 11:32) (76 - 98)  BP: 131/72 (02-07-19 @ 11:32) (104/76 - 131/72)  RR:  (18 - 18)  SpO2:  (100% - 100%)  Wt(kg): --  GENERAL: NAD at this time  EYES: No proptosis, EOMI  HEENT:  Atraumatic, Normocephalic,   THYROID: Normal size, no palpable nodules  RESPIRATORY: Clear to auscultation bilaterally, full excursion, non-labored  CARDIOVASCULAR: irregular, + murmur; no peripheral edema  GI: Soft, nontender, +PEG, non distended, normal bowel sounds  SKIN: Dry +multiple ulcers on LE and sacrum  MUSCULOSKELETAL: decreased ROM with weakness  NEURO: follows commands  PSYCH: normal affect, normal mood  CUSHING'S SIGNS: no striae      POCT Blood Glucose.: 134 mg/dL (02-07-19 @ 12:21)  POCT Blood Glucose.: 157 mg/dL (02-07-19 @ 10:20)  POCT Blood Glucose.: 124 mg/dL (02-07-19 @ 09:15)  POCT Blood Glucose.: 138 mg/dL (02-07-19 @ 08:19)  POCT Blood Glucose.: 120 mg/dL (02-07-19 @ 07:19)  POCT Blood Glucose.: 100 mg/dL (02-07-19 @ 06:59)  POCT Blood Glucose.: 38 mg/dL (02-07-19 @ 06:19)  POCT Blood Glucose.: 35 mg/dL (02-07-19 @ 06:17)  POCT Blood Glucose.: 30 mg/dL (02-07-19 @ 05:51)  POCT Blood Glucose.: 42 mg/dL (02-07-19 @ 05:50)  POCT Blood Glucose.: 290 mg/dL (02-06-19 @ 23:19)  POCT Blood Glucose.: 425 mg/dL (02-06-19 @ 18:02)  POCT Blood Glucose.: 426 mg/dL (02-06-19 @ 18:00)  POCT Blood Glucose.: 367 mg/dL (02-06-19 @ 13:36)  POCT Blood Glucose.: 283 mg/dL (02-06-19 @ 07:41)  POCT Blood Glucose.: 305 mg/dL (02-06-19 @ 05:58)  POCT Blood Glucose.: 266 mg/dL (02-06-19 @ 03:32)  POCT Blood Glucose.: 246 mg/dL (02-06-19 @ 01:32)  POCT Blood Glucose.: 195 mg/dL (02-05-19 @ 23:30)  POCT Blood Glucose.: 178 mg/dL (02-05-19 @ 21:28)  POCT Blood Glucose.: 142 mg/dL (02-05-19 @ 19:53)  POCT Blood Glucose.: 124 mg/dL (02-05-19 @ 17:38)  POCT Blood Glucose.: 173 mg/dL (02-05-19 @ 15:31)  POCT Blood Glucose.: 42 mg/dL (02-05-19 @ 15:09)  POCT Blood Glucose.: 47 mg/dL (02-05-19 @ 15:07)  POCT Blood Glucose.: 127 mg/dL (02-05-19 @ 10:04)  POCT Blood Glucose.: 187 mg/dL (02-05-19 @ 09:18)  POCT Blood Glucose.: 28 mg/dL (02-05-19 @ 09:08)  POCT Blood Glucose.: 27 mg/dL (02-05-19 @ 09:07)  POCT Blood Glucose.: 131 mg/dL (02-05-19 @ 06:26)  POCT Blood Glucose.: 35 mg/dL (02-05-19 @ 06:00)  POCT Blood Glucose.: 52 mg/dL (02-05-19 @ 05:52)  POCT Blood Glucose.: 36 mg/dL (02-05-19 @ 05:50)  POCT Blood Glucose.: 269 mg/dL (02-04-19 @ 23:38)                              8.5    19.84 )-----------( 353      ( 07 Feb 2019 07:52 )             29.5       02-07    150<H>  |  109<H>  |  51<H>  ----------------------------<  29<LL>  3.7   |  29  |  1.66<H>    EGFR if : 47<L>  EGFR if non : 41<L>    Ca    8.1<L>      02-07  Mg     2.2     02-07  Phos  2.2     02-07    TPro  5.9<L>  /  Alb  2.2<L>  /  TBili  0.3  /  DBili  x   /  AST  19  /  ALT  17  /  AlkPhos  241<H>  02-07    Thyroid Function Tests:      Hemoglobin A1C, Whole Blood: 7.4 % <H> [4.0 - 5.6] (02-04-19 @ 08:56)        Radiology:

## 2019-02-07 NOTE — PHYSICAL THERAPY INITIAL EVALUATION ADULT - PERTINENT HX OF CURRENT PROBLEM, REHAB EVAL
Pt  is a 74 yo M PMHx CAD s/p CABG, PAD s/p stents, atrial fibrillation (on coumadin), CKD, PEG tube, in-dwelling Tejeda, extended hospital stay s/p hip fracture (re-admitted to [facility] for de-cubitus ulcers) admitted for metabolic encephalopathy 2/2 sepsis 2/2 multiple potential sources of infection (catheter associated UTI, sacral decubitus ulcer, possible osteomyelitis)

## 2019-02-07 NOTE — CHART NOTE - NSCHARTNOTEFT_GEN_A_CORE
Notified by RN of lactate = 4.4. Patient currently on abx for osteomyelitis. Ordered 500cc NS bolus. Spoke with Dr. Wright (nephrology) regarding IVF maintenance. Ordered 1/2 NS 100cc/hr x 6 hours.     Repeat lactate in AM   Will continue to monitor.   Will endorse to day team.     Milana Elliott PA-C   [id number]

## 2019-02-07 NOTE — PROGRESS NOTE ADULT - SUBJECTIVE AND OBJECTIVE BOX
INTERVAL HPI/OVERNIGHT EVENTS:  Pt seen and examined at bedside.     Allergies/Intolerance: No Known Allergies      MEDICATIONS  (STANDING):  Dakins Solution - 1/4 Strength 1 Application(s) Topical three times a day  dextrose 10%. 1000 milliLiter(s) (50 mL/Hr) IV Continuous <Continuous>  dextrose 5%. 1000 milliLiter(s) (50 mL/Hr) IV Continuous <Continuous>  dextrose 50% Injectable 12.5 Gram(s) IV Push once  dextrose 50% Injectable 25 Gram(s) IV Push once  dextrose 50% Injectable 25 Gram(s) IV Push once  ertapenem  IVPB      ertapenem  IVPB 1000 milliGRAM(s) IV Intermittent every 24 hours  polyethylene glycol 3350 17 Gram(s) Oral daily  senna Syrup 5 milliLiter(s) Oral at bedtime  vancomycin  IVPB 1000 milliGRAM(s) IV Intermittent daily  zinc oxide 40% Ointment 1 Application(s) Topical three times a day    MEDICATIONS  (PRN):  acetaminophen   Tablet .. 650 milliGRAM(s) Oral every 6 hours PRN Temp greater or equal to 38C (100.4F)  dextrose 40% Gel 15 Gram(s) Oral once PRN Blood Glucose LESS THAN 70 milliGRAM(s)/deciliter  glucagon  Injectable 1 milliGRAM(s) IntraMuscular once PRN Glucose LESS THAN 70 milligrams/deciliter        ROS: all systems reviewed and wnl      PHYSICAL EXAMINATION:  Vital Signs Last 24 Hrs  T(C): 36.3 (07 Feb 2019 11:32), Max: 36.4 (06 Feb 2019 18:59)  T(F): 97.3 (07 Feb 2019 11:32), Max: 97.6 (06 Feb 2019 18:59)  HR: 90 (07 Feb 2019 11:32) (76 - 98)  BP: 131/72 (07 Feb 2019 11:32) (104/76 - 131/72)  BP(mean): --  RR: 18 (07 Feb 2019 11:32) (18 - 18)  SpO2: 100% (07 Feb 2019 11:32) (100% - 100%)  CAPILLARY BLOOD GLUCOSE      POCT Blood Glucose.: 134 mg/dL (07 Feb 2019 12:21)  POCT Blood Glucose.: 157 mg/dL (07 Feb 2019 10:20)  POCT Blood Glucose.: 124 mg/dL (07 Feb 2019 09:15)  POCT Blood Glucose.: 138 mg/dL (07 Feb 2019 08:19)  POCT Blood Glucose.: 120 mg/dL (07 Feb 2019 07:19)  POCT Blood Glucose.: 100 mg/dL (07 Feb 2019 06:59)  POCT Blood Glucose.: 38 mg/dL (07 Feb 2019 06:19)  POCT Blood Glucose.: 35 mg/dL (07 Feb 2019 06:17)  POCT Blood Glucose.: 30 mg/dL (07 Feb 2019 05:51)  POCT Blood Glucose.: 42 mg/dL (07 Feb 2019 05:50)  POCT Blood Glucose.: 290 mg/dL (06 Feb 2019 23:19)  POCT Blood Glucose.: 425 mg/dL (06 Feb 2019 18:02)  POCT Blood Glucose.: 426 mg/dL (06 Feb 2019 18:00)  POCT Blood Glucose.: 367 mg/dL (06 Feb 2019 13:36)      02-06 @ 07:01 - 02-07 @ 07:00  --------------------------------------------------------  IN: 1330 mL / OUT: 600 mL / NET: 730 mL    02-07 @ 07:01  -  02-07 @ 13:34  --------------------------------------------------------  IN: 0 mL / OUT: 1 mL / NET: -1 mL        GENERAL:   NECK: supple, No JVD  CHEST/LUNG: clear to auscultation bilaterally; no rales, rhonchi, or wheezing b/l  HEART: normal S1, S2  ABDOMEN: BS+, soft, ND, NT   EXTREMITIES:  pulses palpable; no clubbing, cyanosis, or edema b/l LEs  SKIN: no rashes or lesions      LABS:                        8.5    19.84 )-----------( 353      ( 07 Feb 2019 07:52 )             29.5     02-07    150<H>  |  109<H>  |  51<H>  ----------------------------<  29<LL>  3.7   |  29  |  1.66<H>    Ca    8.1<L>      07 Feb 2019 05:54  Phos  2.2     02-07  Mg     2.2     02-07    TPro  5.9<L>  /  Alb  2.2<L>  /  TBili  0.3  /  DBili  x   /  AST  19  /  ALT  17  /  AlkPhos  241<H>  02-07    PT/INR - ( 07 Feb 2019 07:56 )   PT: 36.7 sec;   INR: 3.11 ratio         PTT - ( 07 Feb 2019 07:56 )  PTT:40.7 sec

## 2019-02-07 NOTE — PROGRESS NOTE ADULT - ASSESSMENT
72 yo male PMH CAD c/b CABG, A fib (on coumadin), CKD stage 3, PAD s/p stents, PEG tube, in-dwelling Hoskins who presents to the nursing home for increased confusion and elevated WBC. History was taken from wife at bedside.	Per wife, patient had been fully functional up until November. He had a hypoglycemic episode, fell, and broke his hip in November. He was admitted to the hospital for surgery, which was uneventful and sent to rehab (in early December).     During the rehab stay, he developed numerous ulcers, including a decubitus ulcer. He was sent back to [facility] for sepsis from the decubitus ulcer. He was treated with abx and returned to rehab for a second time. This second hospital course was complicated by acute renal failure requiring dialysis, IV abx, and "surgery" to clean the ulcer. In the ED, he received Vanc/Zosyn. He was admitted to medicine for further management and sepsis on arrival from   sacral osteomylitis.     ID: Agree Invancz 1 gram daily and IV Vancomycin 1,000 mg/day. Agree with CT chest, r/o PVC vs pneumonia. PEG feeds in place. Chronic hoskins changed this admission.     Reanl: CKD III stable, creatinine 1.66. Renal sono confirms MRD.     CV: Lasix on hold, took as outpatient. Free water replaced in PEG to treat hypernatremia. TTE pending, moved to tele today.   Stop all IVF in AM if BS stable.     Endo: episodes of hypoglycemia off insulin. On D 10 50 cc/h. Endo consult requested. PEG feeds in place.     Agree DNR. SHERIE can be done but not candidate for vascular intervention. 72 yo male PMH CAD c/b CABG, A fib (on coumadin), CKD stage 3, PAD s/p stents, PEG tube, in-dwelling Hoskins who presents to the nursing home for increased confusion and elevated WBC. History was taken from wife at bedside.	Per wife, patient had been fully functional up until November. He had a hypoglycemic episode, fell, and broke his hip in November. He was admitted to the hospital for surgery, which was uneventful and sent to rehab (in early December).     During the rehab stay, he developed numerous ulcers, including a decubitus ulcer. He was sent back to [facility] for sepsis from the decubitus ulcer. He was treated with abx and returned to rehab for a second time. This second hospital course was complicated by acute renal failure requiring dialysis, IV abx, and "surgery" to clean the ulcer. In the ED, he received Vanc/Zosyn. He was admitted to medicine for further management and sepsis on arrival from   sacral osteomylitis.     ID: Agree Invancz 1 gram daily and IV Vancomycin 1,000 mg/day. CT chest shows no pneumonia, bilateral effusions. PEG feeds in place. Chronic hoskins changed this admission.   Agree with ID, sacral bone biopsy would be next best test to guide treatment plan. CT of lumbar spine confirms sacral OM.     Reanl: CKD III stable, creatinine 1.66. Renal sono confirms MRD.     CV: Lasix on hold, took as outpatient. Free water replaced in PEG to treat hypernatremia. TTE pending, moved to tele today.   Stop all IVF in AM if BS stable. Restart Lasix 40 mg/day.     Endo: episodes of hypoglycemia off insulin. On D 10 50 cc/h. Endo consult requested. PEG feeds in place.     Agree DNR. SHERIE can be done but not candidate for vascular intervention.

## 2019-02-08 ENCOUNTER — RESULT REVIEW (OUTPATIENT)
Age: 74
End: 2019-02-08

## 2019-02-08 DIAGNOSIS — E11.65 TYPE 2 DIABETES MELLITUS WITH HYPERGLYCEMIA: ICD-10-CM

## 2019-02-08 LAB
ANION GAP SERPL CALC-SCNC: 10 MMOL/L — SIGNIFICANT CHANGE UP (ref 5–17)
BASOPHILS # BLD AUTO: 0.01 K/UL — SIGNIFICANT CHANGE UP (ref 0–0.2)
BASOPHILS NFR BLD AUTO: 0.1 % — SIGNIFICANT CHANGE UP (ref 0–2)
BUN SERPL-MCNC: 47 MG/DL — HIGH (ref 7–23)
CALCIUM SERPL-MCNC: 8.1 MG/DL — LOW (ref 8.4–10.5)
CHLORIDE SERPL-SCNC: 106 MMOL/L — SIGNIFICANT CHANGE UP (ref 96–108)
CO2 SERPL-SCNC: 31 MMOL/L — SIGNIFICANT CHANGE UP (ref 22–31)
CREAT SERPL-MCNC: 1.62 MG/DL — HIGH (ref 0.5–1.3)
CRP SERPL-MCNC: 8.89 MG/DL — HIGH (ref 0–0.4)
CULTURE RESULTS: SIGNIFICANT CHANGE UP
CULTURE RESULTS: SIGNIFICANT CHANGE UP
EOSINOPHIL # BLD AUTO: 0.14 K/UL — SIGNIFICANT CHANGE UP (ref 0–0.5)
EOSINOPHIL NFR BLD AUTO: 1.1 % — SIGNIFICANT CHANGE UP (ref 0–6)
ERYTHROCYTE [SEDIMENTATION RATE] IN BLOOD: 35 MM/HR — HIGH (ref 0–20)
GLUCOSE BLDC GLUCOMTR-MCNC: 232 MG/DL — HIGH (ref 70–99)
GLUCOSE BLDC GLUCOMTR-MCNC: 324 MG/DL — HIGH (ref 70–99)
GLUCOSE BLDC GLUCOMTR-MCNC: 373 MG/DL — HIGH (ref 70–99)
GLUCOSE BLDC GLUCOMTR-MCNC: 396 MG/DL — HIGH (ref 70–99)
GLUCOSE SERPL-MCNC: 381 MG/DL — HIGH (ref 70–99)
HCT VFR BLD CALC: 29.5 % — LOW (ref 39–50)
HGB BLD-MCNC: 8.4 G/DL — LOW (ref 13–17)
IMM GRANULOCYTES NFR BLD AUTO: 0.2 % — SIGNIFICANT CHANGE UP (ref 0–1.5)
INR BLD: 4.35 RATIO — HIGH (ref 0.88–1.16)
LYMPHOCYTES # BLD AUTO: 1.05 K/UL — SIGNIFICANT CHANGE UP (ref 1–3.3)
LYMPHOCYTES # BLD AUTO: 8.1 % — LOW (ref 13–44)
MCHC RBC-ENTMCNC: 27.6 PG — SIGNIFICANT CHANGE UP (ref 27–34)
MCHC RBC-ENTMCNC: 28.5 GM/DL — LOW (ref 32–36)
MCV RBC AUTO: 97 FL — SIGNIFICANT CHANGE UP (ref 80–100)
MONOCYTES # BLD AUTO: 0.73 K/UL — SIGNIFICANT CHANGE UP (ref 0–0.9)
MONOCYTES NFR BLD AUTO: 5.7 % — SIGNIFICANT CHANGE UP (ref 2–14)
NEUTROPHILS # BLD AUTO: 10.95 K/UL — HIGH (ref 1.8–7.4)
NEUTROPHILS NFR BLD AUTO: 84.8 % — HIGH (ref 43–77)
PLATELET # BLD AUTO: 339 K/UL — SIGNIFICANT CHANGE UP (ref 150–400)
POTASSIUM SERPL-MCNC: 4.3 MMOL/L — SIGNIFICANT CHANGE UP (ref 3.5–5.3)
POTASSIUM SERPL-SCNC: 4.3 MMOL/L — SIGNIFICANT CHANGE UP (ref 3.5–5.3)
PROTHROM AB SERPL-ACNC: 51.9 SEC — HIGH (ref 10–13.1)
RBC # BLD: 3.04 M/UL — LOW (ref 4.2–5.8)
RBC # FLD: 20.1 % — HIGH (ref 10.3–14.5)
SODIUM SERPL-SCNC: 147 MMOL/L — HIGH (ref 135–145)
SPECIMEN SOURCE: SIGNIFICANT CHANGE UP
SPECIMEN SOURCE: SIGNIFICANT CHANGE UP
VANCOMYCIN TROUGH SERPL-MCNC: 17.6 UG/ML — SIGNIFICANT CHANGE UP (ref 10–20)
WBC # BLD: 12.9 K/UL — HIGH (ref 3.8–10.5)
WBC # FLD AUTO: 12.9 K/UL — HIGH (ref 3.8–10.5)

## 2019-02-08 PROCEDURE — 11042 DBRDMT SUBQ TIS 1ST 20SQCM/<: CPT

## 2019-02-08 PROCEDURE — 88304 TISSUE EXAM BY PATHOLOGIST: CPT | Mod: 26

## 2019-02-08 PROCEDURE — 99232 SBSQ HOSP IP/OBS MODERATE 35: CPT

## 2019-02-08 PROCEDURE — 99232 SBSQ HOSP IP/OBS MODERATE 35: CPT | Mod: GC

## 2019-02-08 PROCEDURE — 99233 SBSQ HOSP IP/OBS HIGH 50: CPT

## 2019-02-08 PROCEDURE — 71045 X-RAY EXAM CHEST 1 VIEW: CPT | Mod: 26

## 2019-02-08 RX ORDER — HUMAN INSULIN 100 [IU]/ML
3 INJECTION, SUSPENSION SUBCUTANEOUS
Qty: 0 | Refills: 0 | Status: DISCONTINUED | OUTPATIENT
Start: 2019-02-08 | End: 2019-02-10

## 2019-02-08 RX ORDER — PIPERACILLIN AND TAZOBACTAM 4; .5 G/20ML; G/20ML
3.38 INJECTION, POWDER, LYOPHILIZED, FOR SOLUTION INTRAVENOUS ONCE
Qty: 0 | Refills: 0 | Status: COMPLETED | OUTPATIENT
Start: 2019-02-08 | End: 2019-02-08

## 2019-02-08 RX ORDER — HEPARIN SODIUM 5000 [USP'U]/ML
5000 INJECTION INTRAVENOUS; SUBCUTANEOUS EVERY 12 HOURS
Qty: 0 | Refills: 0 | Status: DISCONTINUED | OUTPATIENT
Start: 2019-02-08 | End: 2019-02-08

## 2019-02-08 RX ORDER — PIPERACILLIN AND TAZOBACTAM 4; .5 G/20ML; G/20ML
3.38 INJECTION, POWDER, LYOPHILIZED, FOR SOLUTION INTRAVENOUS EVERY 8 HOURS
Qty: 0 | Refills: 0 | Status: DISCONTINUED | OUTPATIENT
Start: 2019-02-08 | End: 2019-02-15

## 2019-02-08 RX ORDER — ASCORBIC ACID 60 MG
500 TABLET,CHEWABLE ORAL DAILY
Qty: 0 | Refills: 0 | Status: DISCONTINUED | OUTPATIENT
Start: 2019-02-08 | End: 2019-02-15

## 2019-02-08 RX ORDER — VANCOMYCIN HCL 1 G
750 VIAL (EA) INTRAVENOUS EVERY 24 HOURS
Qty: 0 | Refills: 0 | Status: DISCONTINUED | OUTPATIENT
Start: 2019-02-09 | End: 2019-02-12

## 2019-02-08 RX ORDER — VANCOMYCIN HCL 1 G
VIAL (EA) INTRAVENOUS
Qty: 0 | Refills: 0 | Status: DISCONTINUED | OUTPATIENT
Start: 2019-02-08 | End: 2019-02-12

## 2019-02-08 RX ORDER — VANCOMYCIN HCL 1 G
750 VIAL (EA) INTRAVENOUS ONCE
Qty: 0 | Refills: 0 | Status: COMPLETED | OUTPATIENT
Start: 2019-02-08 | End: 2019-02-08

## 2019-02-08 RX ORDER — NYSTATIN CREAM 100000 [USP'U]/G
1 CREAM TOPICAL
Qty: 0 | Refills: 0 | Status: DISCONTINUED | OUTPATIENT
Start: 2019-02-08 | End: 2019-02-10

## 2019-02-08 RX ADMIN — Medication 1 APPLICATION(S): at 22:52

## 2019-02-08 RX ADMIN — ZINC OXIDE 1 APPLICATION(S): 200 OINTMENT TOPICAL at 14:38

## 2019-02-08 RX ADMIN — Medication 5: at 00:58

## 2019-02-08 RX ADMIN — Medication 1 TABLET(S): at 14:26

## 2019-02-08 RX ADMIN — Medication 1 APPLICATION(S): at 06:44

## 2019-02-08 RX ADMIN — SENNA PLUS 5 MILLILITER(S): 8.6 TABLET ORAL at 22:53

## 2019-02-08 RX ADMIN — ZINC OXIDE 1 APPLICATION(S): 200 OINTMENT TOPICAL at 22:53

## 2019-02-08 RX ADMIN — HUMAN INSULIN 3 UNIT(S): 100 INJECTION, SUSPENSION SUBCUTANEOUS at 20:06

## 2019-02-08 RX ADMIN — ERTAPENEM SODIUM 120 MILLIGRAM(S): 1 INJECTION, POWDER, LYOPHILIZED, FOR SOLUTION INTRAMUSCULAR; INTRAVENOUS at 14:23

## 2019-02-08 RX ADMIN — Medication 500 MILLIGRAM(S): at 18:34

## 2019-02-08 RX ADMIN — Medication 40 MILLIGRAM(S): at 06:19

## 2019-02-08 RX ADMIN — NYSTATIN CREAM 1 APPLICATION(S): 100000 CREAM TOPICAL at 18:33

## 2019-02-08 RX ADMIN — PIPERACILLIN AND TAZOBACTAM 200 GRAM(S): 4; .5 INJECTION, POWDER, LYOPHILIZED, FOR SOLUTION INTRAVENOUS at 18:33

## 2019-02-08 RX ADMIN — Medication 250 MILLIGRAM(S): at 18:33

## 2019-02-08 RX ADMIN — Medication 1 APPLICATION(S): at 08:45

## 2019-02-08 RX ADMIN — Medication 1 APPLICATION(S): at 14:03

## 2019-02-08 RX ADMIN — PIPERACILLIN AND TAZOBACTAM 25 GRAM(S): 4; .5 INJECTION, POWDER, LYOPHILIZED, FOR SOLUTION INTRAVENOUS at 22:53

## 2019-02-08 RX ADMIN — Medication 5: at 12:31

## 2019-02-08 RX ADMIN — Medication 2: at 18:34

## 2019-02-08 RX ADMIN — ZINC OXIDE 1 APPLICATION(S): 200 OINTMENT TOPICAL at 06:46

## 2019-02-08 RX ADMIN — Medication 1 TABLET(S): at 18:34

## 2019-02-08 RX ADMIN — Medication 4: at 06:19

## 2019-02-08 NOTE — PROGRESS NOTE ADULT - ASSESSMENT
74 yo man with a hx of CAD s/p CABG, Afib on coumadin, CKD stage 3, PAD, aortobifem bypass ~2012 by Dr. Guallpa at [place], CEA, POBA L distal AT/DP (2016) SFA stent (2017), and left 4th toe amputation, admitted with sepsis 2/2 UTI vs. sacral decubitus w/ osteomyelitis, with cool right leg and chronic ulcers to the foot, heel, and shin.    - SHERIE/PVRs, ordered, will follow up  - will plan for CT angio abdomen with runoff to assess lower extremity vasculature once Cr improved  - care per primary team  - vascular will cont to follow, [phone]    Patient discussed with Dr. Zamora

## 2019-02-08 NOTE — PROGRESS NOTE ADULT - SUBJECTIVE AND OBJECTIVE BOX
INTERVAL HPI/OVERNIGHT EVENTS:  Pt seen and examined at bedside.     Allergies/Intolerance: No Known Allergies      MEDICATIONS  (STANDING):  Dakins Solution - 1/4 Strength 1 Application(s) Topical three times a day  dextrose 10%. 1000 milliLiter(s) (50 mL/Hr) IV Continuous <Continuous>  dextrose 5%. 1000 milliLiter(s) (50 mL/Hr) IV Continuous <Continuous>  dextrose 50% Injectable 12.5 Gram(s) IV Push once  dextrose 50% Injectable 25 Gram(s) IV Push once  dextrose 50% Injectable 25 Gram(s) IV Push once  ertapenem  IVPB      ertapenem  IVPB 1000 milliGRAM(s) IV Intermittent every 24 hours  furosemide    Tablet 40 milliGRAM(s) Oral daily  insulin lispro (HumaLOG) corrective regimen sliding scale   SubCutaneous every 6 hours  lactobacillus acidophilus 1 Tablet(s) Oral two times a day with meals  polyethylene glycol 3350 17 Gram(s) Oral daily  senna Syrup 5 milliLiter(s) Oral at bedtime  vancomycin  IVPB 1000 milliGRAM(s) IV Intermittent daily  zinc oxide 20% Ointment 1 Application(s) Topical three times a day    MEDICATIONS  (PRN):  acetaminophen   Tablet .. 650 milliGRAM(s) Oral every 6 hours PRN Temp greater or equal to 38C (100.4F)  dextrose 40% Gel 15 Gram(s) Oral once PRN Blood Glucose LESS THAN 70 milliGRAM(s)/deciliter  glucagon  Injectable 1 milliGRAM(s) IntraMuscular once PRN Glucose LESS THAN 70 milligrams/deciliter        ROS: all systems reviewed and wnl      PHYSICAL EXAMINATION:  Vital Signs Last 24 Hrs  T(C): 36.6 (08 Feb 2019 04:21), Max: 36.6 (08 Feb 2019 04:21)  T(F): 97.8 (08 Feb 2019 04:21), Max: 97.8 (08 Feb 2019 04:21)  HR: 95 (08 Feb 2019 04:21) (90 - 95)  BP: 139/85 (08 Feb 2019 04:21) (96/59 - 139/85)  BP(mean): --  RR: 18 (08 Feb 2019 04:21) (18 - 18)  SpO2: 96% (08 Feb 2019 04:21) (94% - 100%)  CAPILLARY BLOOD GLUCOSE      POCT Blood Glucose.: 324 mg/dL (08 Feb 2019 05:24)  POCT Blood Glucose.: 373 mg/dL (08 Feb 2019 00:14)  POCT Blood Glucose.: 220 mg/dL (07 Feb 2019 17:56)  POCT Blood Glucose.: 134 mg/dL (07 Feb 2019 12:21)  POCT Blood Glucose.: 157 mg/dL (07 Feb 2019 10:20)  POCT Blood Glucose.: 124 mg/dL (07 Feb 2019 09:15)  POCT Blood Glucose.: 138 mg/dL (07 Feb 2019 08:19)      02-07 @ 07:01  -  02-08 @ 07:00  --------------------------------------------------------  IN: 830 mL / OUT: 651 mL / NET: 179 mL        GENERAL:   NECK: supple, No JVD  CHEST/LUNG: clear to auscultation bilaterally; no rales, rhonchi, or wheezing b/l  HEART: normal S1, S2  ABDOMEN: BS+, soft, ND, NT   EXTREMITIES:  pulses palpable; no clubbing, cyanosis, or edema b/l LEs  SKIN: no rashes or lesions      LABS:                        8.5    19.84 )-----------( 353      ( 07 Feb 2019 07:52 )             29.5     02-07    150<H>  |  109<H>  |  51<H>  ----------------------------<  29<LL>  3.7   |  29  |  1.66<H>    Ca    8.1<L>      07 Feb 2019 05:54  Phos  2.2     02-07  Mg     2.2     02-07    TPro  5.9<L>  /  Alb  2.2<L>  /  TBili  0.3  /  DBili  x   /  AST  19  /  ALT  17  /  AlkPhos  241<H>  02-07    PT/INR - ( 07 Feb 2019 07:56 )   PT: 36.7 sec;   INR: 3.11 ratio         PTT - ( 07 Feb 2019 07:56 )  PTT:40.7 sec INTERVAL HPI/OVERNIGHT EVENTS:  Pt seen and examined at bedside.     Allergies/Intolerance: No Known Allergies      MEDICATIONS  (STANDING):  Dakins Solution - 1/4 Strength 1 Application(s) Topical three times a day  dextrose 10%. 1000 milliLiter(s) (50 mL/Hr) IV Continuous <Continuous>  dextrose 5%. 1000 milliLiter(s) (50 mL/Hr) IV Continuous <Continuous>  dextrose 50% Injectable 12.5 Gram(s) IV Push once  dextrose 50% Injectable 25 Gram(s) IV Push once  dextrose 50% Injectable 25 Gram(s) IV Push once  ertapenem  IVPB      ertapenem  IVPB 1000 milliGRAM(s) IV Intermittent every 24 hours  furosemide    Tablet 40 milliGRAM(s) Oral daily  insulin lispro (HumaLOG) corrective regimen sliding scale   SubCutaneous every 6 hours  lactobacillus acidophilus 1 Tablet(s) Oral two times a day with meals  polyethylene glycol 3350 17 Gram(s) Oral daily  senna Syrup 5 milliLiter(s) Oral at bedtime  vancomycin  IVPB 1000 milliGRAM(s) IV Intermittent daily  zinc oxide 20% Ointment 1 Application(s) Topical three times a day    MEDICATIONS  (PRN):  acetaminophen   Tablet .. 650 milliGRAM(s) Oral every 6 hours PRN Temp greater or equal to 38C (100.4F)  dextrose 40% Gel 15 Gram(s) Oral once PRN Blood Glucose LESS THAN 70 milliGRAM(s)/deciliter  glucagon  Injectable 1 milliGRAM(s) IntraMuscular once PRN Glucose LESS THAN 70 milligrams/deciliter        ROS: all systems reviewed and wnl      PHYSICAL EXAMINATION:  Vital Signs Last 24 Hrs  T(C): 36.6 (08 Feb 2019 04:21), Max: 36.6 (08 Feb 2019 04:21)  T(F): 97.8 (08 Feb 2019 04:21), Max: 97.8 (08 Feb 2019 04:21)  HR: 95 (08 Feb 2019 04:21) (90 - 95)  BP: 139/85 (08 Feb 2019 04:21) (96/59 - 139/85)  BP(mean): --  RR: 18 (08 Feb 2019 04:21) (18 - 18)  SpO2: 96% (08 Feb 2019 04:21) (94% - 100%)  CAPILLARY BLOOD GLUCOSE      POCT Blood Glucose.: 324 mg/dL (08 Feb 2019 05:24)  POCT Blood Glucose.: 373 mg/dL (08 Feb 2019 00:14)  POCT Blood Glucose.: 220 mg/dL (07 Feb 2019 17:56)  POCT Blood Glucose.: 134 mg/dL (07 Feb 2019 12:21)  POCT Blood Glucose.: 157 mg/dL (07 Feb 2019 10:20)  POCT Blood Glucose.: 124 mg/dL (07 Feb 2019 09:15)  POCT Blood Glucose.: 138 mg/dL (07 Feb 2019 08:19)      02-07 @ 07:01  -  02-08 @ 07:00  --------------------------------------------------------  IN: 830 mL / OUT: 651 mL / NET: 179 mL        GENERAL: stable in bed, PEG feeds in place, no CP, SOB or fevers  NECK: supple, No JVD  CHEST/LUNG: clear to auscultation bilaterally; no rales, rhonchi, or wheezing b/l  HEART: normal S1, S2  ABDOMEN: BS+, soft, ND, NT   EXTREMITIES:  pulses palpable; no clubbing, cyanosis, or edema b/l LEs  ischemic ulcers to right leg noted  SKIN: no rashes or lesions      LABS:                        8.5    19.84 )-----------( 353      ( 07 Feb 2019 07:52 )             29.5     02-07    150<H>  |  109<H>  |  51<H>  ----------------------------<  29<LL>  3.7   |  29  |  1.66<H>    Ca    8.1<L>      07 Feb 2019 05:54  Phos  2.2     02-07  Mg     2.2     02-07    TPro  5.9<L>  /  Alb  2.2<L>  /  TBili  0.3  /  DBili  x   /  AST  19  /  ALT  17  /  AlkPhos  241<H>  02-07    PT/INR - ( 07 Feb 2019 07:56 )   PT: 36.7 sec;   INR: 3.11 ratio         PTT - ( 07 Feb 2019 07:56 )  PTT:40.7 sec

## 2019-02-08 NOTE — PROGRESS NOTE ADULT - ASSESSMENT
73M with multiple medical problems (CAD, CABG, PAD, aorto-bifemoral bypass, CKD), R hip fracture s/p ORIF, s/p PEG/hoskins here with sacral osteomyelitis with sepsis - likely source is osteomyelitis.  Prior history  - patient was hospitalized at [place] and sacral wound was debrided.  Polymicrobial (E coli, Kpna, enterococcus - all "pan-sensitive").  bone scan was negative at that time late December for OM.  s/p debridement of sacral wound today - results pending.  Altered mental status    Suggest:  - change ertapenem to zosyn - could ertapenem be contributing to altered mental status  - please lower vancomycin to 750mg daily (trough a little high)  - f/u bone biopsy and cultures  - neuro eval for confusion?    Please call the ID service [phone] with questions or concerns over the weekend    above d/w NP [phone]

## 2019-02-08 NOTE — PROGRESS NOTE ADULT - PROBLEM SELECTOR PLAN 1
Patient with SHINE on CKD in setting of hypotension and sepsis. Baseline Scr. is unknown. Latest Scr. is elevated but stable at 1.62. Patient is non oliguric. Urine electrolytes are suggestive of pre renal azotemia. Patient with hemodynamically mediated SHINE due to volume depletion? Patient with underlying CKD which is currently at baseline creatinine?? Continue with IV hydration. Monitor Scr, I/O, and electrolytes daily. Avoid NSAIDs, RCAs, and other nephrotoxins.

## 2019-02-08 NOTE — PROGRESS NOTE ADULT - ASSESSMENT
74 y/o M w/ uncontrolled Type 2 DM initially with hypoglycemia now with hyperglycemia on 18 hour tube feeds.

## 2019-02-08 NOTE — PROGRESS NOTE ADULT - PROBLEM SELECTOR PLAN 2
Patient with metabolic alkalosis in setting of volume depletion. Latest serum CO2 noted to be 31 which is in upper limit of normal. Urine chloride is less than 35 which is suggestive of contraction/saline responsive alkalosis. Recommend to continue with IV hydration. Monitor serum CO2 level.

## 2019-02-08 NOTE — PROGRESS NOTE ADULT - PROBLEM SELECTOR PLAN 1
Goal glucose 100-200. No need for tight glycemic control.  Add NPH 3 units q6 start 30 min prior to initiation of tube feeds (7:30pm) then every 6 hours. Monitor off insulin when tube feeds are off. Continue with correction scale q6.  Adjust as needed.  Plan to d/c on insulin.

## 2019-02-08 NOTE — PROGRESS NOTE ADULT - SUBJECTIVE AND OBJECTIVE BOX
f/u OM sacrum    Interval History/ROS:   still so confused.  wife at bedside.   no fever.  s/p bedside debridement of the infected sacral wound.  sample sent of bone for pathology and culture.  Unable to obtain ROS - patient confused.    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  Pressure ulcer, unspecified pressure ulcer stage: in lateral rt 4th toe both sides from toe compression  S/P hernia repair    Allergies  No Known Allergies    ANTIMICROBIALS:    vancomycin  IVPB 1000 daily (2/3-)  ertapenem  IVPB 1000 every 24 hours    MEDICATIONS  (STANDING):  heparin  Injectable 5000 every 12 hours  insulin lispro (HumaLOG) corrective regimen sliding scale  every 6 hours  insulin NPH human recombinant 3 <User Schedule>  polyethylene glycol 3350 17 daily  senna Syrup 5 at bedtime    Vital Signs Last 24 Hrs  T(F): 97.5 (02-08-19 @ 13:17), Max: 97.8 (02-08-19 @ 04:21)  HR: 91 (02-08-19 @ 13:17)  BP: 105/69 (02-08-19 @ 13:17)  RR: 18 (02-08-19 @ 13:17)  SpO2: 100% (02-08-19 @ 13:17) (94% - 100%)    PHYSICAL EXAM:  General: non-toxic; in bed; reaching at things that aren't there  HEAD/EYES: anicteric  ENT:  supple  Cardiovascular:   S1, S2  Respiratory:  clear bilaterally  GI:  soft, non-tender, normal bowel sounds; peg  :  catheter - clear urine  Musculoskeletal:  no synovitis  Neurologic:  awake, confused  Skin:  multiple eschars of the legs b/l; sacral wound debrided today  R hip healing  Psychiatric:  not really able to assess  Vascular:  no phlebitis                               8.5    19.84 )-----------( 353      ( 07 Feb 2019 07:52 )             29.5 02-08    147  |  106  |  47  ----------------------------<  381  4.3   |  31  |  1.62  Ca    8.1      08 Feb 2019 10:34Phos  2.2     02-07Mg     2.2     02-07  TPro  5.9  /  Alb  2.2  /  TBili  0.3  /  DBili  x   /  AST  19  /  ALT  17  /  AlkPhos  241  02-07    Sedimentation Rate, Erythrocyte: 66 (02-03-19)    Vancomycin Level, Trough: 20.5 (02-07 @ 18:54)  Vancomycin Level, Trough: 21.1 (02-07 @ 18:53)    MICROBIOLOGY:  Culture - Blood (02.03.19 @ 17:52)    Specimen Source: .Blood Blood-Venous    Culture Results:   No growth to date.    Culture - Blood (02.03.19 @ 17:52)    Specimen Source: .Blood Blood-Peripheral    Culture Results:   No growth to date.    Culture - Urine (02.03.19 @ 16:44)    Specimen Source: .Urine Catheterized    Culture Results:   <10,000 CFU/ml Normal Urogenital nany present    RADIOLOGY:  US Kidney and Bladder (02.06.19 @ 09:54) >  IMPRESSION:  Renal parenchymal disease. No hydronephrosis. Trace perinephric fluid bilaterally.  Trace to small abdominal pelvic free fluid.  Cholelithiasis. 3 mm gallbladder polyp. Follow-upcan be performed in one year to assess for interval change in size.    Xray Chest 1 View- PORTABLE-Urgent (02.04.19 @ 03:31) >  IMPRESSION: The patient is status post CABG and heart valve repair. A left cardiac device is noted. There is no pneumothorax. There are small bilateral pleural effusions with associated pulmonary edema demonstrating overall interval improvement since the prior study.     CT Lumbar Spine No Cont (02.03.19 @ 15:34) >  A large sacral decubitus ulcer is noted overlying the mid-lower sacrum and coccyx. Areas of cortical discontinuity are noted involving the dorsal sacrum most consistent with osteomyelitis. No periosteal reaction   is visualized. No large fluid collections are appreciated within the limitations of this noncontrast study.  Severe compression deformity of the T12 vertebral body with mild retropulsion of bone with a mild bony central canal stenosis as previously described. Bilateral spondylolysis defects are again noted at theL5-S1 levels. Multilevel spondylosis. Diffuse demineralization of the bones.  Atheromatous disease with redemonstration of aortobifemoral bypass which is incompletely evaluated with absence of intravenous contrast.  Fluid within the pelvis ofunknown etiology. Tejeda catheter balloon within the bladder.  The patient is status post right hip replacement.  IMPRESSION:  A large sacral decubitus ulcer is noted overlying the mid-lower sacrum and coccyx. Areas of cortical discontinuity are noted involving the dorsal sacrum most consistent with osteomyelitis.

## 2019-02-08 NOTE — PROGRESS NOTE ADULT - SUBJECTIVE AND OBJECTIVE BOX
SUBJECTIVE: Pt seen, chart reviewed.  74 yo diabetic , active smoker , male with a stage 4 sacral decubitus POA  Spoke with wife , Mendy, today regarding need for debridement  of sacral decubitus  Consent granted  Patient remains non ambulatory, unable to answer questions appropriately    Allergies    No Known Allergies    Intolerances        ertapenem  IVPB      ertapenem  IVPB 1000 milliGRAM(s) IV Intermittent every 24 hours  heparin  Injectable 5000 Unit(s) SubCutaneous every 12 hours  vancomycin  IVPB 1000 milliGRAM(s) IV Intermittent daily      PAST MEDICAL & SURGICAL HISTORY:  CKD (chronic kidney disease) stage 3, GFR 30-59 ml/min  Pressure ulcer, unspecified pressure ulcer stage: in lateral rt 4th toe both sides from toe compression  Coronary artery disease of native artery of native heart with stable angina pectoris  Diastolic congestive heart failure, unspecified congestive heart failure chronicity  Smoker  Gout involving toe, unspecified cause, unspecified chronicity, unspecified laterality  PAD (peripheral artery disease)  Chronic atrial fibrillation: on coumadin  Hyperlipidemia, unspecified hyperlipidemia type  HTN (hypertension), benign  Type 2 diabetes mellitus with other circulatory complication, with long-term current use of insulin  S/P angioplasty with stent: vascular stent- left leg  S/P hernia repair  Artificial cardiac pacemaker: 2014  S/P aortobifemoral bypass surgery      HEALTH ISSUES - PROBLEM Dx:  Type 2 diabetes mellitus with hypoglycemia without coma, with long-term current use of insulin: Type 2 diabetes mellitus with hypoglycemia without coma, with long-term current use of insulin  Electrolyte abnormality: Electrolyte abnormality  Anemia: Anemia  Hypoglycemia: Hypoglycemia  Atrial fibrillation, chronic: Atrial fibrillation, chronic  Osteomyelitis of sacrum: Osteomyelitis of sacrum  Diabetes mellitus with hypoglycemia: Diabetes mellitus with hypoglycemia  Hypokalemia: Hypokalemia  SHINE (acute kidney injury): SHINE (acute kidney injury)  Need for prophylactic measure: Need for prophylactic measure  Medication management: Medication management  Left arm swelling: Left arm swelling  Heart failure: Heart failure  Metabolic alkalosis: Metabolic alkalosis  Compression fracture of T12 vertebra: Compression fracture of T12 vertebra  Acute kidney injury superimposed on CKD: Acute kidney injury superimposed on CKD  Acute kidney injury: Acute kidney injury  Demand ischemia: Demand ischemia  Elevated alkaline phosphatase level: Elevated alkaline phosphatase level  Hypernatremia: Hypernatremia  Chronic atrial fibrillation: Chronic atrial fibrillation  Sepsis: Sepsis          FAMILY HISTORY:  Family history of hypertension (Father)      Physical Exam:  Vital Signs Last 24 Hrs  T(C): 36.6 (08 Feb 2019 04:21), Max: 36.6 (08 Feb 2019 04:21)  T(F): 97.8 (08 Feb 2019 04:21), Max: 97.8 (08 Feb 2019 04:21)  HR: 95 (08 Feb 2019 04:21) (90 - 95)  BP: 139/85 (08 Feb 2019 04:21) (96/59 - 139/85)  BP(mean): --  RR: 18 (08 Feb 2019 04:21) (18 - 18)  SpO2: 96% (08 Feb 2019 04:21) (94% - 100%)  At bedrest using nasal O2  cachectic  Non ambulatory  large stage 4 sacral decubitus  with a large amount of bone in wound  using sterile technique a portion of necrotic bone was easily avulsed and submitted for C&S   Sharp debridement done of necrotic wound edges and SQ tissue to level of  sacrum  Well tolerated   Wound redressed  D/W medicine following procedure  Not yet clean enough for VAC    LABS:                        8.5    19.84 )-----------( 353      ( 07 Feb 2019 07:52 )             29.5     PT/INR - ( 07 Feb 2019 07:56 )   PT: 36.7 sec;   INR: 3.11 ratio         PTT - ( 07 Feb 2019 07:56 )  PTT:40.7 sec      Outpatient follow up information provided- [phone]

## 2019-02-08 NOTE — PROGRESS NOTE ADULT - SUBJECTIVE AND OBJECTIVE BOX
SUBJECTIVE:  No acute overnight events. Patient slept poorly and is more somnolent this morning than previous exam.    OBJECTIVE:  Vital Signs Last 24 Hrs  T(C): 36.6 (2019 04:21), Max: 36.6 (2019 04:21)  T(F): 97.8 (2019 04:21), Max: 97.8 (2019 04:21)  HR: 95 (2019 04:21) (90 - 95)  BP: 139/85 (2019 04:21) (96/59 - 139/85)  BP(mean): --  RR: 18 (2019 04:21) (18 - 18)  SpO2: 96% (2019 04:21) (94% - 100%)    I&O's Detail    2019 07:01  -  2019 07:00  --------------------------------------------------------  IN:    dextrose 10%: 550 mL    Glucerna: 280 mL  Total IN: 830 mL    OUT:    Incontinent per Collection Ba mL    Indwelling Catheter - Urethral: 650 mL  Total OUT: 651 mL    Total NET: 179 mL          Inpatient Medications:  MEDICATIONS  (STANDING):  ascorbic acid 500 milliGRAM(s) Oral daily  Dakins Solution - 1/4 Strength 1 Application(s) Topical three times a day  dextrose 5%. 1000 milliLiter(s) (50 mL/Hr) IV Continuous <Continuous>  dextrose 50% Injectable 12.5 Gram(s) IV Push once  dextrose 50% Injectable 25 Gram(s) IV Push once  dextrose 50% Injectable 25 Gram(s) IV Push once  ertapenem  IVPB      ertapenem  IVPB 1000 milliGRAM(s) IV Intermittent every 24 hours  heparin  Injectable 5000 Unit(s) SubCutaneous every 12 hours  insulin lispro (HumaLOG) corrective regimen sliding scale   SubCutaneous every 6 hours  lactobacillus acidophilus 1 Tablet(s) Oral two times a day with meals  polyethylene glycol 3350 17 Gram(s) Oral daily  senna Syrup 5 milliLiter(s) Oral at bedtime  silver nitrate Applicator 1 Application(s) Topical once  vancomycin  IVPB 1000 milliGRAM(s) IV Intermittent daily  zinc oxide 20% Ointment 1 Application(s) Topical three times a day    MEDICATIONS  (PRN):  acetaminophen   Tablet .. 650 milliGRAM(s) Oral every 6 hours PRN Temp greater or equal to 38C (100.4F)  dextrose 40% Gel 15 Gram(s) Oral once PRN Blood Glucose LESS THAN 70 milliGRAM(s)/deciliter  glucagon  Injectable 1 milliGRAM(s) IntraMuscular once PRN Glucose LESS THAN 70 milligrams/deciliter      Physical Examination:  GEN: NAD, resting quietly, cachectic  PULM: symmetric chest rise bilaterally, no increased WOB  CV: regular rate  ABD: soft, ND, PEG in place with surrounding erythematous rash, well-healed midline laparotomy incision and L inguinal hernia incision  EXTR:  Right: dry ulcers to anterior chin, dorsum of foot, lateral malleolus, and heel, cool to touch, no palpable pulses  Left: no palpable pulses, warm, well-perfused, no wounds or ulcers  Palpable femoral pulses bilaterally, no popliteal pulses    LABS:                        8.5    19.84 )-----------( 353      ( 2019 07:52 )             29.5           150<H>  |  109<H>  |  51<H>  ----------------------------<  29<LL>  3.7   |  29  |  1.66<H>    Ca    8.1<L>      2019 05:54  Phos  2.2       Mg     2.2         TPro  5.9<L>  /  Alb  2.2<L>  /  TBili  0.3  /  DBili  x   /  AST  19  /  ALT  17  /  AlkPhos  241<H>        CULTURES:  .Blood Blood-Peripheral   @ 17:52   No growth to date.  --  --      .Urine Catheterized   @ 16:44   <10,000 CFU/ml Normal Urogenital nany present  --  --      IMAGING:  < from: CT Chest No Cont (19 @ 12:10) >  IMPRESSION:     1.  Bilateral pleural effusions and atelectasis.  2.  Mosaic attenuation attenuation the lung is likely due to air trapping.    < end of copied text >      IMPRESSION:   73yMale admitted with Patient is a 73y old  Male who presents with a chief complaint of Sepsis (2019 08:50)

## 2019-02-08 NOTE — CHART NOTE - NSCHARTNOTEFT_GEN_A_CORE
Nutrition Follow Up Note  Patient seen for: initial malnutrition follow-up     Chart reviewed, events noted. This is a " 73 year old M with a hx of CAD s/p CABG, Afib on coumadin, CKD stage 3, PAD, aortobifem bypass ~2012 by Dr. Guallpa at [place], CEA, POBA L distal AT/DP (2016) SFA stent (2017), and left 4th toe amputation, admitted with sepsis 2/2 UTI vs. sacral decubitus w/ osteomyelitis, with cool right leg and chronic ulcers to the foot, heel, and shin". Per vascular, plan for CT angio abdomen. Noted with leaking PEG.       Source: RN, medical record, pt with confused     Diet : NPO with enteral feeds        Enteral /Parenteral Nutrition: Pt observed with enteral feeds of Glucerna 1.2 infusing at 40ml/hr x 18hrs. Regimen provides 720ml total volume, 864 kcal (14.2 Kcal/kg) and 43.2gm protein (.71gm/kg) and ~580ml free water based on wt of 60.7 Kg (2/5). Current regimen meeting ~57% of low end calorie needs and 51% of low-end protein needs. Per RN, pt noted with diarrhea, x6 BM (2/7), x1 BM today so far 2/8. Per previous RD note, pt noted with loose BMs PTA. Per RN PEG tube also noted to be leaking a little.     Weights:   2/6: 115lbs --? accuracy of wt   2/5: 133.5 lbs   2/3 (dosing): 134.6 lbs   wt per rehab: 136lbs (1/30)    Pertinent Medications: MEDICATIONS  (STANDING):  ascorbic acid 500 milliGRAM(s) Oral daily  Dakins Solution - 1/4 Strength 1 Application(s) Topical three times a day  dextrose 5%. 1000 milliLiter(s) (50 mL/Hr) IV Continuous <Continuous>  dextrose 50% Injectable 12.5 Gram(s) IV Push once  dextrose 50% Injectable 25 Gram(s) IV Push once  dextrose 50% Injectable 25 Gram(s) IV Push once  ertapenem  IVPB      ertapenem  IVPB 1000 milliGRAM(s) IV Intermittent every 24 hours  heparin  Injectable 5000 Unit(s) SubCutaneous every 12 hours  insulin lispro (HumaLOG) corrective regimen sliding scale   SubCutaneous every 6 hours  lactobacillus acidophilus 1 Tablet(s) Oral two times a day with meals  nystatin Powder 1 Application(s) Topical two times a day  polyethylene glycol 3350 17 Gram(s) Oral daily  senna Syrup 5 milliLiter(s) Oral at bedtime  vancomycin  IVPB 1000 milliGRAM(s) IV Intermittent daily  zinc oxide 20% Ointment 1 Application(s) Topical three times a day    MEDICATIONS  (PRN):  acetaminophen   Tablet .. 650 milliGRAM(s) Oral every 6 hours PRN Temp greater or equal to 38C (100.4F)  dextrose 40% Gel 15 Gram(s) Oral once PRN Blood Glucose LESS THAN 70 milliGRAM(s)/deciliter  glucagon  Injectable 1 milliGRAM(s) IntraMuscular once PRN Glucose LESS THAN 70 milligrams/deciliter    Pertinent Labs: 02-08 @ 10:34: Na 147<H>, BUN 47<H>, Cr 1.62<H>, <H>, K+ 4.3, Phos --, Mg --, Alk Phos --, ALT/SGPT --, AST/SGOT --, HbA1c --    Finger Sticks:  POCT Blood Glucose.: 324 mg/dL (02-08 @ 05:24)  POCT Blood Glucose.: 373 mg/dL (02-08 @ 00:14)  POCT Blood Glucose.: 220 mg/dL (02-07 @ 17:56)  POCT Blood Glucose.: 134 mg/dL (02-07 @ 12:21)      Skin per nursing documentation: stage 4 pressure injury to sacral spine, unstageable pressure injuries to right heel, left heel, right lateral heel/mallelous    Edema: none     Estimated Needs:   [x ] no change since previous assessment  [ ] recalculated:     Previous Nutrition Diagnosis: malnutrition (severe)  Nutrition Diagnosis is: on going, being addressed with enteral feeds     New Nutrition Diagnosis: N/A     Interventions:   Recommend  1) Recommend increase EN goal rate to better meet nutritional needs. Recommend increase to Glucerna 1.2 @ 60 ml/hr x 24hrs via PEG. Regimen to [id number] ml total volume, 1728 kcal/d (28Kcal/kg), 86.4 gm protein/day (1.4gm/kg), and 1160 ml free water based on weight of 60.7kg.   2) Continue Danactive BID   3) Continue ascorbic acid for wound healing.     Monitoring and Evaluation:     Continue to monitor Nutritional intake, Tolerance to diet prescription, weights, labs, skin integrity    RD remains available upon request and will follow up per protocol  Lila South RD, CDN, Pager # [phone]

## 2019-02-08 NOTE — CHART NOTE - NSCHARTNOTEFT_GEN_A_CORE
Called by RN for PEG site leaking stomach fluid.   Pt seen and examined at bedside.   Pt has a balloon replacement gastrostomy tube, that is leaking around site and has caused maceration/erosion of skin.   Cleaned with NS, zinc oxide applied to affected skin, single drainage sponge placed, bumper adjusted.   RN at bedside aware of plan to monitor closely for leaking overnight.   No leaking at this time. If reoccurs may need to be seen by GI or IR.   Wound consult ordered.   Will continue to monitor overnight.     f/u with primary team in [place] ANP-BC  Medicine  [id number]

## 2019-02-08 NOTE — PROGRESS NOTE ADULT - SUBJECTIVE AND OBJECTIVE BOX
CARDIOLOGY     PROGRESS  NOTE   ________________________________________________    CHIEF COMPLAINT:Patient is a 73y old  Male who presents with a chief complaint of Sepsis (08 Feb 2019 07:50)  doing better a little combative.  	  REVIEW OF SYSTEMS:  CONSTITUTIONAL: No fever, weight loss, or fatigue  EYES: No eye pain, visual disturbances, or discharge  ENT:  No difficulty hearing, tinnitus, vertigo; No sinus or throat pain  NECK: No pain or stiffness  RESPIRATORY: No cough, wheezing, chills or hemoptysis; No Shortness of Breath  CARDIOVASCULAR: No chest pain, palpitations, passing out, dizziness, or leg swelling  GASTROINTESTINAL: No abdominal or epigastric pain. No nausea, vomiting, or hematemesis; No diarrhea or constipation. No melena or hematochezia.  GENITOURINARY: No dysuria, frequency, hematuria, or incontinence  NEUROLOGICAL: No headaches, memory loss, loss of strength, numbness, or tremors  SKIN: No itching, burning, rashes, or lesions   LYMPH Nodes: No enlarged glands  ENDOCRINE: No heat or cold intolerance; No hair loss  MUSCULOSKELETAL: No joint pain or swelling; No muscle, back, or extremity pain  PSYCHIATRIC: No depression, anxiety, mood swings, or difficulty sleeping  HEME/LYMPH: No easy bruising, or bleeding gums  ALLERGY AND IMMUNOLOGIC: No hives or eczema	    [ ] All others negative	  [ ] Unable to obtain    PHYSICAL EXAM:  T(C): 36.6 (02-08-19 @ 04:21), Max: 36.6 (02-08-19 @ 04:21)  HR: 95 (02-08-19 @ 04:21) (90 - 95)  BP: 139/85 (02-08-19 @ 04:21) (96/59 - 139/85)  RR: 18 (02-08-19 @ 04:21) (18 - 18)  SpO2: 96% (02-08-19 @ 04:21) (94% - 100%)  Wt(kg): --  I&O's Summary    07 Feb 2019 07:01  -  08 Feb 2019 07:00  --------------------------------------------------------  IN: 830 mL / OUT: 651 mL / NET: 179 mL        Appearance: Normal	  HEENT:   Normal oral mucosa, PERRL, EOMI	  Lymphatic: No lymphadenopathy  Cardiovascular: Normal S1 S2, No JVD,+ murmurs, No edema  Respiratory: Lungs clear to auscultation	  Psychiatry: A & O x 3, Mood & affect appropriate  Gastrointestinal:  Soft, Non-tender, + BS	  Skin: No rashes, No ecchymoses, No cyanosis	  Neurologic: Non-focal  Extremities: Normal range of motion, No clubbing, cyanosis or edema  Vascular: Peripheral pulses palpable 2+ bilaterally    MEDICATIONS  (STANDING):  ascorbic acid 500 milliGRAM(s) Oral daily  Dakins Solution - 1/4 Strength 1 Application(s) Topical three times a day  dextrose 5%. 1000 milliLiter(s) (50 mL/Hr) IV Continuous <Continuous>  dextrose 50% Injectable 12.5 Gram(s) IV Push once  dextrose 50% Injectable 25 Gram(s) IV Push once  dextrose 50% Injectable 25 Gram(s) IV Push once  ertapenem  IVPB      ertapenem  IVPB 1000 milliGRAM(s) IV Intermittent every 24 hours  furosemide    Tablet 40 milliGRAM(s) Oral daily  heparin  Injectable 5000 Unit(s) SubCutaneous every 12 hours  insulin lispro (HumaLOG) corrective regimen sliding scale   SubCutaneous every 6 hours  lactobacillus acidophilus 1 Tablet(s) Oral two times a day with meals  polyethylene glycol 3350 17 Gram(s) Oral daily  senna Syrup 5 milliLiter(s) Oral at bedtime  silver nitrate Applicator 1 Application(s) Topical once  vancomycin  IVPB 1000 milliGRAM(s) IV Intermittent daily  zinc oxide 20% Ointment 1 Application(s) Topical three times a day      TELEMETRY: 	    ECG:  	  RADIOLOGY:  OTHER: 	  	  LABS:	 	    CARDIAC MARKERS:  CARDIAC MARKERS ( 07 Feb 2019 05:54 )  x     / x     / 18 U/L / x     / 2.6 ng/mL  CARDIAC MARKERS ( 06 Feb 2019 23:28 )  x     / x     / 26 U/L / x     / 2.8 ng/mL                                8.5    19.84 )-----------( 353      ( 07 Feb 2019 07:52 )             29.5     02-07    150<H>  |  109<H>  |  51<H>  ----------------------------<  29<LL>  3.7   |  29  |  1.66<H>    Ca    8.1<L>      07 Feb 2019 05:54  Phos  2.2     02-07  Mg     2.2     02-07    TPro  5.9<L>  /  Alb  2.2<L>  /  TBili  0.3  /  DBili  x   /  AST  19  /  ALT  17  /  AlkPhos  241<H>  02-07    proBNP: Serum Pro-Brain Natriuretic Peptide: 69077 pg/mL (02-03 @ 13:07)    Lipid Profile:   HgA1c: Hemoglobin A1C, Whole Blood: 7.4 % (02-04 @ 08:56)    TSH:   PT/INR - ( 07 Feb 2019 07:56 )   PT: 36.7 sec;   INR: 3.11 ratio         PTT - ( 07 Feb 2019 07:56 )  PTT:40.7 sec  < from: CT Chest No Cont (02.07.19 @ 12:10) >    1.  Bilateral pleural effusions and atelectasis.  2.  Mosaic attenuation attenuation the lung is likely due to air trapping.      < end of copied text >      Assessment and plan  ---------------------------  dc lasix  repeat chest xray  abx  awaiting echo CARDIOLOGY     PROGRESS  NOTE   ________________________________________________    CHIEF COMPLAINT:Patient is a 73y old  Male who presents with a chief complaint of Sepsis (08 Feb 2019 07:50)  doing better a little combative.  	  REVIEW OF SYSTEMS:  CONSTITUTIONAL: No fever, weight loss, or fatigue  EYES: No eye pain, visual disturbances, or discharge  ENT:  No difficulty hearing, tinnitus, vertigo; No sinus or throat pain  NECK: No pain or stiffness  RESPIRATORY: No cough, wheezing, chills or hemoptysis; No Shortness of Breath  CARDIOVASCULAR: No chest pain, palpitations, passing out, dizziness, or leg swelling  GASTROINTESTINAL: No abdominal or epigastric pain. No nausea, vomiting, or hematemesis; No diarrhea or constipation. No melena or hematochezia.  GENITOURINARY: No dysuria, frequency, hematuria, or incontinence  NEUROLOGICAL: No headaches, memory loss, loss of strength, numbness, or tremors  SKIN: No itching, burning, rashes, or lesions   LYMPH Nodes: No enlarged glands  ENDOCRINE: No heat or cold intolerance; No hair loss  MUSCULOSKELETAL: No joint pain or swelling; No muscle, back, or extremity pain  PSYCHIATRIC: No depression, anxiety, mood swings, or difficulty sleeping  HEME/LYMPH: No easy bruising, or bleeding gums  ALLERGY AND IMMUNOLOGIC: No hives or eczema	    [ ] All others negative	  [ ] Unable to obtain    PHYSICAL EXAM:  T(C): 36.6 (02-08-19 @ 04:21), Max: 36.6 (02-08-19 @ 04:21)  HR: 95 (02-08-19 @ 04:21) (90 - 95)  BP: 139/85 (02-08-19 @ 04:21) (96/59 - 139/85)  RR: 18 (02-08-19 @ 04:21) (18 - 18)  SpO2: 96% (02-08-19 @ 04:21) (94% - 100%)  Wt(kg): --  I&O's Summary    07 Feb 2019 07:01  -  08 Feb 2019 07:00  --------------------------------------------------------  IN: 830 mL / OUT: 651 mL / NET: 179 mL        Appearance: Normal	  HEENT:   Normal oral mucosa, PERRL, EOMI	  Lymphatic: No lymphadenopathy  Cardiovascular: Normal S1 S2, No JVD,+ murmurs, No edema  Respiratory: Lungs clear to auscultation	  Psychiatry: A & O x 3, Mood & affect appropriate  Gastrointestinal:  Soft, Non-tender, + BS	  Skin: No rashes, No ecchymoses, No cyanosis	  Neurologic: Non-focal  Extremities: Normal range of motion, No clubbing, cyanosis or edema  Vascular: Peripheral pulses palpable 2+ bilaterally    MEDICATIONS  (STANDING):  ascorbic acid 500 milliGRAM(s) Oral daily  Dakins Solution - 1/4 Strength 1 Application(s) Topical three times a day  dextrose 5%. 1000 milliLiter(s) (50 mL/Hr) IV Continuous <Continuous>  dextrose 50% Injectable 12.5 Gram(s) IV Push once  dextrose 50% Injectable 25 Gram(s) IV Push once  dextrose 50% Injectable 25 Gram(s) IV Push once  ertapenem  IVPB      ertapenem  IVPB 1000 milliGRAM(s) IV Intermittent every 24 hours  furosemide    Tablet 40 milliGRAM(s) Oral daily  heparin  Injectable 5000 Unit(s) SubCutaneous every 12 hours  insulin lispro (HumaLOG) corrective regimen sliding scale   SubCutaneous every 6 hours  lactobacillus acidophilus 1 Tablet(s) Oral two times a day with meals  polyethylene glycol 3350 17 Gram(s) Oral daily  senna Syrup 5 milliLiter(s) Oral at bedtime  silver nitrate Applicator 1 Application(s) Topical once  vancomycin  IVPB 1000 milliGRAM(s) IV Intermittent daily  zinc oxide 20% Ointment 1 Application(s) Topical three times a day      TELEMETRY: 	    ECG:  	  RADIOLOGY:  OTHER: 	  	  LABS:	 	    CARDIAC MARKERS:  CARDIAC MARKERS ( 07 Feb 2019 05:54 )  x     / x     / 18 U/L / x     / 2.6 ng/mL  CARDIAC MARKERS ( 06 Feb 2019 23:28 )  x     / x     / 26 U/L / x     / 2.8 ng/mL                                8.5    19.84 )-----------( 353      ( 07 Feb 2019 07:52 )             29.5     02-07    150<H>  |  109<H>  |  51<H>  ----------------------------<  29<LL>  3.7   |  29  |  1.66<H>    Ca    8.1<L>      07 Feb 2019 05:54  Phos  2.2     02-07  Mg     2.2     02-07    TPro  5.9<L>  /  Alb  2.2<L>  /  TBili  0.3  /  DBili  x   /  AST  19  /  ALT  17  /  AlkPhos  241<H>  02-07    proBNP: Serum Pro-Brain Natriuretic Peptide: 99752 pg/mL (02-03 @ 13:07)    Lipid Profile:   HgA1c: Hemoglobin A1C, Whole Blood: 7.4 % (02-04 @ 08:56)    TSH:   PT/INR - ( 07 Feb 2019 07:56 )   PT: 36.7 sec;   INR: 3.11 ratio         PTT - ( 07 Feb 2019 07:56 )  PTT:40.7 sec  < from: CT Chest No Cont (02.07.19 @ 12:10) >    1.  Bilateral pleural effusions and atelectasis.  2.  Mosaic attenuation attenuation the lung is likely due to air trapping.      < end of copied text >      Assessment and plan  ---------------------------  dc lasix  repeat chest xray  abx  awaiting echo  pulmonary eval incentive spirometry  dvt prophylaxis

## 2019-02-08 NOTE — PROGRESS NOTE ADULT - PROBLEM SELECTOR PLAN 4
Patient with hypernatremia in setting of impaired free water intake and volume depletion. Latest serum sodium has improved to 147 today. Recommend to continue with free water supplementation via PEG tube. Monitor serum sodium level.

## 2019-02-08 NOTE — PROGRESS NOTE ADULT - ASSESSMENT
72 yo male PMH CAD c/b CABG, A fib (on coumadin), CKD stage 3, PAD s/p stents, PEG tube, in-dwelling Hoskins who presents to the nursing home for increased confusion and elevated WBC. History was taken from wife at bedside.	Per wife, patient had been fully functional up until November. He had a hypoglycemic episode, fell, and broke his hip in November. He was admitted to the hospital for surgery, which was uneventful and sent to rehab (in early December).     During the rehab stay, he developed numerous ulcers, including a decubitus ulcer. He was sent back to [facility] for sepsis from the decubitus ulcer. He was treated with abx and returned to rehab for a second time. This second hospital course was complicated by acute renal failure requiring dialysis, IV abx, and "surgery" to clean the ulcer. In the ED, he received Vanc/Zosyn. He was admitted to medicine for further management and sepsis on arrival from   sacral osteomylitis.     ID: Agree Invancz 1 gram daily and IV Vancomycin 1,000 mg/day for sacral decubitus. CT chest shows no pneumonia, bilateral effusions. PEG feeds in place. Chronic hoskins changed this admission.   Agree with ID. Had bone biopsy done today. CT of lumbar spine confirms sacral OM. Need bone culture to guide treatment. PT ordered.     Reanl: CKD III stable, creatinine 1.66. Renal sono confirms MRD. On free water replacement 250 ml QID via peg for hypernatremia.     CV: Lasix on hold, took as outpatient. Free water replaced in PEG to treat hypernatremia. TTE pending, moved to tele today.   Stop all IVF in AM if BS stable. Will likely need to restart Lasix 40 mg/day, repeat CXR pending. Leave tele for now.     Endo: episodes of hypoglycemia off insulin. Stopped D 10. Endo consult requested. PEG feeds in place. All standing insulin on hold.      Agree DNR. SHERIE can be done but not candidate for vascular intervention.  Consider SHERIE early next week.

## 2019-02-08 NOTE — PROGRESS NOTE ADULT - SUBJECTIVE AND OBJECTIVE BOX
Chief Complaint: Evaluating this 74 y/o M for uncontrolled Type 2 DM w/ hyperglycemia      Interval History: Hypoglycemia resolved now with hyperglycemia off standing insulin but still on tube feeds x 18 hours. More lethargic today. Currently sleeping.     MEDICATIONS  (STANDING):  ascorbic acid 500 milliGRAM(s) Oral daily  Dakins Solution - 1/4 Strength 1 Application(s) Topical three times a day  dextrose 5%. 1000 milliLiter(s) (50 mL/Hr) IV Continuous <Continuous>  dextrose 50% Injectable 12.5 Gram(s) IV Push once  dextrose 50% Injectable 25 Gram(s) IV Push once  dextrose 50% Injectable 25 Gram(s) IV Push once  insulin lispro (HumaLOG) corrective regimen sliding scale   SubCutaneous every 6 hours  insulin NPH human recombinant 3 Unit(s) SubCutaneous <User Schedule>  lactobacillus acidophilus 1 Tablet(s) Oral two times a day with meals  nystatin Powder 1 Application(s) Topical two times a day  piperacillin/tazobactam IVPB. 3.375 Gram(s) IV Intermittent once  piperacillin/tazobactam IVPB. 3.375 Gram(s) IV Intermittent every 8 hours  polyethylene glycol 3350 17 Gram(s) Oral daily  senna Syrup 5 milliLiter(s) Oral at bedtime  vancomycin  IVPB      vancomycin  IVPB 750 milliGRAM(s) IV Intermittent once  zinc oxide 20% Ointment 1 Application(s) Topical three times a day    MEDICATIONS  (PRN):  acetaminophen   Tablet .. 650 milliGRAM(s) Oral every 6 hours PRN Temp greater or equal to 38C (100.4F)  dextrose 40% Gel 15 Gram(s) Oral once PRN Blood Glucose LESS THAN 70 milliGRAM(s)/deciliter  glucagon  Injectable 1 milliGRAM(s) IntraMuscular once PRN Glucose LESS THAN 70 milligrams/deciliter      Allergies    No Known Allergies    Intolerances      Review of Systems: Unable to assess at this time.     PHYSICAL EXAM:  VITALS: T(C): 36.4 (02-08-19 @ 13:17)  T(F): 97.5 (02-08-19 @ 13:17), Max: 97.8 (02-08-19 @ 04:21)  HR: 91 (02-08-19 @ 13:17) (90 - 95)  BP: 105/69 (02-08-19 @ 13:17) (96/59 - 139/85)  RR:  (18 - 18)  SpO2:  (94% - 100%)  Wt(kg): --  GENERAL: NAD at this time  EYES: closed  HEENT:  Atraumatic, Normocephalic,   RESPIRATORY: Clear to auscultation bilaterally, full excursion, non labored  CARDIOVASCULAR: irregular rhythm; normal S1/S2, no peripheral edema  GI: Soft, +PEG, nontender, non distended, normal bowel sounds  SKIN: +ulcerations on LE and decubitus ulcer      POCT Blood Glucose.: 396 mg/dL (02-08-19 @ 12:31)  POCT Blood Glucose.: 324 mg/dL (02-08-19 @ 05:24)  POCT Blood Glucose.: 373 mg/dL (02-08-19 @ 00:14)  POCT Blood Glucose.: 220 mg/dL (02-07-19 @ 17:56)  POCT Blood Glucose.: 134 mg/dL (02-07-19 @ 12:21)  POCT Blood Glucose.: 157 mg/dL (02-07-19 @ 10:20)  POCT Blood Glucose.: 124 mg/dL (02-07-19 @ 09:15)  POCT Blood Glucose.: 138 mg/dL (02-07-19 @ 08:19)  POCT Blood Glucose.: 120 mg/dL (02-07-19 @ 07:19)  POCT Blood Glucose.: 100 mg/dL (02-07-19 @ 06:59)  POCT Blood Glucose.: 38 mg/dL (02-07-19 @ 06:19)  POCT Blood Glucose.: 35 mg/dL (02-07-19 @ 06:17)  POCT Blood Glucose.: 30 mg/dL (02-07-19 @ 05:51)  POCT Blood Glucose.: 42 mg/dL (02-07-19 @ 05:50)  POCT Blood Glucose.: 290 mg/dL (02-06-19 @ 23:19)  POCT Blood Glucose.: 425 mg/dL (02-06-19 @ 18:02)  POCT Blood Glucose.: 426 mg/dL (02-06-19 @ 18:00)  POCT Blood Glucose.: 367 mg/dL (02-06-19 @ 13:36)  POCT Blood Glucose.: 283 mg/dL (02-06-19 @ 07:41)  POCT Blood Glucose.: 305 mg/dL (02-06-19 @ 05:58)  POCT Blood Glucose.: 266 mg/dL (02-06-19 @ 03:32)  POCT Blood Glucose.: 246 mg/dL (02-06-19 @ 01:32)  POCT Blood Glucose.: 195 mg/dL (02-05-19 @ 23:30)  POCT Blood Glucose.: 178 mg/dL (02-05-19 @ 21:28)  POCT Blood Glucose.: 142 mg/dL (02-05-19 @ 19:53)        02-08    147<H>  |  106  |  47<H>  ----------------------------<  381<H>  4.3   |  31  |  1.62<H>    EGFR if : 48<L>  EGFR if non : 42<L>    Ca    8.1<L>      02-08  Mg     2.2     02-07  Phos  2.2     02-07    TPro  5.9<L>  /  Alb  2.2<L>  /  TBili  0.3  /  DBili  x   /  AST  19  /  ALT  17  /  AlkPhos  241<H>  02-07        Thyroid Function Tests:      Hemoglobin A1C, Whole Blood: 7.4 % <H> [4.0 - 5.6] (02-04-19 @ 08:56)

## 2019-02-08 NOTE — PROGRESS NOTE ADULT - SUBJECTIVE AND OBJECTIVE BOX
[facility] Division of Kidney Diseases & Hypertension  FOLLOW UP NOTE  [phone]--------------------------------------------------------------------------------  Chief Complaint: SHINE on CKD      24 hour events/subjective:    Patient seen and examined.   No acute events noted overnight.   UOP ~650 cc over past 24 hours.     PAST HISTORY  --------------------------------------------------------------------------------  No significant changes to PMH, PSH, FHx, SHx, unless otherwise noted    ALLERGIES & MEDICATIONS  --------------------------------------------------------------------------------  Allergies    No Known Allergies    Intolerances      Standing Inpatient Medications  ascorbic acid 500 milliGRAM(s) Oral daily  Dakins Solution - 1/4 Strength 1 Application(s) Topical three times a day  dextrose 5%. 1000 milliLiter(s) IV Continuous <Continuous>  dextrose 50% Injectable 12.5 Gram(s) IV Push once  dextrose 50% Injectable 25 Gram(s) IV Push once  dextrose 50% Injectable 25 Gram(s) IV Push once  ertapenem  IVPB      ertapenem  IVPB 1000 milliGRAM(s) IV Intermittent every 24 hours  heparin  Injectable 5000 Unit(s) SubCutaneous every 12 hours  insulin lispro (HumaLOG) corrective regimen sliding scale   SubCutaneous every 6 hours  insulin NPH human recombinant 3 Unit(s) SubCutaneous <User Schedule>  lactobacillus acidophilus 1 Tablet(s) Oral two times a day with meals  nystatin Powder 1 Application(s) Topical two times a day  polyethylene glycol 3350 17 Gram(s) Oral daily  senna Syrup 5 milliLiter(s) Oral at bedtime  vancomycin  IVPB 1000 milliGRAM(s) IV Intermittent daily  zinc oxide 20% Ointment 1 Application(s) Topical three times a day    PRN Inpatient Medications  acetaminophen   Tablet .. 650 milliGRAM(s) Oral every 6 hours PRN  dextrose 40% Gel 15 Gram(s) Oral once PRN  glucagon  Injectable 1 milliGRAM(s) IntraMuscular once PRN      REVIEW OF SYSTEMS  --------------------------------------------------------------------------------  Gen: No  fevers/chills  Skin: No rashes  Head/Eyes/Ears/Mouth: No headache; Normal hearing; Normal vision w/o blurriness  Respiratory: No dyspnea, cough, wheezing, hemoptysis  CV: No chest pain, PND, orthopnea  GI: No abdominal pain, diarrhea, constipation, nausea, vomiting  : No increased frequency, dysuria, hematuria, nocturia  MSK: No joint pain/swelling; no back pain; no edema  Neuro: No dizziness/lightheadedness, weakness, seizures, numbness, tingling      All other systems were reviewed and are negative, except as noted.    VITALS/PHYSICAL EXAM  --------------------------------------------------------------------------------  T(C): 36.4 (02-08-19 @ 13:17), Max: 36.6 (02-08-19 @ 04:21)  HR: 91 (02-08-19 @ 13:17) (90 - 95)  BP: 105/69 (02-08-19 @ 13:17) (96/59 - 139/85)  RR: 18 (02-08-19 @ 13:17) (18 - 18)  SpO2: 100% (02-08-19 @ 13:17) (94% - 100%)  Wt(kg): --        02-07-19 @ 07:01  -  02-08-19 @ 07:00  --------------------------------------------------------  IN: 830 mL / OUT: 651 mL / NET: 179 mL    02-08-19 @ 07:01  -  02-08-19 @ 13:44  --------------------------------------------------------  IN: 0 mL / OUT: 0 mL / NET: 0 mL      Physical Exam:  	  Gen: Elderly male  	HEENT: Dry mucus membranes   	Pulm: CTA B/L  	CV:  S1S2  	Abd: +BS, soft               : hoskins present with dark colored urine present.   	Ext: No B/L Lower ext edema; healed ulcers present over LE.   	Neuro: Awake but confused.   	Skin: Warm and dry      LABS/STUDIES  --------------------------------------------------------------------------------              8.5    19.84 >-----------<  353      [02-07-19 @ 07:52]              29.5     147  |  106  |  47  ----------------------------<  381      [02-08-19 @ 10:34]  4.3   |  31  |  1.62        Ca     8.1     [02-08-19 @ 10:34]      Mg     2.2     [02-07-19 @ 05:54]      Phos  2.2     [02-07-19 @ 05:54]    TPro  5.9  /  Alb  2.2  /  TBili  0.3  /  DBili  x   /  AST  19  /  ALT  17  /  AlkPhos  241  [02-07-19 @ 05:54]    PT/INR: PT 36.7 , INR 3.11       [02-07-19 @ 07:56]  PTT: 40.7       [02-07-19 @ 07:56]    CK 18      [02-07-19 @ 05:54]    Creatinine Trend:  SCr 1.62 [02-08 @ 10:34]  SCr 1.66 [02-07 @ 05:54]  SCr 1.76 [02-06 @ 23:28]  SCr 1.63 [02-06 @ 07:00]  SCr 1.68 [02-05 @ 21:43]    Urinalysis - [02-03-19 @ 13:07]      Color Yellow / Appearance Slightly Turbid / SG 1.022 / pH 7.0      Gluc Negative / Ketone Negative  / Bili Negative / Urobili Negative       Blood Moderate / Protein 30 mg/dL / Leuk Est Large / Nitrite Negative      RBC 40 /  / Hyaline 21 / Gran  / Sq Epi  / Non Sq Epi 1 / Bacteria Negative    Urine Creatinine 108      [02-06-19 @ 03:09]  Urine Sodium <20      [02-06-19 @ 03:09]  Urine Potassium 43      [02-06-19 @ 04:32]  Urine Chloride <35      [02-06-19 @ 04:32]  Urine Osmolality 485      [02-06-19 @ 03:09]    Iron 18, TIBC 143, %sat 13      [02-04-19 @ 08:58]  Ferritin 262      [02-04-19 @ 08:58]  HbA1c 7.4      [02-04-19 @ 08:56]

## 2019-02-09 LAB
ANION GAP SERPL CALC-SCNC: 11 MMOL/L — SIGNIFICANT CHANGE UP (ref 5–17)
BUN SERPL-MCNC: 41 MG/DL — HIGH (ref 7–23)
CALCIUM SERPL-MCNC: 8.1 MG/DL — LOW (ref 8.4–10.5)
CHLORIDE SERPL-SCNC: 109 MMOL/L — HIGH (ref 96–108)
CO2 SERPL-SCNC: 32 MMOL/L — HIGH (ref 22–31)
CREAT SERPL-MCNC: 1.53 MG/DL — HIGH (ref 0.5–1.3)
GLUCOSE BLDC GLUCOMTR-MCNC: 116 MG/DL — HIGH (ref 70–99)
GLUCOSE BLDC GLUCOMTR-MCNC: 178 MG/DL — HIGH (ref 70–99)
GLUCOSE BLDC GLUCOMTR-MCNC: 186 MG/DL — HIGH (ref 70–99)
GLUCOSE BLDC GLUCOMTR-MCNC: 346 MG/DL — HIGH (ref 70–99)
GLUCOSE BLDC GLUCOMTR-MCNC: 92 MG/DL — SIGNIFICANT CHANGE UP (ref 70–99)
GLUCOSE SERPL-MCNC: 110 MG/DL — HIGH (ref 70–99)
GRAM STN FLD: SIGNIFICANT CHANGE UP
HCT VFR BLD CALC: 27.1 % — LOW (ref 39–50)
HGB BLD-MCNC: 8 G/DL — LOW (ref 13–17)
INR BLD: 3.69 RATIO — HIGH (ref 0.88–1.16)
MCHC RBC-ENTMCNC: 28.1 PG — SIGNIFICANT CHANGE UP (ref 27–34)
MCHC RBC-ENTMCNC: 29.5 GM/DL — LOW (ref 32–36)
MCV RBC AUTO: 95.1 FL — SIGNIFICANT CHANGE UP (ref 80–100)
PLATELET # BLD AUTO: 309 K/UL — SIGNIFICANT CHANGE UP (ref 150–400)
POTASSIUM SERPL-MCNC: 3.6 MMOL/L — SIGNIFICANT CHANGE UP (ref 3.5–5.3)
POTASSIUM SERPL-SCNC: 3.6 MMOL/L — SIGNIFICANT CHANGE UP (ref 3.5–5.3)
PROTHROM AB SERPL-ACNC: 43.8 SEC — HIGH (ref 10–13.1)
RBC # BLD: 2.85 M/UL — LOW (ref 4.2–5.8)
RBC # FLD: 20 % — HIGH (ref 10.3–14.5)
SODIUM SERPL-SCNC: 152 MMOL/L — HIGH (ref 135–145)
SPECIMEN SOURCE: SIGNIFICANT CHANGE UP
WBC # BLD: 13.6 K/UL — HIGH (ref 3.8–10.5)
WBC # FLD AUTO: 13.6 K/UL — HIGH (ref 3.8–10.5)

## 2019-02-09 RX ORDER — CLOPIDOGREL BISULFATE 75 MG/1
75 TABLET, FILM COATED ORAL DAILY
Qty: 0 | Refills: 0 | Status: DISCONTINUED | OUTPATIENT
Start: 2019-02-09 | End: 2019-02-16

## 2019-02-09 RX ORDER — ATORVASTATIN CALCIUM 80 MG/1
20 TABLET, FILM COATED ORAL AT BEDTIME
Qty: 0 | Refills: 0 | Status: DISCONTINUED | OUTPATIENT
Start: 2019-02-09 | End: 2019-02-15

## 2019-02-09 RX ORDER — FUROSEMIDE 40 MG
40 TABLET ORAL ONCE
Qty: 0 | Refills: 0 | Status: COMPLETED | OUTPATIENT
Start: 2019-02-09 | End: 2019-02-09

## 2019-02-09 RX ORDER — TAMSULOSIN HYDROCHLORIDE 0.4 MG/1
0.4 CAPSULE ORAL AT BEDTIME
Qty: 0 | Refills: 0 | Status: DISCONTINUED | OUTPATIENT
Start: 2019-02-09 | End: 2019-02-15

## 2019-02-09 RX ORDER — FUROSEMIDE 40 MG
20 TABLET ORAL
Qty: 0 | Refills: 0 | Status: DISCONTINUED | OUTPATIENT
Start: 2019-02-10 | End: 2019-02-11

## 2019-02-09 RX ADMIN — Medication 40 MILLIGRAM(S): at 10:51

## 2019-02-09 RX ADMIN — PIPERACILLIN AND TAZOBACTAM 25 GRAM(S): 4; .5 INJECTION, POWDER, LYOPHILIZED, FOR SOLUTION INTRAVENOUS at 22:04

## 2019-02-09 RX ADMIN — Medication 4: at 18:44

## 2019-02-09 RX ADMIN — Medication 250 MILLIGRAM(S): at 17:44

## 2019-02-09 RX ADMIN — Medication 1 APPLICATION(S): at 22:03

## 2019-02-09 RX ADMIN — Medication 500 MILLIGRAM(S): at 12:32

## 2019-02-09 RX ADMIN — HUMAN INSULIN 3 UNIT(S): 100 INJECTION, SUSPENSION SUBCUTANEOUS at 18:44

## 2019-02-09 RX ADMIN — NYSTATIN CREAM 1 APPLICATION(S): 100000 CREAM TOPICAL at 17:44

## 2019-02-09 RX ADMIN — ZINC OXIDE 1 APPLICATION(S): 200 OINTMENT TOPICAL at 13:08

## 2019-02-09 RX ADMIN — Medication 1 APPLICATION(S): at 13:07

## 2019-02-09 RX ADMIN — PIPERACILLIN AND TAZOBACTAM 25 GRAM(S): 4; .5 INJECTION, POWDER, LYOPHILIZED, FOR SOLUTION INTRAVENOUS at 05:56

## 2019-02-09 RX ADMIN — Medication 1 APPLICATION(S): at 05:55

## 2019-02-09 RX ADMIN — Medication 1 TABLET(S): at 17:16

## 2019-02-09 RX ADMIN — PIPERACILLIN AND TAZOBACTAM 25 GRAM(S): 4; .5 INJECTION, POWDER, LYOPHILIZED, FOR SOLUTION INTRAVENOUS at 13:08

## 2019-02-09 RX ADMIN — HUMAN INSULIN 3 UNIT(S): 100 INJECTION, SUSPENSION SUBCUTANEOUS at 08:25

## 2019-02-09 RX ADMIN — ZINC OXIDE 1 APPLICATION(S): 200 OINTMENT TOPICAL at 22:05

## 2019-02-09 RX ADMIN — NYSTATIN CREAM 1 APPLICATION(S): 100000 CREAM TOPICAL at 05:55

## 2019-02-09 RX ADMIN — Medication 1: at 01:19

## 2019-02-09 RX ADMIN — ATORVASTATIN CALCIUM 20 MILLIGRAM(S): 80 TABLET, FILM COATED ORAL at 22:04

## 2019-02-09 RX ADMIN — ZINC OXIDE 1 APPLICATION(S): 200 OINTMENT TOPICAL at 05:55

## 2019-02-09 RX ADMIN — HUMAN INSULIN 3 UNIT(S): 100 INJECTION, SUSPENSION SUBCUTANEOUS at 01:20

## 2019-02-09 NOTE — PROGRESS NOTE ADULT - SUBJECTIVE AND OBJECTIVE BOX
CARDIOLOGY     PROGRESS  NOTE   ________________________________________________    CHIEF COMPLAINT:Patient is a 73y old  Male who presents with a chief complaint of Sepsis (08 Feb 2019 18:15)  no complain.  	  REVIEW OF SYSTEMS:  CONSTITUTIONAL: No fever, weight loss, or fatigue  EYES: No eye pain, visual disturbances, or discharge  ENT:  No difficulty hearing, tinnitus, vertigo; No sinus or throat pain  NECK: No pain or stiffness  RESPIRATORY: No cough, wheezing, chills or hemoptysis;+ Shortness of Breath  CARDIOVASCULAR: No chest pain, palpitations, passing out, dizziness, or leg swelling  GASTROINTESTINAL: No abdominal or epigastric pain. No nausea, vomiting, or hematemesis; No diarrhea or constipation. No melena or hematochezia.  GENITOURINARY: No dysuria, frequency, hematuria, or incontinence  NEUROLOGICAL: No headaches, memory loss, loss of strength, numbness, or tremors  SKIN: No itching, burning, rashes, or lesions   LYMPH Nodes: No enlarged glands  ENDOCRINE: No heat or cold intolerance; No hair loss  MUSCULOSKELETAL: No joint pain or swelling; No muscle, back, or extremity pain  PSYCHIATRIC: No depression, anxiety, mood swings, or difficulty sleeping  HEME/LYMPH: No easy bruising, or bleeding gums  ALLERGY AND IMMUNOLOGIC: No hives or eczema	    [ ] All others negative	  [ ] Unable to obtain    PHYSICAL EXAM:  T(C): 36.3 (02-09-19 @ 05:56), Max: 36.4 (02-08-19 @ 13:17)  HR: 83 (02-09-19 @ 05:56) (81 - 91)  BP: 131/82 (02-09-19 @ 05:56) (105/69 - 131/82)  RR: 20 (02-09-19 @ 05:56) (18 - 20)  SpO2: 100% (02-09-19 @ 05:56) (100% - 100%)  Wt(kg): --  I&O's Summary    08 Feb 2019 07:01  -  09 Feb 2019 07:00  --------------------------------------------------------  IN: 630 mL / OUT: 765 mL / NET: -135 mL        Appearance: Normal	  HEENT:   Normal oral mucosa, PERRL, EOMI	  Lymphatic: No lymphadenopathy  Cardiovascular: Normal S1 S2, No JVD,+ murmurs, No edema  Respiratory: Lungs clear to auscultation	  Psychiatry: A & O x 3, Mood & affect appropriate  Gastrointestinal:  Soft, Non-tender, + BS	  Skin: No rashes, No ecchymoses, No cyanosis	  Neurologic: Non-focal  Extremities: Normal range of motion, No clubbing, cyanosis or edema  Vascular: Peripheral pulses palpable 2+ bilaterally    MEDICATIONS  (STANDING):  ascorbic acid 500 milliGRAM(s) Oral daily  Dakins Solution - 1/4 Strength 1 Application(s) Topical three times a day  dextrose 5%. 1000 milliLiter(s) (50 mL/Hr) IV Continuous <Continuous>  dextrose 50% Injectable 12.5 Gram(s) IV Push once  dextrose 50% Injectable 25 Gram(s) IV Push once  dextrose 50% Injectable 25 Gram(s) IV Push once  insulin lispro (HumaLOG) corrective regimen sliding scale   SubCutaneous every 6 hours  insulin NPH human recombinant 3 Unit(s) SubCutaneous <User Schedule>  lactobacillus acidophilus 1 Tablet(s) Oral two times a day with meals  nystatin Powder 1 Application(s) Topical two times a day  piperacillin/tazobactam IVPB. 3.375 Gram(s) IV Intermittent every 8 hours  polyethylene glycol 3350 17 Gram(s) Oral daily  senna Syrup 5 milliLiter(s) Oral at bedtime  vancomycin  IVPB      vancomycin  IVPB 750 milliGRAM(s) IV Intermittent every 24 hours  zinc oxide 20% Ointment 1 Application(s) Topical three times a day      TELEMETRY: 	    ECG:  	  RADIOLOGY:  OTHER: 	  	  LABS:	 	    CARDIAC MARKERS:                                8.4    12.90 )-----------( 339      ( 08 Feb 2019 13:24 )             29.5     02-08    147<H>  |  106  |  47<H>  ----------------------------<  381<H>  4.3   |  31  |  1.62<H>    Ca    8.1<L>      08 Feb 2019 10:34      proBNP: Serum Pro-Brain Natriuretic Peptide: 67777 pg/mL (02-03 @ 13:07)    Lipid Profile:   HgA1c: Hemoglobin A1C, Whole Blood: 7.4 % (02-04 @ 08:56)    TSH:   PT/INR - ( 08 Feb 2019 13:24 )   PT: 51.9 sec;   INR: 4.35 ratio       < from: Transthoracic Echocardiogram (02.04.19 @ 12:19) >  Incomplete and therefore uninterpretable exam. Patient  confused and uncooperative and did not permit completion of  a full exam.    < end of copied text >  < from: Xray Chest 1 View- PORTABLE-Urgent (02.08.19 @ 11:49) >  Increased pulmonary edema. Stablesmall left pleural effusion. Left   retrocardiac opacity may represent atelectasis.    Sternotomy and mitral valve replacement. Stable left pacer.      < end of copied text >          Assessment and plan  ---------------------------  add lasix  echo will try to repeat  continue abx

## 2019-02-09 NOTE — PROGRESS NOTE ADULT - SUBJECTIVE AND OBJECTIVE BOX
INTERVAL HPI/OVERNIGHT EVENTS:  Pt seen and examined at bedside.     Allergies/Intolerance: No Known Allergies      MEDICATIONS  (STANDING):  ascorbic acid 500 milliGRAM(s) Oral daily  Dakins Solution - 1/4 Strength 1 Application(s) Topical three times a day  dextrose 5%. 1000 milliLiter(s) (50 mL/Hr) IV Continuous <Continuous>  dextrose 50% Injectable 12.5 Gram(s) IV Push once  dextrose 50% Injectable 25 Gram(s) IV Push once  dextrose 50% Injectable 25 Gram(s) IV Push once  insulin lispro (HumaLOG) corrective regimen sliding scale   SubCutaneous every 6 hours  insulin NPH human recombinant 3 Unit(s) SubCutaneous <User Schedule>  lactobacillus acidophilus 1 Tablet(s) Oral two times a day with meals  nystatin Powder 1 Application(s) Topical two times a day  piperacillin/tazobactam IVPB. 3.375 Gram(s) IV Intermittent every 8 hours  polyethylene glycol 3350 17 Gram(s) Oral daily  senna Syrup 5 milliLiter(s) Oral at bedtime  vancomycin  IVPB      vancomycin  IVPB 750 milliGRAM(s) IV Intermittent every 24 hours  zinc oxide 20% Ointment 1 Application(s) Topical three times a day    MEDICATIONS  (PRN):  acetaminophen   Tablet .. 650 milliGRAM(s) Oral every 6 hours PRN Temp greater or equal to 38C (100.4F)  dextrose 40% Gel 15 Gram(s) Oral once PRN Blood Glucose LESS THAN 70 milliGRAM(s)/deciliter  glucagon  Injectable 1 milliGRAM(s) IntraMuscular once PRN Glucose LESS THAN 70 milligrams/deciliter        ROS: all systems reviewed and wnl      PHYSICAL EXAMINATION:  Vital Signs Last 24 Hrs  T(C): 36.3 (09 Feb 2019 05:56), Max: 36.4 (08 Feb 2019 13:17)  T(F): 97.4 (09 Feb 2019 05:56), Max: 97.5 (08 Feb 2019 13:17)  HR: 83 (09 Feb 2019 05:56) (81 - 91)  BP: 113/50 (09 Feb 2019 10:50) (105/69 - 131/82)  BP(mean): --  RR: 20 (09 Feb 2019 05:56) (18 - 20)  SpO2: 100% (09 Feb 2019 05:56) (100% - 100%)  CAPILLARY BLOOD GLUCOSE      POCT Blood Glucose.: 186 mg/dL (09 Feb 2019 08:24)  POCT Blood Glucose.: 116 mg/dL (09 Feb 2019 06:05)  POCT Blood Glucose.: 178 mg/dL (09 Feb 2019 01:01)  POCT Blood Glucose.: 232 mg/dL (08 Feb 2019 18:14)  POCT Blood Glucose.: 396 mg/dL (08 Feb 2019 12:31)      02-08 @ 07:01  -  02-09 @ 07:00  --------------------------------------------------------  IN: 630 mL / OUT: 765 mL / NET: -135 mL        GENERAL:   NECK: supple, No JVD  CHEST/LUNG: clear to auscultation bilaterally; no rales, rhonchi, or wheezing b/l  HEART: normal S1, S2  ABDOMEN: BS+, soft, ND, NT   EXTREMITIES:  pulses palpable; no clubbing, cyanosis, or edema b/l LEs  SKIN: no rashes or lesions      LABS:                        8.4    12.90 )-----------( 339      ( 08 Feb 2019 13:24 )             29.5     02-08    147<H>  |  106  |  47<H>  ----------------------------<  381<H>  4.3   |  31  |  1.62<H>    Ca    8.1<L>      08 Feb 2019 10:34      PT/INR - ( 08 Feb 2019 13:24 )   PT: 51.9 sec;   INR: 4.35 ratio INTERVAL HPI/OVERNIGHT EVENTS:  Pt seen and examined at bedside.     Allergies/Intolerance: No Known Allergies      MEDICATIONS  (STANDING):  ascorbic acid 500 milliGRAM(s) Oral daily  Dakins Solution - 1/4 Strength 1 Application(s) Topical three times a day  dextrose 5%. 1000 milliLiter(s) (50 mL/Hr) IV Continuous <Continuous>  dextrose 50% Injectable 12.5 Gram(s) IV Push once  dextrose 50% Injectable 25 Gram(s) IV Push once  dextrose 50% Injectable 25 Gram(s) IV Push once  insulin lispro (HumaLOG) corrective regimen sliding scale   SubCutaneous every 6 hours  insulin NPH human recombinant 3 Unit(s) SubCutaneous <User Schedule>  lactobacillus acidophilus 1 Tablet(s) Oral two times a day with meals  nystatin Powder 1 Application(s) Topical two times a day  piperacillin/tazobactam IVPB. 3.375 Gram(s) IV Intermittent every 8 hours  polyethylene glycol 3350 17 Gram(s) Oral daily  senna Syrup 5 milliLiter(s) Oral at bedtime  vancomycin  IVPB      vancomycin  IVPB 750 milliGRAM(s) IV Intermittent every 24 hours  zinc oxide 20% Ointment 1 Application(s) Topical three times a day    MEDICATIONS  (PRN):  acetaminophen   Tablet .. 650 milliGRAM(s) Oral every 6 hours PRN Temp greater or equal to 38C (100.4F)  dextrose 40% Gel 15 Gram(s) Oral once PRN Blood Glucose LESS THAN 70 milliGRAM(s)/deciliter  glucagon  Injectable 1 milliGRAM(s) IntraMuscular once PRN Glucose LESS THAN 70 milligrams/deciliter        ROS: all systems reviewed and wnl      PHYSICAL EXAMINATION:  Vital Signs Last 24 Hrs  T(C): 36.3 (09 Feb 2019 05:56), Max: 36.4 (08 Feb 2019 13:17)  T(F): 97.4 (09 Feb 2019 05:56), Max: 97.5 (08 Feb 2019 13:17)  HR: 83 (09 Feb 2019 05:56) (81 - 91)  BP: 113/50 (09 Feb 2019 10:50) (105/69 - 131/82)  BP(mean): --  RR: 20 (09 Feb 2019 05:56) (18 - 20)  SpO2: 100% (09 Feb 2019 05:56) (100% - 100%)  CAPILLARY BLOOD GLUCOSE      POCT Blood Glucose.: 186 mg/dL (09 Feb 2019 08:24)  POCT Blood Glucose.: 116 mg/dL (09 Feb 2019 06:05)  POCT Blood Glucose.: 178 mg/dL (09 Feb 2019 01:01)  POCT Blood Glucose.: 232 mg/dL (08 Feb 2019 18:14)  POCT Blood Glucose.: 396 mg/dL (08 Feb 2019 12:31)      02-08 @ 07:01  -  02-09 @ 07:00  --------------------------------------------------------  IN: 630 mL / OUT: 765 mL / NET: -135 mL        GENERAL: in bed, stable, no fevers, SOB or CP. Not following commands well.   NECK: supple, No JVD  CHEST/LUNG: clear to auscultation bilaterally; no rales, rhonchi, or wheezing b/l  HEART: normal S1, S2  ABDOMEN: BS+, soft, ND, NT. PEG tube in place.    EXTREMITIES:  pulses palpable; no clubbing, cyanosis, or edema b/l LEs  Tejeda cath in place.   SKIN: no rashes or lesions      LABS:                        8.4    12.90 )-----------( 339      ( 08 Feb 2019 13:24 )             29.5     02-08    147<H>  |  106  |  47<H>  ----------------------------<  381<H>  4.3   |  31  |  1.62<H>    Ca    8.1<L>      08 Feb 2019 10:34      PT/INR - ( 08 Feb 2019 13:24 )   PT: 51.9 sec;   INR: 4.35 ratio

## 2019-02-09 NOTE — PROGRESS NOTE ADULT - ASSESSMENT
72 yo male PMH CAD c/b CABG, A fib (on coumadin), CKD stage 3, PAD s/p stents, PEG tube, in-dwelling [facility]kins who presents to the nursing home for increased confusion and elevated WBC. History was taken from wife at bedside.	Per wife, patient had been fully functional up until November. He had a hypoglycemic episode, fell, and broke his hip in November. He was admitted to the hospital for surgery, which was uneventful and sent to rehab (in early December).     During the rehab stay, he developed numerous ulcers, including a decubitus ulcer. He was sent back to [facility] for sepsis from the decubitus ulcer. He was treated with abx and returned to rehab for a second time. This second hospital course was complicated by acute renal failure requiring dialysis, IV abx, and "surgery" to clean the ulcer. In the ED, he received Vanc/Zosyn. He was admitted to medicine for further management and sepsis on arrival from   sacral osteomylitis.     ID: Agree IV Zosyn 3.375 grams every 8 hours and IV Vancomycin 750 mg/day for sacral decubitus. CT chest shows no pneumonia, bilateral effusions. PEG feeds in place. Chronic hoskins changed this admission. Agree with ID. Had bone biopsy done today. CT of lumbar spine confirms sacral OM. Need bone culture to guide treatment. PT ordered. Possible VAC dressing later in week if surgery/wound care agrees.     Reanl: CKD III stable, creatinine 1.66. Renal sono confirms MRD. On free water replacement 250 ml QID via peg for hypernatremia.     CV: Lasix resumed given CHF on CXR Lasix 20 mg IVP bid. TTE pending, can stop tele as NSR for 48 hours.    Stop all IVF in AM if BS stable. Resume Plavix 75 mg/day, taken at home with Lipitor.  Hold Coumadin for PICC line.   Need INR < 1.50 for PICC.       Endo: episodes of hypoglycemia off insulin. Stopped D 10. Endo consult requested. PEG feeds in place. On NPH 3 units/day.       Agree DNR. SHERIE can be done but not candidate for vascular intervention.  Consider SHERIE early next week. 74 yo male PMH CAD c/b CABG, A fib (on coumadin), CKD stage 3, PAD s/p stents, PEG tube, in-dwelling [facility]kins who presents to the nursing home for increased confusion and elevated WBC. History was taken from wife at bedside.	Per wife, patient had been fully functional up until November. He had a hypoglycemic episode, fell, and broke his hip in November. He was admitted to the hospital for surgery, which was uneventful and sent to rehab (in early December).     During the rehab stay, he developed numerous ulcers, including a decubitus ulcer. He was sent back to [facility] for sepsis from the decubitus ulcer. He was treated with abx and returned to rehab for a second time. This second hospital course was complicated by acute renal failure requiring dialysis, IV abx, and "surgery" to clean the ulcer. In the ED, he received Vanc/Zosyn. He was admitted to medicine for further management and sepsis on arrival from   sacral osteomylitis.     ID: Agree IV Zosyn 3.375 grams every 8 hours and IV Vancomycin 750 mg/day for sacral decubitus. CT chest shows no pneumonia, bilateral effusions. PEG feeds in place. Chronic hoskins changed this admission. Agree with ID. Had bone biopsy done today. CT of lumbar spine confirms sacral OM. Need bone culture to guide treatment. PT ordered. Possible VAC dressing later in week if surgery/wound care agrees.     Reanl: CKD III stable, creatinine 1.66. Renal sono confirms MRD. On free water replacement 250 ml QID via peg for hypernatremia.     Urology: Add Flomax .4 mg/day. Used to take Cardura at home. TOV eventually ???    CV: Lasix resumed given CHF on CXR Lasix 20 mg IVP bid. TTE pending, can stop tele as NSR for 48 hours.    Stop all IVF in AM if BS stable. Resume Plavix 75 mg/day, taken at home with Lipitor.  Hold Coumadin for PICC line.   Need INR < 1.50 for PICC.       Endo: episodes of hypoglycemia off insulin. Stopped D 10. Endo consult requested. PEG feeds in place. On NPH 3 units/day.       Agree DNR. SHERIE can be done but not candidate for vascular intervention.  Consider SHERIE early next week.

## 2019-02-10 LAB
-  AMIKACIN: SIGNIFICANT CHANGE UP
-  AMPICILLIN/SULBACTAM: SIGNIFICANT CHANGE UP
-  AMPICILLIN: SIGNIFICANT CHANGE UP
-  AZTREONAM: SIGNIFICANT CHANGE UP
-  CEFAZOLIN: SIGNIFICANT CHANGE UP
-  CEFEPIME: SIGNIFICANT CHANGE UP
-  CEFOTAXIME: SIGNIFICANT CHANGE UP
-  CEFOXITIN: SIGNIFICANT CHANGE UP
-  CEFTAZIDIME: SIGNIFICANT CHANGE UP
-  CEFTRIAXONE: SIGNIFICANT CHANGE UP
-  CEFUROXIME: SIGNIFICANT CHANGE UP
-  CIPROFLOXACIN: SIGNIFICANT CHANGE UP
-  ERTAPENEM: SIGNIFICANT CHANGE UP
-  GENTAMICIN: SIGNIFICANT CHANGE UP
-  IMIPENEM: SIGNIFICANT CHANGE UP
-  LEVOFLOXACIN: SIGNIFICANT CHANGE UP
-  MEROPENEM: SIGNIFICANT CHANGE UP
-  PIPERACILLIN/TAZOBACTAM: SIGNIFICANT CHANGE UP
-  TIGECYCLINE: SIGNIFICANT CHANGE UP
-  TOBRAMYCIN: SIGNIFICANT CHANGE UP
-  TRIMETHOPRIM/SULFAMETHOXAZOLE: SIGNIFICANT CHANGE UP
ANION GAP SERPL CALC-SCNC: 12 MMOL/L — SIGNIFICANT CHANGE UP (ref 5–17)
APTT BLD: 45.2 SEC — HIGH (ref 27.5–36.3)
BUN SERPL-MCNC: 43 MG/DL — HIGH (ref 7–23)
CALCIUM SERPL-MCNC: 8.3 MG/DL — LOW (ref 8.4–10.5)
CHLORIDE SERPL-SCNC: 106 MMOL/L — SIGNIFICANT CHANGE UP (ref 96–108)
CO2 SERPL-SCNC: 33 MMOL/L — HIGH (ref 22–31)
CREAT SERPL-MCNC: 1.66 MG/DL — HIGH (ref 0.5–1.3)
GLUCOSE BLDC GLUCOMTR-MCNC: 134 MG/DL — HIGH (ref 70–99)
GLUCOSE BLDC GLUCOMTR-MCNC: 143 MG/DL — HIGH (ref 70–99)
GLUCOSE BLDC GLUCOMTR-MCNC: 245 MG/DL — HIGH (ref 70–99)
GLUCOSE BLDC GLUCOMTR-MCNC: 283 MG/DL — HIGH (ref 70–99)
GLUCOSE BLDC GLUCOMTR-MCNC: 322 MG/DL — HIGH (ref 70–99)
GLUCOSE BLDC GLUCOMTR-MCNC: 90 MG/DL — SIGNIFICANT CHANGE UP (ref 70–99)
GLUCOSE SERPL-MCNC: 132 MG/DL — HIGH (ref 70–99)
HCT VFR BLD CALC: 27.3 % — LOW (ref 39–50)
HGB BLD-MCNC: 8.7 G/DL — LOW (ref 13–17)
INR BLD: 2.87 RATIO — HIGH (ref 0.88–1.16)
MCHC RBC-ENTMCNC: 30 PG — SIGNIFICANT CHANGE UP (ref 27–34)
MCHC RBC-ENTMCNC: 31.9 GM/DL — LOW (ref 32–36)
MCV RBC AUTO: 93.9 FL — SIGNIFICANT CHANGE UP (ref 80–100)
METHOD TYPE: SIGNIFICANT CHANGE UP
PLATELET # BLD AUTO: 327 K/UL — SIGNIFICANT CHANGE UP (ref 150–400)
POTASSIUM SERPL-MCNC: 3.9 MMOL/L — SIGNIFICANT CHANGE UP (ref 3.5–5.3)
POTASSIUM SERPL-SCNC: 3.9 MMOL/L — SIGNIFICANT CHANGE UP (ref 3.5–5.3)
PROTHROM AB SERPL-ACNC: 34.1 SEC — HIGH (ref 10–12.9)
RBC # BLD: 2.91 M/UL — LOW (ref 4.2–5.8)
RBC # FLD: 18 % — HIGH (ref 10.3–14.5)
SODIUM SERPL-SCNC: 151 MMOL/L — HIGH (ref 135–145)
WBC # BLD: 15 K/UL — HIGH (ref 3.8–10.5)
WBC # FLD AUTO: 15 K/UL — HIGH (ref 3.8–10.5)

## 2019-02-10 RX ORDER — PHYTONADIONE (VIT K1) 5 MG
2.5 TABLET ORAL ONCE
Qty: 0 | Refills: 0 | Status: COMPLETED | OUTPATIENT
Start: 2019-02-10 | End: 2019-02-10

## 2019-02-10 RX ADMIN — Medication 20 MILLIGRAM(S): at 06:34

## 2019-02-10 RX ADMIN — Medication 1 APPLICATION(S): at 06:35

## 2019-02-10 RX ADMIN — CLOPIDOGREL BISULFATE 75 MILLIGRAM(S): 75 TABLET, FILM COATED ORAL at 18:27

## 2019-02-10 RX ADMIN — PIPERACILLIN AND TAZOBACTAM 25 GRAM(S): 4; .5 INJECTION, POWDER, LYOPHILIZED, FOR SOLUTION INTRAVENOUS at 15:22

## 2019-02-10 RX ADMIN — Medication 250 MILLIGRAM(S): at 23:53

## 2019-02-10 RX ADMIN — NYSTATIN CREAM 1 APPLICATION(S): 100000 CREAM TOPICAL at 06:35

## 2019-02-10 RX ADMIN — PIPERACILLIN AND TAZOBACTAM 25 GRAM(S): 4; .5 INJECTION, POWDER, LYOPHILIZED, FOR SOLUTION INTRAVENOUS at 06:36

## 2019-02-10 RX ADMIN — Medication 2: at 18:27

## 2019-02-10 RX ADMIN — Medication 1 TABLET(S): at 18:27

## 2019-02-10 RX ADMIN — ZINC OXIDE 1 APPLICATION(S): 200 OINTMENT TOPICAL at 18:27

## 2019-02-10 RX ADMIN — HUMAN INSULIN 3 UNIT(S): 100 INJECTION, SUSPENSION SUBCUTANEOUS at 01:23

## 2019-02-10 RX ADMIN — ZINC OXIDE 1 APPLICATION(S): 200 OINTMENT TOPICAL at 06:36

## 2019-02-10 RX ADMIN — Medication 1 APPLICATION(S): at 18:28

## 2019-02-10 RX ADMIN — TAMSULOSIN HYDROCHLORIDE 0.4 MILLIGRAM(S): 0.4 CAPSULE ORAL at 22:33

## 2019-02-10 RX ADMIN — ATORVASTATIN CALCIUM 20 MILLIGRAM(S): 80 TABLET, FILM COATED ORAL at 22:33

## 2019-02-10 RX ADMIN — Medication 1 APPLICATION(S): at 23:00

## 2019-02-10 RX ADMIN — Medication 2.5 MILLIGRAM(S): at 18:27

## 2019-02-10 RX ADMIN — Medication 500 MILLIGRAM(S): at 18:27

## 2019-02-10 NOTE — CONSULT NOTE ADULT - SUBJECTIVE AND OBJECTIVE BOX
Chief Complaint:  Patient is a 73y old  Male who presents with a chief complaint of Sepsis (10 Feb 2019 09:29)      HPI:  72 yo male with CAD c/b CABG, A fib (on coumadin), CKD stage 3, PAD s/p stents, PEG tube, in-dwelling [facility]jeda who presents to the nursing home for increased confusion and elevated WBC. History was taken from wife at bedside.  Per wife, patient had been fully functional up until November. He had a hypoglycemic episode, fell, and broke his hip in November. He was admitted to the hospital for surgery, which was uneventful and sent to rehab (in early December). During the rehab stay, he developed numerous ulcers, including a decubitus ulcer. He was sent back to [facility] for sepsis from the decubitus ulcer. He was treated with abx and returned to rehab for a second time. This second hospital course was complicated by acute renal failure requiring dialysis, IV abx, and "surgery" to clean the ulcer.    In the ED, he received Vanc/Zosyn. He was admitted to medicine for further management. (03 Feb 2019 17:37)    GI consulted today for leak and redness around PEG tube site. Pt unable to provide a history but he has an 18F Medline balloon PEG in place.      Allergies:  No Known Allergies      Home Medications:  acetaminophen 325 mg oral tablet: 2 tab(s) by gastrostomy tube once a day, As Needed - for moderate pain (03 Feb 2019 18:19)  acetaminophen 325 mg oral tablet: 2 tab(s) by gastrostomy tube every 6 hours, As Needed - pain (03 Feb 2019 18:19)  amoxicillin-clavulanate 500 mg-125 mg oral tablet: 1 tab(s) by gastrostomy tube 2 times a day for 7 days (03 Feb 2019 18:19)  Basaglar KwikPen 100 units/mL subcutaneous solution: 12 unit(s) subcutaneous once a day (at bedtime) (03 Feb 2019 18:19)  Cardura 2 mg oral tablet: 1 tab(s) by gastrostomy tube once a day (in the evening) (03 Feb 2019 18:19)  Dakins Half Strength 0.25% topical solution: Apply topically to affected area every shift-sacral wound (03 Feb 2019 18:19)  Dulcolax Laxative 10 mg rectal suppository: 1 suppository(ies) rectal once a day, As Needed - for constipation (03 Feb 2019 18:19)  famotidine 20 mg oral tablet: 1 tab(s) by gastrostomy tube once a day (03 Feb 2019 18:19)  ferrous sulfate 325 mg (65 mg elemental iron) oral tablet: 1 tab(s) orally 2 times a day (03 Feb 2019 18:19)  Fleet Enema 7 g-19 g rectal enema: 118 milliliter(s) rectal once, As Needed - for constipation (03 Feb 2019 18:19)  HumaLOG 100 units/mL subcutaneous solution: inject as per sliding scale    0 unit(s)= 0 - 200  2 unit(s)= 201-250  4 unit(s)= 251-300  6 unit(s)= 301-350  8 unit(s)= 351-400  401+ Call MD and &gt; call MD (03 Feb 2019 18:19)  Lasix 40 mg oral tablet: 1 tab(s) by gastrostomy tube once a day- mod congestion/small bilaterl effusion (03 Feb 2019 18:19)  Mag-Ox 400 oral tablet: 1 tab(s) by gastrostomy tube once a day (03 Feb 2019 18:19)  magnesium hydroxide 8% oral suspension: 30 milliliter(s) orally once a day (at bedtime), As Needed - for constipation (03 Feb 2019 18:19)  Metoprolol Tartrate 25 mg oral tablet: 0.5 tab(s) by gastrostomy tube 2 times a day (03 Feb 2019 18:19)  Micatin 2% topical cream: Apply topically to affected area 3 times a day ( perineal area/periwound) (03 Feb 2019 18:19)  Multiple Vitamins oral tablet: 1 tab(s) by gastrostomy tube once a day (03 Feb 2019 18:19)  potassium chloride 10 mEq oral tablet, extended release: 1 tab(s) orally every other day- muscle weakness (03 Feb 2019 18:19)  Santyl 250 units/g topical ointment: Apply topically to affected area once a day (03 Feb 2019 18:19)      Hospital Medications:  acetaminophen   Tablet .. 650 milliGRAM(s) Oral every 6 hours PRN  ascorbic acid 500 milliGRAM(s) Oral daily  atorvastatin 20 milliGRAM(s) Oral at bedtime  clopidogrel Tablet 75 milliGRAM(s) Oral daily  Dakins Solution - 1/4 Strength 1 Application(s) Topical three times a day  dextrose 40% Gel 15 Gram(s) Oral once PRN  dextrose 5%. 1000 milliLiter(s) IV Continuous <Continuous>  dextrose 50% Injectable 12.5 Gram(s) IV Push once  dextrose 50% Injectable 25 Gram(s) IV Push once  dextrose 50% Injectable 25 Gram(s) IV Push once  furosemide   Injectable 20 milliGRAM(s) IV Push two times a day  glucagon  Injectable 1 milliGRAM(s) IntraMuscular once PRN  insulin lispro (HumaLOG) corrective regimen sliding scale   SubCutaneous every 6 hours  lactobacillus acidophilus 1 Tablet(s) Oral two times a day with meals  nystatin Powder 1 Application(s) Topical two times a day  phytonadione   Solution 2.5 milliGRAM(s) Oral once  piperacillin/tazobactam IVPB. 3.375 Gram(s) IV Intermittent every 8 hours  polyethylene glycol 3350 17 Gram(s) Oral daily  senna Syrup 5 milliLiter(s) Oral at bedtime  tamsulosin 0.4 milliGRAM(s) Oral at bedtime  vancomycin  IVPB 750 milliGRAM(s) IV Intermittent every 24 hours  vancomycin  IVPB      zinc oxide 20% Ointment 1 Application(s) Topical three times a day      PMHX/PSHX:  CKD (chronic kidney disease) stage 3, GFR 30-59 ml/min  Pressure ulcer, unspecified pressure ulcer stage  Coronary artery disease of native artery of native heart with stable angina pectoris  Diastolic congestive heart failure, unspecified congestive heart failure chronicity  Smoker  Gout involving toe, unspecified cause, unspecified chronicity, unspecified laterality  Gouty nephropathy  PAD (peripheral artery disease)  Chronic atrial fibrillation  Hyperlipidemia, unspecified hyperlipidemia type  HTN (hypertension), benign  Type 2 diabetes mellitus with other circulatory complication, with long-term current use of insulin  S/P angioplasty with stent  S/P hernia repair  Artificial cardiac pacemaker  S/P aortobifemoral bypass surgery      Family history:  Family history of hypertension (Father)  No pertinent family history in first degree relatives      Social History: unable to obtain 2/2 mental status     ROS:  unable to obtain 2/2 mental status     PHYSICAL EXAM:     GENERAL:  Disheveled, restrained, in NAD  HEENT:  NC/AT  CHEST:  Full & symmetric excursion, no increased effort  ABDOMEN:  Soft, non-tender, PEG site with redness, shallow ulceration, maceration of skin.  External bumper very loose at 4.5cm. Minimal drainage.  EXTREMITIES:  no cyanosis,clubbing or edema  SKIN:  Peg site rash as above  NEURO:  Alert, not oriented    Vital Signs:  Vital Signs Last 24 Hrs  T(C): 36.3 (10 Feb 2019 11:55), Max: 36.7 (09 Feb 2019 20:51)  T(F): 97.4 (10 Feb 2019 11:55), Max: 98 (09 Feb 2019 20:51)  HR: 86 (10 Feb 2019 11:55) (84 - 92)  BP: 101/51 (10 Feb 2019 11:55) (101/51 - 120/67)  BP(mean): --  RR: 20 (10 Feb 2019 11:55) (18 - 20)  SpO2: 100% (10 Feb 2019 11:55) (97% - 100%)  Daily     Daily     LABS:                        8.7    15.0  )-----------( 327      ( 10 Feb 2019 12:20 )             27.3     02-10    151<H>  |  106  |  43<H>  ----------------------------<  132<H>  3.9   |  33<H>  |  1.66<H>    Ca    8.3<L>      10 Feb 2019 12:02        PT/INR - ( 10 Feb 2019 12:02 )   PT: 34.1 sec;   INR: 2.87 ratio         PTT - ( 10 Feb 2019 12:02 )  PTT:45.2 sec        Imaging:  < from: Xray Feeding Tube Check SI (02.06.19 @ 10:38) >    EXAM:  FEEDING TUBE CHECK SI-SC                            PROCEDURE DATE:  02/06/2019            INTERPRETATION:  CLINICAL INFORMATION: PEG placement.    EXAM: Frontal radiograph the abdomen.    COMPARISON: CT abdomen 12/20/2016.    FINDINGS:  PEGtube. Contrast is seen within the stomach. No obvious extraluminal   contrast is visualized. Exam slightly limited by single view.  Nonobstructive bowel gas pattern.  There is no evidence of intraperitoneal free air.  There is no evidence of organomegaly or pathologic calcification.  The visualized osseous structures demonstrate no acute pathology.  Partially visualized right hip arthroplasty.    IMPRESSION:     Evaluation of PEG tube demonstrates contrast within the stomach without   obvious extra luminal contrast. Exam slightly limited by single view.    Nonobstructive bowel gas pattern without evidence of free air.                RAFFI HUTCHINSON M.D., RADIOLOGIST RESIDENT  This document has been electronically signed.  KATIE JIN M.D.,ATTENDING RADIOLOGIST  This document has been electronically signed. Feb 6 2019  1:13PM                < end of copied text >

## 2019-02-10 NOTE — PROGRESS NOTE ADULT - SUBJECTIVE AND OBJECTIVE BOX
CARDIOLOGY     PROGRESS  NOTE   ________________________________________________    CHIEF COMPLAINT:Patient is a 73y old  Male who presents with a chief complaint of Sepsis (10 Feb 2019 08:24)  doing better.  	  REVIEW OF SYSTEMS:  CONSTITUTIONAL: No fever, weight loss, or fatigue  EYES: No eye pain, visual disturbances, or discharge  ENT:  No difficulty hearing, tinnitus, vertigo; No sinus or throat pain  NECK: No pain or stiffness  RESPIRATORY: No cough, wheezing, chills or hemoptysis; No Shortness of Breath  CARDIOVASCULAR: No chest pain, palpitations, passing out, dizziness, or leg swelling  GASTROINTESTINAL: No abdominal or epigastric pain. No nausea, vomiting, or hematemesis; No diarrhea or constipation. No melena or hematochezia.  GENITOURINARY: No dysuria, frequency, hematuria, or incontinence  NEUROLOGICAL: No headaches, memory loss, loss of strength, numbness, or tremors  SKIN: No itching, burning, rashes, or lesions   LYMPH Nodes: No enlarged glands  ENDOCRINE: No heat or cold intolerance; No hair loss  MUSCULOSKELETAL: No joint pain or swelling; No muscle, back, or extremity pain  PSYCHIATRIC: No depression, anxiety, mood swings, or difficulty sleeping  HEME/LYMPH: No easy bruising, or bleeding gums  ALLERGY AND IMMUNOLOGIC: No hives or eczema	    [ ] All others negative	  [ ] Unable to obtain    PHYSICAL EXAM:  T(C): 36.4 (02-10-19 @ 04:52), Max: 36.7 (02-09-19 @ 20:51)  HR: 89 (02-10-19 @ 04:52) (84 - 93)  BP: 120/67 (02-10-19 @ 04:52) (101/63 - 130/88)  RR: 20 (02-10-19 @ 04:52) (18 - 20)  SpO2: 97% (02-10-19 @ 04:52) (97% - 99%)  Wt(kg): --  I&O's Summary    09 Feb 2019 07:01  -  10 Feb 2019 07:00  --------------------------------------------------------  IN: 960 mL / OUT: 1600 mL / NET: -640 mL        Appearance: Normal	  HEENT:   Normal oral mucosa, PERRL, EOMI	  Lymphatic: No lymphadenopathy  Cardiovascular: Normal S1 S2, No JVD, + murmurs, No edema  Respiratory: Lungs clear to auscultation	  Psychiatry: A & O x 3, Mood & affect appropriate  Gastrointestinal:  Soft, Non-tender, + BS	  Skin: No rashes, No ecchymoses, No cyanosis	  Neurologic: Non-focal  Extremities: Normal range of motion, No clubbing, cyanosis or edema  Vascular: Peripheral pulses palpable 2+ bilaterally    MEDICATIONS  (STANDING):  ascorbic acid 500 milliGRAM(s) Oral daily  atorvastatin 20 milliGRAM(s) Oral at bedtime  clopidogrel Tablet 75 milliGRAM(s) Oral daily  Dakins Solution - 1/4 Strength 1 Application(s) Topical three times a day  dextrose 5%. 1000 milliLiter(s) (50 mL/Hr) IV Continuous <Continuous>  dextrose 50% Injectable 12.5 Gram(s) IV Push once  dextrose 50% Injectable 25 Gram(s) IV Push once  dextrose 50% Injectable 25 Gram(s) IV Push once  furosemide   Injectable 20 milliGRAM(s) IV Push two times a day  insulin lispro (HumaLOG) corrective regimen sliding scale   SubCutaneous every 6 hours  insulin NPH human recombinant 3 Unit(s) SubCutaneous <User Schedule>  lactobacillus acidophilus 1 Tablet(s) Oral two times a day with meals  nystatin Powder 1 Application(s) Topical two times a day  phytonadione   Solution 2.5 milliGRAM(s) Oral once  piperacillin/tazobactam IVPB. 3.375 Gram(s) IV Intermittent every 8 hours  polyethylene glycol 3350 17 Gram(s) Oral daily  senna Syrup 5 milliLiter(s) Oral at bedtime  tamsulosin 0.4 milliGRAM(s) Oral at bedtime  vancomycin  IVPB 750 milliGRAM(s) IV Intermittent every 24 hours  vancomycin  IVPB      zinc oxide 20% Ointment 1 Application(s) Topical three times a day      TELEMETRY: 	    ECG:  	  RADIOLOGY:  OTHER: 	  	  LABS:	 	    CARDIAC MARKERS:                                8.0    13.60 )-----------( 309      ( 09 Feb 2019 12:08 )             27.1     02-09    152<H>  |  109<H>  |  41<H>  ----------------------------<  110<H>  3.6   |  32<H>  |  1.53<H>    Ca    8.1<L>      09 Feb 2019 10:55      proBNP: Serum Pro-Brain Natriuretic Peptide: 78592 pg/mL (02-03 @ 13:07)    Lipid Profile:   HgA1c: Hemoglobin A1C, Whole Blood: 7.4 % (02-04 @ 08:56)    TSH:   PT/INR - ( 09 Feb 2019 12:08 )   PT: 43.8 sec;   INR: 3.69 ratio         < from: CT Chest No Cont (02.07.19 @ 12:10) >  1.  Bilateral pleural effusions and atelectasis.  2.  Mosaic attenuation attenuation the lung is likely due to air trapping.      < end of copied text >  < from: Xray Chest 1 View- PORTABLE-Urgent (02.08.19 @ 11:49) >  Increased pulmonary edema. Stablesmall left pleural effusion. Left   retrocardiac opacity may represent atelectasis.    Sternotomy and mitral valve replacement. Stable left pacer.    < end of copied text >        Assessment and plan  ---------------------------  need to repeat echo  decrease lasix to 20 mg daily  f/u lytes closely  psych eval  oob to chair

## 2019-02-10 NOTE — PROGRESS NOTE ADULT - ASSESSMENT
72 yo male PMH CAD c/b CABG, A fib (on coumadin), CKD stage 3, PAD s/p stents, PEG tube, in-dwelling Hoskins who presents to the nursing home for increased confusion and elevated WBC. History was taken from wife at bedside.	Per wife, patient had been fully functional up until November. He had a hypoglycemic episode, fell, and broke his hip in November. He was admitted to the hospital for surgery, which was uneventful and sent to rehab (in early December).     During the rehab stay, he developed numerous ulcers, including a decubitus ulcer. He was sent back to [facility] for sepsis from the decubitus ulcer. He was treated with abx and returned to rehab for a second time. This second hospital course was complicated by acute renal failure requiring dialysis, IV abx, and "surgery" to clean the ulcer. In the ED, he received Vanc/Zosyn. He was admitted to medicine for further management and sepsis on arrival from   sacral osteomylitis.     ID: Agree IV Zosyn 3.375 grams every 8 hours and IV Vancomycin 750 mg/day for sacral decubitus. CT chest shows no pneumonia, bilateral effusions. PEG feeds in place, some leakage around the site. Chronic hoskins changed this admission. Agree with ID. Had bone biopsy done today  which now shows GNR. CT of lumbar spine confirms sacral OM. Need bone culture to guide treatment. PT ordered. Possible VAC dressing later in week if surgery/wound care agrees.     Reanl: CKD III stable, creatinine 1.66. Renal sono confirms MRD. On free water replacement 250 ml QID via peg for hypernatremia.     Urology: Add Flomax .4 mg/day. Used to take Cardura at home. TOV eventually ???    CV: Lasix resumed given CHF on CXR Lasix 20 mg IVP bid. TTE pending, can stop tele as NSR for 48 hours.    Stop all IVF in AM if BS stable. Resume Plavix 75 mg/day, taken at home with Lipitor.  Hold Coumadin for PICC line.   Need INR < 1.50 for PICC.       Endo: episodes of hypoglycemia off insulin. Stopped D 10. Endo consult requested. PEG feeds on hold given leaking. NPH on hold  until PEG feeds working well.         GI: Peg site leaking past 24 hours. GI was called to inspect site and adjust if possible.     Agree DNR. SHERIE can be done but not candidate for vascular intervention.  Consider SHERIE early next week.

## 2019-02-10 NOTE — CHART NOTE - NSCHARTNOTEFT_GEN_A_CORE
[phone]  BASIA GLORIA    Notified  by RN for PEG site leaking tube feeds. Pt seen and examined at bedside.   Pt has a gastrostomy tube, with what appears to be tube feeds leaking around site and has caused maceration/erosion of skin.   Leaking has been noted in the past.   Will hold tube feeds now, consider GI vs IR consult.    Will discuss with primary team this AM    To Arizmendi PA-C  Dept of Medicine [phone]  BASIA GLORIA    Notified  by RN for PEG site leaking tube feeds. Pt seen and examined at bedside.   Pt has a gastrostomy tube, with what appears to be tube feeds leaking around site and has caused maceration/erosion of skin.   Leaking has been noted in the past.   Will hold tube feeds at this time, consider GI vs IR consult.    Will discuss with primary team this AM    To Arizmendi PA-C  Dept of Medicine  [phone]

## 2019-02-10 NOTE — CONSULT NOTE ADULT - ASSESSMENT
Impression:  1) PEG site leakage with possible SSTI    Recs:  - would continue vanc/zosyn which are adequate coverage for any skin infection/cellulitis around PEG site  - no need for nystatin powder  - keep site very dry  - apply layer of barrier cream under bumper and around PEG site; will need to apply throughout day  - if area does not heal, can consider attempting to upsize PEG to 20F tube, however, there is not a lot of space around the current tube so this may not fit  - ultimately if continues to leak, patient may need to have PEG removed with NGT for feeding for several days to allow tract to close, and then new PEG place at different site endoscopically. Impression:  1) PEG site leakage with possible SSTI    Recs:  - would continue vanc/zosyn which are adequate coverage for any skin infection/cellulitis around PEG site  - no need for nystatin powder  - keep site very dry  - apply layer of barrier cream under bumper and around PEG site; will need to apply throughout day  - if area does not heal, can consider attempting to upsize PEG to 20F tube, however, there is not a lot of space around the current tube so this may not fit  - ultimately if continues to leak, patient may need to have PEG removed with NGT for feeding for several days to allow tract to close, and then new PEG place at different site endoscopically.   - would hold tube feeds for now  - ok to give meds/flushes per peg as peg itself is intact Impression:  1) PEG site leakage with possible SSTI    Recs:  - obtain CT Abdomen to ensure the patient does not have buried bumper syndrome  - would continue vanc/zosyn which are adequate coverage for any skin infection/cellulitis around PEG site  - no need for nystatin powder  - keep site very dry  - apply layer of barrier cream under bumper and around PEG site; will need to apply throughout day  - if area does not heal, can consider attempting to upsize PEG to 20F tube, however, there is not a lot of space around the current tube so this may not fit  - ultimately if continues to leak, patient may need to have PEG removed with NGT for feeding for several days to allow tract to close, and then new PEG place at different site endoscopically.   - would hold tube feeds for now

## 2019-02-10 NOTE — CONSULT NOTE ADULT - ATTENDING COMMENTS
Patient seen and examined. Agree with above. The skin underneath the bumper is erythematous and ulcerated. Would obtain CT abdomen to rule out buried bumper syndrome, as the bumper was likely very tight previously. Otherwise, leave bumper loose for now. The other possibility is that the leakage of gastric juices has been causing the ulceration - we will consider changing PEG to a larger size if CT is negative.

## 2019-02-10 NOTE — PROGRESS NOTE ADULT - SUBJECTIVE AND OBJECTIVE BOX
INTERVAL HPI/OVERNIGHT EVENTS:  Pt seen and examined at bedside.     Allergies/Intolerance: No Known Allergies      MEDICATIONS  (STANDING):  ascorbic acid 500 milliGRAM(s) Oral daily  atorvastatin 20 milliGRAM(s) Oral at bedtime  clopidogrel Tablet 75 milliGRAM(s) Oral daily  Dakins Solution - 1/4 Strength 1 Application(s) Topical three times a day  dextrose 5%. 1000 milliLiter(s) (50 mL/Hr) IV Continuous <Continuous>  dextrose 50% Injectable 12.5 Gram(s) IV Push once  dextrose 50% Injectable 25 Gram(s) IV Push once  dextrose 50% Injectable 25 Gram(s) IV Push once  furosemide   Injectable 20 milliGRAM(s) IV Push two times a day  insulin lispro (HumaLOG) corrective regimen sliding scale   SubCutaneous every 6 hours  insulin NPH human recombinant 3 Unit(s) SubCutaneous <User Schedule>  lactobacillus acidophilus 1 Tablet(s) Oral two times a day with meals  nystatin Powder 1 Application(s) Topical two times a day  piperacillin/tazobactam IVPB. 3.375 Gram(s) IV Intermittent every 8 hours  polyethylene glycol 3350 17 Gram(s) Oral daily  senna Syrup 5 milliLiter(s) Oral at bedtime  tamsulosin 0.4 milliGRAM(s) Oral at bedtime  vancomycin  IVPB 750 milliGRAM(s) IV Intermittent every 24 hours  vancomycin  IVPB      zinc oxide 20% Ointment 1 Application(s) Topical three times a day    MEDICATIONS  (PRN):  acetaminophen   Tablet .. 650 milliGRAM(s) Oral every 6 hours PRN Temp greater or equal to 38C (100.4F)  dextrose 40% Gel 15 Gram(s) Oral once PRN Blood Glucose LESS THAN 70 milliGRAM(s)/deciliter  glucagon  Injectable 1 milliGRAM(s) IntraMuscular once PRN Glucose LESS THAN 70 milligrams/deciliter        ROS: all systems reviewed and wnl      PHYSICAL EXAMINATION:  Vital Signs Last 24 Hrs  T(C): 36.4 (10 Feb 2019 04:52), Max: 36.7 (09 Feb 2019 20:51)  T(F): 97.6 (10 Feb 2019 04:52), Max: 98 (09 Feb 2019 20:51)  HR: 89 (10 Feb 2019 04:52) (84 - 93)  BP: 120/67 (10 Feb 2019 04:52) (101/63 - 130/88)  BP(mean): --  RR: 20 (10 Feb 2019 04:52) (18 - 20)  SpO2: 97% (10 Feb 2019 04:52) (97% - 99%)  CAPILLARY BLOOD GLUCOSE      POCT Blood Glucose.: 90 mg/dL (10 Feb 2019 06:36)  POCT Blood Glucose.: 143 mg/dL (10 Feb 2019 00:35)  POCT Blood Glucose.: 346 mg/dL (09 Feb 2019 18:32)  POCT Blood Glucose.: 92 mg/dL (09 Feb 2019 11:59)      02-09 @ 07:01  -  02-10 @ 07:00  --------------------------------------------------------  IN: 960 mL / OUT: 1600 mL / NET: -640 mL        GENERAL:   NECK: supple, No JVD  CHEST/LUNG: clear to auscultation bilaterally; no rales, rhonchi, or wheezing b/l  HEART: normal S1, S2  ABDOMEN: BS+, soft, ND, NT   EXTREMITIES:  pulses palpable; no clubbing, cyanosis, or edema b/l LEs  SKIN: no rashes or lesions      LABS:                        8.0    13.60 )-----------( 309      ( 09 Feb 2019 12:08 )             27.1     02-09    152<H>  |  109<H>  |  41<H>  ----------------------------<  110<H>  3.6   |  32<H>  |  1.53<H>    Ca    8.1<L>      09 Feb 2019 10:55      PT/INR - ( 09 Feb 2019 12:08 )   PT: 43.8 sec;   INR: 3.69 ratio INTERVAL HPI/OVERNIGHT EVENTS:  Pt seen and examined at bedside.     Allergies/Intolerance: No Known Allergies      MEDICATIONS  (STANDING):  ascorbic acid 500 milliGRAM(s) Oral daily  atorvastatin 20 milliGRAM(s) Oral at bedtime  clopidogrel Tablet 75 milliGRAM(s) Oral daily  Dakins Solution - 1/4 Strength 1 Application(s) Topical three times a day  dextrose 5%. 1000 milliLiter(s) (50 mL/Hr) IV Continuous <Continuous>  dextrose 50% Injectable 12.5 Gram(s) IV Push once  dextrose 50% Injectable 25 Gram(s) IV Push once  dextrose 50% Injectable 25 Gram(s) IV Push once  furosemide   Injectable 20 milliGRAM(s) IV Push two times a day  insulin lispro (HumaLOG) corrective regimen sliding scale   SubCutaneous every 6 hours  insulin NPH human recombinant 3 Unit(s) SubCutaneous <User Schedule>  lactobacillus acidophilus 1 Tablet(s) Oral two times a day with meals  nystatin Powder 1 Application(s) Topical two times a day  piperacillin/tazobactam IVPB. 3.375 Gram(s) IV Intermittent every 8 hours  polyethylene glycol 3350 17 Gram(s) Oral daily  senna Syrup 5 milliLiter(s) Oral at bedtime  tamsulosin 0.4 milliGRAM(s) Oral at bedtime  vancomycin  IVPB 750 milliGRAM(s) IV Intermittent every 24 hours  vancomycin  IVPB      zinc oxide 20% Ointment 1 Application(s) Topical three times a day    MEDICATIONS  (PRN):  acetaminophen   Tablet .. 650 milliGRAM(s) Oral every 6 hours PRN Temp greater or equal to 38C (100.4F)  dextrose 40% Gel 15 Gram(s) Oral once PRN Blood Glucose LESS THAN 70 milliGRAM(s)/deciliter  glucagon  Injectable 1 milliGRAM(s) IntraMuscular once PRN Glucose LESS THAN 70 milligrams/deciliter        ROS: all systems reviewed and wnl      PHYSICAL EXAMINATION:  Vital Signs Last 24 Hrs  T(C): 36.4 (10 Feb 2019 04:52), Max: 36.7 (09 Feb 2019 20:51)  T(F): 97.6 (10 Feb 2019 04:52), Max: 98 (09 Feb 2019 20:51)  HR: 89 (10 Feb 2019 04:52) (84 - 93)  BP: 120/67 (10 Feb 2019 04:52) (101/63 - 130/88)  BP(mean): --  RR: 20 (10 Feb 2019 04:52) (18 - 20)  SpO2: 97% (10 Feb 2019 04:52) (97% - 99%)  CAPILLARY BLOOD GLUCOSE      POCT Blood Glucose.: 90 mg/dL (10 Feb 2019 06:36)  POCT Blood Glucose.: 143 mg/dL (10 Feb 2019 00:35)  POCT Blood Glucose.: 346 mg/dL (09 Feb 2019 18:32)  POCT Blood Glucose.: 92 mg/dL (09 Feb 2019 11:59)      02-09 @ 07:01  -  02-10 @ 07:00  --------------------------------------------------------  IN: 960 mL / OUT: 1600 mL / NET: -640 mL        GENERAL: in bed, comfortable, no fevers, SOB or CP  NECK: supple, No JVD  CHEST/LUNG: clear to auscultation bilaterally; no rales, rhonchi, or wheezing b/l  HEART: normal S1, S2  ABDOMEN: BS+, soft, ND, NT. PEG tube in place, some leakage around site   EXTREMITIES:  pulses palpable; no clubbing, cyanosis, or edema b/l LEs  hoskins in place  SKIN: no rashes or lesions      LABS:                        8.0    13.60 )-----------( 309      ( 09 Feb 2019 12:08 )             27.1     02-09    152<H>  |  109<H>  |  41<H>  ----------------------------<  110<H>  3.6   |  32<H>  |  1.53<H>    Ca    8.1<L>      09 Feb 2019 10:55      PT/INR - ( 09 Feb 2019 12:08 )   PT: 43.8 sec;   INR: 3.69 ratio

## 2019-02-11 LAB
ANION GAP SERPL CALC-SCNC: 18 MMOL/L — HIGH (ref 5–17)
APTT BLD: 35.9 SEC — SIGNIFICANT CHANGE UP (ref 27.5–36.3)
BUN SERPL-MCNC: 46 MG/DL — HIGH (ref 7–23)
CALCIUM SERPL-MCNC: 8.3 MG/DL — LOW (ref 8.4–10.5)
CHLORIDE SERPL-SCNC: 105 MMOL/L — SIGNIFICANT CHANGE UP (ref 96–108)
CO2 SERPL-SCNC: 29 MMOL/L — SIGNIFICANT CHANGE UP (ref 22–31)
CREAT SERPL-MCNC: 1.75 MG/DL — HIGH (ref 0.5–1.3)
GLUCOSE BLDC GLUCOMTR-MCNC: 215 MG/DL — HIGH (ref 70–99)
GLUCOSE BLDC GLUCOMTR-MCNC: 224 MG/DL — HIGH (ref 70–99)
GLUCOSE BLDC GLUCOMTR-MCNC: 230 MG/DL — HIGH (ref 70–99)
GLUCOSE BLDC GLUCOMTR-MCNC: 244 MG/DL — HIGH (ref 70–99)
GLUCOSE BLDC GLUCOMTR-MCNC: 306 MG/DL — HIGH (ref 70–99)
GLUCOSE SERPL-MCNC: 263 MG/DL — HIGH (ref 70–99)
HCT VFR BLD CALC: 27.7 % — LOW (ref 39–50)
HGB BLD-MCNC: 8.6 G/DL — LOW (ref 13–17)
INR BLD: 1.7 RATIO — HIGH (ref 0.88–1.16)
MCHC RBC-ENTMCNC: 29.1 PG — SIGNIFICANT CHANGE UP (ref 27–34)
MCHC RBC-ENTMCNC: 31.2 GM/DL — LOW (ref 32–36)
MCV RBC AUTO: 93.4 FL — SIGNIFICANT CHANGE UP (ref 80–100)
PLATELET # BLD AUTO: 343 K/UL — SIGNIFICANT CHANGE UP (ref 150–400)
POTASSIUM SERPL-MCNC: 3.7 MMOL/L — SIGNIFICANT CHANGE UP (ref 3.5–5.3)
POTASSIUM SERPL-SCNC: 3.7 MMOL/L — SIGNIFICANT CHANGE UP (ref 3.5–5.3)
PROTHROM AB SERPL-ACNC: 19.9 SEC — HIGH (ref 10–12.9)
RBC # BLD: 2.96 M/UL — LOW (ref 4.2–5.8)
RBC # FLD: 18.2 % — HIGH (ref 10.3–14.5)
SODIUM SERPL-SCNC: 152 MMOL/L — HIGH (ref 135–145)
WBC # BLD: 16 K/UL — HIGH (ref 3.8–10.5)
WBC # FLD AUTO: 16 K/UL — HIGH (ref 3.8–10.5)

## 2019-02-11 PROCEDURE — 99223 1ST HOSP IP/OBS HIGH 75: CPT | Mod: GC

## 2019-02-11 PROCEDURE — 99232 SBSQ HOSP IP/OBS MODERATE 35: CPT

## 2019-02-11 PROCEDURE — 71045 X-RAY EXAM CHEST 1 VIEW: CPT | Mod: 26

## 2019-02-11 PROCEDURE — 93306 TTE W/DOPPLER COMPLETE: CPT | Mod: 26

## 2019-02-11 RX ORDER — INSULIN LISPRO 100/ML
VIAL (ML) SUBCUTANEOUS EVERY 4 HOURS
Qty: 0 | Refills: 0 | Status: DISCONTINUED | OUTPATIENT
Start: 2019-02-11 | End: 2019-02-11

## 2019-02-11 RX ORDER — FUROSEMIDE 40 MG
20 TABLET ORAL DAILY
Qty: 0 | Refills: 0 | Status: DISCONTINUED | OUTPATIENT
Start: 2019-02-11 | End: 2019-02-11

## 2019-02-11 RX ORDER — FUROSEMIDE 40 MG
40 TABLET ORAL DAILY
Qty: 0 | Refills: 0 | Status: DISCONTINUED | OUTPATIENT
Start: 2019-02-11 | End: 2019-02-12

## 2019-02-11 RX ORDER — INSULIN LISPRO 100/ML
VIAL (ML) SUBCUTANEOUS EVERY 4 HOURS
Qty: 0 | Refills: 0 | Status: DISCONTINUED | OUTPATIENT
Start: 2019-02-11 | End: 2019-02-13

## 2019-02-11 RX ADMIN — ZINC OXIDE 1 APPLICATION(S): 200 OINTMENT TOPICAL at 07:02

## 2019-02-11 RX ADMIN — Medication 1 APPLICATION(S): at 22:20

## 2019-02-11 RX ADMIN — Medication 2: at 15:31

## 2019-02-11 RX ADMIN — CLOPIDOGREL BISULFATE 75 MILLIGRAM(S): 75 TABLET, FILM COATED ORAL at 13:11

## 2019-02-11 RX ADMIN — ZINC OXIDE 1 APPLICATION(S): 200 OINTMENT TOPICAL at 13:12

## 2019-02-11 RX ADMIN — Medication 250 MILLIGRAM(S): at 17:34

## 2019-02-11 RX ADMIN — Medication 500 MILLIGRAM(S): at 13:12

## 2019-02-11 RX ADMIN — ZINC OXIDE 1 APPLICATION(S): 200 OINTMENT TOPICAL at 00:07

## 2019-02-11 RX ADMIN — Medication 2: at 12:56

## 2019-02-11 RX ADMIN — Medication 20 MILLIGRAM(S): at 07:05

## 2019-02-11 RX ADMIN — Medication 1 APPLICATION(S): at 07:36

## 2019-02-11 RX ADMIN — Medication 1 TABLET(S): at 08:52

## 2019-02-11 RX ADMIN — Medication 2: at 07:33

## 2019-02-11 RX ADMIN — ZINC OXIDE 1 APPLICATION(S): 200 OINTMENT TOPICAL at 22:20

## 2019-02-11 RX ADMIN — PIPERACILLIN AND TAZOBACTAM 25 GRAM(S): 4; .5 INJECTION, POWDER, LYOPHILIZED, FOR SOLUTION INTRAVENOUS at 13:12

## 2019-02-11 RX ADMIN — Medication 3: at 00:11

## 2019-02-11 RX ADMIN — Medication 2: at 22:19

## 2019-02-11 RX ADMIN — PIPERACILLIN AND TAZOBACTAM 25 GRAM(S): 4; .5 INJECTION, POWDER, LYOPHILIZED, FOR SOLUTION INTRAVENOUS at 08:52

## 2019-02-11 RX ADMIN — PIPERACILLIN AND TAZOBACTAM 25 GRAM(S): 4; .5 INJECTION, POWDER, LYOPHILIZED, FOR SOLUTION INTRAVENOUS at 22:18

## 2019-02-11 RX ADMIN — Medication 1 APPLICATION(S): at 13:12

## 2019-02-11 NOTE — PROGRESS NOTE ADULT - ASSESSMENT
73M with multiple medical problems (CAD, CABG, PAD, aorto-bifemoral bypass, CKD), R hip fracture s/p ORIF, s/p PEG/hoskins here with sacral osteomyelitis with sepsis - likely source is osteomyelitis.  Hx also notable for unconntrolled Type 2 DM initially with hypoglycemia now with hyperglycemia on tube feeds (treated with NPH and correction scale).  tube feeds off since yesterday, now with overnight hyperglycemia, increasing white count (on IV abx) and ongoing hypernatremia

## 2019-02-11 NOTE — PROGRESS NOTE ADULT - PROBLEM SELECTOR PLAN 2
pt with ongoing hypernatremia. Likely due to free water deficit.  Has a calculated 2.4 liter fluid deficit.  Would benefit from free water boluses, defer to primary team given concern for PEG leak and consider other routes of administration for free water.

## 2019-02-11 NOTE — PROGRESS NOTE ADULT - PROBLEM SELECTOR PLAN 1
Goal glucose 100-200. No need for tight glycemic control.  can place pt on q4 hr correction scale given hyperglycemia   remove dextrose from IV abx if possible   would not add basal for now given that tube feeds are off and pt is at risk for hypoglcyemia (will add basal if continued hyperglycemia ensues)    when tube feeds are restarted and are increased in rate, we can restart NPH with tube feeds.     inform endocrine of hypoglycemia or persistent hyperglycemia episodes as changes in pts insulin regimen will need to be made.   notify endocrine if any plans to be NPO/diet changes as this will also affect insulin regimen.

## 2019-02-11 NOTE — PROGRESS NOTE ADULT - SUBJECTIVE AND OBJECTIVE BOX
f/u OM sacrum    Interval History/ROS:   antibiotics changed from erta to zosyn, however, he is still confused but more calm.  sacral decubitus changed just prior to my evaluation today. no fever.  culture so far polymicrobial.  Unable to obtain ROS - patient confused.    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 3.375 every 8 hours  vancomycin  IVPB 750 every 24 hours    MEDICATIONS  (STANDING):  atorvastatin 20 at bedtime  clopidogrel Tablet 75 daily  furosemide   Injectable 20 daily  insulin lispro (HumaLOG) corrective regimen sliding scale  every 4 hours  polyethylene glycol 3350 17 daily  senna Syrup 5 at bedtime  tamsulosin 0.4 at bedtime    T(F): 97.9 (02-11-19 @ 11:34), Max: 98.1 (02-11-19 @ 04:25)  HR: 90 (02-11-19 @ 11:34)  BP: 102/54 (02-11-19 @ 11:34)  RR: 19 (02-11-19 @ 11:34)  SpO2: 94% (02-11-19 @ 11:34) (94% - 97%)    PHYSICAL EXAM:  General: non-toxic; in bed; fidgeting with her hands  HEAD/EYES: anicteric  ENT:  supple  Cardiovascular:   S1, S2  Respiratory:  clear bilaterally  GI:  soft, non-tender, normal bowel sounds; peg  :  catheter - clear urine but dark  Musculoskeletal:  no synovitis  Neurologic:  awake, confused  Skin:  multiple eschars of the legs b/l  R hip healing  Psychiatric:  not really able to assess  Vascular:  no phlebitis                                8.6    16.0  )-----------( 343      ( 11 Feb 2019 08:15 )             27.7 02-11    152  |  105  |  46  ----------------------------<  263  3.7   |  29  |  1.75  Ca    8.3      11 Feb 2019 08:13    C-Reactive Protein, Serum: 8.89 (02-08-19)    Sedimentation Rate, Erythrocyte: 35 (02-08-19)  Sedimentation Rate, Erythrocyte: 66 (02-03-19)    Vancomycin Level, Trough: 17.6 (02-08 @ 17:46)  Vancomycin Level, Trough: 20.5 (02-07 @ 18:54)  Vancomycin Level, Trough: 21.1 (02-07 @ 18:53)    MICROBIOLOGY:    Culture - Tissue with Gram Stain (02.08.19 @ 20:13)    Gram Stain:   No polymorphonuclear cells seen per low power field  No organisms seen per oil power field    -  Gentamicin: S <=1    -  Imipenem: S <=1    -  Levofloxacin: S <=1    -  Meropenem: S <=1    -  Piperacillin/Tazobactam: S <=8    -  Tigecycline: S <=1    -  Tobramycin: S <=2    -  Trimethoprim/Sulfamethoxazole: S <=0.5/9.5    -  Amikacin: S <=8    -  Ampicillin: R >16     -  Ampicillin/Sulbactam: S <=4/2    -  Aztreonam: S <=4    -  Cefazolin: R >16    -  Cefepime: S <=2    -  Cefotaxime: S <=2    -  Cefoxitin: R >16    -  Ceftazidime: S <=1    -  Ceftriaxone: S <=1     -  Cefuroxime: S <=4    -  Ciprofloxacin: S <=0.5    -  Ertapenem: S <=0.5    Specimen Source: .Tissue sacral bone    Culture Results:   Few Citrobacter freundii complex  Rare Enterococcus species  Few Coag Negative Staphylococcus     Culture - Blood (02.03.19 @ 17:52)    Specimen Source: .Blood Blood-Venous    Culture Results:   No growth to date.    Culture - Blood (02.03.19 @ 17:52)    Specimen Source: .Blood Blood-Peripheral    Culture Results:   No growth to date.    Culture - Urine (02.03.19 @ 16:44)    Specimen Source: .Urine Catheterized    Culture Results:   <10,000 CFU/ml Normal Urogenital nany present    RADIOLOGY:  US Kidney and Bladder (02.06.19 @ 09:54) >  IMPRESSION:  Renal parenchymal disease. No hydronephrosis. Trace perinephric fluid bilaterally.  Trace to small abdominal pelvic free fluid.  Cholelithiasis. 3 mm gallbladder polyp. Follow-upcan be performed in one year to assess for interval change in size.    Xray Chest 1 View- PORTABLE-Urgent (02.04.19 @ 03:31) >  IMPRESSION: The patient is status post CABG and heart valve repair. A left cardiac device is noted. There is no pneumothorax. There are small bilateral pleural effusions with associated pulmonary edema demonstrating overall interval improvement since the prior study.     CT Lumbar Spine No Cont (02.03.19 @ 15:34) >  A large sacral decubitus ulcer is noted overlying the mid-lower sacrum and coccyx. Areas of cortical discontinuity are noted involving the dorsal sacrum most consistent with osteomyelitis. No periosteal reaction   is visualized. No large fluid collections are appreciated within the limitations of this noncontrast study.  Severe compression deformity of the T12 vertebral body with mild retropulsion of bone with a mild bony central canal stenosis as previously described. Bilateral spondylolysis defects are again noted at theL5-S1 levels. Multilevel spondylosis. Diffuse demineralization of the bones.  Atheromatous disease with redemonstration of aortobifemoral bypass which is incompletely evaluated with absence of intravenous contrast.  Fluid within the pelvis ofunknown etiology. Tejeda catheter balloon within the bladder.  The patient is status post right hip replacement.  IMPRESSION:  A large sacral decubitus ulcer is noted overlying the mid-lower sacrum and coccyx. Areas of cortical discontinuity are noted involving the dorsal sacrum most consistent with osteomyelitis.

## 2019-02-11 NOTE — PROGRESS NOTE ADULT - ASSESSMENT
72 yo man with a hx of CAD s/p CABG, Afib on coumadin, CKD stage 3, PAD, aortobifem bypass ~2012 by Dr. Guallpa at [place], CEA, POBA L distal AT/DP (2016) SFA stent (2017), and left 4th toe amputation, admitted with sepsis 2/2 UTI vs. sacral decubitus w/ osteomyelitis, with cool right leg and chronic ulcers to the foot, heel, and shin.    - Reccomend SHERIE/PVRs - however primary team currently holding off due to patient's other medical issues   - will plan for CT angio abdomen with runoff to assess lower extremity vasculature once Cr improved  - Care per primary team  - Please call back once SHERIE/PVRs are obtained  - Discussed with Vascular surgery fellows    PADMINI Montoya PGY2  Vascular surgery  [id number]

## 2019-02-11 NOTE — PROGRESS NOTE ADULT - ASSESSMENT
73M with multiple medical problems (CAD, CABG, PAD, aorto-bifemoral bypass, CKD), R hip fracture s/p ORIF, s/p PEG/hoskins here with sacral osteomyelitis with sepsis - likely source is osteomyelitis.  Prior history  - patient was hospitalized at [place] and sacral wound was debrided.  Polymicrobial (E coli, Kpna, enterococcus - all "pan-sensitive").  bone scan was negative at that time late December for OM.  s/p debridement of sacral wound - bone sent for path and culture.  Culture is polymicrobial.  Zosyn is okay. Altered mental status - persists with change to zosyn    Suggest:  - continue zosyn - f/u final culture and sensitivity  - asked nurse to call me with next dressing change - should be around 2pm today  - vancomycin to 750mg daily (trough is better)  - f/u bone biopsy and cultures  - eventual PICC and 6 weeks IV antibiotics pending pathology    above d/w NP [id number]

## 2019-02-11 NOTE — PROGRESS NOTE ADULT - ATTENDING COMMENTS
Hannah Sims MD  Pager [phone] ([facility])/ [phone] ([facility]) [please provide 10 digit call back number]  Nights and weekends: [phone]  Please note that this patient may be followed by a different provider tomorrow. If no answer or after hours, please contact [phone].  For final dc reccomendations, please call [phone] or page the endocrine fellow on call.

## 2019-02-11 NOTE — PROGRESS NOTE ADULT - SUBJECTIVE AND OBJECTIVE BOX
CARDIOLOGY     PROGRESS  NOTE   ________________________________________________    CHIEF COMPLAINT:Patient is a 73y old  Male who presents with a chief complaint of Sepsis (10 Feb 2019 15:17)  doing much better.  	  REVIEW OF SYSTEMS:  CONSTITUTIONAL: No fever, weight loss, or fatigue  EYES: No eye pain, visual disturbances, or discharge  ENT:  No difficulty hearing, tinnitus, vertigo; No sinus or throat pain  NECK: No pain or stiffness  RESPIRATORY: No cough, wheezing, chills or hemoptysis; No Shortness of Breath  CARDIOVASCULAR: No chest pain, palpitations, passing out, dizziness, or leg swelling  GASTROINTESTINAL: No abdominal or epigastric pain. No nausea, vomiting, or hematemesis; No diarrhea or constipation. No melena or hematochezia.  GENITOURINARY: No dysuria, frequency, hematuria, or incontinence  NEUROLOGICAL: No headaches, memory loss, loss of strength, numbness, or tremors  SKIN: No itching, burning, rashes, or lesions   LYMPH Nodes: No enlarged glands  ENDOCRINE: No heat or cold intolerance; No hair loss  MUSCULOSKELETAL: No joint pain or swelling; No muscle, back, or extremity pain  PSYCHIATRIC: No depression, anxiety, mood swings, or difficulty sleeping  HEME/LYMPH: No easy bruising, or bleeding gums  ALLERGY AND IMMUNOLOGIC: No hives or eczema	    [ ] All others negative	  [ ] Unable to obtain    PHYSICAL EXAM:  T(C): 36.7 (02-11-19 @ 04:25), Max: 36.7 (02-11-19 @ 04:25)  HR: 92 (02-11-19 @ 04:25) (86 - 92)  BP: 121/74 (02-11-19 @ 04:25) (101/51 - 121/74)  RR: 20 (02-11-19 @ 04:25) (20 - 20)  SpO2: 96% (02-11-19 @ 04:25) (96% - 100%)  Wt(kg): --  I&O's Summary    10 Feb 2019 07:01  -  11 Feb 2019 07:00  --------------------------------------------------------  IN: 250 mL / OUT: 700 mL / NET: -450 mL        Appearance: Normal	  HEENT:   Normal oral mucosa, PERRL, EOMI	  Lymphatic: No lymphadenopathy  Cardiovascular: Normal S1 S2, No JVD, No murmurs, No edema  Respiratory: rhonchi  Psychiatry: A & O x 3, Mood & affect appropriate  Gastrointestinal:  Soft, Non-tender, + BS	  Skin: No rashes, No ecchymoses, No cyanosis	  Neurologic: Non-focal  Extremities: Normal range of motion, No clubbing, cyanosis or edema  Vascular: Peripheral pulses palpable 2+ bilaterally    MEDICATIONS  (STANDING):  ascorbic acid 500 milliGRAM(s) Oral daily  atorvastatin 20 milliGRAM(s) Oral at bedtime  clopidogrel Tablet 75 milliGRAM(s) Oral daily  Dakins Solution - 1/4 Strength 1 Application(s) Topical three times a day  dextrose 5%. 1000 milliLiter(s) (50 mL/Hr) IV Continuous <Continuous>  dextrose 50% Injectable 12.5 Gram(s) IV Push once  dextrose 50% Injectable 25 Gram(s) IV Push once  dextrose 50% Injectable 25 Gram(s) IV Push once  furosemide   Injectable 20 milliGRAM(s) IV Push two times a day  insulin lispro (HumaLOG) corrective regimen sliding scale   SubCutaneous every 6 hours  lactobacillus acidophilus 1 Tablet(s) Oral two times a day with meals  piperacillin/tazobactam IVPB. 3.375 Gram(s) IV Intermittent every 8 hours  polyethylene glycol 3350 17 Gram(s) Oral daily  senna Syrup 5 milliLiter(s) Oral at bedtime  tamsulosin 0.4 milliGRAM(s) Oral at bedtime  vancomycin  IVPB 750 milliGRAM(s) IV Intermittent every 24 hours  vancomycin  IVPB      zinc oxide 20% Ointment 1 Application(s) Topical three times a day      TELEMETRY: 	    ECG:  	  RADIOLOGY:  OTHER: 	  	  LABS:	 	    CARDIAC MARKERS:                                8.7    15.0  )-----------( 327      ( 10 Feb 2019 12:20 )             27.3     02-10    151<H>  |  106  |  43<H>  ----------------------------<  132<H>  3.9   |  33<H>  |  1.66<H>    Ca    8.3<L>      10 Feb 2019 12:02      proBNP: Serum Pro-Brain Natriuretic Peptide: 40247 pg/mL (02-03 @ 13:07)    Lipid Profile:   HgA1c: Hemoglobin A1C, Whole Blood: 7.4 % (02-04 @ 08:56)    TSH:   PT/INR - ( 10 Feb 2019 12:02 )   PT: 34.1 sec;   INR: 2.87 ratio         PTT - ( 10 Feb 2019 12:02 )  PTT:45.2 sec      Assessment and plan  ---------------------------  sepsis  chf acute on chronic  decrease lasix  repeat echo

## 2019-02-11 NOTE — PROGRESS NOTE ADULT - ASSESSMENT
74 yo male PMH CAD c/b CABG, A fib (on coumadin), CKD stage 3, PAD s/p stents, PEG tube, in-dwelling Hoskins who presents to the nursing home for increased confusion and elevated WBC. History was taken from wife at bedside.	Per wife, patient had been fully functional up until November. He had a hypoglycemic episode, fell, and broke his hip in November. He was admitted to the hospital for surgery, which was uneventful and sent to rehab (in early December).     During the rehab stay, he developed numerous ulcers, including a decubitus ulcer. He was sent back to [facility] for sepsis from the decubitus ulcer. He was treated with abx and returned to rehab for a second time. This second hospital course was complicated by acute renal failure requiring dialysis, IV abx, and "surgery" to clean the ulcer. In the ED, he received Vanc/Zosyn. He was admitted to medicine for further management and sepsis on arrival from   sacral osteomylitis.     ID: Agree IV Zosyn 3.375 grams every 8 hours and IV Vancomycin 750 mg/day for sacral decubitus. CT chest shows no pneumonia, bilateral effusions. PEG feeds stopped working yesterday, now on hold. Leakage around the site. Chronic hoskins changed this admission. Agree with ID. Had bone biopsy done today  which now shows GNR. CT of lumbar spine confirms sacral OM. Need bone culture to guide treatment. PT ordered. Possible VAC dressing later in week if surgery/wound care agrees. Discussed with surgery attending from wound care service.     Reanl: CKD III stable, creatinine 1.66. Renal sono confirms MRD. Free water replacement on hold without  PEG.      Urology: Add Flomax .4 mg/day. Used to take Cardura at home. TOV eventually ???    CV: Lasix resumed given CHF on CXR. Lasix changed to 40 mg PO daily given mild elevation of creatinine.     Stop all IVF in AM if BS stable. Resume Plavix 75 mg/day, taken at home with Lipitor.  Hold Coumadin for PICC line.   Need INR < 1.50 for PICC.       Endo: episodes of hypoglycemia off insulin. Stopped D 10. Endo consult requested. PEG feeds on hold given leaking. NPH on hold  until PEG feeds working well. Glucose stable at 224.         GI: Peg site leaking past 24 hours. Not able to use now. GI recommends not using tube for one week. May need new one. Might   be better to remove PEG tube so site can heal, then place new one.      Agree DNR. SHERIE can be done but not candidate for vascular bypass. Consider SHERIE early next week. 74 yo male PMH CAD c/b CABG, A fib (on coumadin), CKD stage 3, PAD s/p stents, PEG tube, in-dwelling Hoskins who presents to the nursing home for increased confusion and elevated WBC. History was taken from wife at bedside.	Per wife, patient had been fully functional up until November. He had a hypoglycemic episode, fell, and broke his hip in November. He was admitted to the hospital for surgery, which was uneventful and sent to rehab (in early December).     During the rehab stay, he developed numerous ulcers, including a decubitus ulcer. He was sent back to [facility] for sepsis from the decubitus ulcer. He was treated with abx and returned to rehab for a second time. This second hospital course was complicated by acute renal failure requiring dialysis, IV abx, and "surgery" to clean the ulcer. In the ED, he received Vanc/Zosyn. He was admitted to medicine for further management and sepsis on arrival from   sacral osteomylitis.     ID: Agree IV Zosyn 3.375 grams every 8 hours and IV Vancomycin 750 mg/day for sacral decubitus. CT chest shows no pneumonia, bilateral effusions. PEG feeds stopped working yesterday, now on hold. Leakage around the site. Chronic hoskins changed this admission. Agree with ID. Had bone biopsy done today  which now shows GNR. CT of lumbar spine confirms sacral OM. Need bone culture to guide treatment. PT ordered. Possible VAC dressing later in week if surgery/wound care agrees. Discussed with surgery attending from wound care service.     Reanl: CKD III stable, creatinine 1.66. Renal sono confirms MRD. Free water replacement on hold without  PEG.      Urology: Add Flomax .4 mg/day. Used to take Cardura at home. TOV eventually ???    CV: Lasix resumed given CHF on CXR. Lasix changed to 40 mg PO daily given mild elevation of creatinine.     Stop all IVF in AM if BS stable. Resume Plavix 75 mg/day, taken at home with Lipitor.  Hold Coumadin for PICC line.   Need INR < 1.50 for PICC.       Endo: episodes of hypoglycemia off insulin. Stopped D 10. Endo consult requested. PEG feeds on hold given leaking. NPH on hold  until PEG feeds working well. Glucose stable at 224.         GI: Peg site leaking past 24 hours. Not able to use now. GI recommends not using tube for one week. May need new one. Might   be better to remove PEG tube so site can heal, then place new one.      Agree DNR. SHERIE can be done but not candidate for vascular bypass. Consider SHERIE early next week.      Note: Has SHINE on CKD III from diuresis. 74 yo male PMH CAD c/b CABG, A fib (on coumadin), CKD stage 3, PAD s/p stents, PEG tube, in-dwelling Hoskins who presents to the nursing home for increased confusion and elevated WBC. History was taken from wife at bedside.	Per wife, patient had been fully functional up until November. He had a hypoglycemic episode, fell, and broke his hip in November. He was admitted to the hospital for surgery, which was uneventful and sent to rehab (in early December).     During the rehab stay, he developed numerous ulcers, including a decubitus ulcer. He was sent back to [facility] for sepsis from the decubitus ulcer. He was treated with abx and returned to rehab for a second time. This second hospital course was complicated by acute renal failure requiring dialysis, IV abx, and "surgery" to clean the ulcer. In the ED, he received Vanc/Zosyn. He was admitted to medicine for further management and sepsis on arrival from   sacral osteomylitis.     ID: Agree IV Zosyn 3.375 grams every 8 hours and IV Vancomycin 750 mg/day for sacral decubitus. CT chest shows no pneumonia, bilateral effusions. PEG feeds stopped working yesterday, now on hold. Leakage around the site. Chronic hoskins changed this admission. Agree with ID. Had bone biopsy done today  which now shows GNR. CT of lumbar spine confirms sacral OM. Need bone culture to guide treatment. PT ordered. Possible VAC dressing later in week if surgery/wound care agrees. Discussed with surgery attending from wound care service.     Reanl: CKD III stable, creatinine 1.66. Renal sono confirms MRD. Free water replacement on hold without  PEG.      Urology: Add Flomax .4 mg/day. Used to take Cardura at home. TOV eventually ???    CV: Lasix resumed given CHF on CXR. Lasix changed to 40 mg PO daily given mild elevation of creatinine.     Stop all IVF in AM if BS stable. Resume Plavix 75 mg/day, taken at home with Lipitor.  Hold Coumadin for PICC line.   Need INR < 1.50 for PICC.       Endo: episodes of hypoglycemia off insulin. Stopped D 10. Endo consult requested. PEG feeds on hold given leaking. NPH on hold  until PEG feeds working well. Glucose stable at 224.         GI: Peg site leaking past 24 hours. Not able to use now. GI recommends not using tube for one week. May need new one. Might   be better to remove PEG tube so site can heal, then place new one.      Agree DNR. SHERIE can be done but not candidate for vascular bypass. Consider SHERIE early next week.      Note: Has SHINE on CKD III from diuresis. Has acute decompensated systolic CHF.

## 2019-02-11 NOTE — PROGRESS NOTE ADULT - SUBJECTIVE AND OBJECTIVE BOX
VASCULAR SURGERY PROGRESS NOTE      Subjective:  No acute events overnight.         Objective:    PE:  GEN: NAD, resting quietly, cachectic  PULM: symmetric chest rise bilaterally, no increased WOB  CV: regular rate  ABD: soft, ND, PEG in place with surrounding erythematous rash, well-healed midline laparotomy incision and L inguinal hernia incision  EXTR:  Right: dry ulcers to anterior chin, dorsum of foot, lateral malleolus, and heel, cool to touch, no palpable pulses  Left: no palpable pulses, warm, well-perfused, no wounds or ulcers  Palpable femoral pulses bilaterally, no popliteal pulses    Vital Signs Last 24 Hrs  T(C): 36.7 (2019 04:25), Max: 36.7 (2019 04:25)  T(F): 98.1 (2019 04:25), Max: 98.1 (2019 04:25)  HR: 92 (2019 04:25) (86 - 92)  BP: 121/74 (2019 04:25) (101/51 - 121/74)  BP(mean): --  RR: 20 (2019 04:25) (20 - 20)  SpO2: 96% (2019 04:25) (96% - 100%)    I&O's Detail    10 Feb 2019 07:01  -  2019 07:00  --------------------------------------------------------  IN:    IV PiggyBack: 250 mL  Total IN: 250 mL    OUT:    Indwelling Catheter - Urethral: 700 mL  Total OUT: 700 mL    Total NET: -450 mL          Daily     Daily Weight in k.2 (2019 07:57)    MEDICATIONS  (STANDING):  ascorbic acid 500 milliGRAM(s) Oral daily  atorvastatin 20 milliGRAM(s) Oral at bedtime  clopidogrel Tablet 75 milliGRAM(s) Oral daily  Dakins Solution - 1/4 Strength 1 Application(s) Topical three times a day  dextrose 5%. 1000 milliLiter(s) (50 mL/Hr) IV Continuous <Continuous>  dextrose 50% Injectable 12.5 Gram(s) IV Push once  dextrose 50% Injectable 25 Gram(s) IV Push once  dextrose 50% Injectable 25 Gram(s) IV Push once  furosemide   Injectable 20 milliGRAM(s) IV Push daily  insulin lispro (HumaLOG) corrective regimen sliding scale   SubCutaneous every 6 hours  lactobacillus acidophilus 1 Tablet(s) Oral two times a day with meals  piperacillin/tazobactam IVPB. 3.375 Gram(s) IV Intermittent every 8 hours  polyethylene glycol 3350 17 Gram(s) Oral daily  senna Syrup 5 milliLiter(s) Oral at bedtime  tamsulosin 0.4 milliGRAM(s) Oral at bedtime  vancomycin  IVPB 750 milliGRAM(s) IV Intermittent every 24 hours  vancomycin  IVPB      zinc oxide 20% Ointment 1 Application(s) Topical three times a day    MEDICATIONS  (PRN):  acetaminophen   Tablet .. 650 milliGRAM(s) Oral every 6 hours PRN Temp greater or equal to 38C (100.4F)  dextrose 40% Gel 15 Gram(s) Oral once PRN Blood Glucose LESS THAN 70 milliGRAM(s)/deciliter  glucagon  Injectable 1 milliGRAM(s) IntraMuscular once PRN Glucose LESS THAN 70 milligrams/deciliter      LABS:                        8.6    16.0  )-----------( 343      ( 2019 08:15 )             27.7     02-11    152<H>  |  105  |  46<H>  ----------------------------<  263<H>  3.7   |  29  |  1.75<H>    Ca    8.3<L>      2019 08:13      PT/INR - ( 2019 08:15 )   PT: 19.9 sec;   INR: 1.70 ratio         PTT - ( 2019 08:15 )  PTT:35.9 sec      RADIOLOGY & ADDITIONAL STUDIES:

## 2019-02-11 NOTE — PROGRESS NOTE ADULT - SUBJECTIVE AND OBJECTIVE BOX
INTERVAL HPI/OVERNIGHT EVENTS:  Pt seen and examined at bedside.     Allergies/Intolerance: No Known Allergies      MEDICATIONS  (STANDING):  ascorbic acid 500 milliGRAM(s) Oral daily  atorvastatin 20 milliGRAM(s) Oral at bedtime  clopidogrel Tablet 75 milliGRAM(s) Oral daily  Dakins Solution - 1/4 Strength 1 Application(s) Topical three times a day  dextrose 5%. 1000 milliLiter(s) (50 mL/Hr) IV Continuous <Continuous>  dextrose 50% Injectable 12.5 Gram(s) IV Push once  dextrose 50% Injectable 25 Gram(s) IV Push once  dextrose 50% Injectable 25 Gram(s) IV Push once  furosemide   Injectable 20 milliGRAM(s) IV Push daily  insulin lispro (HumaLOG) corrective regimen sliding scale   SubCutaneous every 6 hours  lactobacillus acidophilus 1 Tablet(s) Oral two times a day with meals  piperacillin/tazobactam IVPB. 3.375 Gram(s) IV Intermittent every 8 hours  polyethylene glycol 3350 17 Gram(s) Oral daily  senna Syrup 5 milliLiter(s) Oral at bedtime  tamsulosin 0.4 milliGRAM(s) Oral at bedtime  vancomycin  IVPB 750 milliGRAM(s) IV Intermittent every 24 hours  vancomycin  IVPB      zinc oxide 20% Ointment 1 Application(s) Topical three times a day    MEDICATIONS  (PRN):  acetaminophen   Tablet .. 650 milliGRAM(s) Oral every 6 hours PRN Temp greater or equal to 38C (100.4F)  dextrose 40% Gel 15 Gram(s) Oral once PRN Blood Glucose LESS THAN 70 milliGRAM(s)/deciliter  glucagon  Injectable 1 milliGRAM(s) IntraMuscular once PRN Glucose LESS THAN 70 milligrams/deciliter        ROS: all systems reviewed and wnl      PHYSICAL EXAMINATION:  Vital Signs Last 24 Hrs  T(C): 36.7 (11 Feb 2019 04:25), Max: 36.7 (11 Feb 2019 04:25)  T(F): 98.1 (11 Feb 2019 04:25), Max: 98.1 (11 Feb 2019 04:25)  HR: 92 (11 Feb 2019 04:25) (86 - 92)  BP: 121/74 (11 Feb 2019 04:25) (101/51 - 121/74)  BP(mean): --  RR: 20 (11 Feb 2019 04:25) (20 - 20)  SpO2: 96% (11 Feb 2019 04:25) (96% - 100%)  CAPILLARY BLOOD GLUCOSE      POCT Blood Glucose.: 244 mg/dL (11 Feb 2019 07:10)  POCT Blood Glucose.: 306 mg/dL (11 Feb 2019 05:56)  POCT Blood Glucose.: 283 mg/dL (10 Feb 2019 23:44)  POCT Blood Glucose.: 322 mg/dL (10 Feb 2019 23:42)  POCT Blood Glucose.: 245 mg/dL (10 Feb 2019 17:51)  POCT Blood Glucose.: 134 mg/dL (10 Feb 2019 12:02)      02-10 @ 07:01  -  02-11 @ 07:00  --------------------------------------------------------  IN: 250 mL / OUT: 700 mL / NET: -450 mL        GENERAL:   NECK: supple, No JVD  CHEST/LUNG: clear to auscultation bilaterally; no rales, rhonchi, or wheezing b/l  HEART: normal S1, S2  ABDOMEN: BS+, soft, ND, NT   EXTREMITIES:  pulses palpable; no clubbing, cyanosis, or edema b/l LEs  SKIN: no rashes or lesions      LABS:                        8.6    16.0  )-----------( 343      ( 11 Feb 2019 08:15 )             27.7     02-11    152<H>  |  105  |  46<H>  ----------------------------<  263<H>  3.7   |  29  |  1.75<H>    Ca    8.3<L>      11 Feb 2019 08:13      PT/INR - ( 11 Feb 2019 08:15 )   PT: 19.9 sec;   INR: 1.70 ratio         PTT - ( 11 Feb 2019 08:15 )  PTT:35.9 sec INTERVAL HPI/OVERNIGHT EVENTS:  Pt seen and examined at bedside.     Allergies/Intolerance: No Known Allergies      MEDICATIONS  (STANDING):  ascorbic acid 500 milliGRAM(s) Oral daily  atorvastatin 20 milliGRAM(s) Oral at bedtime  clopidogrel Tablet 75 milliGRAM(s) Oral daily  Dakins Solution - 1/4 Strength 1 Application(s) Topical three times a day  dextrose 5%. 1000 milliLiter(s) (50 mL/Hr) IV Continuous <Continuous>  dextrose 50% Injectable 12.5 Gram(s) IV Push once  dextrose 50% Injectable 25 Gram(s) IV Push once  dextrose 50% Injectable 25 Gram(s) IV Push once  furosemide   Injectable 20 milliGRAM(s) IV Push daily  insulin lispro (HumaLOG) corrective regimen sliding scale   SubCutaneous every 6 hours  lactobacillus acidophilus 1 Tablet(s) Oral two times a day with meals  piperacillin/tazobactam IVPB. 3.375 Gram(s) IV Intermittent every 8 hours  polyethylene glycol 3350 17 Gram(s) Oral daily  senna Syrup 5 milliLiter(s) Oral at bedtime  tamsulosin 0.4 milliGRAM(s) Oral at bedtime  vancomycin  IVPB 750 milliGRAM(s) IV Intermittent every 24 hours  vancomycin  IVPB      zinc oxide 20% Ointment 1 Application(s) Topical three times a day    MEDICATIONS  (PRN):  acetaminophen   Tablet .. 650 milliGRAM(s) Oral every 6 hours PRN Temp greater or equal to 38C (100.4F)  dextrose 40% Gel 15 Gram(s) Oral once PRN Blood Glucose LESS THAN 70 milliGRAM(s)/deciliter  glucagon  Injectable 1 milliGRAM(s) IntraMuscular once PRN Glucose LESS THAN 70 milligrams/deciliter        ROS: all systems reviewed and wnl      PHYSICAL EXAMINATION:  Vital Signs Last 24 Hrs  T(C): 36.7 (11 Feb 2019 04:25), Max: 36.7 (11 Feb 2019 04:25)  T(F): 98.1 (11 Feb 2019 04:25), Max: 98.1 (11 Feb 2019 04:25)  HR: 92 (11 Feb 2019 04:25) (86 - 92)  BP: 121/74 (11 Feb 2019 04:25) (101/51 - 121/74)  BP(mean): --  RR: 20 (11 Feb 2019 04:25) (20 - 20)  SpO2: 96% (11 Feb 2019 04:25) (96% - 100%)  CAPILLARY BLOOD GLUCOSE      POCT Blood Glucose.: 244 mg/dL (11 Feb 2019 07:10)  POCT Blood Glucose.: 306 mg/dL (11 Feb 2019 05:56)  POCT Blood Glucose.: 283 mg/dL (10 Feb 2019 23:44)  POCT Blood Glucose.: 322 mg/dL (10 Feb 2019 23:42)  POCT Blood Glucose.: 245 mg/dL (10 Feb 2019 17:51)  POCT Blood Glucose.: 134 mg/dL (10 Feb 2019 12:02)      02-10 @ 07:01  -  02-11 @ 07:00  --------------------------------------------------------  IN: 250 mL / OUT: 700 mL / NET: -450 mL        GENERAL: in bed, no fevers, SOB or CP  NECK: supple, No JVD  CHEST/LUNG: clear to auscultation bilaterally; no rales, rhonchi, or wheezing b/l  HEART: normal S1, S2  ABDOMEN: BS+, soft, ND, NT. PEG tube in place   EXTREMITIES:  pulses palpable; no clubbing, cyanosis, or edema b/l LEs  hoskins in place  SKIN: no rashes or lesions      LABS:                        8.6    16.0  )-----------( 343      ( 11 Feb 2019 08:15 )             27.7     02-11    152<H>  |  105  |  46<H>  ----------------------------<  263<H>  3.7   |  29  |  1.75<H>    Ca    8.3<L>      11 Feb 2019 08:13      PT/INR - ( 11 Feb 2019 08:15 )   PT: 19.9 sec;   INR: 1.70 ratio         PTT - ( 11 Feb 2019 08:15 )  PTT:35.9 sec

## 2019-02-11 NOTE — PROGRESS NOTE ADULT - SUBJECTIVE AND OBJECTIVE BOX
Chief Complaint/Follow-up on:   DM    Subjective:  POC blood glucose and insulin use reviewed.  pt has been off tube feeds since yesterday   with hyperglycemia   however did NOT recieve any insulin since correction lispro all day until 18:00   overnight hyperglycemia   tube feeds off to eval for PEG leak     Labs with increasing white count, AG elevated, no lacate  also with continued hypernatremia         MEDICATIONS  (STANDING):  ascorbic acid 500 milliGRAM(s) Oral daily  atorvastatin 20 milliGRAM(s) Oral at bedtime  clopidogrel Tablet 75 milliGRAM(s) Oral daily  Dakins Solution - 1/4 Strength 1 Application(s) Topical three times a day  dextrose 5%. 1000 milliLiter(s) (50 mL/Hr) IV Continuous <Continuous>  dextrose 50% Injectable 12.5 Gram(s) IV Push once  dextrose 50% Injectable 25 Gram(s) IV Push once  dextrose 50% Injectable 25 Gram(s) IV Push once  furosemide   Injectable 20 milliGRAM(s) IV Push daily  insulin lispro (HumaLOG) corrective regimen sliding scale   SubCutaneous every 4 hours  lactobacillus acidophilus 1 Tablet(s) Oral two times a day with meals  piperacillin/tazobactam IVPB. 3.375 Gram(s) IV Intermittent every 8 hours  polyethylene glycol 3350 17 Gram(s) Oral daily  senna Syrup 5 milliLiter(s) Oral at bedtime  tamsulosin 0.4 milliGRAM(s) Oral at bedtime  vancomycin  IVPB 750 milliGRAM(s) IV Intermittent every 24 hours  vancomycin  IVPB      zinc oxide 20% Ointment 1 Application(s) Topical three times a day    MEDICATIONS  (PRN):  acetaminophen   Tablet .. 650 milliGRAM(s) Oral every 6 hours PRN Temp greater or equal to 38C (100.4F)  dextrose 40% Gel 15 Gram(s) Oral once PRN Blood Glucose LESS THAN 70 milliGRAM(s)/deciliter  glucagon  Injectable 1 milliGRAM(s) IntraMuscular once PRN Glucose LESS THAN 70 milligrams/deciliter      PHYSICAL EXAM:  VITALS: T(C): 36.6 (02-11-19 @ 11:34)  T(F): 97.9 (02-11-19 @ 11:34), Max: 98.1 (02-11-19 @ 04:25)  HR: 90 (02-11-19 @ 11:34) (90 - 92)  BP: 102/54 (02-11-19 @ 11:34) (102/54 - 121/74)  RR:  (19 - 20)  SpO2:  (94% - 97%)  Wt(kg): --  GENERAL: NAD, well-groomed, well-developed  EYES: No proptosis, no injection  HEENT:  Atraumatic, Normocephalic, moist mucous membranes  RESPIRATORY: Clear to auscultation bilaterally; No rales, rhonchi, wheezing, or rubs  CARDIOVASCULAR: Regular rate and rhythm; No murmurs; no peripheral edema  GI: Soft, nontender, non distended, normal bowel sounds      POCT Blood Glucose.: 224 mg/dL (02-11-19 @ 11:57)  POCT Blood Glucose.: 244 mg/dL (02-11-19 @ 07:10)  POCT Blood Glucose.: 306 mg/dL (02-11-19 @ 05:56)  POCT Blood Glucose.: 283 mg/dL (02-10-19 @ 23:44)  POCT Blood Glucose.: 322 mg/dL (02-10-19 @ 23:42)  POCT Blood Glucose.: 245 mg/dL (02-10-19 @ 17:51)  POCT Blood Glucose.: 134 mg/dL (02-10-19 @ 12:02)  POCT Blood Glucose.: 90 mg/dL (02-10-19 @ 06:36)  POCT Blood Glucose.: 143 mg/dL (02-10-19 @ 00:35)  POCT Blood Glucose.: 346 mg/dL (02-09-19 @ 18:32)  POCT Blood Glucose.: 92 mg/dL (02-09-19 @ 11:59)  POCT Blood Glucose.: 186 mg/dL (02-09-19 @ 08:24)  POCT Blood Glucose.: 116 mg/dL (02-09-19 @ 06:05)  POCT Blood Glucose.: 178 mg/dL (02-09-19 @ 01:01)  POCT Blood Glucose.: 232 mg/dL (02-08-19 @ 18:14)  POCT Blood Glucose.: 396 mg/dL (02-08-19 @ 12:31)    02-11    152<H>  |  105  |  46<H>  ----------------------------<  263<H>  3.7   |  29  |  1.75<H>    EGFR if : 44<L>  EGFR if non : 38<L>    Ca    8.3<L>      02-11            Thyroid Function Tests:      Hemoglobin A1C, Whole Blood: 7.4 % <H> [4.0 - 5.6] (02-04-19 @ 08:56)

## 2019-02-12 LAB
ANION GAP SERPL CALC-SCNC: 16 MMOL/L — SIGNIFICANT CHANGE UP (ref 5–17)
APTT BLD: 33.3 SEC — SIGNIFICANT CHANGE UP (ref 27.5–36.3)
BUN SERPL-MCNC: 47 MG/DL — HIGH (ref 7–23)
CALCIUM SERPL-MCNC: 8.1 MG/DL — LOW (ref 8.4–10.5)
CHLORIDE SERPL-SCNC: 107 MMOL/L — SIGNIFICANT CHANGE UP (ref 96–108)
CO2 SERPL-SCNC: 30 MMOL/L — SIGNIFICANT CHANGE UP (ref 22–31)
CREAT SERPL-MCNC: 1.93 MG/DL — HIGH (ref 0.5–1.3)
GLUCOSE BLDC GLUCOMTR-MCNC: 174 MG/DL — HIGH (ref 70–99)
GLUCOSE BLDC GLUCOMTR-MCNC: 209 MG/DL — HIGH (ref 70–99)
GLUCOSE BLDC GLUCOMTR-MCNC: 214 MG/DL — HIGH (ref 70–99)
GLUCOSE BLDC GLUCOMTR-MCNC: 279 MG/DL — HIGH (ref 70–99)
GLUCOSE BLDC GLUCOMTR-MCNC: 320 MG/DL — HIGH (ref 70–99)
GLUCOSE BLDC GLUCOMTR-MCNC: 331 MG/DL — HIGH (ref 70–99)
GLUCOSE SERPL-MCNC: 163 MG/DL — HIGH (ref 70–99)
HCT VFR BLD CALC: 25.5 % — LOW (ref 39–50)
HGB BLD-MCNC: 8 G/DL — LOW (ref 13–17)
INR BLD: 1.48 RATIO — HIGH (ref 0.88–1.16)
MCHC RBC-ENTMCNC: 29.4 PG — SIGNIFICANT CHANGE UP (ref 27–34)
MCHC RBC-ENTMCNC: 31.5 GM/DL — LOW (ref 32–36)
MCV RBC AUTO: 93.4 FL — SIGNIFICANT CHANGE UP (ref 80–100)
PLATELET # BLD AUTO: 313 K/UL — SIGNIFICANT CHANGE UP (ref 150–400)
POTASSIUM SERPL-MCNC: 3.1 MMOL/L — LOW (ref 3.5–5.3)
POTASSIUM SERPL-SCNC: 3.1 MMOL/L — LOW (ref 3.5–5.3)
PROTHROM AB SERPL-ACNC: 17.1 SEC — HIGH (ref 10–13.1)
RBC # BLD: 2.73 M/UL — LOW (ref 4.2–5.8)
RBC # FLD: 18.2 % — HIGH (ref 10.3–14.5)
SODIUM SERPL-SCNC: 153 MMOL/L — HIGH (ref 135–145)
SURGICAL PATHOLOGY STUDY: SIGNIFICANT CHANGE UP
WBC # BLD: 14 K/UL — HIGH (ref 3.8–10.5)
WBC # FLD AUTO: 14 K/UL — HIGH (ref 3.8–10.5)

## 2019-02-12 PROCEDURE — 99232 SBSQ HOSP IP/OBS MODERATE 35: CPT

## 2019-02-12 PROCEDURE — 99222 1ST HOSP IP/OBS MODERATE 55: CPT | Mod: GC

## 2019-02-12 PROCEDURE — 99232 SBSQ HOSP IP/OBS MODERATE 35: CPT | Mod: GC

## 2019-02-12 PROCEDURE — 74150 CT ABDOMEN W/O CONTRAST: CPT | Mod: 26

## 2019-02-12 RX ORDER — POTASSIUM CHLORIDE 20 MEQ
10 PACKET (EA) ORAL
Qty: 0 | Refills: 0 | Status: COMPLETED | OUTPATIENT
Start: 2019-02-12 | End: 2019-02-12

## 2019-02-12 RX ORDER — FUROSEMIDE 40 MG
40 TABLET ORAL ONCE
Qty: 0 | Refills: 0 | Status: COMPLETED | OUTPATIENT
Start: 2019-02-12 | End: 2019-02-12

## 2019-02-12 RX ADMIN — Medication 4: at 18:45

## 2019-02-12 RX ADMIN — Medication 100 MILLIEQUIVALENT(S): at 18:59

## 2019-02-12 RX ADMIN — Medication 40 MILLIGRAM(S): at 06:07

## 2019-02-12 RX ADMIN — Medication 1 APPLICATION(S): at 14:03

## 2019-02-12 RX ADMIN — ZINC OXIDE 1 APPLICATION(S): 200 OINTMENT TOPICAL at 14:03

## 2019-02-12 RX ADMIN — ZINC OXIDE 1 APPLICATION(S): 200 OINTMENT TOPICAL at 23:58

## 2019-02-12 RX ADMIN — Medication 1 APPLICATION(S): at 22:50

## 2019-02-12 RX ADMIN — PIPERACILLIN AND TAZOBACTAM 25 GRAM(S): 4; .5 INJECTION, POWDER, LYOPHILIZED, FOR SOLUTION INTRAVENOUS at 06:07

## 2019-02-12 RX ADMIN — Medication 2: at 02:49

## 2019-02-12 RX ADMIN — Medication 1 APPLICATION(S): at 06:08

## 2019-02-12 RX ADMIN — PIPERACILLIN AND TAZOBACTAM 25 GRAM(S): 4; .5 INJECTION, POWDER, LYOPHILIZED, FOR SOLUTION INTRAVENOUS at 14:03

## 2019-02-12 RX ADMIN — Medication 100 MILLIEQUIVALENT(S): at 14:48

## 2019-02-12 RX ADMIN — Medication 4: at 14:34

## 2019-02-12 RX ADMIN — Medication 100 MILLIEQUIVALENT(S): at 16:23

## 2019-02-12 RX ADMIN — PIPERACILLIN AND TAZOBACTAM 25 GRAM(S): 4; .5 INJECTION, POWDER, LYOPHILIZED, FOR SOLUTION INTRAVENOUS at 22:50

## 2019-02-12 RX ADMIN — ZINC OXIDE 1 APPLICATION(S): 200 OINTMENT TOPICAL at 06:08

## 2019-02-12 RX ADMIN — Medication 2: at 22:56

## 2019-02-12 NOTE — PROGRESS NOTE ADULT - PROBLEM SELECTOR PLAN 1
Patient with SHINE on CKD in setting of hypotension and sepsis. Baseline Scr. is unknown. Latest Scr. is elevated at 1.9 today. Patient is non oliguric. Urine electrolytes are suggestive of pre renal azotemia. Patient with hemodynamically mediated SHINE due to volume depletion? Recommend to hold off on further diuretic therapy as patient appears clinically euvolemic. Monitor Scr, I/O, and electrolytes daily. Avoid NSAIDs, RCAs, and other nephrotoxins.

## 2019-02-12 NOTE — PROGRESS NOTE ADULT - SUBJECTIVE AND OBJECTIVE BOX
CARDIOLOGY     PROGRESS  NOTE   ________________________________________________    CHIEF COMPLAINT:Patient is a 73y old  Male who presents with a chief complaint of Sepsis (12 Feb 2019 07:01)  no complain.  	  REVIEW OF SYSTEMS:  CONSTITUTIONAL: No fever, weight loss, or fatigue  EYES: No eye pain, visual disturbances, or discharge  ENT:  No difficulty hearing, tinnitus, vertigo; No sinus or throat pain  NECK: No pain or stiffness  RESPIRATORY: No cough, wheezing, chills or hemoptysis; No Shortness of Breath  CARDIOVASCULAR: No chest pain, palpitations, passing out, dizziness, or leg swelling  GASTROINTESTINAL: No abdominal or epigastric pain. No nausea, vomiting, or hematemesis; No diarrhea or constipation. No melena or hematochezia.  GENITOURINARY: No dysuria, frequency, hematuria, or incontinence  NEUROLOGICAL: No headaches, memory loss, loss of strength, numbness, or tremors  SKIN: No itching, burning, rashes, or lesions   LYMPH Nodes: No enlarged glands  ENDOCRINE: No heat or cold intolerance; No hair loss  MUSCULOSKELETAL: No joint pain or swelling; No muscle, back, or extremity pain  PSYCHIATRIC: No depression, anxiety, mood swings, or difficulty sleeping  HEME/LYMPH: No easy bruising, or bleeding gums  ALLERGY AND IMMUNOLOGIC: No hives or eczema	    [ ] All others negative	  [ ] Unable to obtain    PHYSICAL EXAM:  T(C): 36.9 (02-12-19 @ 04:26), Max: 36.9 (02-11-19 @ 20:56)  HR: 80 (02-12-19 @ 04:26) (80 - 90)  BP: 109/53 (02-12-19 @ 04:26) (95/57 - 113/62)  RR: 18 (02-12-19 @ 04:26) (18 - 19)  SpO2: 100% (02-12-19 @ 07:55) (93% - 100%)  Wt(kg): --  I&O's Summary    11 Feb 2019 07:01  -  12 Feb 2019 07:00  --------------------------------------------------------  IN: 400 mL / OUT: 750 mL / NET: -350 mL        Appearance: Normal	  HEENT:   Normal oral mucosa, PERRL, EOMI	  Lymphatic: No lymphadenopathy  Cardiovascular: Normal S1 S2, No JVD, + murmurs, No edema  Respiratory: Lungs clear to auscultation	  Psychiatry: A & O x 3, Mood & affect appropriate  Gastrointestinal:  Soft, Non-tender, + BS	  Skin: No rashes, No ecchymoses, No cyanosis	  Neurologic: Non-focal  Extremities: Normal range of motion, No clubbing, cyanosis or edema  Vascular: Peripheral pulses palpable 2+ bilaterally    MEDICATIONS  (STANDING):  ascorbic acid 500 milliGRAM(s) Oral daily  atorvastatin 20 milliGRAM(s) Oral at bedtime  clopidogrel Tablet 75 milliGRAM(s) Oral daily  Dakins Solution - 1/4 Strength 1 Application(s) Topical three times a day  dextrose 5%. 1000 milliLiter(s) (50 mL/Hr) IV Continuous <Continuous>  dextrose 50% Injectable 12.5 Gram(s) IV Push once  dextrose 50% Injectable 25 Gram(s) IV Push once  dextrose 50% Injectable 25 Gram(s) IV Push once  furosemide    Tablet 40 milliGRAM(s) Oral daily  insulin lispro (HumaLOG) corrective regimen sliding scale   SubCutaneous every 4 hours  lactobacillus acidophilus 1 Tablet(s) Oral two times a day with meals  piperacillin/tazobactam IVPB. 3.375 Gram(s) IV Intermittent every 8 hours  polyethylene glycol 3350 17 Gram(s) Oral daily  senna Syrup 5 milliLiter(s) Oral at bedtime  tamsulosin 0.4 milliGRAM(s) Oral at bedtime  vancomycin  IVPB 750 milliGRAM(s) IV Intermittent every 24 hours  vancomycin  IVPB      zinc oxide 20% Ointment 1 Application(s) Topical three times a day      TELEMETRY: 	    ECG:  	  RADIOLOGY:  OTHER: 	  	  LABS:	 	    CARDIAC MARKERS:                                8.0    14.0  )-----------( 313      ( 12 Feb 2019 06:11 )             25.5     02-12    153<H>  |  107  |  47<H>  ----------------------------<  163<H>  3.1<L>   |  30  |  1.93<H>    Ca    8.1<L>      12 Feb 2019 06:11      proBNP: Serum Pro-Brain Natriuretic Peptide: 43675 pg/mL (02-03 @ 13:07)    Lipid Profile:   HgA1c: Hemoglobin A1C, Whole Blood: 7.4 % (02-04 @ 08:56)    TSH:   PT/INR - ( 11 Feb 2019 08:15 )   PT: 19.9 sec;   INR: 1.70 ratio         PTT - ( 11 Feb 2019 08:15 )  PTT:35.9 sec  < from: Xray Chest 1 View- PORTABLE-Urgent (02.11.19 @ 08:59) >  1.  Decreased pulmonaryedema.   2.  Left pleural effusion.    < end of copied text >  < from: Xray Chest 1 View- PORTABLE-Urgent (02.11.19 @ 08:59) >  1.  Decreased pulmonaryedema.   2.  Left pleural effusion.    < end of copied text >      Assessment and plan  ---------------------------  chf/pulmonary edema  doing better  awaiting echo  psych noe  called to do the cho again

## 2019-02-12 NOTE — CHART NOTE - NSCHARTNOTEFT_GEN_A_CORE
Nutrition Follow Up Note  Patient seen for: malnutrition follow-up     Chart reviwed, events noted. This is a " 73 year old M with a hx of CAD s/p CABG, Afib on coumadin, CKD stage 3, PAD, aortobifem bypass ~2012 by Dr. Guallpa at [place], CEA, POBA L distal AT/DP (2016) SFA stent (2017), and left 4th toe amputation, admitted with sepsis 2/2 UTI vs. sacral decubitus w/ osteomyelitis, with cool right leg and chronic ulcers to the foot, heel, and shin". S/p CT chest which shows no pneumonia, bilateral effusions, s/p bone biopsy. Pt being followed by GI for leaking PEG.       Source: medical record, pt discussed during rounds, pt with AMS.    Diet : NPO with enteral feeds (feeds currently on hold)     Enteral /Parenteral Nutrition: Pt previously receiving Glucerna 1.2 @ 60ml/ x 24 hrs via PEG + Danactive BID. Feeds now on hold (sine 2/11) due to feeds leaking around PEG site, also noted with skin erosion around site. Per discussion during rounds, GI following with possible plan for either change in PEG size or change in PEG site. Endocrine following for hyperglycemia. Consult for bedside swallow evaluation noted in chart. No acute GI distress noted. Last BM on 2/12.     Weights:   2/11: 115lbs   2/6: 115.7 lbs   2/5: 133.5 lbs   2/3 (dosing): 134.6 lbs   wt per rehab: 136lbs (1/30)      Pertinent Medications: MEDICATIONS  (STANDING):  ascorbic acid 500 milliGRAM(s) Oral daily  atorvastatin 20 milliGRAM(s) Oral at bedtime  clopidogrel Tablet 75 milliGRAM(s) Oral daily  Dakins Solution - 1/4 Strength 1 Application(s) Topical three times a day  dextrose 5%. 1000 milliLiter(s) (50 mL/Hr) IV Continuous <Continuous>  dextrose 50% Injectable 12.5 Gram(s) IV Push once  dextrose 50% Injectable 25 Gram(s) IV Push once  dextrose 50% Injectable 25 Gram(s) IV Push once  furosemide    Tablet 40 milliGRAM(s) Oral daily  insulin lispro (HumaLOG) corrective regimen sliding scale   SubCutaneous every 4 hours  lactobacillus acidophilus 1 Tablet(s) Oral two times a day with meals  piperacillin/tazobactam IVPB. 3.375 Gram(s) IV Intermittent every 8 hours  polyethylene glycol 3350 17 Gram(s) Oral daily  senna Syrup 5 milliLiter(s) Oral at bedtime  tamsulosin 0.4 milliGRAM(s) Oral at bedtime  vancomycin  IVPB 750 milliGRAM(s) IV Intermittent every 24 hours  vancomycin  IVPB      zinc oxide 20% Ointment 1 Application(s) Topical three times a day    MEDICATIONS  (PRN):  acetaminophen   Tablet .. 650 milliGRAM(s) Oral every 6 hours PRN Temp greater or equal to 38C (100.4F)  dextrose 40% Gel 15 Gram(s) Oral once PRN Blood Glucose LESS THAN 70 milliGRAM(s)/deciliter  glucagon  Injectable 1 milliGRAM(s) IntraMuscular once PRN Glucose LESS THAN 70 milligrams/deciliter    Pertinent Labs: 02-12 @ 06:11: Na 153<H>, BUN 47<H>, Cr 1.93<H>, <H>, K+ 3.1<L>, Phos --, Mg --, Alk Phos --, ALT/SGPT --, AST/SGOT --, HbA1c --    Finger Sticks:  POCT Blood Glucose.: 174 mg/dL (02-12 @ 06:20)  POCT Blood Glucose.: 214 mg/dL (02-12 @ 02:18)  POCT Blood Glucose.: 215 mg/dL (02-11 @ 22:01)  POCT Blood Glucose.: 147 mg/dL (02-11 @ 18:21)  POCT Blood Glucose.: 230 mg/dL (02-11 @ 15:31)  POCT Blood Glucose.: 224 mg/dL (02-11 @ 11:57)      Skin per nursing documentation: : stage 4 pressure injury on sacrum, unstageable pressure injuries on left heel, unstageable right heel/mallelous.   Edema: +2 right arm, +3 left arm     Estimated Needs:   [x ] no change since previous assessment  [ ] recalculated:     Previous Nutrition Diagnosis: malnutrition (severe)  Nutrition Diagnosis is: on going     New Nutrition Diagnosis: N/A       Interventions:   Recommend  1) As medically  feasible recommend resuming Glucerna 1.2 @ 60 ml/hr x 24hrs.  Regimen to [id number] ml total volume, 1728 kcal/d (28Kcal/kg), 86.4 gm protein/day (1.4gm/kg), and 1160 ml free water based on weight of 60.7kg. Defer additional free water to team.   2) Pending restarted in EN feeds continue Danactive BID   3) Continue ascorbic acid as able for wound healing.     Monitoring and Evaluation:     Continue to monitor Nutritional intake, Tolerance to diet prescription, weights, labs, skin integrity    RD remains available upon request and will follow up per protocol  Lila South RD, CDN, Pager # [phone] Nutrition Follow Up Note  Patient seen for: malnutrition follow-up     Chart reviwed, events noted. This is a " 73 year old M with a hx of CAD s/p CABG, Afib on coumadin, CKD stage 3, PAD, aortobifem bypass ~2012 by Dr. Guallpa at [place], CEA, POBA L distal AT/DP (2016) SFA stent (2017), and left 4th toe amputation, admitted with sepsis 2/2 UTI vs. sacral decubitus w/ osteomyelitis, with cool right leg and chronic ulcers to the foot, heel, and shin". S/p CT chest which shows no pneumonia, bilateral effusions, s/p bone biopsy. Pt being followed by GI for leaking PEG.       Source: medical record, pt discussed during rounds, pt with AMS.    Diet : NPO with enteral feeds (feeds currently on hold)     Enteral /Parenteral Nutrition: Pt previously receiving Glucerna 1.2 @ 60ml/ x 24 hrs via PEG + Danactive BID. Feeds now on hold (sine 2/11) due to feeds leaking around PEG site, also noted with skin erosion around site. Per discussion during rounds, GI following with possible plan for either change in PEG size or change in PEG site. Endocrine following for hyperglycemia. Consult for bedside swallow evaluation noted in chart. No acute GI distress noted. Last BM on 2/12.     Weights: ? accuracy of wt trends, will continue to monitor   2/11: 115lbs (bed)  2/6: 115.7 lbs (bed)  2/3 (dosing): 134.6 lbs   wt per rehab: 136lbs (1/30)      Pertinent Medications: MEDICATIONS  (STANDING):  ascorbic acid 500 milliGRAM(s) Oral daily  atorvastatin 20 milliGRAM(s) Oral at bedtime  clopidogrel Tablet 75 milliGRAM(s) Oral daily  Dakins Solution - 1/4 Strength 1 Application(s) Topical three times a day  dextrose 5%. 1000 milliLiter(s) (50 mL/Hr) IV Continuous <Continuous>  dextrose 50% Injectable 12.5 Gram(s) IV Push once  dextrose 50% Injectable 25 Gram(s) IV Push once  dextrose 50% Injectable 25 Gram(s) IV Push once  furosemide    Tablet 40 milliGRAM(s) Oral daily  insulin lispro (HumaLOG) corrective regimen sliding scale   SubCutaneous every 4 hours  lactobacillus acidophilus 1 Tablet(s) Oral two times a day with meals  piperacillin/tazobactam IVPB. 3.375 Gram(s) IV Intermittent every 8 hours  polyethylene glycol 3350 17 Gram(s) Oral daily  senna Syrup 5 milliLiter(s) Oral at bedtime  tamsulosin 0.4 milliGRAM(s) Oral at bedtime  vancomycin  IVPB 750 milliGRAM(s) IV Intermittent every 24 hours  vancomycin  IVPB      zinc oxide 20% Ointment 1 Application(s) Topical three times a day    MEDICATIONS  (PRN):  acetaminophen   Tablet .. 650 milliGRAM(s) Oral every 6 hours PRN Temp greater or equal to 38C (100.4F)  dextrose 40% Gel 15 Gram(s) Oral once PRN Blood Glucose LESS THAN 70 milliGRAM(s)/deciliter  glucagon  Injectable 1 milliGRAM(s) IntraMuscular once PRN Glucose LESS THAN 70 milligrams/deciliter    Pertinent Labs: 02-12 @ 06:11: Na 153<H>, BUN 47<H>, Cr 1.93<H>, <H>, K+ 3.1<L>, Phos --, Mg --, Alk Phos --, ALT/SGPT --, AST/SGOT --, HbA1c --    Finger Sticks:  POCT Blood Glucose.: 174 mg/dL (02-12 @ 06:20)  POCT Blood Glucose.: 214 mg/dL (02-12 @ 02:18)  POCT Blood Glucose.: 215 mg/dL (02-11 @ 22:01)  POCT Blood Glucose.: 147 mg/dL (02-11 @ 18:21)  POCT Blood Glucose.: 230 mg/dL (02-11 @ 15:31)  POCT Blood Glucose.: 224 mg/dL (02-11 @ 11:57)      Skin per nursing documentation: : stage 4 pressure injury on sacrum, unstageable pressure injuries on left heel, unstageable right heel/mallelous.   Edema: +2 right arm, +3 left arm     Estimated Needs:   [x ] no change since previous assessment  [ ] recalculated:     Previous Nutrition Diagnosis: malnutrition (severe)  Nutrition Diagnosis is: on going     New Nutrition Diagnosis: N/A       Interventions:   Recommend  1) As medically  feasible recommend resuming Glucerna 1.2 @ 60 ml/hr x 24hrs.  Regimen to [id number] ml total volume, 1728 kcal/d (28Kcal/kg), 86.4 gm protein/day (1.4gm/kg), and 1160 ml free water based on weight of 60.7kg. Defer additional free water to team.   2) Pending restarted in EN feeds continue Danactive BID   3) Continue ascorbic acid as able for wound healing.     Monitoring and Evaluation:     Continue to monitor Nutritional intake, Tolerance to diet prescription, weights, labs, skin integrity    RD remains available upon request and will follow up per protocol  Lila South RD, CDN, Pager # [phone]

## 2019-02-12 NOTE — PROGRESS NOTE ADULT - PROBLEM SELECTOR PLAN 4
Patient with hypernatremia in setting of impaired free water intake and volume depletion. Latest serum sodium remains elevated at 153 today. Recommend to continue with free water supplementation via PEG tube. Monitor serum sodium level.

## 2019-02-12 NOTE — PROGRESS NOTE ADULT - SUBJECTIVE AND OBJECTIVE BOX
f/u OM sacrum    Interval History/ROS:   wound evaluated yesterday  - not much better.  eschar of wound edges.  Unable to obtain ROS - patient confused.    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 3.375 every 8 hours  vancomycin  IVPB 750 every 24 hours    MEDICATIONS  (STANDING):  atorvastatin 20 at bedtime  clopidogrel Tablet 75 daily  insulin lispro (HumaLOG) corrective regimen sliding scale  every 4 hours  polyethylene glycol 3350 17 daily  senna Syrup 5 at bedtime  tamsulosin 0.4 at bedtime    Vital Signs Last 24 Hrs  T(F): 98.4 (02-12-19 @ 04:26), Max: 98.4 (02-11-19 @ 20:56)  HR: 80 (02-12-19 @ 04:26)  BP: 109/53 (02-12-19 @ 04:26)  RR: 18 (02-12-19 @ 04:26)  SpO2: 100% (02-12-19 @ 07:55) (93% - 100%)    PHYSICAL EXAM:  General: non-toxic; in bed  HEAD/EYES: anicteric  ENT:  supple  Cardiovascular:   S1, S2  Respiratory:  clear bilaterally  GI:  soft, non-tender, normal bowel sounds; peg  :  catheter - clear urine but dark  Musculoskeletal:  no synovitis  Neurologic:  awake, confused  Skin:  multiple eschars of the legs b/l  R hip healing  Psychiatric:  not really able to assess  Vascular:  no phlebitis                                   8.0    14.0  )-----------( 313      ( 12 Feb 2019 06:11 )             25.5 02-12    153  |  107  |  47  ----------------------------<  163  3.1   |  30  |  1.93  Ca    8.1      12 Feb 2019 06:11    WBC Count: 14.0 (02-12-19 @ 06:11)  WBC Count: 16.0 (02-11-19 @ 08:15)  WBC Count: 15.0 (02-10-19 @ 12:20)  WBC Count: 13.60 (02-09-19 @ 12:08)  WBC Count: 12.90 (02-08-19 @ 13:24)  WBC Count: 19.84 (02-07-19 @ 07:52)  WBC Count: 21.1 (02-06-19 @ 23:28)  WBC Count: 20.0 (02-06-19 @ 07:00)  WBC Count: 23.6 (02-05-19 @ 15:08)    C-Reactive Protein, Serum: 8.89 (02-08-19)    Sedimentation Rate, Erythrocyte: 35 (02-08-19)  Sedimentation Rate, Erythrocyte: 66 (02-03-19)    Vancomycin Level, Trough: 17.6 (02-08 @ 17:46)  Vancomycin Level, Trough: 20.5 (02-07 @ 18:54)  Vancomycin Level, Trough: 21.1 (02-07 @ 18:53)    MICROBIOLOGY:  Culture - Tissue with Gram Stain (02.08.19 @ 20:13)    Gram Stain:   No polymorphonuclear cells seen per low power field  No organisms seen per oil power field    -  Gentamicin: S <=1    -  Imipenem: S <=1    -  Levofloxacin: S <=1    -  Meropenem: S <=1    -  Piperacillin/Tazobactam: S <=8    -  Tigecycline: S <=1    -  Tobramycin: S <=2    -  Trimethoprim/Sulfamethoxazole: S <=0.5/9.5    -  Amikacin: S <=8    -  Ampicillin: R >16     -  Ampicillin/Sulbactam: S <=4/2    -  Aztreonam: S <=4    -  Cefazolin: R >16    -  Cefepime: S <=2    -  Cefotaxime: S <=2    -  Cefoxitin: R >16    -  Ceftazidime: S <=1    -  Ceftriaxone: S <=1     -  Cefuroxime: S <=4    -  Ciprofloxacin: S <=0.5    -  Ertapenem: S <=0.5    Specimen Source: .Tissue sacral bone    Culture Results:   Few Citrobacter freundii complex  Rare Enterococcus species (VRE)  Few Coag Negative Staphylococcus     Culture - Blood (02.03.19 @ 17:52)    Specimen Source: .Blood Blood-Venous    Culture Results:   No growth to date.    Culture - Blood (02.03.19 @ 17:52)    Specimen Source: .Blood Blood-Peripheral    Culture Results:   No growth to date.    Culture - Urine (02.03.19 @ 16:44)    Specimen Source: .Urine Catheterized    Culture Results:   <10,000 CFU/ml Normal Urogenital nany present    RADIOLOGY:  US Kidney and Bladder (02.06.19 @ 09:54) >  IMPRESSION:  Renal parenchymal disease. No hydronephrosis. Trace perinephric fluid bilaterally.  Trace to small abdominal pelvic free fluid.  Cholelithiasis. 3 mm gallbladder polyp. Follow-upcan be performed in one year to assess for interval change in size.    Xray Chest 1 View- PORTABLE-Urgent (02.04.19 @ 03:31) >  IMPRESSION: The patient is status post CABG and heart valve repair. A left cardiac device is noted. There is no pneumothorax. There are small bilateral pleural effusions with associated pulmonary edema demonstrating overall interval improvement since the prior study.     CT Lumbar Spine No Cont (02.03.19 @ 15:34) >  A large sacral decubitus ulcer is noted overlying the mid-lower sacrum and coccyx. Areas of cortical discontinuity are noted involving the dorsal sacrum most consistent with osteomyelitis. No periosteal reaction   is visualized. No large fluid collections are appreciated within the limitations of this noncontrast study.  Severe compression deformity of the T12 vertebral body with mild retropulsion of bone with a mild bony central canal stenosis as previously described. Bilateral spondylolysis defects are again noted at theL5-S1 levels. Multilevel spondylosis. Diffuse demineralization of the bones.  Atheromatous disease with redemonstration of aortobifemoral bypass which is incompletely evaluated with absence of intravenous contrast.  Fluid within the pelvis ofunknown etiology. Tejeda catheter balloon within the bladder.  The patient is status post right hip replacement.  IMPRESSION:  A large sacral decubitus ulcer is noted overlying the mid-lower sacrum and coccyx. Areas of cortical discontinuity are noted involving the dorsal sacrum most consistent with osteomyelitis.

## 2019-02-12 NOTE — PROGRESS NOTE ADULT - ASSESSMENT
73M with multiple medical problems (CAD, CABG, PAD, aorto-bifemoral bypass, CKD), R hip fracture s/p ORIF, s/p PEG/hoskins here with sacral osteomyelitis with sepsis - likely source is osteomyelitis.  Prior history  - patient was hospitalized at [place] and sacral wound was debrided.  Polymicrobial (E coli, Kpna, enterococcus - all "pan-sensitive").  bone scan was negative at that time late December for OM.  s/p debridement of sacral wound - bone sent for path and culture.  Culture is polymicrobial.  Zosyn is okay. Altered mental status - persists with change to zosyn.  VRE in the wound that is polymicrobial.  Doubt it is a predominant pathogen    Suggest:  - continue zosyn - 6 weeks  - would stop vancomycin (no mrsa and +vre)  - f/u bone biopsy and cultures  - PICC  - weekly labs

## 2019-02-12 NOTE — PROGRESS NOTE ADULT - SUBJECTIVE AND OBJECTIVE BOX
INTERVAL HPI/OVERNIGHT EVENTS:  Pt seen and examined at bedside.     Allergies/Intolerance: No Known Allergies      MEDICATIONS  (STANDING):  ascorbic acid 500 milliGRAM(s) Oral daily  atorvastatin 20 milliGRAM(s) Oral at bedtime  clopidogrel Tablet 75 milliGRAM(s) Oral daily  Dakins Solution - 1/4 Strength 1 Application(s) Topical three times a day  dextrose 5%. 1000 milliLiter(s) (50 mL/Hr) IV Continuous <Continuous>  dextrose 50% Injectable 12.5 Gram(s) IV Push once  dextrose 50% Injectable 25 Gram(s) IV Push once  dextrose 50% Injectable 25 Gram(s) IV Push once  furosemide    Tablet 40 milliGRAM(s) Oral daily  insulin lispro (HumaLOG) corrective regimen sliding scale   SubCutaneous every 4 hours  lactobacillus acidophilus 1 Tablet(s) Oral two times a day with meals  piperacillin/tazobactam IVPB. 3.375 Gram(s) IV Intermittent every 8 hours  polyethylene glycol 3350 17 Gram(s) Oral daily  senna Syrup 5 milliLiter(s) Oral at bedtime  tamsulosin 0.4 milliGRAM(s) Oral at bedtime  vancomycin  IVPB 750 milliGRAM(s) IV Intermittent every 24 hours  vancomycin  IVPB      zinc oxide 20% Ointment 1 Application(s) Topical three times a day    MEDICATIONS  (PRN):  acetaminophen   Tablet .. 650 milliGRAM(s) Oral every 6 hours PRN Temp greater or equal to 38C (100.4F)  dextrose 40% Gel 15 Gram(s) Oral once PRN Blood Glucose LESS THAN 70 milliGRAM(s)/deciliter  glucagon  Injectable 1 milliGRAM(s) IntraMuscular once PRN Glucose LESS THAN 70 milligrams/deciliter        ROS: all systems reviewed and wnl      PHYSICAL EXAMINATION:  Vital Signs Last 24 Hrs  T(C): 36.9 (12 Feb 2019 04:26), Max: 36.9 (11 Feb 2019 20:56)  T(F): 98.4 (12 Feb 2019 04:26), Max: 98.4 (11 Feb 2019 20:56)  HR: 80 (12 Feb 2019 04:26) (80 - 90)  BP: 109/53 (12 Feb 2019 04:26) (95/57 - 113/62)  BP(mean): --  RR: 18 (12 Feb 2019 04:26) (18 - 19)  SpO2: 99% (12 Feb 2019 04:26) (93% - 99%)  CAPILLARY BLOOD GLUCOSE      POCT Blood Glucose.: 174 mg/dL (12 Feb 2019 06:20)  POCT Blood Glucose.: 214 mg/dL (12 Feb 2019 02:18)  POCT Blood Glucose.: 215 mg/dL (11 Feb 2019 22:01)  POCT Blood Glucose.: 147 mg/dL (11 Feb 2019 18:21)  POCT Blood Glucose.: 230 mg/dL (11 Feb 2019 15:31)  POCT Blood Glucose.: 224 mg/dL (11 Feb 2019 11:57)  POCT Blood Glucose.: 244 mg/dL (11 Feb 2019 07:10)      02-11 @ 07:01  -  02-12 @ 07:00  --------------------------------------------------------  IN: 400 mL / OUT: 750 mL / NET: -350 mL        GENERAL:   NECK: supple, No JVD  CHEST/LUNG: clear to auscultation bilaterally; no rales, rhonchi, or wheezing b/l  HEART: normal S1, S2  ABDOMEN: BS+, soft, ND, NT   EXTREMITIES:  pulses palpable; no clubbing, cyanosis, or edema b/l LEs  SKIN: no rashes or lesions      LABS:                        8.6    16.0  )-----------( 343      ( 11 Feb 2019 08:15 )             27.7     02-12    153<H>  |  107  |  47<H>  ----------------------------<  163<H>  3.1<L>   |  30  |  1.93<H>    Ca    8.1<L>      12 Feb 2019 06:11      PT/INR - ( 11 Feb 2019 08:15 )   PT: 19.9 sec;   INR: 1.70 ratio         PTT - ( 11 Feb 2019 08:15 )  PTT:35.9 sec INTERVAL HPI/OVERNIGHT EVENTS:  Pt seen and examined at bedside.     Allergies/Intolerance: No Known Allergies      MEDICATIONS  (STANDING):  ascorbic acid 500 milliGRAM(s) Oral daily  atorvastatin 20 milliGRAM(s) Oral at bedtime  clopidogrel Tablet 75 milliGRAM(s) Oral daily  Dakins Solution - 1/4 Strength 1 Application(s) Topical three times a day  dextrose 5%. 1000 milliLiter(s) (50 mL/Hr) IV Continuous <Continuous>  dextrose 50% Injectable 12.5 Gram(s) IV Push once  dextrose 50% Injectable 25 Gram(s) IV Push once  dextrose 50% Injectable 25 Gram(s) IV Push once  furosemide    Tablet 40 milliGRAM(s) Oral daily  insulin lispro (HumaLOG) corrective regimen sliding scale   SubCutaneous every 4 hours  lactobacillus acidophilus 1 Tablet(s) Oral two times a day with meals  piperacillin/tazobactam IVPB. 3.375 Gram(s) IV Intermittent every 8 hours  polyethylene glycol 3350 17 Gram(s) Oral daily  senna Syrup 5 milliLiter(s) Oral at bedtime  tamsulosin 0.4 milliGRAM(s) Oral at bedtime  vancomycin  IVPB 750 milliGRAM(s) IV Intermittent every 24 hours  vancomycin  IVPB      zinc oxide 20% Ointment 1 Application(s) Topical three times a day    MEDICATIONS  (PRN):  acetaminophen   Tablet .. 650 milliGRAM(s) Oral every 6 hours PRN Temp greater or equal to 38C (100.4F)  dextrose 40% Gel 15 Gram(s) Oral once PRN Blood Glucose LESS THAN 70 milliGRAM(s)/deciliter  glucagon  Injectable 1 milliGRAM(s) IntraMuscular once PRN Glucose LESS THAN 70 milligrams/deciliter        ROS: all systems reviewed and wnl      PHYSICAL EXAMINATION:  Vital Signs Last 24 Hrs  T(C): 36.9 (12 Feb 2019 04:26), Max: 36.9 (11 Feb 2019 20:56)  T(F): 98.4 (12 Feb 2019 04:26), Max: 98.4 (11 Feb 2019 20:56)  HR: 80 (12 Feb 2019 04:26) (80 - 90)  BP: 109/53 (12 Feb 2019 04:26) (95/57 - 113/62)  BP(mean): --  RR: 18 (12 Feb 2019 04:26) (18 - 19)  SpO2: 99% (12 Feb 2019 04:26) (93% - 99%)  CAPILLARY BLOOD GLUCOSE      POCT Blood Glucose.: 174 mg/dL (12 Feb 2019 06:20)  POCT Blood Glucose.: 214 mg/dL (12 Feb 2019 02:18)  POCT Blood Glucose.: 215 mg/dL (11 Feb 2019 22:01)  POCT Blood Glucose.: 147 mg/dL (11 Feb 2019 18:21)  POCT Blood Glucose.: 230 mg/dL (11 Feb 2019 15:31)  POCT Blood Glucose.: 224 mg/dL (11 Feb 2019 11:57)  POCT Blood Glucose.: 244 mg/dL (11 Feb 2019 07:10)      02-11 @ 07:01  -  02-12 @ 07:00  --------------------------------------------------------  IN: 400 mL / OUT: 750 mL / NET: -350 mL        GENERAL: in bed, NAD, no fevers or cough  NECK: supple, No JVD  CHEST/LUNG: clear to auscultation bilaterally; no rales, rhonchi, or wheezing b/l  HEART: normal S1, S2  ABDOMEN: BS+, soft, ND, NT   EXTREMITIES:  pulses palpable; no clubbing, cyanosis, or edema b/l LEs  SKIN: no rashes or lesions      LABS:                        8.6    16.0  )-----------( 343      ( 11 Feb 2019 08:15 )             27.7     02-12    153<H>  |  107  |  47<H>  ----------------------------<  163<H>  3.1<L>   |  30  |  1.93<H>    Ca    8.1<L>      12 Feb 2019 06:11      PT/INR - ( 11 Feb 2019 08:15 )   PT: 19.9 sec;   INR: 1.70 ratio         PTT - ( 11 Feb 2019 08:15 )  PTT:35.9 sec

## 2019-02-12 NOTE — PROGRESS NOTE ADULT - SUBJECTIVE AND OBJECTIVE BOX
[facility] Division of Kidney Diseases & Hypertension  FOLLOW UP NOTE  [phone]--------------------------------------------------------------------------------  Chief Complaint: SHINE on CKD/Hypernatremia     24 hour events/subjective:    Patient seen and examined in AM  No acute events noted overnight.   Received IV lasix yesterday due to concern for fluid overload.     PAST HISTORY  --------------------------------------------------------------------------------  No significant changes to PMH, PSH, FHx, SHx, unless otherwise noted    ALLERGIES & MEDICATIONS  --------------------------------------------------------------------------------  Allergies    No Known Allergies    Intolerances      Standing Inpatient Medications  ascorbic acid 500 milliGRAM(s) Oral daily  atorvastatin 20 milliGRAM(s) Oral at bedtime  clopidogrel Tablet 75 milliGRAM(s) Oral daily  Dakins Solution - 1/4 Strength 1 Application(s) Topical three times a day  dextrose 5%. 1000 milliLiter(s) IV Continuous <Continuous>  dextrose 50% Injectable 12.5 Gram(s) IV Push once  dextrose 50% Injectable 25 Gram(s) IV Push once  dextrose 50% Injectable 25 Gram(s) IV Push once  insulin lispro (HumaLOG) corrective regimen sliding scale   SubCutaneous every 4 hours  lactobacillus acidophilus 1 Tablet(s) Oral two times a day with meals  piperacillin/tazobactam IVPB. 3.375 Gram(s) IV Intermittent every 8 hours  polyethylene glycol 3350 17 Gram(s) Oral daily  senna Syrup 5 milliLiter(s) Oral at bedtime  tamsulosin 0.4 milliGRAM(s) Oral at bedtime  vancomycin  IVPB 750 milliGRAM(s) IV Intermittent every 24 hours  vancomycin  IVPB      zinc oxide 20% Ointment 1 Application(s) Topical three times a day    PRN Inpatient Medications  acetaminophen   Tablet .. 650 milliGRAM(s) Oral every 6 hours PRN  dextrose 40% Gel 15 Gram(s) Oral once PRN  glucagon  Injectable 1 milliGRAM(s) IntraMuscular once PRN      REVIEW OF SYSTEMS  --------------------------------------------------------------------------------  Resp: no SOB  CV: no CP  GI: no abdominal pain   MSK: no edema    VITALS/PHYSICAL EXAM  --------------------------------------------------------------------------------  T(C): 36.9 (02-12-19 @ 04:26), Max: 36.9 (02-11-19 @ 20:56)  HR: 80 (02-12-19 @ 04:26) (80 - 90)  BP: 109/53 (02-12-19 @ 04:26) (95/57 - 113/62)  RR: 18 (02-12-19 @ 04:26) (18 - 19)  SpO2: 100% (02-12-19 @ 07:55) (93% - 100%)  Wt(kg): --        02-11-19 @ 07:01  -  02-12-19 @ 07:00  --------------------------------------------------------  IN: 400 mL / OUT: 750 mL / NET: -350 mL      Physical Exam:  	  Gen: Elderly male  	HEENT: Dry mucus membranes   	Pulm: CTA B/L  	CV:  S1S2  	Abd: +BS, soft               : hoskins present with dark colored urine present.   	Ext: No B/L Lower ext edema; healed ulcers present over LE.   	Neuro: Awake but confused.   	Skin: Warm and dry    LABS/STUDIES  --------------------------------------------------------------------------------              8.0    14.0  >-----------<  313      [02-12-19 @ 06:11]              25.5     153  |  107  |  47  ----------------------------<  163      [02-12-19 @ 06:11]  3.1   |  30  |  1.93        Ca     8.1     [02-12-19 @ 06:11]      PT/INR: PT 17.1 , INR 1.48       [02-12-19 @ 08:06]  PTT: 33.3       [02-12-19 @ 08:06]      Creatinine Trend:  SCr 1.93 [02-12 @ 06:11]  SCr 1.75 [02-11 @ 08:13]  SCr 1.66 [02-10 @ 12:02]  SCr 1.53 [02-09 @ 10:55]  SCr 1.62 [02-08 @ 10:34]      Urine Creatinine 108      [02-06-19 @ 03:09]  Urine Sodium <20      [02-06-19 @ 03:09]  Urine Potassium 43      [02-06-19 @ 04:32]  Urine Chloride <35      [02-06-19 @ 04:32]  Urine Osmolality 485      [02-06-19 @ 03:09]

## 2019-02-12 NOTE — PROGRESS NOTE ADULT - ASSESSMENT
74 yo male PMH CAD c/b CABG, A fib (on coumadin), CKD stage 3, PAD s/p stents, PEG tube, in-dwelling Hoskins who presents to the nursing home for increased confusion and elevated WBC. History was taken from wife at bedside.	Per wife, patient had been fully functional up until November. He had a hypoglycemic episode, fell, and broke his hip in November. He was admitted to the hospital for surgery, which was uneventful and sent to rehab (in early December).     During the rehab stay, he developed numerous ulcers, including a decubitus ulcer. He was sent back to [facility] for sepsis from the decubitus ulcer. He was treated with abx and returned to rehab for a second time. This second hospital course was complicated by acute renal failure requiring dialysis, IV abx, and "surgery" to clean the ulcer. In the ED, he received Vanc/Zosyn. He was admitted to medicine for further management and sepsis on arrival from   sacral osteomylitis.     ID: Agree IV Zosyn 3.375 grams every 8 hours and IV Vancomycin 750 mg/day for sacral decubitus. CT chest shows no pneumonia, bilateral effusions. PEG feeds stopped working yesterday, now on hold. Leakage around the site. Chronic hoskins changed this admission. Agree with ID. Had bone biopsy done today  which now shows GNR. CT of lumbar spine confirms sacral OM. Need bone culture to guide treatment. PT ordered. Possible VAC dressing later in week if surgery/wound care agrees. Discussed with surgery attending from wound care service.     Reanl: CKD III stable, creatinine 1.66. Renal sono confirms MRD. Free water replacement on hold without  PEG.  Creatinine up today to 1.93.   Will hold Lasix for now.     Urology: Add Flomax .4 mg/day. Used to take Cardura at home. No TOV in near future.     CV: Lasix on hold given SHINE. Stop all IVF for now. Resume Plavix 75 mg/day, taken at home with Lipitor.  Hold Coumadin for PICC line.   Need INR < 1.50 for PICC.       Endo: episodes of hypoglycemia off insulin. Stopped D 10. Endo consult requested. PEG feeds on hold given leaking. NPH on hold  until PEG feeds working well. Glucose stable at 224.         GI: Peg site leaking past 24 hours. Not able to use now. GI recommends not using tube for one week. May need new one. Might   be better to remove PEG tube so site can heal, then place new one.  Await GI recommendation today regarding PEG. Speech  eval requested.     Agree DNR.      Note: Has SHINE on CKD III from diuresis. Has acute decompensated systolic CHF.

## 2019-02-12 NOTE — PROGRESS NOTE ADULT - PROBLEM SELECTOR PLAN 3
Patient with hypokalemia in setting of metabolic alkalosis and insulin use. Recommend to supplement with PO KCl or IV KCl if PEG tube is not working. Check serum magnesium. Monitor serum K level.

## 2019-02-12 NOTE — PROGRESS NOTE ADULT - SUBJECTIVE AND OBJECTIVE BOX
Chief Complaint:  Patient is a 73y old  Male who presents with a chief complaint of Sepsis (12 Feb 2019 11:28)      Interval Events: No overnight events. Patient is non-verbal.    Allergies:  No Known Allergies      Hospital Medications:  acetaminophen   Tablet .. 650 milliGRAM(s) Oral every 6 hours PRN  ascorbic acid 500 milliGRAM(s) Oral daily  atorvastatin 20 milliGRAM(s) Oral at bedtime  clopidogrel Tablet 75 milliGRAM(s) Oral daily  Dakins Solution - 1/4 Strength 1 Application(s) Topical three times a day  dextrose 40% Gel 15 Gram(s) Oral once PRN  dextrose 5%. 1000 milliLiter(s) IV Continuous <Continuous>  dextrose 50% Injectable 12.5 Gram(s) IV Push once  dextrose 50% Injectable 25 Gram(s) IV Push once  dextrose 50% Injectable 25 Gram(s) IV Push once  glucagon  Injectable 1 milliGRAM(s) IntraMuscular once PRN  insulin lispro (HumaLOG) corrective regimen sliding scale   SubCutaneous every 4 hours  lactobacillus acidophilus 1 Tablet(s) Oral two times a day with meals  piperacillin/tazobactam IVPB. 3.375 Gram(s) IV Intermittent every 8 hours  polyethylene glycol 3350 17 Gram(s) Oral daily  senna Syrup 5 milliLiter(s) Oral at bedtime  tamsulosin 0.4 milliGRAM(s) Oral at bedtime  zinc oxide 20% Ointment 1 Application(s) Topical three times a day      PMHX/PSHX:  CKD (chronic kidney disease) stage 3, GFR 30-59 ml/min  Pressure ulcer, unspecified pressure ulcer stage  Coronary artery disease of native artery of native heart with stable angina pectoris  Diastolic congestive heart failure, unspecified congestive heart failure chronicity  Smoker  Gout involving toe, unspecified cause, unspecified chronicity, unspecified laterality  Gouty nephropathy  PAD (peripheral artery disease)  Chronic atrial fibrillation  Hyperlipidemia, unspecified hyperlipidemia type  HTN (hypertension), benign  Type 2 diabetes mellitus with other circulatory complication, with long-term current use of insulin  S/P angioplasty with stent  S/P hernia repair  Artificial cardiac pacemaker  S/P aortobifemoral bypass surgery      Family history:  Family history of hypertension (Father)  No pertinent family history in first degree relatives      ROS:   Unable to obtain, patient is non-verbal      PHYSICAL EXAM:   Vital Signs:  Vital Signs Last 24 Hrs  T(C): 36.7 (12 Feb 2019 12:08), Max: 36.9 (11 Feb 2019 20:56)  T(F): 98 (12 Feb 2019 12:08), Max: 98.4 (11 Feb 2019 20:56)  HR: 87 (12 Feb 2019 12:08) (80 - 90)  BP: 104/68 (12 Feb 2019 12:08) (95/57 - 113/62)  BP(mean): --  RR: 18 (12 Feb 2019 12:08) (18 - 18)  SpO2: 100% (12 Feb 2019 12:08) (93% - 100%)  Daily     Daily     GENERAL:  Appears stated age  HEENT:  NC/AT   CHEST:  Poor effort  HEART:  Regular rhythm, S1, S2   ABDOMEN:  Soft, non-tender, ulcerated and erythema underneath PEG bumper, no leakage of contents around PEG  EXTREMITIES:  no cyanosis,clubbing or edema  SKIN:  No rash/erythema  NEURO:  Alert, oriented x 0    LABS:                        8.0    14.0  )-----------( 313      ( 12 Feb 2019 06:11 )             25.5     02-12    153<H>  |  107  |  47<H>  ----------------------------<  163<H>  3.1<L>   |  30  |  1.93<H>    Ca    8.1<L>      12 Feb 2019 06:11        PT/INR - ( 12 Feb 2019 08:06 )   PT: 17.1 sec;   INR: 1.48 ratio         PTT - ( 12 Feb 2019 08:06 )  PTT:33.3 sec

## 2019-02-12 NOTE — CONSULT NOTE ADULT - ATTENDING COMMENTS
Seen and examined with resident. Agree with note.   Patient with debility and gait dysfunction.  Patient will need subacute rehabilitation when stable.

## 2019-02-12 NOTE — PROGRESS NOTE ADULT - PROBLEM SELECTOR PLAN 2
Patient with metabolic alkalosis in setting of volume depletion and diuretic use. Latest serum CO2 noted to be 30. Continue to hold off on further diuretic therapy for now. continue with IV hydration. Monitor serum CO2 level.

## 2019-02-12 NOTE — CONSULT NOTE ADULT - SUBJECTIVE AND OBJECTIVE BOX
73yM was admitted on 02-03    Patient is a 73y old  Male who presents with a chief complaint of confusion.    HPI:  74 yo male with CAD c/b CABG, A fib (on coumadin), CKD stage 3, PAD s/p stents, PEG tube, in-dwelling [place] who presents to the nursing home for increased confusion and elevated WBC. History was taken from wife at bedside.	Per wife, patient had been fully functional up until November. He had a hypoglycemic episode, fell, and broke his hip in November. He was admitted to the hospital for surgery, which was uneventful and sent to rehab (in early December). During the rehab stay, he developed numerous ulcers, including a decubitus ulcer. He was sent back to [facility] for sepsis from the decubitus ulcer. He was treated with abx and returned to rehab for a second time. This second hospital course was complicated by acute renal failure requiring dialysis, IV abx, and "surgery" to clean the ulcer.    In the ED, he received Vanc/Zosyn. He was admitted to medicine for further management. (03 Feb 2019 17:37)    REVIEW OF SYSTEMS  Constitutional - No fever, No weight loss, No fatigue  HEENT - No eye pain, No visual disturbances, No difficulty hearing, No tinnitus, No vertigo, No neck pain  Respiratory - No cough, No wheezing, No shortness of breath  Cardiovascular - No chest pain, No palpitations  Gastrointestinal - No abdominal pain, No nausea, No vomiting, No diarrhea, No constipation  Genitourinary - No dysuria, No frequency, No hematuria, No incontinence  Neurological - No headaches, No memory loss, No loss of strength, No numbness, No tremors  Skin - No itching, No rashes, No lesions   Endocrine - No temperature intolerance  Musculoskeletal - No joint pain, No joint swelling, No muscle pain  Psychiatric - No depression, No anxiety    PAST MEDICAL & SURGICAL HISTORY  CKD (chronic kidney disease) stage 3, GFR 30-59 ml/min  Pressure ulcer, unspecified pressure ulcer stage  Coronary artery disease of native artery of native heart with stable angina pectoris  Diastolic congestive heart failure, unspecified congestive heart failure chronicity  Smoker  Gouty nephropathy  PAD (peripheral artery disease)  Chronic atrial fibrillation  Hyperlipidemia, unspecified hyperlipidemia type  HTN (hypertension), benign  Type 2 diabetes mellitus with other circulatory complication, with long-term current use of insulin  S/P angioplasty with stent  S/P hernia repair  Artificial cardiac pacemaker  S/P aortobifemoral bypass surgery    FAMILY HISTORY   Family history of hypertension (Father)    SOCIAL HISTORY  Smoking - Denied  EtOH - Denied   Drugs - Denied    FUNCTIONAL HISTORY  Lives Independent    CURRENT FUNCTIONAL STATUS    RECENT LABS/IMAGING  CBC Full  -  ( 12 Feb 2019 06:11 )  WBC Count : 14.0 K/uL  Hemoglobin : 8.0 g/dL  Hematocrit : 25.5 %  Platelet Count - Automated : 313 K/uL  Mean Cell Volume : 93.4 fl  Mean Cell Hemoglobin : 29.4 pg  Mean Cell Hemoglobin Concentration : 31.5 gm/dL  Auto Neutrophil # : x  Auto Lymphocyte # : x  Auto Monocyte # : x  Auto Eosinophil # : x  Auto Basophil # : x  Auto Neutrophil % : x  Auto Lymphocyte % : x  Auto Monocyte % : x  Auto Eosinophil % : x  Auto Basophil % : x    02-12    153<H>  |  107  |  47<H>  ----------------------------<  163<H>  3.1<L>   |  30  |  1.93<H>    Ca    8.1<L>      12 Feb 2019 06:11    ALLERGIES NKA    MEDICATIONS  (STANDING):  ascorbic acid 500 milliGRAM(s) Oral daily  atorvastatin 20 milliGRAM(s) Oral at bedtime  clopidogrel Tablet 75 milliGRAM(s) Oral daily  Dakins Solution - 1/4 Strength 1 Application(s) Topical three times a day  dextrose 5%. 1000 milliLiter(s) (50 mL/Hr) IV Continuous <Continuous>  dextrose 50% Injectable 12.5 Gram(s) IV Push once  dextrose 50% Injectable 25 Gram(s) IV Push once  dextrose 50% Injectable 25 Gram(s) IV Push once  furosemide    Tablet 40 milliGRAM(s) Oral daily  insulin lispro (HumaLOG) corrective regimen sliding scale   SubCutaneous every 4 hours  lactobacillus acidophilus 1 Tablet(s) Oral two times a day with meals  piperacillin/tazobactam IVPB. 3.375 Gram(s) IV Intermittent every 8 hours  polyethylene glycol 3350 17 Gram(s) Oral daily  senna Syrup 5 milliLiter(s) Oral at bedtime  tamsulosin 0.4 milliGRAM(s) Oral at bedtime  vancomycin  IVPB 750 milliGRAM(s) IV Intermittent every 24 hours  vancomycin  IVPB      zinc oxide 20% Ointment 1 Application(s) Topical three times a day    MEDICATIONS  (PRN):  acetaminophen   Tablet .. 650 milliGRAM(s) Oral every 6 hours PRN Temp greater or equal to 38C (100.4F)  dextrose 40% Gel 15 Gram(s) Oral once PRN Blood Glucose LESS THAN 70 milliGRAM(s)/deciliter  glucagon  Injectable 1 milliGRAM(s) IntraMuscular once PRN Glucose LESS THAN 70 milligrams/deciliter    Vital Signs Last 24 Hrs  T(C): 36.9 (12 Feb 2019 04:26), Max: 36.9 (11 Feb 2019 20:56)  T(F): 98.4 (12 Feb 2019 04:26), Max: 98.4 (11 Feb 2019 20:56)  HR: 80 (12 Feb 2019 04:26) (80 - 90)  BP: 109/53 (12 Feb 2019 04:26) (95/57 - 113/62)  BP(mean): --  RR: 18 (12 Feb 2019 04:26) (18 - 19)  SpO2: 100% (12 Feb 2019 07:55) (93% - 100%)      PHYSICAL EXAM  Constitutional - NAD, Comfortable  HEENT - NCAT, EOMI  Neck - Supple, No limited ROM  Chest - CTA bilaterally, No wheeze, No rhonchi, No crackles  Cardiovascular - RRR, S1S2, No murmurs  Abdomen - BS+, Soft, NTND  Extremities - No C/C/E, No calf tenderness   Neurologic Exam -                    Cognitive - Awake, Alert, AAO to self, place, date, year, situation     Communication - Fluent, No dysarthria     Cranial Nerves - CN 2-12 intact     Motor - No focal deficits                    LEFT    UE - ShAB 5/5, EF 5/5, EE 5/5, WE 5/5,  5/5                    RIGHT UE - ShAB 5/5, EF 5/5, EE 5/5, WE 5/5,  5/5                    LEFT    LE - HF 5/5, KE 5/5, DF 5/5, PF 5/5                    RIGHT LE - HF 5/5, KE 5/5, DF 5/5, PF 5/5        Sensory - Intact to LT     Reflexes - DTR Intact, No primitive reflexive     Coordination - FTN intact     OculoVestibular - No saccades, No nystagmus, VOR         Balance - WNL Static  Psychiatric - Mood stable, Affect WNL    ------------------------------------------------------------------------------------------------  ASSESSMENT/PLAN  73yMale with functional deficits after  Pain - Tylenol  DVT PPX - SCDs  Rehab - 73yM was admitted on 02-03    Patient is a 73y old  Male who presents with a chief complaint of confusion.    HPI:  74 yo male with CAD c/b CABG, A fib (on coumadin), CKD stage 3, PAD s/p stents, PEG tube, in-dwelling [place] who presented with increased confusion and elevated WBC.  Pt is s/p hip fracture in November, prior to which he was fully functional according to his wife. After hip surgery in December, Pt sent to rehab, where he developed numerous ulcers, including a sacral decubitus ulcer. He was sent back to [facility] for sepsis from the decubitus ulcer. He was treated with abx and returned to rehab for a second time. This second hospital course was complicated by acute renal failure requiring dialysis, IV abx, and "surgery" to clean the ulcer. Pt is currently being managed for metabolic encephalopathy 2/2 multiple potential sources of infection (catheter associated UTI, sacral decubitus ulcer, possible osteomyelitis).      REVIEW OF SYSTEMS  Constitutional - No fever, No weight loss, No fatigue  HEENT - No eye pain, No visual disturbances, No difficulty hearing, No tinnitus, No vertigo, No neck pain  Respiratory - No cough, No wheezing, No shortness of breath  Cardiovascular - No chest pain, No palpitations  Gastrointestinal - No abdominal pain, No nausea, No vomiting, No diarrhea, No constipation  Genitourinary - No dysuria, No frequency, No hematuria, No incontinence  Neurological - No headaches, No memory loss, No loss of strength, No numbness, No tremors  Skin - No itching, No rashes, No lesions   Endocrine - No temperature intolerance  Musculoskeletal - No joint pain, No joint swelling, No muscle pain  Psychiatric - No depression, No anxiety    PAST MEDICAL & SURGICAL HISTORY  CKD (chronic kidney disease) stage 3, GFR 30-59 ml/min  Pressure ulcer, unspecified pressure ulcer stage  Coronary artery disease of native artery of native heart with stable angina pectoris  Diastolic congestive heart failure, unspecified congestive heart failure chronicity  Smoker  Gouty nephropathy  PAD (peripheral artery disease)  Chronic atrial fibrillation  Hyperlipidemia, unspecified hyperlipidemia type  HTN (hypertension), benign  Type 2 diabetes mellitus with other circulatory complication, with long-term current use of insulin  S/P angioplasty with stent  S/P hernia repair  Artificial cardiac pacemaker  S/P aortobifemoral bypass surgery    FAMILY HISTORY   Family history of hypertension (Father)    SOCIAL HISTORY  Smoking - Denied  EtOH - Denied   Drugs - Denied    FUNCTIONAL HISTORY  Lives in a subacute rehab, requires assistance with ADLs    CURRENT FUNCTIONAL STATUS  (02/07/2019): Bed Mobility: Supine to Sit:     · Level of [place]	maximum assist (25% patients effort)  · Physical Assist/Nonphysical Assist	  1 person assist; nonverbal cues (demo/gestures); verbal cues    Bed Mobility Analysis:     · Impairments Contributing to Impaired Bed Mobility: decreased strength; impaired balance; cognition; impaired motor control; impaired coordination    Transfer: Sit to Stand:     · Level of [place]	maximum assist (25% patients effort)  · Physical Assist/Nonphysical Assist:	2 person assist; nonverbal cues (demo/gestures); verbal cues  · Weight-Bearing Restrictions:	full weight-bearing    Transfer: Stand to Sit:     · Level of [place]	maximum assist (25% patients effort)  · Physical Assist/Nonphysical Assist:	2 person assist; nonverbal cues (demo/gestures); verbal cues  · Weight-Bearing Restrictions:	full weight-bearing    Sit/Stand Transfer Safety Analysis:     · Impairments Contributing to Impaired Transfers:	decreased strength; impaired balance; cognition; impaired motor control    Gait Skills:     · Level of [place]	maximum assist (25% patients effort)  · Physical Assist/Nonphysical Assist	2 person assist  · Weight-Bearing Restrictions	full weight-bearing  · Gait Distance	1 step toward of head of bed    Gait Analysis:     · Impairments Contributing to Gait Deviations	decreased strength; impaired balance; cognition    Balance Skills Assessment:     · Sitting Balance: Static	fair minus  · Sitting Balance: Dynamic	fair minus  · Sit-to-Stand Balance	poor plus  · Standing Balance: Static	poor plus  · Standing Balance: Dynamic	poor plus  · Identified Impairments Contributing to Balance Disturbance	decreased strength; impaired motor control      RECENT LABS/IMAGING  CBC Full  -  ( 12 Feb 2019 06:11 )  WBC Count : 14.0 K/uL  Hemoglobin : 8.0 g/dL  Hematocrit : 25.5 %  Platelet Count - Automated : 313 K/uL  Mean Cell Volume : 93.4 fl  Mean Cell Hemoglobin : 29.4 pg  Mean Cell Hemoglobin Concentration : 31.5 gm/dL  Auto Neutrophil # : x  Auto Lymphocyte # : x  Auto Monocyte # : x  Auto Eosinophil # : x  Auto Basophil # : x  Auto Neutrophil % : x  Auto Lymphocyte % : x  Auto Monocyte % : x  Auto Eosinophil % : x  Auto Basophil % : x    02-12    153<H>  |  107  |  47<H>  ----------------------------<  163<H>  3.1<L>   |  30  |  1.93<H>    Ca    8.1<L>      12 Feb 2019 06:11    ALLERGIES NKA    MEDICATIONS  (STANDING):  ascorbic acid 500 milliGRAM(s) Oral daily  atorvastatin 20 milliGRAM(s) Oral at bedtime  clopidogrel Tablet 75 milliGRAM(s) Oral daily  Dakins Solution - 1/4 Strength 1 Application(s) Topical three times a day  dextrose 5%. 1000 milliLiter(s) (50 mL/Hr) IV Continuous <Continuous>  dextrose 50% Injectable 12.5 Gram(s) IV Push once  dextrose 50% Injectable 25 Gram(s) IV Push once  dextrose 50% Injectable 25 Gram(s) IV Push once  furosemide    Tablet 40 milliGRAM(s) Oral daily  insulin lispro (HumaLOG) corrective regimen sliding scale   SubCutaneous every 4 hours  lactobacillus acidophilus 1 Tablet(s) Oral two times a day with meals  piperacillin/tazobactam IVPB. 3.375 Gram(s) IV Intermittent every 8 hours  polyethylene glycol 3350 17 Gram(s) Oral daily  senna Syrup 5 milliLiter(s) Oral at bedtime  tamsulosin 0.4 milliGRAM(s) Oral at bedtime  vancomycin  IVPB 750 milliGRAM(s) IV Intermittent every 24 hours  vancomycin  IVPB      zinc oxide 20% Ointment 1 Application(s) Topical three times a day    MEDICATIONS  (PRN):  acetaminophen   Tablet .. 650 milliGRAM(s) Oral every 6 hours PRN Temp greater or equal to 38C (100.4F)  dextrose 40% Gel 15 Gram(s) Oral once PRN Blood Glucose LESS THAN 70 milliGRAM(s)/deciliter  glucagon  Injectable 1 milliGRAM(s) IntraMuscular once PRN Glucose LESS THAN 70 milligrams/deciliter    Vital Signs Last 24 Hrs  T(C): 36.9 (12 Feb 2019 04:26), Max: 36.9 (11 Feb 2019 20:56)  T(F): 98.4 (12 Feb 2019 04:26), Max: 98.4 (11 Feb 2019 20:56)  HR: 80 (12 Feb 2019 04:26) (80 - 90)  BP: 109/53 (12 Feb 2019 04:26) (95/57 - 113/62)  BP(mean): --  RR: 18 (12 Feb 2019 04:26) (18 - 19)  SpO2: 100% (12 Feb 2019 07:55) (93% - 100%)      PHYSICAL EXAM  Constitutional - NAD, Comfortable  HEENT - NCAT, EOMI  Neck - Supple, No limited ROM  Chest - CTA bilaterally, No wheeze, No rhonchi, No crackles  Cardiovascular - RRR, S1S2, No murmurs  Abdomen - BS+, Soft, NTND  Extremities - No C/C/E, No calf tenderness   Neurologic Exam -                    Cognitive - Awake, Alert, AAO to self, place, date, year, situation     Communication - Fluent, No dysarthria     Cranial Nerves - CN 2-12 intact     Motor - No focal deficits                    LEFT    UE - ShAB 5/5, EF 5/5, EE 5/5, WE 5/5,  5/5                    RIGHT UE - ShAB 5/5, EF 5/5, EE 5/5, WE 5/5,  5/5                    LEFT    LE - HF 5/5, KE 5/5, DF 5/5, PF 5/5                    RIGHT LE - HF 5/5, KE 5/5, DF 5/5, PF 5/5        Sensory - Intact to LT     Reflexes - DTR Intact, No primitive reflexive     Coordination - FTN intact     OculoVestibular - No saccades, No nystagmus, VOR         Balance - WNL Static  Psychiatric - Mood stable, Affect WNL    ------------------------------------------------------------------------------------------------  ASSESSMENT/PLAN  73yMale with functional deficits after  Pain - Tylenol  DVT PPX - SCDs  Rehab - 73yM was admitted on 02-03    Patient is a 73y old  Male who presents with a chief complaint of confusion.    HPI:  72 yo male with h/o CAD s/p CABG, A fib (on coumadin), CKD stage 3, PAD s/p stents, PEG tube, in-dwelling [place] who presented from nursing home to [facility] 2/3/19 with increased confusion and elevated WBC.  Pt is s/p hip fracture in November 2018, prior to which he was fully functional according to his wife. After hip surgery in December, Pt sent to rehab, where he developed numerous pressure ulcers, including a sacral decubitus ulcer. He was sent back to [facility] for sepsis d/t decubitus ulcer. He was treated with abx and returned to rehab for a second time. This second hospital course was complicated by acute renal failure requiring dialysis, IV abx, and "surgery" for the ulcer. Pt is currently being managed for metabolic encephalopathy 2/2 multiple potential sources of infection (catheter associated UTI, sacral decubitus ulcer, and possible osteomyelitis).    REVIEW OF SYSTEMS  Patient unable to communicate clearly but states he has no pain.    PAST MEDICAL & SURGICAL HISTORY  CKD (chronic kidney disease) stage 3, GFR 30-59 ml/min  Pressure ulcer, unspecified pressure ulcer stage  Coronary artery disease of native artery of native heart with stable angina pectoris  Diastolic congestive heart failure, unspecified congestive heart failure chronicity  Smoker  Gouty nephropathy  PAD (peripheral artery disease)  Chronic atrial fibrillation  Hyperlipidemia, unspecified hyperlipidemia type  HTN (hypertension), benign  Type 2 diabetes mellitus with other circulatory complication, with long-term current use of insulin  S/P angioplasty with stent  S/P hernia repair  Artificial cardiac pacemaker  S/P aortobifemoral bypass surgery    FAMILY HISTORY   Family history of hypertension (Father)    SOCIAL HISTORY  Smoking - Denied  EtOH - Denied   Drugs - Denied    FUNCTIONAL HISTORY  Lives in a SNF, requires assistance with ADLs    CURRENT FUNCTIONAL STATUS  (02/07/2019): Bed Mobility: Supine to Sit:     · Level of [place]	maximum assist (25% patients effort)  · Physical Assist/Nonphysical Assist	  1 person assist; nonverbal cues (demo/gestures); verbal cues    Bed Mobility Analysis:     · Impairments Contributing to Impaired Bed Mobility: decreased strength; impaired balance; cognition; impaired motor control; impaired coordination    Transfer: Sit to Stand:     · Level of [place]	maximum assist (25% patients effort)  · Physical Assist/Nonphysical Assist:	2 person assist; nonverbal cues (demo/gestures); verbal cues  · Weight-Bearing Restrictions:	full weight-bearing    Transfer: Stand to Sit:     · Level of [place]	maximum assist (25% patients effort)  · Physical Assist/Nonphysical Assist:	2 person assist; nonverbal cues (demo/gestures); verbal cues  · Weight-Bearing Restrictions:	full weight-bearing    Sit/Stand Transfer Safety Analysis:     · Impairments Contributing to Impaired Transfers:	decreased strength; impaired balance; cognition; impaired motor control    Gait Skills:     · Level of [place]	maximum assist (25% patients effort)  · Physical Assist/Nonphysical Assist	2 person assist  · Weight-Bearing Restrictions	full weight-bearing  · Gait Distance	1 step toward of head of bed    Gait Analysis:     · Impairments Contributing to Gait Deviations	decreased strength; impaired balance; cognition    Balance Skills Assessment:     · Sitting Balance: Static	fair minus  · Sitting Balance: Dynamic	fair minus  · Sit-to-Stand Balance	poor plus  · Standing Balance: Static	poor plus  · Standing Balance: Dynamic	poor plus  · Identified Impairments Contributing to Balance Disturbance	decreased strength; impaired motor control      RECENT LABS/IMAGING  CBC Full  -  ( 12 Feb 2019 06:11 )  WBC Count : 14.0 K/uL  Hemoglobin : 8.0 g/dL  Hematocrit : 25.5 %  Platelet Count - Automated : 313 K/uL  Mean Cell Volume : 93.4 fl  Mean Cell Hemoglobin : 29.4 pg  Mean Cell Hemoglobin Concentration : 31.5 gm/dL  Auto Neutrophil # : x  Auto Lymphocyte # : x  Auto Monocyte # : x  Auto Eosinophil # : x  Auto Basophil # : x  Auto Neutrophil % : x  Auto Lymphocyte % : x  Auto Monocyte % : x  Auto Eosinophil % : x  Auto Basophil % : x    02-12    153<H>  |  107  |  47<H>  ----------------------------<  163<H>  3.1<L>   |  30  |  1.93<H>    Ca    8.1<L>      12 Feb 2019 06:11    ALLERGIES NKA    MEDICATIONS  (STANDING):  ascorbic acid 500 milliGRAM(s) Oral daily  atorvastatin 20 milliGRAM(s) Oral at bedtime  clopidogrel Tablet 75 milliGRAM(s) Oral daily  Dakins Solution - 1/4 Strength 1 Application(s) Topical three times a day  dextrose 5%. 1000 milliLiter(s) (50 mL/Hr) IV Continuous <Continuous>  dextrose 50% Injectable 12.5 Gram(s) IV Push once  dextrose 50% Injectable 25 Gram(s) IV Push once  dextrose 50% Injectable 25 Gram(s) IV Push once  furosemide    Tablet 40 milliGRAM(s) Oral daily  insulin lispro (HumaLOG) corrective regimen sliding scale   SubCutaneous every 4 hours  lactobacillus acidophilus 1 Tablet(s) Oral two times a day with meals  piperacillin/tazobactam IVPB. 3.375 Gram(s) IV Intermittent every 8 hours  polyethylene glycol 3350 17 Gram(s) Oral daily  senna Syrup 5 milliLiter(s) Oral at bedtime  tamsulosin 0.4 milliGRAM(s) Oral at bedtime  vancomycin  IVPB 750 milliGRAM(s) IV Intermittent every 24 hours  vancomycin  IVPB      zinc oxide 20% Ointment 1 Application(s) Topical three times a day    MEDICATIONS  (PRN):  acetaminophen   Tablet .. 650 milliGRAM(s) Oral every 6 hours PRN Temp greater or equal to 38C (100.4F)  dextrose 40% Gel 15 Gram(s) Oral once PRN Blood Glucose LESS THAN 70 milliGRAM(s)/deciliter  glucagon  Injectable 1 milliGRAM(s) IntraMuscular once PRN Glucose LESS THAN 70 milligrams/deciliter    Vital Signs Last 24 Hrs  T(C): 36.9 (12 Feb 2019 04:26), Max: 36.9 (11 Feb 2019 20:56)  T(F): 98.4 (12 Feb 2019 04:26), Max: 98.4 (11 Feb 2019 20:56)  HR: 80 (12 Feb 2019 04:26) (80 - 90)  BP: 109/53 (12 Feb 2019 04:26) (95/57 - 113/62)  BP(mean): --  RR: 18 (12 Feb 2019 04:26) (18 - 19)  SpO2: 100% (12 Feb 2019 07:55) (93% - 100%)    PHYSICAL EXAM  Constitutional - NAD  HEENT - NCAT, EOMI  Neck - Supple, No limited ROM  Chest - CTA bilaterally, No wheeze  Cardiovascular - PACEMAKER, RRR, S1S2, No murmurs  Abdomen - BS+, Soft, NTND  Extremities - Multiple ulcers on LEs, edematous B/L UEs with bruises and ulcers.   Neurologic Exam -                    Cognitive - Awake, Alert, but Oriented to self only     Communication - Dysarthria and poor oral care      Cranial Nerves - CN 2-12 intact     Motor - No focal deficits                    LEFT    UE - ShAB 1/5 (patient declined), EF 5/5, EE 5/5, WE 5/5,  5/5                    RIGHT UE - ShAB 1/5  (patient declined), EF 5/5, EE 5/5, WE 5/5,  5/5                    B/L LE: patient non-compliant with directions/ unwilling to move, but able to dorsiflex/plantar flex Left ankle      Sensory - Intact to LT     Reflexes - DTR Intact     Coordination - impaired    Psychiatric - Mood stable, Affect WNL    ------------------------------------------------------------------------------------------------  ASSESSMENT/PLAN    73y Male with PMHx of CAD s/p CABG, PAD s/p stents, A fib (on coumadin), CKD stage 3, PEG tube, in-dwelling [place] with functional deficits after metabolic encephalopathy 2/2 multiple potential sources of infection (urinary catheter, sacral decubitus ulcer, possible osteomyelitis). Pt is s/p hip fracture in November and sepsis 2/2 decubitus ulcer after stay in rehab post hip surgical repair in December. Pt resides in SNF requiring assistance with ADLs.     Medical care as per primary team. Recommend BID oral care.     Continue bedside PT/OT/SLP for swallow rehab, strength, ROM, bed mobility/transfers/ambulation/ADLs   Pain - Tylenol PRN  DVT PPX - Plavix (pt on coumadin at home)   Bowel - senna, Miralax   Diet - NPO with Tube Feed  Skin - turn and position q2hrs to maintain skin integrity. wound care for existing ulcers   Rehab -   Recommend [facility], patient DOES NOT meet acute inpatient rehabilitation criteria  Will sign off, please reconsult if needed for rehab dispo recommendations.

## 2019-02-12 NOTE — PROGRESS NOTE ADULT - ASSESSMENT
Impression:  1) PEG site leakage with possible SSTI    Recs:  - obtain CT Abdomen to ensure the patient does not have buried bumper syndrome  - would continue vanc/zosyn which are adequate coverage for any skin infection/cellulitis around PEG site  - keep site very dry  - apply layer of barrier cream under bumper and around PEG site; will need to apply throughout day  - if area does not heal, can consider attempting to upsize PEG to 20F tube, however, there is not a lot of space around the current tube so this may not fit  - ultimately if continues to leak, patient may need to have PEG removed with NGT for feeding for several days to allow tract to close, and then new PEG place at different site endoscopically.   - would hold tube feeds for now  - Discussed with ESTELLE Floyd  [phone]

## 2019-02-13 ENCOUNTER — RESULT REVIEW (OUTPATIENT)
Age: 74
End: 2019-02-13

## 2019-02-13 LAB
-  AMIKACIN: SIGNIFICANT CHANGE UP
-  AMOXICILLIN/CLAVULANIC ACID: SIGNIFICANT CHANGE UP
-  AMPICILLIN/SULBACTAM: SIGNIFICANT CHANGE UP
-  AMPICILLIN: SIGNIFICANT CHANGE UP
-  AZTREONAM: SIGNIFICANT CHANGE UP
-  CEFAZOLIN: SIGNIFICANT CHANGE UP
-  CEFEPIME: SIGNIFICANT CHANGE UP
-  CEFOXITIN: SIGNIFICANT CHANGE UP
-  CEFTRIAXONE: SIGNIFICANT CHANGE UP
-  CIPROFLOXACIN: SIGNIFICANT CHANGE UP
-  ERTAPENEM: SIGNIFICANT CHANGE UP
-  GENTAMICIN: SIGNIFICANT CHANGE UP
-  IMIPENEM: SIGNIFICANT CHANGE UP
-  LEVOFLOXACIN: SIGNIFICANT CHANGE UP
-  MEROPENEM: SIGNIFICANT CHANGE UP
-  PIPERACILLIN/TAZOBACTAM: SIGNIFICANT CHANGE UP
-  TOBRAMYCIN: SIGNIFICANT CHANGE UP
-  TRIMETHOPRIM/SULFAMETHOXAZOLE: SIGNIFICANT CHANGE UP
ANION GAP SERPL CALC-SCNC: 15 MMOL/L — SIGNIFICANT CHANGE UP (ref 5–17)
APTT BLD: 59.1 SEC — HIGH (ref 27.5–36.3)
BUN SERPL-MCNC: 53 MG/DL — HIGH (ref 7–23)
CALCIUM SERPL-MCNC: 8.4 MG/DL — SIGNIFICANT CHANGE UP (ref 8.4–10.5)
CHLORIDE SERPL-SCNC: 109 MMOL/L — HIGH (ref 96–108)
CO2 SERPL-SCNC: 32 MMOL/L — HIGH (ref 22–31)
CREAT SERPL-MCNC: 2.24 MG/DL — HIGH (ref 0.5–1.3)
GLUCOSE BLDC GLUCOMTR-MCNC: 222 MG/DL — HIGH (ref 70–99)
GLUCOSE BLDC GLUCOMTR-MCNC: 223 MG/DL — HIGH (ref 70–99)
GLUCOSE BLDC GLUCOMTR-MCNC: 227 MG/DL — HIGH (ref 70–99)
GLUCOSE BLDC GLUCOMTR-MCNC: 244 MG/DL — HIGH (ref 70–99)
GLUCOSE BLDC GLUCOMTR-MCNC: 247 MG/DL — HIGH (ref 70–99)
GLUCOSE SERPL-MCNC: 172 MG/DL — HIGH (ref 70–99)
HCT VFR BLD CALC: 28.5 % — LOW (ref 39–50)
HCT VFR BLD CALC: 29.7 % — LOW (ref 39–50)
HGB BLD-MCNC: 9 G/DL — LOW (ref 13–17)
HGB BLD-MCNC: 9.3 G/DL — LOW (ref 13–17)
MAGNESIUM SERPL-MCNC: 2.1 MG/DL — SIGNIFICANT CHANGE UP (ref 1.6–2.6)
MCHC RBC-ENTMCNC: 29.7 PG — SIGNIFICANT CHANGE UP (ref 27–34)
MCHC RBC-ENTMCNC: 29.9 PG — SIGNIFICANT CHANGE UP (ref 27–34)
MCHC RBC-ENTMCNC: 31.3 GM/DL — LOW (ref 32–36)
MCHC RBC-ENTMCNC: 31.8 GM/DL — LOW (ref 32–36)
MCV RBC AUTO: 94 FL — SIGNIFICANT CHANGE UP (ref 80–100)
MCV RBC AUTO: 94.9 FL — SIGNIFICANT CHANGE UP (ref 80–100)
METHOD TYPE: SIGNIFICANT CHANGE UP
PLATELET # BLD AUTO: 357 K/UL — SIGNIFICANT CHANGE UP (ref 150–400)
PLATELET # BLD AUTO: 375 K/UL — SIGNIFICANT CHANGE UP (ref 150–400)
POTASSIUM SERPL-MCNC: 3 MMOL/L — LOW (ref 3.5–5.3)
POTASSIUM SERPL-SCNC: 3 MMOL/L — LOW (ref 3.5–5.3)
RBC # BLD: 3.03 M/UL — LOW (ref 4.2–5.8)
RBC # BLD: 3.13 M/UL — LOW (ref 4.2–5.8)
RBC # FLD: 18.6 % — HIGH (ref 10.3–14.5)
RBC # FLD: 18.6 % — HIGH (ref 10.3–14.5)
SODIUM SERPL-SCNC: 156 MMOL/L — HIGH (ref 135–145)
WBC # BLD: 14.1 K/UL — HIGH (ref 3.8–10.5)
WBC # BLD: 15.7 K/UL — HIGH (ref 3.8–10.5)
WBC # FLD AUTO: 14.1 K/UL — HIGH (ref 3.8–10.5)
WBC # FLD AUTO: 15.7 K/UL — HIGH (ref 3.8–10.5)

## 2019-02-13 PROCEDURE — 99232 SBSQ HOSP IP/OBS MODERATE 35: CPT

## 2019-02-13 PROCEDURE — 99232 SBSQ HOSP IP/OBS MODERATE 35: CPT | Mod: GC

## 2019-02-13 PROCEDURE — 11042 DBRDMT SUBQ TIS 1ST 20SQCM/<: CPT

## 2019-02-13 PROCEDURE — 88304 TISSUE EXAM BY PATHOLOGIST: CPT | Mod: 26

## 2019-02-13 RX ORDER — HEPARIN SODIUM 5000 [USP'U]/ML
INJECTION INTRAVENOUS; SUBCUTANEOUS
Qty: 25000 | Refills: 0 | Status: DISCONTINUED | OUTPATIENT
Start: 2019-02-13 | End: 2019-02-14

## 2019-02-13 RX ORDER — HEPARIN SODIUM 5000 [USP'U]/ML
5000 INJECTION INTRAVENOUS; SUBCUTANEOUS EVERY 6 HOURS
Qty: 0 | Refills: 0 | Status: DISCONTINUED | OUTPATIENT
Start: 2019-02-13 | End: 2019-02-13

## 2019-02-13 RX ORDER — HEPARIN SODIUM 5000 [USP'U]/ML
2500 INJECTION INTRAVENOUS; SUBCUTANEOUS EVERY 6 HOURS
Qty: 0 | Refills: 0 | Status: DISCONTINUED | OUTPATIENT
Start: 2019-02-13 | End: 2019-02-13

## 2019-02-13 RX ORDER — SODIUM CHLORIDE 9 MG/ML
1000 INJECTION, SOLUTION INTRAVENOUS
Qty: 0 | Refills: 0 | Status: DISCONTINUED | OUTPATIENT
Start: 2019-02-13 | End: 2019-02-14

## 2019-02-13 RX ORDER — INSULIN LISPRO 100/ML
VIAL (ML) SUBCUTANEOUS EVERY 6 HOURS
Qty: 0 | Refills: 0 | Status: DISCONTINUED | OUTPATIENT
Start: 2019-02-13 | End: 2019-02-14

## 2019-02-13 RX ORDER — HEPARIN SODIUM 5000 [USP'U]/ML
5000 INJECTION INTRAVENOUS; SUBCUTANEOUS EVERY 6 HOURS
Qty: 0 | Refills: 0 | Status: DISCONTINUED | OUTPATIENT
Start: 2019-02-13 | End: 2019-02-14

## 2019-02-13 RX ORDER — HEPARIN SODIUM 5000 [USP'U]/ML
INJECTION INTRAVENOUS; SUBCUTANEOUS
Qty: 25000 | Refills: 0 | Status: DISCONTINUED | OUTPATIENT
Start: 2019-02-13 | End: 2019-02-13

## 2019-02-13 RX ORDER — HEPARIN SODIUM 5000 [USP'U]/ML
2500 INJECTION INTRAVENOUS; SUBCUTANEOUS EVERY 6 HOURS
Qty: 0 | Refills: 0 | Status: DISCONTINUED | OUTPATIENT
Start: 2019-02-13 | End: 2019-02-14

## 2019-02-13 RX ORDER — INSULIN GLARGINE 100 [IU]/ML
6 INJECTION, SOLUTION SUBCUTANEOUS AT BEDTIME
Qty: 0 | Refills: 0 | Status: DISCONTINUED | OUTPATIENT
Start: 2019-02-13 | End: 2019-02-14

## 2019-02-13 RX ADMIN — Medication 2: at 23:52

## 2019-02-13 RX ADMIN — ZINC OXIDE 1 APPLICATION(S): 200 OINTMENT TOPICAL at 13:37

## 2019-02-13 RX ADMIN — Medication 2: at 01:20

## 2019-02-13 RX ADMIN — Medication 2: at 06:39

## 2019-02-13 RX ADMIN — Medication 1 APPLICATION(S): at 13:15

## 2019-02-13 RX ADMIN — Medication 2: at 18:33

## 2019-02-13 RX ADMIN — HEPARIN SODIUM 1100 UNIT(S)/HR: 5000 INJECTION INTRAVENOUS; SUBCUTANEOUS at 17:16

## 2019-02-13 RX ADMIN — PIPERACILLIN AND TAZOBACTAM 25 GRAM(S): 4; .5 INJECTION, POWDER, LYOPHILIZED, FOR SOLUTION INTRAVENOUS at 06:26

## 2019-02-13 RX ADMIN — ZINC OXIDE 1 APPLICATION(S): 200 OINTMENT TOPICAL at 23:35

## 2019-02-13 RX ADMIN — Medication 1 APPLICATION(S): at 23:35

## 2019-02-13 RX ADMIN — HEPARIN SODIUM 1100 UNIT(S)/HR: 5000 INJECTION INTRAVENOUS; SUBCUTANEOUS at 23:39

## 2019-02-13 RX ADMIN — ZINC OXIDE 1 APPLICATION(S): 200 OINTMENT TOPICAL at 06:26

## 2019-02-13 RX ADMIN — SODIUM CHLORIDE 100 MILLILITER(S): 9 INJECTION, SOLUTION INTRAVENOUS at 15:21

## 2019-02-13 RX ADMIN — Medication 1 APPLICATION(S): at 06:26

## 2019-02-13 RX ADMIN — Medication 2: at 11:34

## 2019-02-13 RX ADMIN — PIPERACILLIN AND TAZOBACTAM 25 GRAM(S): 4; .5 INJECTION, POWDER, LYOPHILIZED, FOR SOLUTION INTRAVENOUS at 13:29

## 2019-02-13 NOTE — SWALLOW BEDSIDE ASSESSMENT ADULT - COMMENTS
HX Continued below.       Per discussion with wife at bedside pt eating normally until [place] lucila. After which pt was hospitalized with infection. PEG placed due to poor PO intake. 2 weeks ago while at Rehab facility SLP attempted subjective PO trials. Per wife there was concern for aspiration. No objective study completed.

## 2019-02-13 NOTE — PROVIDER CONTACT NOTE (CRITICAL VALUE NOTIFICATION) - RECOMMENDATIONS
continue antibiotic, will continue to monitor
Bolus NS 500cc x1hr, then start maintenance 1/2 NS x6hrs.
IV push D50.
Notify PA.

## 2019-02-13 NOTE — PROGRESS NOTE ADULT - ASSESSMENT
73M with multiple medical problems (CAD, CABG, PAD, aorto-bifemoral bypass, CKD), R hip fracture s/p ORIF, s/p PEG/hoskins here with sacral osteomyelitis with sepsis - likely source is osteomyelitis.  Hx also notable for unconntrolled Type 2 DM initially with hypoglycemia now with hyperglycemia off tube feeds. No need for tight glycemic control but given infection will start very low dose basal insulin to improve glycemic control. BG goal (140-200mg/dl).

## 2019-02-13 NOTE — PROGRESS NOTE ADULT - SUBJECTIVE AND OBJECTIVE BOX
SUBJECTIVE: Pt seen, chart reviewed.  G tube malfunction noted  D/W Dr Gilliland    Allergies    No Known Allergies    Intolerances        clopidogrel Tablet 75 milliGRAM(s) Oral daily  piperacillin/tazobactam IVPB. 3.375 Gram(s) IV Intermittent every 8 hours      PAST MEDICAL & SURGICAL HISTORY:  CKD (chronic kidney disease) stage 3, GFR 30-59 ml/min  Pressure ulcer, unspecified pressure ulcer stage: in lateral rt 4th toe both sides from toe compression  Coronary artery disease of native artery of native heart with stable angina pectoris  Diastolic congestive heart failure, unspecified congestive heart failure chronicity  Smoker  Gout involving toe, unspecified cause, unspecified chronicity, unspecified laterality  PAD (peripheral artery disease)  Chronic atrial fibrillation: on coumadin  Hyperlipidemia, unspecified hyperlipidemia type  HTN (hypertension), benign  Type 2 diabetes mellitus with other circulatory complication, with long-term current use of insulin  S/P angioplasty with stent: vascular stent- left leg  S/P hernia repair  Artificial cardiac pacemaker: 2014  S/P aortobifemoral bypass surgery      HEALTH ISSUES - PROBLEM Dx:  Type 2 diabetes mellitus with hyperglycemia, with long-term current use of insulin: Type 2 diabetes mellitus with hyperglycemia, with long-term current use of insulin  Type 2 diabetes mellitus with hypoglycemia without coma, with long-term current use of insulin: Type 2 diabetes mellitus with hypoglycemia without coma, with long-term current use of insulin  Electrolyte abnormality: Electrolyte abnormality  Anemia: Anemia  Hypoglycemia: Hypoglycemia  Atrial fibrillation, chronic: Atrial fibrillation, chronic  Osteomyelitis of sacrum: Osteomyelitis of sacrum  Diabetes mellitus with hypoglycemia: Diabetes mellitus with hypoglycemia  Hypokalemia: Hypokalemia  SHINE (acute kidney injury): SHINE (acute kidney injury)  Need for prophylactic measure: Need for prophylactic measure  Medication management: Medication management  Left arm swelling: Left arm swelling  Heart failure: Heart failure  Metabolic alkalosis: Metabolic alkalosis  Compression fracture of T12 vertebra: Compression fracture of T12 vertebra  Acute kidney injury superimposed on CKD: Acute kidney injury superimposed on CKD  Acute kidney injury: Acute kidney injury  Demand ischemia: Demand ischemia  Elevated alkaline phosphatase level: Elevated alkaline phosphatase level  Hypernatremia: Hypernatremia  Chronic atrial fibrillation: Chronic atrial fibrillation  Sepsis: Sepsis          FAMILY HISTORY:  Family history of hypertension (Father)      Physical Exam:  Vital Signs Last 24 Hrs  T(C): 36.7 (13 Feb 2019 11:22), Max: 36.7 (13 Feb 2019 05:27)  T(F): 98 (13 Feb 2019 11:22), Max: 98 (13 Feb 2019 05:27)  HR: 93 (13 Feb 2019 11:22) (92 - 98)  BP: 107/70 (13 Feb 2019 11:22) (102/52 - 127/65)  BP(mean): --  RR: 18 (13 Feb 2019 11:22) (18 - 18)  SpO2: 93% (13 Feb 2019 11:22) (93% - 98%)    Patient is no longer combative  Remains cachectic  stage 4 sacral decubitus is odor free  Large amount of bone present in wound , and inferior margin of undermining  borders the distal rectal wall  Small necrotic edges of wound debrided using scissors, and scant necrotic tissue in wound bed debrided to fascial level  Will continue Dakins and delay VAC placement until wound starts to granulate    LABS:                        9.0    15.7  )-----------( 375      ( 13 Feb 2019 10:31 )             28.5     PT/INR - ( 12 Feb 2019 08:06 )   PT: 17.1 sec;   INR: 1.48 ratio         PTT - ( 12 Feb 2019 08:06 )  PTT:33.3 sec      Outpatient follow up information provided- [phone]

## 2019-02-13 NOTE — SWALLOW BEDSIDE ASSESSMENT ADULT - SWALLOW EVAL: DIAGNOSIS
Pt presents with an oropharyngeal dysphagia superimposed upon reduced mentation. PO trials are contraindicated at this time given pt's high risk for aspiration.

## 2019-02-13 NOTE — SWALLOW BEDSIDE ASSESSMENT ADULT - ADDITIONAL RECOMMENDATIONS
Maintain oral hygiene  This service signing off at this time. Please reconsult as medically appropriate.

## 2019-02-13 NOTE — PROGRESS NOTE ADULT - ASSESSMENT
Impression:  1) PEG site leakage with possible SSTI    Recommendations  - Continue vancomycin / zosyn which are adequate coverage for any skin infection/cellulitis around PEG site  - Continue to hold tube feeds  - Keep PEG tube insertion site dry  - Apply layer of barrier cream under bumper and around PEG site; will need to apply throughout day  - If area does not heal, can consider attempting to upsize PEG to 20F tube, however, there is not a lot of space around the current tube so this may not fit  - Ultimately if continues to leak, patient may need to have PEG removed with NGT for feeding for several days to allow tract to close, and then new PEG place at different site endoscopically.     Diaz Alexander MD  Gastroenterology Fellow  Pager number: [phone] / [phone] Impression:  1) PEG site leakage  2) Ulcerated skin beneath external PEG bumper likely due to continued trauma from bumper vs irritation from gastric contents    Recommendations  - Continue vancomycin / zosyn which are adequate coverage for any skin infection/cellulitis around PEG site  - The entire area underneath and near the PEG bumper is ulcerated or erythematous; the tract of the PEG also appear to be ulcerated; therefore, even if we change the PEG to a large one, the area likely will not heal well and the large PEG will likely cause even more irritation adjacent to it  - Therefore, would not recommend upsizing PEG right now; change it for a large one might also run the risk of continuing to dilate/enlarge the tract  - Would obtain would care evaluation for the ulcerated skin beneath the bumper; would keep PEG bumper VERY LOOSE and place absorbent dressings at the ulcerated site to help the skin heal  - If the skin does not heal, then the possibility of removing the PEG, feeding by NGT in the meantime until it heals, and then replacing the PEG might be needed  - In the meantime, if we are able to dress the ulcerated/irritated skin beneath the bumper of the PEG, would resume PEG feeds at a slow rate    Diaz Alexander MD  Gastroenterology Fellow  Pager number: [phone] / [phone]

## 2019-02-13 NOTE — SWALLOW BEDSIDE ASSESSMENT ADULT - SLP PERTINENT HISTORY OF CURRENT PROBLEM
72 yo male with CAD c/b CABG, A fib (on coumadin), CKD stage 3, PAD s/p stents, PEG tube, in-dwelling [place] who presents to the nursing home for increased confusion and elevated WBC. History was taken from wife at bedside.	Per wife, patient had been fully functional up until November. He had a hypoglycemic episode, fell, and broke his hip in November. He was admitted to the hospital for surgery, which was uneventful and sent to rehab (in early December). During the rehab stay, he developed numerous ulcers, including a decubitus ulcer. He was sent back to [facility] for sepsis from the decubitus ulcer. He was treated with abx and returned to rehab for a second time. This second hospital course was complicated by acute renal failure requiring dialysis, IV abx, and "surgery" to clean the ulcer.

## 2019-02-13 NOTE — PROGRESS NOTE ADULT - SUBJECTIVE AND OBJECTIVE BOX
Diabetes Follow up note:  Interval Hx:  73 year old male w/T2DM w/nephropathy, CAD, retinopathy here w/metabolic encephalopathy c/b SHINE on CKD and hypoglycemia. Pt remains off tube feed diet w/glucose values persistently in 200s on present insulin scale only.     Review of Systems:  Pt unable to provide ROS.     MEDS:  atorvastatin 20 milliGRAM(s) Oral at bedtime    insulin lispro (HumaLOG) corrective regimen sliding scale   SubCutaneous every 4 hours    piperacillin/tazobactam IVPB. 3.375 Gram(s) IV Intermittent every 8 hours    Allergies    No Known Allergies      PE:  General: Thin male lying in bed. Opens eyes but not following commands.   Vital Signs Last 24 Hrs  T(C): 36.7 (13 Feb 2019 11:22), Max: 36.7 (13 Feb 2019 05:27)  T(F): 98 (13 Feb 2019 11:22), Max: 98 (13 Feb 2019 05:27)  HR: 93 (13 Feb 2019 11:22) (92 - 98)  BP: 107/70 (13 Feb 2019 11:22) (102/52 - 127/65)  BP(mean): --  RR: 18 (13 Feb 2019 11:22) (18 - 18)  SpO2: 93% (13 Feb 2019 11:22) (93% - 98%)  Abd: Soft, NT,ND,   Extremities: Warm  Neuro: Opens eyes to verbal command but not following commands.     LABS:    POCT Blood Glucose.: 247 mg/dL (02-13-19 @ 11:11)  POCT Blood Glucose.: 223 mg/dL (02-13-19 @ 06:38)  POCT Blood Glucose.: 222 mg/dL (02-13-19 @ 00:50)  POCT Blood Glucose.: 209 mg/dL (02-12-19 @ 22:51)  POCT Blood Glucose.: 331 mg/dL (02-12-19 @ 18:27)  POCT Blood Glucose.: 320 mg/dL (02-12-19 @ 14:25)  POCT Blood Glucose.: 279 mg/dL (02-12-19 @ 10:54)  POCT Blood Glucose.: 174 mg/dL (02-12-19 @ 06:20)  POCT Blood Glucose.: 214 mg/dL (02-12-19 @ 02:18)  POCT Blood Glucose.: 215 mg/dL (02-11-19 @ 22:01)  POCT Blood Glucose.: 147 mg/dL (02-11-19 @ 18:21)  POCT Blood Glucose.: 230 mg/dL (02-11-19 @ 15:31)  POCT Blood Glucose.: 224 mg/dL (02-11-19 @ 11:57)  POCT Blood Glucose.: 244 mg/dL (02-11-19 @ 07:10)  POCT Blood Glucose.: 306 mg/dL (02-11-19 @ 05:56)  POCT Blood Glucose.: 283 mg/dL (02-10-19 @ 23:44)  POCT Blood Glucose.: 322 mg/dL (02-10-19 @ 23:42)  POCT Blood Glucose.: 245 mg/dL (02-10-19 @ 17:51)                            9.0    15.7  )-----------( 375      ( 13 Feb 2019 10:31 )             28.5       02-13    156<H>  |  109<H>  |  53<H>  ----------------------------<  172<H>  3.0<L>   |  32<H>  |  2.24<H>    Ca    8.4      13 Feb 2019 10:27  Mg     2.1     02-13          Hemoglobin A1C, Whole Blood: 7.4 % <H> [4.0 - 5.6] (02-04-19 @ 08:56)            Contact number: fernando [phone] or [phone]

## 2019-02-13 NOTE — PROGRESS NOTE ADULT - ASSESSMENT
SHINE  HYPERNATREMIA     has no oral intake and peg tube not functioning. very dehydrated. start d5w at 100cc/hr. when na is improved, can switch to 1/2 ns for some volume expansion./

## 2019-02-13 NOTE — PROGRESS NOTE ADULT - SUBJECTIVE AND OBJECTIVE BOX
[facility] Division of Kidney Diseases & Hypertension  FOLLOW UP NOTE  --------------------------------------------------------------------------------  Chief Complaint:    24 hour events/subjective:    peg tube still not functioning         PAST HISTORY  --------------------------------------------------------------------------------  No significant changes to PMH, PSH, FHx, SHx, unless otherwise noted    ALLERGIES & MEDICATIONS  --------------------------------------------------------------------------------  Allergies    No Known Allergies    Intolerances      Standing Inpatient Medications  ascorbic acid 500 milliGRAM(s) Oral daily  atorvastatin 20 milliGRAM(s) Oral at bedtime  clopidogrel Tablet 75 milliGRAM(s) Oral daily  Dakins Solution - 1/4 Strength 1 Application(s) Topical three times a day  dextrose 5%. 1000 milliLiter(s) IV Continuous <Continuous>  dextrose 50% Injectable 12.5 Gram(s) IV Push once  dextrose 50% Injectable 25 Gram(s) IV Push once  dextrose 50% Injectable 25 Gram(s) IV Push once  heparin  Infusion.  Unit(s)/Hr IV Continuous <Continuous>  insulin glargine Injectable (LANTUS) 6 Unit(s) SubCutaneous at bedtime  insulin lispro (HumaLOG) corrective regimen sliding scale   SubCutaneous every 6 hours  lactobacillus acidophilus 1 Tablet(s) Oral two times a day with meals  piperacillin/tazobactam IVPB. 3.375 Gram(s) IV Intermittent every 8 hours  polyethylene glycol 3350 17 Gram(s) Oral daily  senna Syrup 5 milliLiter(s) Oral at bedtime  sodium chloride 0.45%. 1000 milliLiter(s) IV Continuous <Continuous>  tamsulosin 0.4 milliGRAM(s) Oral at bedtime  zinc oxide 20% Ointment 1 Application(s) Topical three times a day    PRN Inpatient Medications  acetaminophen   Tablet .. 650 milliGRAM(s) Oral every 6 hours PRN  dextrose 40% Gel 15 Gram(s) Oral once PRN  glucagon  Injectable 1 milliGRAM(s) IntraMuscular once PRN  heparin  Injectable 5000 Unit(s) IV Push every 6 hours PRN  heparin  Injectable 2500 Unit(s) IV Push every 6 hours PRN        All other systems were reviewed and are negative, except as noted.    VITALS/PHYSICAL EXAM  --------------------------------------------------------------------------------  T(C): 36.7 (02-13-19 @ 11:22), Max: 36.7 (02-13-19 @ 05:27)  HR: 93 (02-13-19 @ 11:22) (92 - 96)  BP: 107/70 (02-13-19 @ 11:22) (102/52 - 118/77)  RR: 18 (02-13-19 @ 11:22) (18 - 18)  SpO2: 93% (02-13-19 @ 11:22) (93% - 98%)  Wt(kg): --        02-12-19 @ 07:01  -  02-13-19 @ 07:00  --------------------------------------------------------  IN: 600 mL / OUT: 780 mL / NET: -180 mL    02-13-19 @ 07:01  -  02-13-19 @ 17:27  --------------------------------------------------------  IN: 0 mL / OUT: 250 mL / NET: -250 mL      Physical Exam:  	Gen: confused   	mouth- VERY DRY	  	Pulm: CTA B/L  	CV: RRR, S1S2; no rub  	Back: No spinal or CVA tenderness; no sacral edema  	Abd: +BS, soft, nontender/nondistended  		  LABS/STUDIES  --------------------------------------------------------------------------------              9.0    15.7  >-----------<  375      [02-13-19 @ 10:31]              28.5     156  |  109  |  53  ----------------------------<  172      [02-13-19 @ 10:27]  3.0   |  32  |  2.24        Ca     8.4     [02-13-19 @ 10:27]      Mg     2.1     [02-13-19 @ 10:27]      PT/INR: PT 17.1 , INR 1.48       [02-12-19 @ 08:06]  PTT: 33.3       [02-12-19 @ 08:06]      Creatinine Trend:  SCr 2.24 [02-13 @ 10:27]  SCr 1.93 [02-12 @ 06:11]  SCr 1.75 [02-11 @ 08:13]  SCr 1.66 [02-10 @ 12:02]  SCr 1.53 [02-09 @ 10:55]

## 2019-02-13 NOTE — PROGRESS NOTE ADULT - SUBJECTIVE AND OBJECTIVE BOX
Chief Complaint: Sepsis    Interval Events:   - The patient underwent CT A/P with no fluid collection surrounding PEG tube site on preliminary read by Radiology    Allergies:  No Known Allergies    Hospital Medications:  acetaminophen   Tablet .. 650 milliGRAM(s) Oral every 6 hours PRN  ascorbic acid 500 milliGRAM(s) Oral daily  atorvastatin 20 milliGRAM(s) Oral at bedtime  clopidogrel Tablet 75 milliGRAM(s) Oral daily  Dakins Solution - 1/4 Strength 1 Application(s) Topical three times a day  dextrose 40% Gel 15 Gram(s) Oral once PRN  dextrose 5%. 1000 milliLiter(s) IV Continuous <Continuous>  dextrose 50% Injectable 12.5 Gram(s) IV Push once  dextrose 50% Injectable 25 Gram(s) IV Push once  dextrose 50% Injectable 25 Gram(s) IV Push once  glucagon  Injectable 1 milliGRAM(s) IntraMuscular once PRN  insulin lispro (HumaLOG) corrective regimen sliding scale   SubCutaneous every 4 hours  lactobacillus acidophilus 1 Tablet(s) Oral two times a day with meals  piperacillin/tazobactam IVPB. 3.375 Gram(s) IV Intermittent every 8 hours  polyethylene glycol 3350 17 Gram(s) Oral daily  senna Syrup 5 milliLiter(s) Oral at bedtime  tamsulosin 0.4 milliGRAM(s) Oral at bedtime  zinc oxide 20% Ointment 1 Application(s) Topical three times a day    PMHX/PSHX:  CKD (chronic kidney disease) stage 3, GFR 30-59 ml/min  Pressure ulcer, unspecified pressure ulcer stage  Coronary artery disease of native artery of native heart with stable angina pectoris  Diastolic congestive heart failure, unspecified congestive heart failure chronicity  Smoker  Gout involving toe, unspecified cause, unspecified chronicity, unspecified laterality  Gouty nephropathy  PAD (peripheral artery disease)  Chronic atrial fibrillation  Hyperlipidemia, unspecified hyperlipidemia type  HTN (hypertension), benign  Type 2 diabetes mellitus with other circulatory complication, with long-term current use of insulin  S/P angioplasty with stent  S/P hernia repair  Artificial cardiac pacemaker  S/P aortobifemoral bypass surgery    ROS: Unable to obtain as patient is non-verbal at baseline    PHYSICAL EXAM:   Vital Signs:  Vital Signs Last 24 Hrs  T(C): 36.7 (13 Feb 2019 05:27), Max: 36.7 (12 Feb 2019 12:08)  T(F): 98 (13 Feb 2019 05:27), Max: 98 (12 Feb 2019 12:08)  HR: 92 (13 Feb 2019 05:27) (87 - 98)  BP: 118/77 (13 Feb 2019 05:27) (102/52 - 127/65)  BP(mean): --  RR: 18 (13 Feb 2019 05:27) (18 - 18)  SpO2: 93% (13 Feb 2019 05:27) (93% - 100%)    GENERAL:  No acute distress  HEENT:  Normocephalic/atraumatic,  no scleral icterus  CHEST:  Decreased inspiratory effort, no accessory muscle use  HEART:  Regular rate and rhythm, no murmurs/rubs/gallops  ABDOMEN:  Soft, non-tender, non-distended, normoactive bowel sounds, erythema and ulcerated skin surrounding PEG tube site, no leakage of gastric contents  EXTREMITIES:  No edema  NEURO: Responds to painful stimuli and verbal stimuli, does not follow commands    LABS:                        8.0    14.0  )-----------( 313      ( 12 Feb 2019 06:11 )             25.5       02-12    153<H>  |  107  |  47<H>  ----------------------------<  163<H>  3.1<L>   |  30  |  1.93<H>    Ca    8.1<L>      12 Feb 2019 06:11    PT/INR - ( 12 Feb 2019 08:06 )   PT: 17.1 sec;   INR: 1.48 ratio         PTT - ( 12 Feb 2019 08:06 )  PTT:33.3 sec               8.0    14.0  )-----------( 313      ( 12 Feb 2019 06:11 )             25.5                         8.6    16.0  )-----------( 343      ( 11 Feb 2019 08:15 )             27.7                         8.7    15.0  )-----------( 327      ( 10 Feb 2019 12:20 )             27.3     Imaging:    CT Abdomen:    Official Read Pending Chief Complaint: Sepsis    Interval Events:   - The patient underwent CT A/P with no fluid collection surrounding PEG tube site on preliminary read by Radiology. No overnight events.    Allergies:  No Known Allergies    Hospital Medications:  acetaminophen   Tablet .. 650 milliGRAM(s) Oral every 6 hours PRN  ascorbic acid 500 milliGRAM(s) Oral daily  atorvastatin 20 milliGRAM(s) Oral at bedtime  clopidogrel Tablet 75 milliGRAM(s) Oral daily  Dakins Solution - 1/4 Strength 1 Application(s) Topical three times a day  dextrose 40% Gel 15 Gram(s) Oral once PRN  dextrose 5%. 1000 milliLiter(s) IV Continuous <Continuous>  dextrose 50% Injectable 12.5 Gram(s) IV Push once  dextrose 50% Injectable 25 Gram(s) IV Push once  dextrose 50% Injectable 25 Gram(s) IV Push once  glucagon  Injectable 1 milliGRAM(s) IntraMuscular once PRN  insulin lispro (HumaLOG) corrective regimen sliding scale   SubCutaneous every 4 hours  lactobacillus acidophilus 1 Tablet(s) Oral two times a day with meals  piperacillin/tazobactam IVPB. 3.375 Gram(s) IV Intermittent every 8 hours  polyethylene glycol 3350 17 Gram(s) Oral daily  senna Syrup 5 milliLiter(s) Oral at bedtime  tamsulosin 0.4 milliGRAM(s) Oral at bedtime  zinc oxide 20% Ointment 1 Application(s) Topical three times a day    PMHX/PSHX:  CKD (chronic kidney disease) stage 3, GFR 30-59 ml/min  Pressure ulcer, unspecified pressure ulcer stage  Coronary artery disease of native artery of native heart with stable angina pectoris  Diastolic congestive heart failure, unspecified congestive heart failure chronicity  Smoker  Gout involving toe, unspecified cause, unspecified chronicity, unspecified laterality  Gouty nephropathy  PAD (peripheral artery disease)  Chronic atrial fibrillation  Hyperlipidemia, unspecified hyperlipidemia type  HTN (hypertension), benign  Type 2 diabetes mellitus with other circulatory complication, with long-term current use of insulin  S/P angioplasty with stent  S/P hernia repair  Artificial cardiac pacemaker  S/P aortobifemoral bypass surgery    ROS: Unable to obtain as patient is non-verbal at baseline    PHYSICAL EXAM:   Vital Signs:  Vital Signs Last 24 Hrs  T(C): 36.7 (13 Feb 2019 05:27), Max: 36.7 (12 Feb 2019 12:08)  T(F): 98 (13 Feb 2019 05:27), Max: 98 (12 Feb 2019 12:08)  HR: 92 (13 Feb 2019 05:27) (87 - 98)  BP: 118/77 (13 Feb 2019 05:27) (102/52 - 127/65)  BP(mean): --  RR: 18 (13 Feb 2019 05:27) (18 - 18)  SpO2: 93% (13 Feb 2019 05:27) (93% - 100%)    GENERAL:  No acute distress  HEENT:  Normocephalic/atraumatic,  no scleral icterus  CHEST:  Decreased inspiratory effort, no accessory muscle use  HEART:  Regular rate and rhythm, no murmurs/rubs/gallops  ABDOMEN:  Soft, non-tender, non-distended, normoactive bowel sounds, erythema and ulcerated skin surrounding PEG tube site, no leakage of gastric contents  EXTREMITIES:  No edema  NEURO: Responds to painful stimuli and verbal stimuli, does not follow commands    LABS:                        8.0    14.0  )-----------( 313      ( 12 Feb 2019 06:11 )             25.5       02-12    153<H>  |  107  |  47<H>  ----------------------------<  163<H>  3.1<L>   |  30  |  1.93<H>    Ca    8.1<L>      12 Feb 2019 06:11    PT/INR - ( 12 Feb 2019 08:06 )   PT: 17.1 sec;   INR: 1.48 ratio         PTT - ( 12 Feb 2019 08:06 )  PTT:33.3 sec               8.0    14.0  )-----------( 313      ( 12 Feb 2019 06:11 )             25.5                         8.6    16.0  )-----------( 343      ( 11 Feb 2019 08:15 )             27.7                         8.7    15.0  )-----------( 327      ( 10 Feb 2019 12:20 )             27.3     Imaging:    CT Abdomen:  < from: CT Abdomen No Cont (02.12.19 @ 22:20) >  FINDINGS:    LOWER CHEST: Partially imaged bilateral pleural effusions and bibasilar   atelectasis, unchanged. Cardiac device leads and mitral annulus   calcifications.     LIVER: Within normal limits.  BILE DUCTS: Normal caliber.  GALLBLADDER: Gallstones.  SPLEEN: Within normal limits.  PANCREAS: Within normal limits.  ADRENALS: Within normal limits.  KIDNEYS/URETERS: Within normal limits.    VISUALIZED PORTIONS:    BOWEL: Gastrostomy tube. No evidence of fluid collection along the   gastrostomy tube or adjacent to the stomach to suggest abscess.   PERITONEUM: Trace ascites.  VESSELS: Atherosclerotic vascular calcifications. Status post aortic   bypass.  RETROPERITONEUM: No lymphadenopathy.    ABDOMINAL WALL: Subcutaneous air posterior to the sacrum, area   incompletely visualized.  BONES: T12 vertebral body compression deformity, unchanged. Multilevel   spinal degenerative changes.    IMPRESSION:     No evidence of abscess along the gastrostomy tube.     < end of copied text >

## 2019-02-13 NOTE — PROGRESS NOTE ADULT - PROBLEM SELECTOR PLAN 1
-test BG Q6h while NPO or on tube feeds  -Start Lantus 6 units QHS (low dose weight based at 0.1 unit/kg)  -Change to Humalog low correction scale Q6h  -discussed w/RN  Will monitor. Call with any questions.   Cell: [phone]

## 2019-02-13 NOTE — SWALLOW BEDSIDE ASSESSMENT ADULT - SLP GENERAL OBSERVATIONS
Pt found in bed supine. Reduced mentation. Did not respond to verbal/tactile stimuli. Mouth breathing.

## 2019-02-13 NOTE — PROGRESS NOTE ADULT - ASSESSMENT
74 yo male PMH CAD c/b CABG, A fib (on coumadin), CKD stage 3, PAD s/p stents, PEG tube, in-dwelling Hoskins who presents to the nursing home for increased confusion and elevated WBC. History was taken from wife at bedside.	Per wife, patient had been fully functional up until November. He had a hypoglycemic episode, fell, and broke his hip in November. He was admitted to the hospital for surgery, which was uneventful and sent to rehab (in early December).     During the rehab stay, he developed numerous ulcers, including a decubitus ulcer. He was sent back to [facility] for sepsis from the decubitus ulcer. He was treated with abx and returned to rehab for a second time. This second hospital course was complicated by acute renal failure requiring dialysis, IV abx, and "surgery" to clean the ulcer. In the ED, he received Vanc/Zosyn. He was admitted to medicine for further management and sepsis on arrival from   sacral osteomylitis.     ID: Agree IV Zosyn 3.375 grams every 8 hours for sacral decubitus, Zosyn stopped. CT chest shows no pneumonia, bilateral effusions. PEG feeds stopped working yesterday, now on hold. Leakage around the site. Chronic hoskins changed this admission. Agree with ID. Had bone biopsy done today which now shows GNR. CT of lumbar spine confirms sacral OM. Need bone culture to guide treatment. PT ordered. Possible VAC dressing later in week if surgery/wound care agrees. Discussed with surgery attending from wound care service.     Reanl: CKD III stable, creatinine 1.66. Renal sono confirms MRD. Free water replacement on hold without  PEG.  Creatinine up today to 1.93.   Will hold Lasix for now.     Urology: Add Flomax .4 mg/day. Used to take Cardura at home. No TOV in near future.     CV: Lasix on hold given SHINE. Stop all IVF for now. Resume Plavix 75 mg/day, taken at home with Lipitor.  Hold Coumadin for Midline.     Need INR < 1.50 for PICC.       Endo: episodes of hypoglycemia off insulin. Stopped D 10. Endo consult requested. PEG feeds on hold given leaking. NPH on hold  until PEG feeds working well. Glucose stable at 224.         GI: Peg site leaking past 24 hours. Not able to use now. GI recommends not using tube for one week. May need new one. Might   be better to remove PEG tube so site can heal, then place new one.  Await GI recommendation today regarding PEG. Speech  eval requested.     Agree DNR.      Note: Has SHINE on CKD III from diuresis. Has acute decompensated systolic CHF.

## 2019-02-13 NOTE — PROGRESS NOTE ADULT - SUBJECTIVE AND OBJECTIVE BOX
INTERVAL HPI/OVERNIGHT EVENTS:  Pt seen and examined at bedside.     Allergies/Intolerance: No Known Allergies      MEDICATIONS  (STANDING):  ascorbic acid 500 milliGRAM(s) Oral daily  atorvastatin 20 milliGRAM(s) Oral at bedtime  clopidogrel Tablet 75 milliGRAM(s) Oral daily  Dakins Solution - 1/4 Strength 1 Application(s) Topical three times a day  dextrose 5%. 1000 milliLiter(s) (50 mL/Hr) IV Continuous <Continuous>  dextrose 50% Injectable 12.5 Gram(s) IV Push once  dextrose 50% Injectable 25 Gram(s) IV Push once  dextrose 50% Injectable 25 Gram(s) IV Push once  insulin lispro (HumaLOG) corrective regimen sliding scale   SubCutaneous every 4 hours  lactobacillus acidophilus 1 Tablet(s) Oral two times a day with meals  piperacillin/tazobactam IVPB. 3.375 Gram(s) IV Intermittent every 8 hours  polyethylene glycol 3350 17 Gram(s) Oral daily  senna Syrup 5 milliLiter(s) Oral at bedtime  tamsulosin 0.4 milliGRAM(s) Oral at bedtime  zinc oxide 20% Ointment 1 Application(s) Topical three times a day    MEDICATIONS  (PRN):  acetaminophen   Tablet .. 650 milliGRAM(s) Oral every 6 hours PRN Temp greater or equal to 38C (100.4F)  dextrose 40% Gel 15 Gram(s) Oral once PRN Blood Glucose LESS THAN 70 milliGRAM(s)/deciliter  glucagon  Injectable 1 milliGRAM(s) IntraMuscular once PRN Glucose LESS THAN 70 milligrams/deciliter        ROS: all systems reviewed and wnl      PHYSICAL EXAMINATION:  Vital Signs Last 24 Hrs  T(C): 36.7 (13 Feb 2019 05:27), Max: 36.7 (12 Feb 2019 12:08)  T(F): 98 (13 Feb 2019 05:27), Max: 98 (12 Feb 2019 12:08)  HR: 92 (13 Feb 2019 05:27) (87 - 98)  BP: 118/77 (13 Feb 2019 05:27) (102/52 - 127/65)  BP(mean): --  RR: 18 (13 Feb 2019 05:27) (18 - 18)  SpO2: 93% (13 Feb 2019 05:27) (93% - 100%)  CAPILLARY BLOOD GLUCOSE      POCT Blood Glucose.: 223 mg/dL (13 Feb 2019 06:38)  POCT Blood Glucose.: 222 mg/dL (13 Feb 2019 00:50)  POCT Blood Glucose.: 209 mg/dL (12 Feb 2019 22:51)  POCT Blood Glucose.: 331 mg/dL (12 Feb 2019 18:27)  POCT Blood Glucose.: 320 mg/dL (12 Feb 2019 14:25)  POCT Blood Glucose.: 279 mg/dL (12 Feb 2019 10:54)      02-12 @ 07:01  -  02-13 @ 07:00  --------------------------------------------------------  IN: 600 mL / OUT: 780 mL / NET: -180 mL        GENERAL:   NECK: supple, No JVD  CHEST/LUNG: clear to auscultation bilaterally; no rales, rhonchi, or wheezing b/l  HEART: normal S1, S2  ABDOMEN: BS+, soft, ND, NT   EXTREMITIES:  pulses palpable; no clubbing, cyanosis, or edema b/l LEs  SKIN: no rashes or lesions      LABS:                        8.0    14.0  )-----------( 313      ( 12 Feb 2019 06:11 )             25.5     02-12    153<H>  |  107  |  47<H>  ----------------------------<  163<H>  3.1<L>   |  30  |  1.93<H>    Ca    8.1<L>      12 Feb 2019 06:11      PT/INR - ( 12 Feb 2019 08:06 )   PT: 17.1 sec;   INR: 1.48 ratio         PTT - ( 12 Feb 2019 08:06 )  PTT:33.3 sec INTERVAL HPI/OVERNIGHT EVENTS:  Pt seen and examined at bedside.     Allergies/Intolerance: No Known Allergies      MEDICATIONS  (STANDING):  ascorbic acid 500 milliGRAM(s) Oral daily  atorvastatin 20 milliGRAM(s) Oral at bedtime  clopidogrel Tablet 75 milliGRAM(s) Oral daily  Dakins Solution - 1/4 Strength 1 Application(s) Topical three times a day  dextrose 5%. 1000 milliLiter(s) (50 mL/Hr) IV Continuous <Continuous>  dextrose 50% Injectable 12.5 Gram(s) IV Push once  dextrose 50% Injectable 25 Gram(s) IV Push once  dextrose 50% Injectable 25 Gram(s) IV Push once  insulin lispro (HumaLOG) corrective regimen sliding scale   SubCutaneous every 4 hours  lactobacillus acidophilus 1 Tablet(s) Oral two times a day with meals  piperacillin/tazobactam IVPB. 3.375 Gram(s) IV Intermittent every 8 hours  polyethylene glycol 3350 17 Gram(s) Oral daily  senna Syrup 5 milliLiter(s) Oral at bedtime  tamsulosin 0.4 milliGRAM(s) Oral at bedtime  zinc oxide 20% Ointment 1 Application(s) Topical three times a day    MEDICATIONS  (PRN):  acetaminophen   Tablet .. 650 milliGRAM(s) Oral every 6 hours PRN Temp greater or equal to 38C (100.4F)  dextrose 40% Gel 15 Gram(s) Oral once PRN Blood Glucose LESS THAN 70 milliGRAM(s)/deciliter  glucagon  Injectable 1 milliGRAM(s) IntraMuscular once PRN Glucose LESS THAN 70 milligrams/deciliter        ROS: all systems reviewed and wnl      PHYSICAL EXAMINATION:  Vital Signs Last 24 Hrs  T(C): 36.7 (13 Feb 2019 05:27), Max: 36.7 (12 Feb 2019 12:08)  T(F): 98 (13 Feb 2019 05:27), Max: 98 (12 Feb 2019 12:08)  HR: 92 (13 Feb 2019 05:27) (87 - 98)  BP: 118/77 (13 Feb 2019 05:27) (102/52 - 127/65)  BP(mean): --  RR: 18 (13 Feb 2019 05:27) (18 - 18)  SpO2: 93% (13 Feb 2019 05:27) (93% - 100%)  CAPILLARY BLOOD GLUCOSE      POCT Blood Glucose.: 223 mg/dL (13 Feb 2019 06:38)  POCT Blood Glucose.: 222 mg/dL (13 Feb 2019 00:50)  POCT Blood Glucose.: 209 mg/dL (12 Feb 2019 22:51)  POCT Blood Glucose.: 331 mg/dL (12 Feb 2019 18:27)  POCT Blood Glucose.: 320 mg/dL (12 Feb 2019 14:25)  POCT Blood Glucose.: 279 mg/dL (12 Feb 2019 10:54)      02-12 @ 07:01  -  02-13 @ 07:00  --------------------------------------------------------  IN: 600 mL / OUT: 780 mL / NET: -180 mL        GENERAL: stable in bed, No fevers or SOB  NECK: supple, No JVD  CHEST/LUNG: clear to auscultation bilaterally; no rales, rhonchi, or wheezing b/l  HEART: normal S1, S2  ABDOMEN: BS+, soft, ND, NT, peg tube in place   EXTREMITIES:  pulses palpable; no clubbing, cyanosis, or edema b/l LEs  hoskins in place  SKIN: no rashes or lesions      LABS:                        8.0    14.0  )-----------( 313      ( 12 Feb 2019 06:11 )             25.5     02-12    153<H>  |  107  |  47<H>  ----------------------------<  163<H>  3.1<L>   |  30  |  1.93<H>    Ca    8.1<L>      12 Feb 2019 06:11      PT/INR - ( 12 Feb 2019 08:06 )   PT: 17.1 sec;   INR: 1.48 ratio         PTT - ( 12 Feb 2019 08:06 )  PTT:33.3 sec

## 2019-02-13 NOTE — PROGRESS NOTE ADULT - ASSESSMENT
73M with multiple medical problems (CAD, CABG, PAD, aorto-bifemoral bypass, CKD), R hip fracture s/p ORIF, s/p PEG/hoskins here with sacral osteomyelitis with sepsis - likely source is osteomyelitis.  Prior history  - patient was hospitalized at [place] and sacral wound was debrided.  Polymicrobial (E coli, Kpna, enterococcus - all "pan-sensitive").  bone scan was negative at that time late December for OM.  s/p debridement of sacral wound.  Culture is polymicrobial.  Zosyn alone is okay.  Continued altered mental status VRE in the wound that is polymicrobial.  Doubt it is a predominant pathogen.  Path not clear if acute or chronic infection - i have reached out to the pathologist to read the tissue for clarification.    Suggest:  - continue zosyn - duration pending path clarification - today is D#11 of IV antibiotics  - f/u bone biopsy clarification  - issue of feeding being addressed    overall, long term prognosis is poor    above d/w hospitalist

## 2019-02-13 NOTE — PROGRESS NOTE ADULT - SUBJECTIVE AND OBJECTIVE BOX
CARDIOLOGY     PROGRESS  NOTE   ________________________________________________    CHIEF COMPLAINT:Patient is a 73y old  Male who presents with a chief complaint of Sepsis (13 Feb 2019 07:32)    	  REVIEW OF SYSTEMS:  CONSTITUTIONAL: No fever, weight loss, or fatigue  EYES: No eye pain, visual disturbances, or discharge  ENT:  No difficulty hearing, tinnitus, vertigo; No sinus or throat pain  NECK: No pain or stiffness  RESPIRATORY: No cough, wheezing, chills or hemoptysis; No Shortness of Breath  CARDIOVASCULAR: No chest pain, palpitations, passing out, dizziness, or leg swelling  GASTROINTESTINAL: No abdominal or epigastric pain. No nausea, vomiting, or hematemesis; No diarrhea or constipation. No melena or hematochezia.  GENITOURINARY: No dysuria, frequency, hematuria, or incontinence  NEUROLOGICAL: No headaches, memory loss, loss of strength, numbness, or tremors  SKIN: No itching, burning, rashes, or lesions   LYMPH Nodes: No enlarged glands  ENDOCRINE: No heat or cold intolerance; No hair loss  MUSCULOSKELETAL: No joint pain or swelling; No muscle, back, or extremity pain  PSYCHIATRIC: No depression, anxiety, mood swings, or difficulty sleeping  HEME/LYMPH: No easy bruising, or bleeding gums  ALLERGY AND IMMUNOLOGIC: No hives or eczema	    [ ] All others negative	  [ ] Unable to obtain    PHYSICAL EXAM:  T(C): 36.7 (02-13-19 @ 05:27), Max: 36.7 (02-12-19 @ 12:08)  HR: 92 (02-13-19 @ 05:27) (87 - 98)  BP: 118/77 (02-13-19 @ 05:27) (102/52 - 127/65)  RR: 18 (02-13-19 @ 05:27) (18 - 18)  SpO2: 93% (02-13-19 @ 05:27) (93% - 100%)  Wt(kg): --  I&O's Summary    12 Feb 2019 07:01  -  13 Feb 2019 07:00  --------------------------------------------------------  IN: 600 mL / OUT: 780 mL / NET: -180 mL        Appearance: Normal	  HEENT:   Normal oral mucosa, PERRL, EOMI	  Lymphatic: No lymphadenopathy  Cardiovascular: Normal S1 S2, No JVD, No murmurs, No edema  Respiratory: Lungs clear to auscultation	  Psychiatry: A & O x 3, Mood & affect appropriate  Gastrointestinal:  Soft, Non-tender, + BS	  Skin: No rashes, No ecchymoses, No cyanosis	  Neurologic: Non-focal  Extremities: Normal range of motion, No clubbing, cyanosis or edema  Vascular: Peripheral pulses palpable 2+ bilaterally    MEDICATIONS  (STANDING):  ascorbic acid 500 milliGRAM(s) Oral daily  atorvastatin 20 milliGRAM(s) Oral at bedtime  clopidogrel Tablet 75 milliGRAM(s) Oral daily  Dakins Solution - 1/4 Strength 1 Application(s) Topical three times a day  dextrose 5%. 1000 milliLiter(s) (50 mL/Hr) IV Continuous <Continuous>  dextrose 50% Injectable 12.5 Gram(s) IV Push once  dextrose 50% Injectable 25 Gram(s) IV Push once  dextrose 50% Injectable 25 Gram(s) IV Push once  insulin lispro (HumaLOG) corrective regimen sliding scale   SubCutaneous every 4 hours  lactobacillus acidophilus 1 Tablet(s) Oral two times a day with meals  piperacillin/tazobactam IVPB. 3.375 Gram(s) IV Intermittent every 8 hours  polyethylene glycol 3350 17 Gram(s) Oral daily  senna Syrup 5 milliLiter(s) Oral at bedtime  tamsulosin 0.4 milliGRAM(s) Oral at bedtime  zinc oxide 20% Ointment 1 Application(s) Topical three times a day      TELEMETRY: 	    ECG:  	  RADIOLOGY:  OTHER: 	  	  LABS:	 	    CARDIAC MARKERS:                                8.0    14.0  )-----------( 313      ( 12 Feb 2019 06:11 )             25.5     02-12    153<H>  |  107  |  47<H>  ----------------------------<  163<H>  3.1<L>   |  30  |  1.93<H>    Ca    8.1<L>      12 Feb 2019 06:11      proBNP: Serum Pro-Brain Natriuretic Peptide: 38733 pg/mL (02-03 @ 13:07)    Lipid Profile:   HgA1c: Hemoglobin A1C, Whole Blood: 7.4 % (02-04 @ 08:56)    TSH:   PT/INR - ( 12 Feb 2019 08:06 )   PT: 17.1 sec;   INR: 1.48 ratio         PTT - ( 12 Feb 2019 08:06 )  PTT:33.3 sec    < from: Transthoracic Echocardiogram (02.11.19 @ 15:39) >  1. Bioprosthetic mitral valve replacement. No obvious  evidence of vegetation.  No mitral regurgitation seen. Peak  mitral valve gradient equals 10 mm Hg, mean transmitral  valve gradient equals 9 mm Hg, which is elevated even in  the setting of a bioprosthetic mitral valve replacement.  Elevated gradients are likely secondary to elevated heart  rate (HR 90).  2. Aortic valve not well visualized; appears calcified.  Although there is no AR or obvious valve destruction, there  is shadowing on the aortic aspect of the non-coronary or  left cusp suggestive of possible vegetation. Unable to rule  out endocarditis.  No aortic valve regurgitation seen.  3. Severely dilated left atrium.  LA volume index = 59  cc/m2.  4. Increased relative wall thickness with normal left  ventricular mass index, consistent with concentric left  ventricular remodeling.  5. Mild segmental left ventricular systolic dysfunction in  [place] setting of marked conduction defects. Difficult  estimation of LV EF; visual estimation 25-30%.  Septal  motion is consistent with conduction defect. Hypokinesis of  the inferoseptal and anteroseptal walls.  6. Increased E/e'  is consistent with elevated left  ventricular filling pressure.  7. Normal right ventricular size and function.    < end of copied text >    Assessment and plan  ---------------------------  chf systolic acute on chronic  s/p bio avr  vegetation  ID comment  need to discuss with health care proxy about the aggressiveness of  therapy  add metoprolol er 25 mg daily

## 2019-02-13 NOTE — PROGRESS NOTE ADULT - SUBJECTIVE AND OBJECTIVE BOX
f/u OM sacrum    Interval History/ROS:   patient continues to be confused.  no fever.  Unable to obtain ROS - patient confused.    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  Pressure ulcer, unspecified pressure ulcer stage: in lateral rt 4th toe both sides from toe compression  S/P hernia repair    Allergies  No Known Allergies    ANTIMICROBIALS:    vancomycin 2/3-11  cefepime   IVPB  2/4-2/5  ertapenem  IVPB 2/5-2/8  piperacillin/tazobactam IVPB [2/8-]    MEDICATIONS  (STANDING):  atorvastatin 20 at bedtime  clopidogrel Tablet 75 daily  insulin lispro (HumaLOG) corrective regimen sliding scale  every 4 hours  polyethylene glycol 3350 17 daily  senna Syrup 5 at bedtime  tamsulosin 0.4 at bedtime    Vital Signs Last 24 Hrs  T(F): 98 (02-13-19 @ 11:22), Max: 98 (02-13-19 @ 05:27)  HR: 93 (02-13-19 @ 11:22)  BP: 107/70 (02-13-19 @ 11:22)  RR: 18 (02-13-19 @ 11:22)  SpO2: 93% (02-13-19 @ 11:22) (93% - 98%)  Wt(kg): --    PHYSICAL EXAM:  General: non-toxic; in bed  HEAD/EYES: anicteric  ENT:  supple  Cardiovascular:   S1, S2  Respiratory:  clear bilaterally  GI:  soft, non-tender, normal bowel sounds  :  hoskins clear urine  Musculoskeletal:  no synovitis  Neurologic:  awake, confused  Skin:  multiple eschars of the legs b/l  Psychiatric:  not really able to assess  Vascular:  no phlebitis                                        9.0    15.7  )-----------( 375      ( 13 Feb 2019 10:31 )             28.5 02-13    156  |  109  |  53  ----------------------------<  172  3.0   |  32  |  2.24  Ca    8.4      13 Feb 2019 10:27Mg     2.1     02-13        C-Reactive Protein, Serum: 8.89 (02-08-19)    Sedimentation Rate, Erythrocyte: 35 (02-08-19)  Sedimentation Rate, Erythrocyte: 66 (02-03-19)    Vancomycin Level, Trough: 17.6 (02-08 @ 17:46)    MICROBIOLOGY:  Culture - Tissue with Gram Stain (02.08.19 @ 20:13)    Gram Stain:   No polymorphonuclear cells seen per low power field  No organisms seen per oil power field    Specimen Source: .Tissue sacral bone    Culture Results:   Few Citrobacter freundii complex  Rare Enterococcus faecium (vancomycin resistant)  Few Coag Negative Staphylococcus  Rare Enterobacter cloacae complex  Rare Candida albicans Referred to Mycology     Culture - Blood (02.03.19 @ 17:52)    Specimen Source: .Blood Blood-Venous    Culture Results:   No growth to date.    Culture - Blood (02.03.19 @ 17:52)    Specimen Source: .Blood Blood-Peripheral    Culture Results:   No growth to date.    Culture - Urine (02.03.19 @ 16:44)    Specimen Source: .Urine Catheterized    Culture Results:   <10,000 CFU/ml Normal Urogenital nany present    RADIOLOGY:  US Kidney and Bladder (02.06.19 @ 09:54) >  IMPRESSION:  Renal parenchymal disease. No hydronephrosis. Trace perinephric fluid bilaterally.  Trace to small abdominal pelvic free fluid.  Cholelithiasis. 3 mm gallbladder polyp. Follow-upcan be performed in one year to assess for interval change in size.    Xray Chest 1 View- PORTABLE-Urgent (02.04.19 @ 03:31) >  IMPRESSION: The patient is status post CABG and heart valve repair. A left cardiac device is noted. There is no pneumothorax. There are small bilateral pleural effusions with associated pulmonary edema demonstrating overall interval improvement since the prior study.     CT Lumbar Spine No Cont (02.03.19 @ 15:34) >  A large sacral decubitus ulcer is noted overlying the mid-lower sacrum and coccyx. Areas of cortical discontinuity are noted involving the dorsal sacrum most consistent with osteomyelitis. No periosteal reaction   is visualized. No large fluid collections are appreciated within the limitations of this noncontrast study.  Severe compression deformity of the T12 vertebral body with mild retropulsion of bone with a mild bony central canal stenosis as previously described. Bilateral spondylolysis defects are again noted at theL5-S1 levels. Multilevel spondylosis. Diffuse demineralization of the bones.  Atheromatous disease with redemonstration of aortobifemoral bypass which is incompletely evaluated with absence of intravenous contrast.  Fluid within the pelvis ofunknown etiology. Hoskins catheter balloon within the bladder.  The patient is status post right hip replacement.  IMPRESSION:  A large sacral decubitus ulcer is noted overlying the mid-lower sacrum and coccyx. Areas of cortical discontinuity are noted involving the dorsal sacrum most consistent with osteomyelitis.

## 2019-02-14 DIAGNOSIS — K94.23 GASTROSTOMY MALFUNCTION: ICD-10-CM

## 2019-02-14 DIAGNOSIS — Z51.5 ENCOUNTER FOR PALLIATIVE CARE: ICD-10-CM

## 2019-02-14 LAB
ANION GAP SERPL CALC-SCNC: 12 MMOL/L — SIGNIFICANT CHANGE UP (ref 5–17)
ANION GAP SERPL CALC-SCNC: 14 MMOL/L — SIGNIFICANT CHANGE UP (ref 5–17)
APTT BLD: 78.3 SEC — HIGH (ref 27.5–36.3)
BLD GP AB SCN SERPL QL: NEGATIVE — SIGNIFICANT CHANGE UP
BUN SERPL-MCNC: 42 MG/DL — HIGH (ref 7–23)
BUN SERPL-MCNC: 43 MG/DL — HIGH (ref 7–23)
CALCIUM SERPL-MCNC: 6.7 MG/DL — LOW (ref 8.4–10.5)
CALCIUM SERPL-MCNC: 7.7 MG/DL — LOW (ref 8.4–10.5)
CHLORIDE SERPL-SCNC: 104 MMOL/L — SIGNIFICANT CHANGE UP (ref 96–108)
CHLORIDE SERPL-SCNC: 107 MMOL/L — SIGNIFICANT CHANGE UP (ref 96–108)
CO2 SERPL-SCNC: 28 MMOL/L — SIGNIFICANT CHANGE UP (ref 22–31)
CO2 SERPL-SCNC: 30 MMOL/L — SIGNIFICANT CHANGE UP (ref 22–31)
CREAT SERPL-MCNC: 1.72 MG/DL — HIGH (ref 0.5–1.3)
CREAT SERPL-MCNC: 1.92 MG/DL — HIGH (ref 0.5–1.3)
CULTURE RESULTS: SIGNIFICANT CHANGE UP
GLUCOSE BLDC GLUCOMTR-MCNC: 103 MG/DL — HIGH (ref 70–99)
GLUCOSE BLDC GLUCOMTR-MCNC: 126 MG/DL — HIGH (ref 70–99)
GLUCOSE BLDC GLUCOMTR-MCNC: 202 MG/DL — HIGH (ref 70–99)
GLUCOSE BLDC GLUCOMTR-MCNC: 265 MG/DL — HIGH (ref 70–99)
GLUCOSE BLDC GLUCOMTR-MCNC: 305 MG/DL — HIGH (ref 70–99)
GLUCOSE BLDC GLUCOMTR-MCNC: 45 MG/DL — CRITICAL LOW (ref 70–99)
GLUCOSE BLDC GLUCOMTR-MCNC: 46 MG/DL — LOW (ref 70–99)
GLUCOSE BLDC GLUCOMTR-MCNC: 66 MG/DL — LOW (ref 70–99)
GLUCOSE BLDC GLUCOMTR-MCNC: 67 MG/DL — LOW (ref 70–99)
GLUCOSE SERPL-MCNC: 118 MG/DL — HIGH (ref 70–99)
GLUCOSE SERPL-MCNC: 197 MG/DL — HIGH (ref 70–99)
HCT VFR BLD CALC: 22.3 % — LOW (ref 39–50)
HCT VFR BLD CALC: 24.9 % — LOW (ref 39–50)
HGB BLD-MCNC: 6.3 G/DL — CRITICAL LOW (ref 13–17)
HGB BLD-MCNC: 8.1 G/DL — LOW (ref 13–17)
INR BLD: 1.55 RATIO — HIGH (ref 0.88–1.16)
MAGNESIUM SERPL-MCNC: 1.7 MG/DL — SIGNIFICANT CHANGE UP (ref 1.6–2.6)
MCHC RBC-ENTMCNC: 26.7 PG — LOW (ref 27–34)
MCHC RBC-ENTMCNC: 28.3 GM/DL — LOW (ref 32–36)
MCHC RBC-ENTMCNC: 30.3 PG — SIGNIFICANT CHANGE UP (ref 27–34)
MCHC RBC-ENTMCNC: 32.4 GM/DL — SIGNIFICANT CHANGE UP (ref 32–36)
MCV RBC AUTO: 93.3 FL — SIGNIFICANT CHANGE UP (ref 80–100)
MCV RBC AUTO: 94.5 FL — SIGNIFICANT CHANGE UP (ref 80–100)
ORGANISM # SPEC MICROSCOPIC CNT: SIGNIFICANT CHANGE UP
PLATELET # BLD AUTO: 297 K/UL — SIGNIFICANT CHANGE UP (ref 150–400)
PLATELET # BLD AUTO: 299 K/UL — SIGNIFICANT CHANGE UP (ref 150–400)
POTASSIUM SERPL-MCNC: 2.5 MMOL/L — CRITICAL LOW (ref 3.5–5.3)
POTASSIUM SERPL-MCNC: 2.8 MMOL/L — CRITICAL LOW (ref 3.5–5.3)
POTASSIUM SERPL-SCNC: 2.5 MMOL/L — CRITICAL LOW (ref 3.5–5.3)
POTASSIUM SERPL-SCNC: 2.8 MMOL/L — CRITICAL LOW (ref 3.5–5.3)
PROTHROM AB SERPL-ACNC: 17.9 SEC — HIGH (ref 10–13.1)
RBC # BLD: 2.36 M/UL — LOW (ref 4.2–5.8)
RBC # BLD: 2.67 M/UL — LOW (ref 4.2–5.8)
RBC # FLD: 18.1 % — HIGH (ref 10.3–14.5)
RBC # FLD: 20.8 % — HIGH (ref 10.3–14.5)
RH IG SCN BLD-IMP: NEGATIVE — SIGNIFICANT CHANGE UP
SODIUM SERPL-SCNC: 144 MMOL/L — SIGNIFICANT CHANGE UP (ref 135–145)
SODIUM SERPL-SCNC: 151 MMOL/L — HIGH (ref 135–145)
SPECIMEN SOURCE: SIGNIFICANT CHANGE UP
WBC # BLD: 10.05 K/UL — SIGNIFICANT CHANGE UP (ref 3.8–10.5)
WBC # BLD: 12.7 K/UL — HIGH (ref 3.8–10.5)
WBC # FLD AUTO: 10.05 K/UL — SIGNIFICANT CHANGE UP (ref 3.8–10.5)
WBC # FLD AUTO: 12.7 K/UL — HIGH (ref 3.8–10.5)

## 2019-02-14 PROCEDURE — 99232 SBSQ HOSP IP/OBS MODERATE 35: CPT | Mod: GC

## 2019-02-14 PROCEDURE — 99232 SBSQ HOSP IP/OBS MODERATE 35: CPT

## 2019-02-14 RX ORDER — SODIUM CHLORIDE 9 MG/ML
1000 INJECTION, SOLUTION INTRAVENOUS
Qty: 0 | Refills: 0 | Status: DISCONTINUED | OUTPATIENT
Start: 2019-02-14 | End: 2019-02-15

## 2019-02-14 RX ORDER — POTASSIUM CHLORIDE 20 MEQ
10 PACKET (EA) ORAL
Qty: 0 | Refills: 0 | Status: COMPLETED | OUTPATIENT
Start: 2019-02-14 | End: 2019-02-14

## 2019-02-14 RX ORDER — SODIUM CHLORIDE 9 MG/ML
1000 INJECTION, SOLUTION INTRAVENOUS
Qty: 0 | Refills: 0 | Status: DISCONTINUED | OUTPATIENT
Start: 2019-02-14 | End: 2019-02-14

## 2019-02-14 RX ORDER — ENOXAPARIN SODIUM 100 MG/ML
60 INJECTION SUBCUTANEOUS EVERY 12 HOURS
Qty: 0 | Refills: 0 | Status: DISCONTINUED | OUTPATIENT
Start: 2019-02-14 | End: 2019-02-14

## 2019-02-14 RX ORDER — ENOXAPARIN SODIUM 100 MG/ML
40 INJECTION SUBCUTANEOUS DAILY
Qty: 0 | Refills: 0 | Status: DISCONTINUED | OUTPATIENT
Start: 2019-02-14 | End: 2019-02-15

## 2019-02-14 RX ORDER — ACETAMINOPHEN 500 MG
1000 TABLET ORAL ONCE
Qty: 0 | Refills: 0 | Status: COMPLETED | OUTPATIENT
Start: 2019-02-14 | End: 2019-02-14

## 2019-02-14 RX ORDER — INSULIN LISPRO 100/ML
VIAL (ML) SUBCUTANEOUS EVERY 6 HOURS
Qty: 0 | Refills: 0 | Status: DISCONTINUED | OUTPATIENT
Start: 2019-02-14 | End: 2019-02-18

## 2019-02-14 RX ORDER — DEXTROSE 50 % IN WATER 50 %
50 SYRINGE (ML) INTRAVENOUS ONCE
Qty: 0 | Refills: 0 | Status: COMPLETED | OUTPATIENT
Start: 2019-02-14 | End: 2019-02-14

## 2019-02-14 RX ORDER — BACITRACIN ZINC 500 UNIT/G
1 OINTMENT IN PACKET (EA) TOPICAL
Qty: 0 | Refills: 0 | Status: COMPLETED | OUTPATIENT
Start: 2019-02-14 | End: 2019-02-19

## 2019-02-14 RX ORDER — ENOXAPARIN SODIUM 100 MG/ML
60 INJECTION SUBCUTANEOUS AT BEDTIME
Qty: 0 | Refills: 0 | Status: DISCONTINUED | OUTPATIENT
Start: 2019-02-14 | End: 2019-02-14

## 2019-02-14 RX ADMIN — PIPERACILLIN AND TAZOBACTAM 25 GRAM(S): 4; .5 INJECTION, POWDER, LYOPHILIZED, FOR SOLUTION INTRAVENOUS at 15:40

## 2019-02-14 RX ADMIN — SODIUM CHLORIDE 100 MILLILITER(S): 9 INJECTION, SOLUTION INTRAVENOUS at 21:19

## 2019-02-14 RX ADMIN — PIPERACILLIN AND TAZOBACTAM 25 GRAM(S): 4; .5 INJECTION, POWDER, LYOPHILIZED, FOR SOLUTION INTRAVENOUS at 01:44

## 2019-02-14 RX ADMIN — Medication 100 MILLIEQUIVALENT(S): at 12:00

## 2019-02-14 RX ADMIN — Medication 100 MILLIEQUIVALENT(S): at 23:50

## 2019-02-14 RX ADMIN — Medication 4: at 19:57

## 2019-02-14 RX ADMIN — INSULIN GLARGINE 6 UNIT(S): 100 INJECTION, SOLUTION SUBCUTANEOUS at 00:00

## 2019-02-14 RX ADMIN — Medication 100 MILLIEQUIVALENT(S): at 10:30

## 2019-02-14 RX ADMIN — Medication 100 MILLIEQUIVALENT(S): at 09:18

## 2019-02-14 RX ADMIN — PIPERACILLIN AND TAZOBACTAM 25 GRAM(S): 4; .5 INJECTION, POWDER, LYOPHILIZED, FOR SOLUTION INTRAVENOUS at 23:50

## 2019-02-14 RX ADMIN — Medication 50 MILLILITER(S): at 07:50

## 2019-02-14 RX ADMIN — Medication 100 MILLIEQUIVALENT(S): at 21:18

## 2019-02-14 RX ADMIN — Medication 1000 MILLIGRAM(S): at 12:40

## 2019-02-14 RX ADMIN — Medication 400 MILLIGRAM(S): at 12:11

## 2019-02-14 RX ADMIN — Medication 100 MILLIEQUIVALENT(S): at 19:47

## 2019-02-14 RX ADMIN — Medication 1 APPLICATION(S): at 21:20

## 2019-02-14 RX ADMIN — ZINC OXIDE 1 APPLICATION(S): 200 OINTMENT TOPICAL at 15:00

## 2019-02-14 RX ADMIN — ZINC OXIDE 1 APPLICATION(S): 200 OINTMENT TOPICAL at 21:21

## 2019-02-14 RX ADMIN — ZINC OXIDE 1 APPLICATION(S): 200 OINTMENT TOPICAL at 06:58

## 2019-02-14 RX ADMIN — PIPERACILLIN AND TAZOBACTAM 25 GRAM(S): 4; .5 INJECTION, POWDER, LYOPHILIZED, FOR SOLUTION INTRAVENOUS at 06:58

## 2019-02-14 RX ADMIN — Medication 1 APPLICATION(S): at 06:59

## 2019-02-14 RX ADMIN — ENOXAPARIN SODIUM 40 MILLIGRAM(S): 100 INJECTION SUBCUTANEOUS at 15:40

## 2019-02-14 NOTE — CONSULT NOTE ADULT - PROBLEM SELECTOR RECOMMENDATION 6
We met with family (patient's wife: Lucia and son) today.  Please see [facility] conversation note for more details.  -MOLST form updated and in chart  -DNI/DNR  -PCU transfer if bed available  -No more artifical nutrition.   -Stop ABx  -no routine blood draw  emotional support provided.

## 2019-02-14 NOTE — PROGRESS NOTE ADULT - ASSESSMENT
73M with multiple medical problems (CAD, CABG, PAD, aorto-bifemoral bypass, CKD), R hip fracture s/p ORIF, s/p PEG/hoskins here with sacral osteomyelitis with sepsis - likely source is osteomyelitis.  Prior history  - patient was hospitalized at [place] and sacral wound was debrided.  Polymicrobial (E coli, Kpna, enterococcus - all "pan-sensitive").  bone scan was negative at that time late December for OM.  s/p debridement of sacral wound.  Culture is polymicrobial.  At this point, patient is heading towards hospice.  No role for a prolonged course of antibiotics.  Long discussion with wife who feels hospice is a better plan for patient as he continues to deteriorate.    Suggest:  - can continue zosyn until discharge to hospice - then would stop antibiotics    overall, long term prognosis is poor    above d/w hospitalist    I will be away returning 2/26/19.   ID available.  Please call [phone].

## 2019-02-14 NOTE — PROGRESS NOTE ADULT - SUBJECTIVE AND OBJECTIVE BOX
INTERVAL HPI/OVERNIGHT EVENTS:  Pt seen and examined at bedside.     Allergies/Intolerance: No Known Allergies      MEDICATIONS  (STANDING):  ascorbic acid 500 milliGRAM(s) Oral daily  atorvastatin 20 milliGRAM(s) Oral at bedtime  clopidogrel Tablet 75 milliGRAM(s) Oral daily  Dakins Solution - 1/4 Strength 1 Application(s) Topical three times a day  dextrose 5% + sodium chloride 0.45%. 1000 milliLiter(s) (100 mL/Hr) IV Continuous <Continuous>  dextrose 5%. 1000 milliLiter(s) (50 mL/Hr) IV Continuous <Continuous>  dextrose 5%. 1000 milliLiter(s) (50 mL/Hr) IV Continuous <Continuous>  dextrose 50% Injectable 12.5 Gram(s) IV Push once  dextrose 50% Injectable 25 Gram(s) IV Push once  dextrose 50% Injectable 25 Gram(s) IV Push once  heparin  Infusion.  Unit(s)/Hr (11 mL/Hr) IV Continuous <Continuous>  insulin glargine Injectable (LANTUS) 6 Unit(s) SubCutaneous at bedtime  insulin lispro (HumaLOG) corrective regimen sliding scale   SubCutaneous every 6 hours  lactobacillus acidophilus 1 Tablet(s) Oral two times a day with meals  piperacillin/tazobactam IVPB. 3.375 Gram(s) IV Intermittent every 8 hours  polyethylene glycol 3350 17 Gram(s) Oral daily  senna Syrup 5 milliLiter(s) Oral at bedtime  tamsulosin 0.4 milliGRAM(s) Oral at bedtime  zinc oxide 20% Ointment 1 Application(s) Topical three times a day    MEDICATIONS  (PRN):  acetaminophen   Tablet .. 650 milliGRAM(s) Oral every 6 hours PRN Temp greater or equal to 38C (100.4F)  dextrose 40% Gel 15 Gram(s) Oral once PRN Blood Glucose LESS THAN 70 milliGRAM(s)/deciliter  glucagon  Injectable 1 milliGRAM(s) IntraMuscular once PRN Glucose LESS THAN 70 milligrams/deciliter  heparin  Injectable 5000 Unit(s) IV Push every 6 hours PRN For aPTT less than 40  heparin  Injectable 2500 Unit(s) IV Push every 6 hours PRN For aPTT between 40 - 57        ROS: all systems reviewed and wnl      PHYSICAL EXAMINATION:  Vital Signs Last 24 Hrs  T(C): 36.7 (14 Feb 2019 04:39), Max: 36.7 (13 Feb 2019 11:22)  T(F): 98 (14 Feb 2019 04:39), Max: 98 (13 Feb 2019 11:22)  HR: 91 (14 Feb 2019 04:39) (91 - 95)  BP: 120/68 (14 Feb 2019 04:39) (107/70 - 120/68)  BP(mean): --  RR: 18 (14 Feb 2019 04:39) (18 - 18)  SpO2: 96% (14 Feb 2019 04:39) (93% - 100%)  CAPILLARY BLOOD GLUCOSE      POCT Blood Glucose.: 66 mg/dL (14 Feb 2019 06:55)  POCT Blood Glucose.: 46 mg/dL (14 Feb 2019 06:36)  POCT Blood Glucose.: 67 mg/dL (14 Feb 2019 06:34)  POCT Blood Glucose.: 227 mg/dL (13 Feb 2019 23:39)  POCT Blood Glucose.: 244 mg/dL (13 Feb 2019 18:27)  POCT Blood Glucose.: 247 mg/dL (13 Feb 2019 11:11)      02-13 @ 07:01  -  02-14 @ 07:00  --------------------------------------------------------  IN: 22 mL / OUT: 650 mL / NET: -628 mL        GENERAL:   NECK: supple, No JVD  CHEST/LUNG: clear to auscultation bilaterally; no rales, rhonchi, or wheezing b/l  HEART: normal S1, S2  ABDOMEN: BS+, soft, ND, NT   EXTREMITIES:  pulses palpable; no clubbing, cyanosis, or edema b/l LEs  SKIN: no rashes or lesions      LABS:                        9.3    14.1  )-----------( 357      ( 13 Feb 2019 23:09 )             29.7     02-13    156<H>  |  109<H>  |  53<H>  ----------------------------<  172<H>  3.0<L>   |  32<H>  |  2.24<H>    Ca    8.4      13 Feb 2019 10:27  Mg     2.1     02-13      PT/INR - ( 12 Feb 2019 08:06 )   PT: 17.1 sec;   INR: 1.48 ratio         PTT - ( 13 Feb 2019 23:09 )  PTT:59.1 sec INTERVAL HPI/OVERNIGHT EVENTS:  Pt seen and examined at bedside.     Allergies/Intolerance: No Known Allergies      MEDICATIONS  (STANDING):  ascorbic acid 500 milliGRAM(s) Oral daily  atorvastatin 20 milliGRAM(s) Oral at bedtime  clopidogrel Tablet 75 milliGRAM(s) Oral daily  Dakins Solution - 1/4 Strength 1 Application(s) Topical three times a day  dextrose 5% + sodium chloride 0.45%. 1000 milliLiter(s) (100 mL/Hr) IV Continuous <Continuous>  dextrose 5%. 1000 milliLiter(s) (50 mL/Hr) IV Continuous <Continuous>  dextrose 5%. 1000 milliLiter(s) (50 mL/Hr) IV Continuous <Continuous>  dextrose 50% Injectable 12.5 Gram(s) IV Push once  dextrose 50% Injectable 25 Gram(s) IV Push once  dextrose 50% Injectable 25 Gram(s) IV Push once  heparin  Infusion.  Unit(s)/Hr (11 mL/Hr) IV Continuous <Continuous>  insulin glargine Injectable (LANTUS) 6 Unit(s) SubCutaneous at bedtime  insulin lispro (HumaLOG) corrective regimen sliding scale   SubCutaneous every 6 hours  lactobacillus acidophilus 1 Tablet(s) Oral two times a day with meals  piperacillin/tazobactam IVPB. 3.375 Gram(s) IV Intermittent every 8 hours  polyethylene glycol 3350 17 Gram(s) Oral daily  senna Syrup 5 milliLiter(s) Oral at bedtime  tamsulosin 0.4 milliGRAM(s) Oral at bedtime  zinc oxide 20% Ointment 1 Application(s) Topical three times a day    MEDICATIONS  (PRN):  acetaminophen   Tablet .. 650 milliGRAM(s) Oral every 6 hours PRN Temp greater or equal to 38C (100.4F)  dextrose 40% Gel 15 Gram(s) Oral once PRN Blood Glucose LESS THAN 70 milliGRAM(s)/deciliter  glucagon  Injectable 1 milliGRAM(s) IntraMuscular once PRN Glucose LESS THAN 70 milligrams/deciliter  heparin  Injectable 5000 Unit(s) IV Push every 6 hours PRN For aPTT less than 40  heparin  Injectable 2500 Unit(s) IV Push every 6 hours PRN For aPTT between 40 - 57        ROS: all systems reviewed and wnl      PHYSICAL EXAMINATION:  Vital Signs Last 24 Hrs  T(C): 36.7 (14 Feb 2019 04:39), Max: 36.7 (13 Feb 2019 11:22)  T(F): 98 (14 Feb 2019 04:39), Max: 98 (13 Feb 2019 11:22)  HR: 91 (14 Feb 2019 04:39) (91 - 95)  BP: 120/68 (14 Feb 2019 04:39) (107/70 - 120/68)  BP(mean): --  RR: 18 (14 Feb 2019 04:39) (18 - 18)  SpO2: 96% (14 Feb 2019 04:39) (93% - 100%)  CAPILLARY BLOOD GLUCOSE      POCT Blood Glucose.: 66 mg/dL (14 Feb 2019 06:55)  POCT Blood Glucose.: 46 mg/dL (14 Feb 2019 06:36)  POCT Blood Glucose.: 67 mg/dL (14 Feb 2019 06:34)  POCT Blood Glucose.: 227 mg/dL (13 Feb 2019 23:39)  POCT Blood Glucose.: 244 mg/dL (13 Feb 2019 18:27)  POCT Blood Glucose.: 247 mg/dL (13 Feb 2019 11:11)      02-13 @ 07:01  -  02-14 @ 07:00  --------------------------------------------------------  IN: 22 mL / OUT: 650 mL / NET: -628 mL        GENERAL: in bed, NAD, no fevers, SOB or cough  NECK: supple, No JVD  CHEST/LUNG: clear to auscultation bilaterally; no rales, rhonchi, or wheezing b/l  HEART: normal S1, S2  ABDOMEN: BS+, soft, ND, NT. less redness around PEG site    EXTREMITIES:  pulses palpable; no clubbing, cyanosis, or edema b/l LEs  right leg ulcers stable   SKIN: no rashes or lesions      LABS:                        9.3    14.1  )-----------( 357      ( 13 Feb 2019 23:09 )             29.7     02-13    156<H>  |  109<H>  |  53<H>  ----------------------------<  172<H>  3.0<L>   |  32<H>  |  2.24<H>    Ca    8.4      13 Feb 2019 10:27  Mg     2.1     02-13      PT/INR - ( 12 Feb 2019 08:06 )   PT: 17.1 sec;   INR: 1.48 ratio         PTT - ( 13 Feb 2019 23:09 )  PTT:59.1 sec

## 2019-02-14 NOTE — CONSULT NOTE ADULT - PROBLEM SELECTOR RECOMMENDATION 9
S/P bone biopsy (GNR). CT of lumbar spine confirms sacral OM.  -S/P wound debridement  -Stop ABx as per GOC conversation   -no routine blood draw   -comfort care S/P bone biopsy (GNR). CT of lumbar spine confirms sacral OM.  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   -S/P bone biopsy (GNR). CT of lumbar spine confirms sacral OM.  -S/P wound debridement  -Stop ABx as per GOC conversation   -no routine blood draw  -Will start Dilaudid 0.2 mg IV q 8 ATC and 0.2 q 2 PRN

## 2019-02-14 NOTE — CHART NOTE - NSCHARTNOTEFT_GEN_A_CORE
called by RN for a pr with FS of 47.  Pt examined at the bedside. A&Ox0 at baise line . NAD. repeated FS at 66.   Hypoglycemia protocol followed.  Dextrose IV 50x1. Started on D5W with 1/2 NS.   - if no improvement in glucose consider endo consult.   -con to monitor

## 2019-02-14 NOTE — CONSULT NOTE ADULT - SUBJECTIVE AND OBJECTIVE BOX
HPI:  72 yo male with CAD c/b CABG, A fib (on coumadin), CKD stage 3, PAD s/p stents, PEG tube, in-dwelling Tejeda who presents to the nursing home for increased confusion and elevated WBC. On admission, history was taken from wife at bedside. Per wife, patient had been fully functional up until November. He had a hypoglycemic episode, fell, and broke his hip in November. He was admitted to the hospital for surgery, which was uneventful and sent to rehab (in early December). During the rehab stay, he developed numerous ulcers, including a decubitus ulcer. He was sent back to [facility] for sepsis from the decubitus ulcer. He was treated with abx and returned to rehab for a second time. This second hospital course was complicated by acute renal failure requiring dialysis, IV abx, and "surgery" to clean the ulcer.    This time, he has been hospitalized for 11 days. 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    Palliative care was called for GOC.      PERTINENT PM/SXH:   CKD (chronic kidney disease) stage 3, GFR 30-59 ml/min  Pressure ulcer, unspecified pressure ulcer stage  Coronary artery disease of native artery of native heart with stable angina pectoris  Diastolic congestive heart failure, unspecified congestive heart failure chronicity  Smoker  Gout involving toe, unspecified cause, unspecified chronicity, unspecified laterality  Gouty nephropathy  PAD (peripheral artery disease)  Chronic atrial fibrillation  Hyperlipidemia, unspecified hyperlipidemia type  HTN (hypertension), benign  Type 2 diabetes mellitus with other circulatory complication, with long-term current use of insulin    S/P angioplasty with stent  S/P hernia repair  Artificial cardiac pacemaker  S/P aortobifemoral bypass surgery    FAMILY HISTORY:  Family history of hypertension (Father)    ITEMS NOT CHECKED ARE NOT PRESENT    SOCIAL HISTORY:   Significant other/partner:   [ ]  Children:  [ ]  Anglican/Spirituality:  Substance hx:  [ ]   Tobacco hx:  [ ]   Alcohol hx: [ ]   Home Opioid hx:  [ ] I-Stop Reference No:  Living Situation: [ ]Home  [ ]Long term care  [ ]Rehab [ ]Other    ADVANCE DIRECTIVES:    DNR  Yes  MOLST  [ ]  Living Will  [ ]   DECISION MAKER(s):  [ ] Health Care Proxy(s)  [x ] Surrogate(s)  [ ] Guardian           Name(s): Phone Number(s): Spouse (Lucia) [phone]     BASELINE (I)ADL(s) (prior to admission):  [place]: [ ]Total  [ ] Moderate [x ]Dependent    Allergies    No Known Allergies    Intolerances    MEDICATIONS  (STANDING):  acetaminophen  IVPB .. 1000 milliGRAM(s) IV Intermittent once  ascorbic acid 500 milliGRAM(s) Oral daily  atorvastatin 20 milliGRAM(s) Oral at bedtime  BACItracin   Ointment 1 Application(s) Topical two times a day  clopidogrel Tablet 75 milliGRAM(s) Oral daily  Dakins Solution - 1/4 Strength 1 Application(s) Topical three times a day  dextrose 5%. 1000 milliLiter(s) (50 mL/Hr) IV Continuous <Continuous>  dextrose 5%. 1000 milliLiter(s) (100 mL/Hr) IV Continuous <Continuous>  dextrose 5%. 1000 milliLiter(s) (50 mL/Hr) IV Continuous <Continuous>  dextrose 50% Injectable 12.5 Gram(s) IV Push once  dextrose 50% Injectable 25 Gram(s) IV Push once  dextrose 50% Injectable 25 Gram(s) IV Push once  enoxaparin Injectable 40 milliGRAM(s) SubCutaneous daily  insulin lispro (HumaLOG) corrective regimen sliding scale   SubCutaneous every 6 hours  lactobacillus acidophilus 1 Tablet(s) Oral two times a day with meals  piperacillin/tazobactam IVPB. 3.375 Gram(s) IV Intermittent every 8 hours  polyethylene glycol 3350 17 Gram(s) Oral daily  potassium chloride  10 mEq/100 mL IVPB 10 milliEquivalent(s) IV Intermittent every 1 hour  senna Syrup 5 milliLiter(s) Oral at bedtime  tamsulosin 0.4 milliGRAM(s) Oral at bedtime  zinc oxide 20% Ointment 1 Application(s) Topical three times a day    MEDICATIONS  (PRN):  acetaminophen   Tablet .. 650 milliGRAM(s) Oral every 6 hours PRN Temp greater or equal to 38C (100.4F)  dextrose 40% Gel 15 Gram(s) Oral once PRN Blood Glucose LESS THAN 70 milliGRAM(s)/deciliter  glucagon  Injectable 1 milliGRAM(s) IntraMuscular once PRN Glucose LESS THAN 70 milligrams/deciliter    PRESENT SYMPTOMS: [ ]Unable to obtain due to poor mentation   Source if other than patient:  [ ]Family   [ ]Team     Pain (Impact on QOL):    Location -         Minimal acceptable level (0-10 scale):                    Aggravating factors -  Quality -  Radiation -  Severity (0-10 scale) -    Timing -    PAIN AD Score:     [email or web address] (press ctrl +  left click to view)    Dyspnea:                           [ ]Mild [ ]Moderate [ ]Severe  Anxiety:                             [ ]Mild [ ]Moderate [ ]Severe  Fatigue:                             [ ]Mild [ ]Moderate [ ]Severe  Nausea:                             [ ]Mild [ ]Moderate [ ]Severe  Loss of appetite:              [ ]Mild [ ]Moderate [ ]Severe  Constipation:                    [ ]Mild [ ]Moderate [ ]Severe    Other Symptoms:  [ ]All other review of systems negative     Karnofsky Performance Score/Palliative Performance Status Version 2:         %    http://palliative.info/resource_material/[email or web address].pdf  PHYSICAL EXAM:  Vital Signs Last 24 Hrs  T(C): 36.7 (14 Feb 2019 04:39), Max: 36.7 (13 Feb 2019 11:22)  T(F): 98 (14 Feb 2019 04:39), Max: 98 (13 Feb 2019 11:22)  HR: 91 (14 Feb 2019 04:39) (91 - 95)  BP: 120/68 (14 Feb 2019 04:39) (107/70 - 120/68)  BP(mean): --  RR: 18 (14 Feb 2019 04:39) (18 - 18)  SpO2: 96% (14 Feb 2019 04:39) (93% - 100%) I&O's Summary    13 Feb 2019 07:01  -  14 Feb 2019 07:00  --------------------------------------------------------  IN: 22 mL / OUT: 650 mL / NET: -628 mL    GENERAL:  [ ]Alert  [ ]Oriented x   [ ]Lethargic  [ ]Cachexia  [ ]Unarousable  [ ]Verbal  [ ]Non-Verbal  Behavioral:   [ ] Anxiety  [ ] Delirium [ ] Agitation [ ] Other  HEENT:  [ ]Normal   [ ]Dry mouth   [ ]ET Tube/Trach  [ ]Oral lesions  PULMONARY:   [ ]Clear [ ]Tachypnea  [ ]Audible excessive secretions   [ ]Rhonchi        [ ]Right [ ]Left [ ]Bilateral  [ ]Crackles        [ ]Right [ ]Left [ ]Bilateral  [ ]Wheezing     [ ]Right [ ]Left [ ]Bilateral  CARDIOVASCULAR:    [ ]Regular [ ]Irregular [ ]Tachy  [ ]Marvin [ ]Murmur [ ]Other  GASTROINTESTINAL:  [ ]Soft  [ ]Distended   [ ]+BS  [ ]Non tender [ ]Tender  [ ]PEG [ ]OGT/ NGT  Last BM:   GENITOURINARY:  [ ]Normal [ ] Incontinent   [ ]Oliguria/Anuria   [ ]Tejeda  MUSCULOSKELETAL:   [ ]Normal   [ ]Weakness  [ ]Bed/Wheelchair bound [ ]Edema  NEUROLOGIC:   [ ]No focal deficits  [ ] Cognitive impairment  [ ] Dysphagia [ ]Dysarthria [ ] Paresis [ ]Other   SKIN:   [ ]Normal   [ ]Pressure ulcer(s)  [ ]Rash    CRITICAL CARE:  [ ] Shock Present  [ ]Septic [ ]Cardiogenic [ ]Neurologic [ ]Hypovolemic  [ ]  Vasopressors [ ]  Inotropes   [ ] Respiratory failure present  [ ] Acute  [ ] Chronic [ ] Hypoxic  [ ] Hypercarbic [ ] Other  [ ] Other organ failure     LABS:                        6.3    10.05 )-----------( 297      ( 14 Feb 2019 07:51 )             22.3   02-14    144  |  104  |  42<H>  ----------------------------<  118<H>  2.5<LL>   |  28  |  1.72<H>    Ca    6.7<L>      14 Feb 2019 06:46  Mg     1.7     02-14    PT/INR - ( 14 Feb 2019 07:51 )   PT: 17.9 sec;   INR: 1.55 ratio         PTT - ( 14 Feb 2019 06:46 )  PTT:78.3 sec      RADIOLOGY & ADDITIONAL STUDIES:    PROTEIN CALORIE MALNUTRITION PRESENT: [ ] Yes [ ] No  [ ] PPSV2 < or = to 30% [ ] significant weight loss  [ ] poor nutritional intake [ ] catabolic state [ ] anasarca     Albumin, Serum: 2.2 g/dL (02-07-19 @ 05:54)  Artificial Nutrition [ ]     REFERRALS:   [ ]Chaplaincy  [ ] Hospice  [ ]Child Life  [ ]Social Work  [ ]Case management [ ]Holistic Therapy   Goals of Care Discussion Document: HPI:  72 yo male with CAD c/b CABG, A fib (on coumadin), CKD stage 3, PAD s/p stents, PEG tube, in-dwelling Te[place] who presents to the nursing home for increased confusion and elevated WBC. On admission, history was taken from wife at bedside. Per wife, patient had been fully functional up until November. He had a hypoglycemic episode, fell, and broke his hip in November. He was admitted to the hospital for surgery, which was uneventful and sent to rehab (in early December). During the rehab stay, he developed numerous ulcers, including a decubitus ulcer. He was sent back to [facility] for sepsis from the decubitus ulcer. He was treated with abx and returned to rehab for a second time. This second hospital course was complicated by acute renal failure requiring dialysis, IV abx, and "surgery" to clean the ulcer.    This time, he has been hospitalized for 11 days. 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    Palliative care was called for GOC.      PERTINENT PM/SXH:   CKD (chronic kidney disease) stage 3, GFR 30-59 ml/min  Pressure ulcer, unspecified pressure ulcer stage  Coronary artery disease of native artery of native heart with stable angina pectoris  Diastolic congestive heart failure, unspecified congestive heart failure chronicity  Smoker  Gout involving toe, unspecified cause, unspecified chronicity, unspecified laterality  Gouty nephropathy  PAD (peripheral artery disease)  Chronic atrial fibrillation  Hyperlipidemia, unspecified hyperlipidemia type  HTN (hypertension), benign  Type 2 diabetes mellitus with other circulatory complication, with long-term current use of insulin    S/P angioplasty with stent  S/P hernia repair  Artificial cardiac pacemaker  S/P aortobifemoral bypass surgery    FAMILY HISTORY:  Family history of hypertension (Father)    ITEMS NOT CHECKED ARE NOT PRESENT    SOCIAL HISTORY:   Significant other/partner:   [X ]  Children:  [X ]  Samaritan/Spirituality: Taoist  Substance hx:  [ ]   Tobacco hx:  [ ]   Alcohol hx: [ ]   Home Opioid hx:  [ ] I-Stop Reference No: [id number]   Living Situation: [ ]Home  [ ]Long term care  [X ]Rehab [ ]Other    ADVANCE DIRECTIVES:    DNR  Yes  MOLST  [X ]  Living Will  [X ]   DECISION MAKER(s):  [ ] Health Care Proxy(s)  [x ] Surrogate(s)  [ ] Guardian           Name(s): Phone Number(s): Spouse (Lucia) [phone]     BASELINE (I)ADL(s) (prior to admission):  [place]: [ ]Total  [ ] Moderate [x ]Dependent    Allergies    No Known Allergies    Intolerances    MEDICATIONS  (STANDING):  ascorbic acid 500 milliGRAM(s) Oral daily  atorvastatin 20 milliGRAM(s) Oral at bedtime  BACItracin   Ointment 1 Application(s) Topical two times a day  clopidogrel Tablet 75 milliGRAM(s) Oral daily  Dakins Solution - 1/4 Strength 1 Application(s) Topical three times a day  dextrose 5% + sodium chloride 0.45% with potassium chloride 20 mEq/L 1000 milliLiter(s) (75 mL/Hr) IV Continuous <Continuous>  dextrose 5%. 1000 milliLiter(s) (50 mL/Hr) IV Continuous <Continuous>  dextrose 50% Injectable 12.5 Gram(s) IV Push once  dextrose 50% Injectable 25 Gram(s) IV Push once  dextrose 50% Injectable 25 Gram(s) IV Push once  enoxaparin Injectable 40 milliGRAM(s) SubCutaneous daily  HYDROmorphone  Injectable 0.2 milliGRAM(s) IV Push every 8 hours  insulin lispro (HumaLOG) corrective regimen sliding scale   SubCutaneous every 6 hours  polyethylene glycol 3350 17 Gram(s) Oral daily  senna Syrup 5 milliLiter(s) Oral at bedtime  zinc oxide 20% Ointment 1 Application(s) Topical three times a day    MEDICATIONS  (PRN):  acetaminophen   Tablet .. 650 milliGRAM(s) Oral every 6 hours PRN Temp greater or equal to 38C (100.4F)  dextrose 40% Gel 15 Gram(s) Oral once PRN Blood Glucose LESS THAN 70 milliGRAM(s)/deciliter  glucagon  Injectable 1 milliGRAM(s) IntraMuscular once PRN Glucose LESS THAN 70 milligrams/deciliter  glycopyrrolate Injectable 0.4 milliGRAM(s) IV Push every 6 hours PRN secretions  HYDROmorphone  Injectable 0.2 milliGRAM(s) IV Push every 2 hours PRN Labored breathing or dyspnea  HYDROmorphone  Injectable 0.2 milliGRAM(s) IV Push every 2 hours PRN Moderate Pain (4 - 6)  LORazepam   Injectable 0.25 milliGRAM(s) IV Push every 3 hours PRN Agitation    PRESENT SYMPTOMS: [ ]Unable to obtain due to poor mentation   Source if other than patient:  [X ]Family   [ ]Team     Pain (Impact on QOL):  Not able to answer   Location -         Minimal acceptable level (0-10 scale):                    Aggravating factors -  Quality -  Radiation -  Severity (0-10 scale) -    Timing -    PAIN AD Score: 3 (mild pain)     [email or web address] (press ctrl +  left click to view)    Dyspnea:                           [ ]Mild [ ]Moderate [ ]Severe  Anxiety:                             [ ]Mild [ ]Moderate [ ]Severe  Fatigue:                             [ ]Mild [ ]Moderate [ ]Severe  Nausea:                             [ ]Mild [ ]Moderate [ ]Severe  Loss of appetite:              [ ]Mild [ ]Moderate [ ]Severe  Constipation:                    [ ]Mild [ ]Moderate [ ]Severe    Other Symptoms:  [ ]All other review of systems negative     Karnofsky Performance Score/Palliative Performance Status Version 2:       20  %    http://palliative.info/resource_material/[email or web address].pdf  PHYSICAL EXAM:  Vital Signs Last 24 Hrs  T(C): 36.3 (15 Feb 2019 12:35), Max: 36.7 (14 Feb 2019 21:59)  T(F): 97.4 (15 Feb 2019 12:35), Max: 98 (14 Feb 2019 21:59)  HR: 75 (15 Feb 2019 12:35) (75 - 90)  BP: 98/67 (15 Feb 2019 12:35) (98/67 - 116/76)  BP(mean): --  RR: 18 (15 Feb 2019 12:35) (17 - 18)  SpO2: 98% (15 Feb 2019 12:35) (93% - 98%)    GENERAL:  [X ]Alert  [ ]Oriented x1   [X ]Lethargic  [ ]Cachexia  [ ]Unarousable  [X ]Verbal  [ ]Non-Verbal  Behavioral:   [ ] Anxiety  [X ] Delirium [ ] Agitation [ ] Other  HEENT:  [X ]Normal   [ ]Dry mouth   [ ]ET Tube/Trach  [ ]Oral lesions  PULMONARY:   [X ]Clear [ ]Tachypnea  [X ]Audible excessive secretions   [ ]Rhonchi        [ ]Right [ ]Left [ ]Bilateral  [ ]Crackles        [ ]Right [ ]Left [ ]Bilateral  [ ]Wheezing     [ ]Right [ ]Left [ ]Bilateral  CARDIOVASCULAR:    [X ]Regular [ ]Irregular [ ]Tachy  [ ]Marvin [ ]Murmur [ ]Other  GASTROINTESTINAL:  [ ]Soft  [ ]Distended   [X ]+BS  [X ]Non tender [ ]Tender  [X ]PEG [ ]OGT/ NGT  Last BM: 2/15/2019  GENITOURINARY:  [ ]Normal [ ] Incontinent   [ ]Oliguria/Anuria   [X ]Tejeda  MUSCULOSKELETAL:   [ ]Normal   [ ]Weakness  [X ]Bed/Wheelchair bound [ ]Edema  NEUROLOGIC:   [ ]No focal deficits  [X ] Cognitive impairment  [X ] Dysphagia [ ]Dysarthria [ ] Paresis [ ]Other   SKIN:   [ ]Normal   [X ]Pressure ulcer(s)  [X ]Rash  Stage IV sacral ulcer, multiple skin ulcers over upper extremities and lower extremities     CRITICAL CARE:  [ ] Shock Present  [ ]Septic [ ]Cardiogenic [ ]Neurologic [ ]Hypovolemic  [ ]  Vasopressors [ ]  Inotropes   [ ] Respiratory failure present  [ ] Acute  [ ] Chronic [ ] Hypoxic  [ ] Hypercarbic [ ] Other  [ ] Other organ failure     LABS:                                   7.7    14.28 )-----------( 350      ( 15 Feb 2019 10:40 )             26.6   02-15    145  |  103  |  42<H>  ----------------------------<  305<H>  3.1<L>   |  28  |  1.81<H>    Ca    8.0<L>      15 Feb 2019 09:35  Mg     1.7     02-14          RADIOLOGY & ADDITIONAL STUDIES:  CT ABD W/O C: FINDINGS:    LOWER CHEST: Partially imaged bilateral pleural effusions and bibasilar   atelectasis, unchanged. Cardiac device leads and mitral annulus   calcifications.     LIVER: Within normal limits.  BILE DUCTS: Normal caliber.  GALLBLADDER: Gallstones.  SPLEEN: Within normal limits.  PANCREAS: Within normal limits.  ADRENALS: Within normal limits.  KIDNEYS/URETERS: Within normal limits.    VISUALIZED PORTIONS:    BOWEL: Gastrostomy tube. No evidence of fluid collection along the   gastrostomy tube or adjacent to the stomach to suggest abscess.   PERITONEUM: Trace ascites.  VESSELS: Atherosclerotic vascular calcifications. Status post aortic   bypass.  RETROPERITONEUM: No lymphadenopathy.    ABDOMINAL WALL: Subcutaneous air posterior to the sacrum, area   incompletely visualized.  BONES: T12 vertebral body compression deformity, unchanged. Multilevel   spinal degenerative changes.    CXR: IMPRESSION:    1.  Decreased pulmonary edema.   2.  Left pleural effusion.      PROTEIN CALORIE MALNUTRITION PRESENT: [X ] Yes [ ] No  [ ] PPSV2 < or = to 30% [ ] significant weight loss  [ ] poor nutritional intake [ ] catabolic state [ ] anasarca     Albumin, Serum: 2.2 g/dL (02-07-19 @ 05:54)  Artificial Nutrition [ ]     REFERRALS:   [ ]Chaplaincy  [X ] Hospice  [ ]Child Life  [ ]Social Work  [X ]Case management [ ]Holistic Therapy   Goals of Care Discussion Document: HPI:  74 yo male with CAD c/b CABG, A fib (on coumadin), CKD stage 3, PAD s/p stents, PEG tube, in-dwelling Tejeda who presents to the nursing home for increased confusion and elevated WBC. On admission, history was taken from wife at bedside. Per wife, patient had been fully functional up until November. He had a hypoglycemic episode, fell, and broke his hip in November. He was admitted to the hospital for surgery, which was uneventful and sent to rehab (in early December). During the rehab stay, he developed numerous ulcers, including a decubitus ulcer. He was sent back to [facility] for sepsis from the decubitus ulcer. He was treated with abx and returned to rehab for a second time. This second hospital course was complicated by acute renal failure requiring dialysis, IV abx, and "surgery" to clean the ulcer.    This time, he has been hospitalized for 11 days. 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    Palliative care was called for GOC.      When evaluated the patient was non verbal, he was confused, appeared to be having some visual hallucinations, and was having mild labored breathing.     PERTINENT PM/SXH:   CKD (chronic kidney disease) stage 3, GFR 30-59 ml/min  Pressure ulcer, unspecified pressure ulcer stage  Coronary artery disease of native artery of native heart with stable angina pectoris  Diastolic congestive heart failure, unspecified congestive heart failure chronicity  Smoker  Gout involving toe, unspecified cause, unspecified chronicity, unspecified laterality  Gouty nephropathy  PAD (peripheral artery disease)  Chronic atrial fibrillation  Hyperlipidemia, unspecified hyperlipidemia type  HTN (hypertension), benign  Type 2 diabetes mellitus with other circulatory complication, with long-term current use of insulin    S/P angioplasty with stent  S/P hernia repair  Artificial cardiac pacemaker  S/P aortobifemoral bypass surgery    FAMILY HISTORY:  Family history of hypertension (Father)    ITEMS NOT CHECKED ARE NOT PRESENT    SOCIAL HISTORY:   Significant other/partner:   [X ]  Children:  [X ]  Yarsani/Spirituality: Worship  Substance hx:  [ ]   Tobacco hx:  [ ]   Alcohol hx: [ ]   Home Opioid hx:  [ ] I-Stop Reference No: [id number]   Living Situation: [ ]Home  [ ]Long term care  [X ]Rehab [ ]Other    ADVANCE DIRECTIVES:    DNR  Yes  MOLST  [X ]  Living Will  [X ]   DECISION MAKER(s):  [ ] Health Care Proxy(s)  [x ] Surrogate(s)  [ ] Guardian           Name(s): Phone Number(s): Spouse (Lucia) [phone]     BASELINE (I)ADL(s) (prior to admission):  [place]: [ ]Total  [ ] Moderate [x ]Dependent    Allergies    No Known Allergies    Intolerances    MEDICATIONS  (STANDING):  ascorbic acid 500 milliGRAM(s) Oral daily  atorvastatin 20 milliGRAM(s) Oral at bedtime  BACItracin   Ointment 1 Application(s) Topical two times a day  clopidogrel Tablet 75 milliGRAM(s) Oral daily  Dakins Solution - 1/4 Strength 1 Application(s) Topical three times a day  dextrose 5% + sodium chloride 0.45% with potassium chloride 20 mEq/L 1000 milliLiter(s) (75 mL/Hr) IV Continuous <Continuous>  dextrose 5%. 1000 milliLiter(s) (50 mL/Hr) IV Continuous <Continuous>  dextrose 50% Injectable 12.5 Gram(s) IV Push once  dextrose 50% Injectable 25 Gram(s) IV Push once  dextrose 50% Injectable 25 Gram(s) IV Push once  enoxaparin Injectable 40 milliGRAM(s) SubCutaneous daily  HYDROmorphone  Injectable 0.2 milliGRAM(s) IV Push every 8 hours  insulin lispro (HumaLOG) corrective regimen sliding scale   SubCutaneous every 6 hours  polyethylene glycol 3350 17 Gram(s) Oral daily  senna Syrup 5 milliLiter(s) Oral at bedtime  zinc oxide 20% Ointment 1 Application(s) Topical three times a day    MEDICATIONS  (PRN):  acetaminophen   Tablet .. 650 milliGRAM(s) Oral every 6 hours PRN Temp greater or equal to 38C (100.4F)  dextrose 40% Gel 15 Gram(s) Oral once PRN Blood Glucose LESS THAN 70 milliGRAM(s)/deciliter  glucagon  Injectable 1 milliGRAM(s) IntraMuscular once PRN Glucose LESS THAN 70 milligrams/deciliter  glycopyrrolate Injectable 0.4 milliGRAM(s) IV Push every 6 hours PRN secretions  HYDROmorphone  Injectable 0.2 milliGRAM(s) IV Push every 2 hours PRN Labored breathing or dyspnea  HYDROmorphone  Injectable 0.2 milliGRAM(s) IV Push every 2 hours PRN Moderate Pain (4 - 6)  LORazepam   Injectable 0.25 milliGRAM(s) IV Push every 3 hours PRN Agitation    PRESENT SYMPTOMS: [x ]Unable to obtain due to poor mentation   Source if other than patient:  [X ]Family   [ ]Team     Pain (Impact on QOL):  Not able to answer   Location -         Minimal acceptable level (0-10 scale):                    Aggravating factors -  Quality -  Radiation -  Severity (0-10 scale) -    Timing -    PAIN AD Score: 3 (mild pain)     [email or web address] (press ctrl +  left click to view)    Dyspnea:                           [ ]Mild [ ]Moderate [ ]Severe  Anxiety:                             [ ]Mild [ ]Moderate [ ]Severe  Fatigue:                             [ ]Mild [ ]Moderate [ ]Severe  Nausea:                             [ ]Mild [ ]Moderate [ ]Severe  Loss of appetite:              [ ]Mild [ ]Moderate [ ]Severe  Constipation:                    [ ]Mild [ ]Moderate [ ]Severe    Other Symptoms:  [ ]All other review of systems negative     Karnofsky Performance Score/Palliative Performance Status Version 2:       20  %    http://palliative.info/resource_material/[email or web address].pdf  PHYSICAL EXAM:  Vital Signs Last 24 Hrs  T(C): 36.3 (15 Feb 2019 12:35), Max: 36.7 (14 Feb 2019 21:59)  T(F): 97.4 (15 Feb 2019 12:35), Max: 98 (14 Feb 2019 21:59)  HR: 75 (15 Feb 2019 12:35) (75 - 90)  BP: 98/67 (15 Feb 2019 12:35) (98/67 - 116/76)  BP(mean): --  RR: 18 (15 Feb 2019 12:35) (17 - 18)  SpO2: 98% (15 Feb 2019 12:35) (93% - 98%)    GENERAL:  [X ]Alert  [ ]Oriented x1   [X ]Lethargic  [ ]Cachexia  [ ]Unarousable  [X ]Verbal  [ ]Non-Verbal [x] Ill appearing elderly male.   Behavioral:   [ ] Anxiety  [X ] Delirium [ ] Agitation [ ] Other  HEENT:  [X ]Normal   [ ]Dry mouth   [ ]ET Tube/Trach  [ ]Oral lesions  PULMONARY:   [X ]Clear [ ]Tachypnea  [X]Audible oropharyngeal secretions   [ ]Rhonchi        [ ]Right [ ]Left [ ]Bilateral  [ ]Crackles        [ ]Right [ ]Left [ ]Bilateral  [ ]Wheezing     [ ]Right [ ]Left [ ]Bilateral  CARDIOVASCULAR:    [X ]Regular [ ]Irregular [ ]Tachy  [ ]Marvin [ ]Murmur [ ]Other  GASTROINTESTINAL:  [ ]Soft  [ ]Distended   [X ]+BS  [X ]Non tender [ ]Tender  [X ]PEG [ ]OGT/ NGT  Last BM: 2/15/2019  GENITOURINARY:  [ ]Normal [ ] Incontinent   [ ]Oliguria/Anuria   [X ]Tejeda  MUSCULOSKELETAL:   [ ]Normal   [ ]Weakness  [X ]Bed/Wheelchair bound [ ]Edema  NEUROLOGIC:   [ ]No focal deficits  [X ] Cognitive impairment  [X ] Dysphagia [ ]Dysarthria [ ] Paresis [ ]Other   SKIN:   [ ]Normal   [X ]Pressure ulcer(s)  [X ]Rash  Stage IV sacral ulcer, multiple skin ulcers over upper extremities and lower extremities     CRITICAL CARE:  [ ] Shock Present  [ ]Septic [ ]Cardiogenic [ ]Neurologic [ ]Hypovolemic  [ ]  Vasopressors [ ]  Inotropes   [ ] Respiratory failure present  [ ] Acute  [ ] Chronic [ ] Hypoxic  [ ] Hypercarbic [ ] Other  [ ] Other organ failure     LABS:                                   7.7    14.28 )-----------( 350      ( 15 Feb 2019 10:40 )             26.6   02-15    145  |  103  |  42<H>  ----------------------------<  305<H>  3.1<L>   |  28  |  1.81<H>    Ca    8.0<L>      15 Feb 2019 09:35  Mg     1.7     02-14          RADIOLOGY & ADDITIONAL STUDIES:  CT ABD W/O C: FINDINGS:    LOWER CHEST: Partially imaged bilateral pleural effusions and bibasilar   atelectasis, unchanged. Cardiac device leads and mitral annulus   calcifications.     LIVER: Within normal limits.  BILE DUCTS: Normal caliber.  GALLBLADDER: Gallstones.  SPLEEN: Within normal limits.  PANCREAS: Within normal limits.  ADRENALS: Within normal limits.  KIDNEYS/URETERS: Within normal limits.    VISUALIZED PORTIONS:    BOWEL: Gastrostomy tube. No evidence of fluid collection along the   gastrostomy tube or adjacent to the stomach to suggest abscess.   PERITONEUM: Trace ascites.  VESSELS: Atherosclerotic vascular calcifications. Status post aortic   bypass.  RETROPERITONEUM: No lymphadenopathy.    ABDOMINAL WALL: Subcutaneous air posterior to the sacrum, area   incompletely visualized.  BONES: T12 vertebral body compression deformity, unchanged. Multilevel   spinal degenerative changes.    CXR: IMPRESSION:    1.  Decreased pulmonary edema.   2.  Left pleural effusion.      PROTEIN CALORIE MALNUTRITION PRESENT: [X ] Yes [ ] No  [ ] PPSV2 < or = to 30% [ ] significant weight loss  [ ] poor nutritional intake [ ] catabolic state [ ] anasarca     Albumin, Serum: 2.2 g/dL (02-07-19 @ 05:54)  Artificial Nutrition [ ]     REFERRALS:   [ ]Chaplaincy  [X ] Hospice  [ ]Child Life  [ ]Social Work  [X ]Case management [ ]Holistic Therapy   Goals of Care Discussion Document:

## 2019-02-14 NOTE — PROGRESS NOTE ADULT - PROBLEM SELECTOR PLAN 1
Patient with SHINE on CKD in setting of hypotension and sepsis. Baseline Scr. is unknown. Latest Scr. has improved to 1.72 today. Patient is non oliguric. Urine electrolytes are suggestive of pre renal azotemia. Patient with hemodynamically mediated SHINE due to volume depletion. Recommend to hold off on further diuretic therapy as patient appears clinically euvolemic. Continue with IV hydration. Monitor Scr, I/O, and electrolytes daily. Avoid NSAIDs, RCAs, and other nephrotoxins.

## 2019-02-14 NOTE — PROGRESS NOTE ADULT - PROBLEM SELECTOR PLAN 1
-now resolved. Maintain D5 infusion @ 100 cc/hr until BG >200 or tube feeds initated today  -test BG Q6h  -Changed Humalog correction scale (2-6) w/correction only above 200mg/dl  -Will need to evaluate glycemic requirements on tube feeds but will proceed carefully given most recent hypo event.   -discussed plan w/RN and NP  pager: [phone]

## 2019-02-14 NOTE — PROGRESS NOTE ADULT - SUBJECTIVE AND OBJECTIVE BOX
Chief Complaint:  Patient is a 73y old  Male who presents with a chief complaint of Sepsis (2019 07:10)      Interval Events:     Allergies:  No Known Allergies        Hospital Medications:  acetaminophen   Tablet .. 650 milliGRAM(s) Oral every 6 hours PRN  ascorbic acid 500 milliGRAM(s) Oral daily  atorvastatin 20 milliGRAM(s) Oral at bedtime  clopidogrel Tablet 75 milliGRAM(s) Oral daily  Dakins Solution - 1/4 Strength 1 Application(s) Topical three times a day  dextrose 40% Gel 15 Gram(s) Oral once PRN  dextrose 5% + sodium chloride 0.45%. 1000 milliLiter(s) IV Continuous <Continuous>  dextrose 5%. 1000 milliLiter(s) IV Continuous <Continuous>  dextrose 5%. 1000 milliLiter(s) IV Continuous <Continuous>  dextrose 50% Injectable 12.5 Gram(s) IV Push once  dextrose 50% Injectable 25 Gram(s) IV Push once  dextrose 50% Injectable 25 Gram(s) IV Push once  enoxaparin Injectable 60 milliGRAM(s) SubCutaneous at bedtime  glucagon  Injectable 1 milliGRAM(s) IntraMuscular once PRN  insulin lispro (HumaLOG) corrective regimen sliding scale   SubCutaneous every 6 hours  lactobacillus acidophilus 1 Tablet(s) Oral two times a day with meals  piperacillin/tazobactam IVPB. 3.375 Gram(s) IV Intermittent every 8 hours  polyethylene glycol 3350 17 Gram(s) Oral daily  potassium chloride  10 mEq/100 mL IVPB 10 milliEquivalent(s) IV Intermittent every 1 hour  senna Syrup 5 milliLiter(s) Oral at bedtime  tamsulosin 0.4 milliGRAM(s) Oral at bedtime  zinc oxide 20% Ointment 1 Application(s) Topical three times a day      PMHX/PSHX:  CKD (chronic kidney disease) stage 3, GFR 30-59 ml/min  Pressure ulcer, unspecified pressure ulcer stage  Coronary artery disease of native artery of native heart with stable angina pectoris  Diastolic congestive heart failure, unspecified congestive heart failure chronicity  Smoker  Gout involving toe, unspecified cause, unspecified chronicity, unspecified laterality  Gouty nephropathy  PAD (peripheral artery disease)  Chronic atrial fibrillation  Hyperlipidemia, unspecified hyperlipidemia type  HTN (hypertension), benign  Type 2 diabetes mellitus with other circulatory complication, with long-term current use of insulin  S/P angioplasty with stent  S/P hernia repair  Artificial cardiac pacemaker  S/P aortobifemoral bypass surgery    ROS: Unable to obtain as patient is non-verbal at baseline    PHYSICAL EXAM:   Vital Signs:  Vital Signs Last 24 Hrs  T(C): 36.7 (2019 04:39), Max: 36.7 (2019 11:22)  T(F): 98 (2019 04:39), Max: 98 (2019 11:22)  HR: 91 (2019 04:39) (91 - 95)  BP: 120/68 (2019 04:39) (107/70 - 120/68)  BP(mean): --  RR: 18 (2019 04:39) (18 - 18)  SpO2: 96% (2019 04:39) (93% - 100%)  Daily Weight in k (2019 13:44)    GENERAL:  No acute distress  HEENT:  Normocephalic/atraumatic,  no scleral icterus  CHEST:  Decreased inspiratory effort, no accessory muscle use  HEART:  Regular rate and rhythm, no murmurs/rubs/gallops  ABDOMEN:  Soft, non-tender, non-distended, normoactive bowel sounds, erythema and ulcerated skin surrounding PEG tube site, no leakage of gastric contents  EXTREMITIES:  No edema  NEURO: Responds to painful stimuli and verbal stimuli, does not follow commands    LABS:                        9.3    14.1  )-----------( 357      ( 2019 23:09 )             29.7     Mean Cell Volume: 94.9 fl (19 @ 23:09)        144  |  104  |  42<H>  ----------------------------<  118<H>  2.5<LL>   |  28  |  1.72<H>    Ca    6.7<L>      2019 06:46  Mg     1.7             PTT - ( 2019 06:46 )  PTT:78.3 sec                            9.3    14.1  )-----------( 357      ( 2019 23:09 )             29.7                         9.0    15.7  )-----------( 375      ( 2019 10:31 )             28.5                         8.0    14.0  )-----------( 313      ( 2019 06:11 )             25.5       Imaging: Chief Complaint: Sepsis    Interval Events:       Allergies:  No Known Allergies    Hospital Medications:  acetaminophen   Tablet .. 650 milliGRAM(s) Oral every 6 hours PRN  ascorbic acid 500 milliGRAM(s) Oral daily  atorvastatin 20 milliGRAM(s) Oral at bedtime  clopidogrel Tablet 75 milliGRAM(s) Oral daily  Dakins Solution - 1/4 Strength 1 Application(s) Topical three times a day  dextrose 40% Gel 15 Gram(s) Oral once PRN  dextrose 5% + sodium chloride 0.45%. 1000 milliLiter(s) IV Continuous <Continuous>  dextrose 5%. 1000 milliLiter(s) IV Continuous <Continuous>  dextrose 5%. 1000 milliLiter(s) IV Continuous <Continuous>  dextrose 50% Injectable 12.5 Gram(s) IV Push once  dextrose 50% Injectable 25 Gram(s) IV Push once  dextrose 50% Injectable 25 Gram(s) IV Push once  enoxaparin Injectable 60 milliGRAM(s) SubCutaneous at bedtime  glucagon  Injectable 1 milliGRAM(s) IntraMuscular once PRN  insulin lispro (HumaLOG) corrective regimen sliding scale   SubCutaneous every 6 hours  lactobacillus acidophilus 1 Tablet(s) Oral two times a day with meals  piperacillin/tazobactam IVPB. 3.375 Gram(s) IV Intermittent every 8 hours  polyethylene glycol 3350 17 Gram(s) Oral daily  potassium chloride  10 mEq/100 mL IVPB 10 milliEquivalent(s) IV Intermittent every 1 hour  senna Syrup 5 milliLiter(s) Oral at bedtime  tamsulosin 0.4 milliGRAM(s) Oral at bedtime  zinc oxide 20% Ointment 1 Application(s) Topical three times a day      PMHX/PSHX:  CKD (chronic kidney disease) stage 3, GFR 30-59 ml/min  Pressure ulcer, unspecified pressure ulcer stage  Coronary artery disease of native artery of native heart with stable angina pectoris  Diastolic congestive heart failure, unspecified congestive heart failure chronicity  Smoker  Gout involving toe, unspecified cause, unspecified chronicity, unspecified laterality  Gouty nephropathy  PAD (peripheral artery disease)  Chronic atrial fibrillation  Hyperlipidemia, unspecified hyperlipidemia type  HTN (hypertension), benign  Type 2 diabetes mellitus with other circulatory complication, with long-term current use of insulin  S/P angioplasty with stent  S/P hernia repair  Artificial cardiac pacemaker  S/P aortobifemoral bypass surgery    ROS: Unable to obtain as patient is non-verbal at baseline    PHYSICAL EXAM:   Vital Signs:  Vital Signs Last 24 Hrs  T(C): 36.7 (2019 04:39), Max: 36.7 (2019 11:22)  T(F): 98 (2019 04:39), Max: 98 (2019 11:22)  HR: 91 (2019 04:39) (91 - 95)  BP: 120/68 (2019 04:39) (107/70 - 120/68)  BP(mean): --  RR: 18 (2019 04:39) (18 - 18)  SpO2: 96% (2019 04:39) (93% - 100%)  Daily Weight in k (2019 13:44)    GENERAL:  No acute distress  HEENT:  Normocephalic/atraumatic,  no scleral icterus  CHEST:  Decreased inspiratory effort, no accessory muscle use  HEART:  Regular rate and rhythm, no murmurs/rubs/gallops  ABDOMEN:  Soft, non-tender, non-distended, normoactive bowel sounds, erythema and ulcerated skin surrounding PEG tube site, no leakage of gastric contents  EXTREMITIES:  No edema  NEURO: Responds to painful stimuli and verbal stimuli, does not follow commands    LABS:                        9.3    14.1  )-----------( 357      ( 2019 23:09 )             29.7     Mean Cell Volume: 94.9 fl (19 @ 23:09)    02-    144  |  104  |  42<H>  ----------------------------<  118<H>  2.5<LL>   |  28  |  1.72<H>    Ca    6.7<L>      2019 06:46  Mg     1.7     02-14        PTT - ( 2019 06:46 )  PTT:78.3 sec                            9.3    14.1  )-----------( 357      ( 2019 23:09 )             29.7                         9.0    15.7  )-----------( 375      ( 2019 10:31 )             28.5                         8.0    14.0  )-----------( 313      ( 2019 06:11 )             25.5       Imaging: Chief Complaint: Sepsis    Interval Events:   - No acute events overnight    Allergies:  No Known Allergies    Hospital Medications:  acetaminophen   Tablet .. 650 milliGRAM(s) Oral every 6 hours PRN  ascorbic acid 500 milliGRAM(s) Oral daily  atorvastatin 20 milliGRAM(s) Oral at bedtime  clopidogrel Tablet 75 milliGRAM(s) Oral daily  Dakins Solution - 1/4 Strength 1 Application(s) Topical three times a day  dextrose 40% Gel 15 Gram(s) Oral once PRN  dextrose 5% + sodium chloride 0.45%. 1000 milliLiter(s) IV Continuous <Continuous>  dextrose 5%. 1000 milliLiter(s) IV Continuous <Continuous>  dextrose 5%. 1000 milliLiter(s) IV Continuous <Continuous>  dextrose 50% Injectable 12.5 Gram(s) IV Push once  dextrose 50% Injectable 25 Gram(s) IV Push once  dextrose 50% Injectable 25 Gram(s) IV Push once  enoxaparin Injectable 60 milliGRAM(s) SubCutaneous at bedtime  glucagon  Injectable 1 milliGRAM(s) IntraMuscular once PRN  insulin lispro (HumaLOG) corrective regimen sliding scale   SubCutaneous every 6 hours  lactobacillus acidophilus 1 Tablet(s) Oral two times a day with meals  piperacillin/tazobactam IVPB. 3.375 Gram(s) IV Intermittent every 8 hours  polyethylene glycol 3350 17 Gram(s) Oral daily  potassium chloride  10 mEq/100 mL IVPB 10 milliEquivalent(s) IV Intermittent every 1 hour  senna Syrup 5 milliLiter(s) Oral at bedtime  tamsulosin 0.4 milliGRAM(s) Oral at bedtime  zinc oxide 20% Ointment 1 Application(s) Topical three times a day      PMHX/PSHX:  CKD (chronic kidney disease) stage 3, GFR 30-59 ml/min  Pressure ulcer, unspecified pressure ulcer stage  Coronary artery disease of native artery of native heart with stable angina pectoris  Diastolic congestive heart failure, unspecified congestive heart failure chronicity  Smoker  Gout involving toe, unspecified cause, unspecified chronicity, unspecified laterality  Gouty nephropathy  PAD (peripheral artery disease)  Chronic atrial fibrillation  Hyperlipidemia, unspecified hyperlipidemia type  HTN (hypertension), benign  Type 2 diabetes mellitus with other circulatory complication, with long-term current use of insulin  S/P angioplasty with stent  S/P hernia repair  Artificial cardiac pacemaker  S/P aortobifemoral bypass surgery    ROS: Unable to obtain as patient is non-verbal at baseline    PHYSICAL EXAM:   Vital Signs:  Vital Signs Last 24 Hrs  T(C): 36.7 (2019 04:39), Max: 36.7 (2019 11:22)  T(F): 98 (2019 04:39), Max: 98 (2019 11:22)  HR: 91 (2019 04:39) (91 - 95)  BP: 120/68 (2019 04:39) (107/70 - 120/68)  BP(mean): --  RR: 18 (2019 04:39) (18 - 18)  SpO2: 96% (2019 04:39) (93% - 100%)  Daily Weight in k (2019 13:44)    GENERAL:  No acute distress  HEENT:  Normocephalic/atraumatic,  no scleral icterus  CHEST:  Decreased inspiratory effort, no accessory muscle use  HEART:  Regular rate and rhythm, no murmurs/rubs/gallops  ABDOMEN:  Soft, non-tender, non-distended, normoactive bowel sounds, erythema and ulcerated skin surrounding PEG tube site, no leakage of gastric contents  EXTREMITIES:  No edema  NEURO: Responds to painful stimuli and verbal stimuli, does not follow commands    LABS:                        9.3    14.1  )-----------( 357      ( 2019 23:09 )             29.7     Mean Cell Volume: 94.9 fl (19 @ 23:09)        144  |  104  |  42<H>  ----------------------------<  118<H>  2.5<LL>   |  28  |  1.72<H>    Ca    6.7<L>      2019 06:46  Mg     1.7     02-14        PTT - ( 2019 06:46 )  PTT:78.3 sec                            9.3    14.1  )-----------( 357      ( 2019 23:09 )             29.7                         9.0    15.7  )-----------( 375      ( 2019 10:31 )             28.5                         8.0    14.0  )-----------( 313      ( 2019 06:11 )             25.5       Imaging: Chief Complaint: Sepsis    Interval Events:   - The patient was noted to be hypoglycemic overnight    Allergies:  No Known Allergies    Hospital Medications:  acetaminophen   Tablet .. 650 milliGRAM(s) Oral every 6 hours PRN  ascorbic acid 500 milliGRAM(s) Oral daily  atorvastatin 20 milliGRAM(s) Oral at bedtime  clopidogrel Tablet 75 milliGRAM(s) Oral daily  Dakins Solution - 1/4 Strength 1 Application(s) Topical three times a day  dextrose 40% Gel 15 Gram(s) Oral once PRN  dextrose 5% + sodium chloride 0.45%. 1000 milliLiter(s) IV Continuous <Continuous>  dextrose 5%. 1000 milliLiter(s) IV Continuous <Continuous>  dextrose 5%. 1000 milliLiter(s) IV Continuous <Continuous>  dextrose 50% Injectable 12.5 Gram(s) IV Push once  dextrose 50% Injectable 25 Gram(s) IV Push once  dextrose 50% Injectable 25 Gram(s) IV Push once  enoxaparin Injectable 60 milliGRAM(s) SubCutaneous at bedtime  glucagon  Injectable 1 milliGRAM(s) IntraMuscular once PRN  insulin lispro (HumaLOG) corrective regimen sliding scale   SubCutaneous every 6 hours  lactobacillus acidophilus 1 Tablet(s) Oral two times a day with meals  piperacillin/tazobactam IVPB. 3.375 Gram(s) IV Intermittent every 8 hours  polyethylene glycol 3350 17 Gram(s) Oral daily  potassium chloride  10 mEq/100 mL IVPB 10 milliEquivalent(s) IV Intermittent every 1 hour  senna Syrup 5 milliLiter(s) Oral at bedtime  tamsulosin 0.4 milliGRAM(s) Oral at bedtime  zinc oxide 20% Ointment 1 Application(s) Topical three times a day      PMHX/PSHX:  CKD (chronic kidney disease) stage 3, GFR 30-59 ml/min  Pressure ulcer, unspecified pressure ulcer stage  Coronary artery disease of native artery of native heart with stable angina pectoris  Diastolic congestive heart failure, unspecified congestive heart failure chronicity  Smoker  Gout involving toe, unspecified cause, unspecified chronicity, unspecified laterality  Gouty nephropathy  PAD (peripheral artery disease)  Chronic atrial fibrillation  Hyperlipidemia, unspecified hyperlipidemia type  HTN (hypertension), benign  Type 2 diabetes mellitus with other circulatory complication, with long-term current use of insulin  S/P angioplasty with stent  S/P hernia repair  Artificial cardiac pacemaker  S/P aortobifemoral bypass surgery    ROS: Unable to obtain as patient is non-verbal at baseline    PHYSICAL EXAM:   Vital Signs:  Vital Signs Last 24 Hrs  T(C): 36.7 (2019 04:39), Max: 36.7 (2019 11:22)  T(F): 98 (2019 04:39), Max: 98 (2019 11:22)  HR: 91 (2019 04:39) (91 - 95)  BP: 120/68 (2019 04:39) (107/70 - 120/68)  BP(mean): --  RR: 18 (2019 04:39) (18 - 18)  SpO2: 96% (2019 04:39) (93% - 100%)  Daily Weight in k (2019 13:44)    GENERAL:  No acute distress  HEENT:  Normocephalic/atraumatic,  no scleral icterus  CHEST:  Decreased inspiratory effort, no accessory muscle use  HEART:  Regular rate and rhythm, no murmurs/rubs/gallops  ABDOMEN:  Soft, non-tender, non-distended, normoactive bowel sounds, erythema and ulcerated skin surrounding PEG tube site, no leakage of gastric contents  EXTREMITIES:  No edema  NEURO: Responds to painful stimuli and verbal stimuli, does not follow commands    LABS:                        9.3    14.1  )-----------( 357      ( 2019 23:09 )             29.7     Mean Cell Volume: 94.9 fl (19 @ 23:09)        144  |  104  |  42<H>  ----------------------------<  118<H>  2.5<LL>   |  28  |  1.72<H>    Ca    6.7<L>      2019 06:46  Mg     1.7     02-14        PTT - ( 2019 06:46 )  PTT:78.3 sec                            9.3    14.1  )-----------( 357      ( 2019 23:09 )             29.7                         9.0    15.7  )-----------( 375      ( 2019 10:31 )             28.5                         8.0    14.0  )-----------( 313      ( 2019 06:11 )             25.5       Imaging: Chief Complaint: Sepsis    Interval Events:   - The patient was noted to be hypoglycemic overnight. Remains non-verbal.    Allergies:  No Known Allergies    Hospital Medications:  acetaminophen   Tablet .. 650 milliGRAM(s) Oral every 6 hours PRN  ascorbic acid 500 milliGRAM(s) Oral daily  atorvastatin 20 milliGRAM(s) Oral at bedtime  clopidogrel Tablet 75 milliGRAM(s) Oral daily  Dakins Solution - 1/4 Strength 1 Application(s) Topical three times a day  dextrose 40% Gel 15 Gram(s) Oral once PRN  dextrose 5% + sodium chloride 0.45%. 1000 milliLiter(s) IV Continuous <Continuous>  dextrose 5%. 1000 milliLiter(s) IV Continuous <Continuous>  dextrose 5%. 1000 milliLiter(s) IV Continuous <Continuous>  dextrose 50% Injectable 12.5 Gram(s) IV Push once  dextrose 50% Injectable 25 Gram(s) IV Push once  dextrose 50% Injectable 25 Gram(s) IV Push once  enoxaparin Injectable 60 milliGRAM(s) SubCutaneous at bedtime  glucagon  Injectable 1 milliGRAM(s) IntraMuscular once PRN  insulin lispro (HumaLOG) corrective regimen sliding scale   SubCutaneous every 6 hours  lactobacillus acidophilus 1 Tablet(s) Oral two times a day with meals  piperacillin/tazobactam IVPB. 3.375 Gram(s) IV Intermittent every 8 hours  polyethylene glycol 3350 17 Gram(s) Oral daily  potassium chloride  10 mEq/100 mL IVPB 10 milliEquivalent(s) IV Intermittent every 1 hour  senna Syrup 5 milliLiter(s) Oral at bedtime  tamsulosin 0.4 milliGRAM(s) Oral at bedtime  zinc oxide 20% Ointment 1 Application(s) Topical three times a day      PMHX/PSHX:  CKD (chronic kidney disease) stage 3, GFR 30-59 ml/min  Pressure ulcer, unspecified pressure ulcer stage  Coronary artery disease of native artery of native heart with stable angina pectoris  Diastolic congestive heart failure, unspecified congestive heart failure chronicity  Smoker  Gout involving toe, unspecified cause, unspecified chronicity, unspecified laterality  Gouty nephropathy  PAD (peripheral artery disease)  Chronic atrial fibrillation  Hyperlipidemia, unspecified hyperlipidemia type  HTN (hypertension), benign  Type 2 diabetes mellitus with other circulatory complication, with long-term current use of insulin  S/P angioplasty with stent  S/P hernia repair  Artificial cardiac pacemaker  S/P aortobifemoral bypass surgery    ROS: Unable to obtain as patient is non-verbal at baseline    PHYSICAL EXAM:   Vital Signs:  Vital Signs Last 24 Hrs  T(C): 36.7 (2019 04:39), Max: 36.7 (2019 11:22)  T(F): 98 (2019 04:39), Max: 98 (2019 11:22)  HR: 91 (2019 04:39) (91 - 95)  BP: 120/68 (2019 04:39) (107/70 - 120/68)  BP(mean): --  RR: 18 (2019 04:39) (18 - 18)  SpO2: 96% (2019 04:39) (93% - 100%)  Daily Weight in k (2019 13:44)    GENERAL:  No acute distress  HEENT:  Normocephalic/atraumatic,  no scleral icterus  CHEST:  Decreased inspiratory effort, no accessory muscle use  HEART:  Regular rate and rhythm, no murmurs/rubs/gallops  ABDOMEN:  Soft, non-tender, non-distended, normoactive bowel sounds, erythema and ulcerated skin surrounding PEG tube site, no leakage of gastric contents  EXTREMITIES:  No edema  NEURO: Responds to painful stimuli and verbal stimuli, does not follow commands    LABS:                        9.3    14.1  )-----------( 357      ( 2019 23:09 )             29.7     Mean Cell Volume: 94.9 fl (19 @ 23:09)        144  |  104  |  42<H>  ----------------------------<  118<H>  2.5<LL>   |  28  |  1.72<H>    Ca    6.7<L>      2019 06:46  Mg     1.7     0214        PTT - ( 2019 06:46 )  PTT:78.3 sec                            9.3    14.1  )-----------( 357      ( 2019 23:09 )             29.7                         9.0    15.7  )-----------( 375      ( 2019 10:31 )             28.5                         8.0    14.0  )-----------( 313      ( 2019 06:11 )             25.5       Imaging:

## 2019-02-14 NOTE — PROVIDER CONTACT NOTE (CRITICAL VALUE NOTIFICATION) - BACKGROUND
sepsis
Patient admitted for sepsis 2/2 osteomyelitis.
Patient admitted with sepsis 2/2 OM sacral.
Pt admitted for sepsis 2/2 OM, encephalopathy.
Pt admitted for sepsis.
Pt admitted with sepsis, electrolyte imbalance and ish.
Pt admitted with sepsis, electrolyte imbalance, and SHINE.

## 2019-02-14 NOTE — PROGRESS NOTE ADULT - SUBJECTIVE AND OBJECTIVE BOX
CARDIOLOGY     PROGRESS  NOTE   ________________________________________________    CHIEF COMPLAINT:Patient is a 73y old  Male who presents with a chief complaint of Sepsis (14 Feb 2019 08:26)  no complain.  	  REVIEW OF SYSTEMS:  CONSTITUTIONAL: No fever, weight loss, or fatigue  EYES: No eye pain, visual disturbances, or discharge  ENT:  No difficulty hearing, tinnitus, vertigo; No sinus or throat pain  NECK: No pain or stiffness  RESPIRATORY: No cough, wheezing, chills or hemoptysis; No Shortness of Breath  CARDIOVASCULAR: No chest pain, palpitations, passing out, dizziness, or leg swelling  GASTROINTESTINAL: No abdominal or epigastric pain. No nausea, vomiting, or hematemesis; No diarrhea or constipation. No melena or hematochezia.  GENITOURINARY: No dysuria, frequency, hematuria, or incontinence  NEUROLOGICAL: No headaches, memory loss, loss of strength, numbness, or tremors  SKIN: No itching, burning, rashes, or lesions   LYMPH Nodes: No enlarged glands  ENDOCRINE: No heat or cold intolerance; No hair loss  MUSCULOSKELETAL: No joint pain or swelling; No muscle, back, or extremity pain  PSYCHIATRIC: No depression, anxiety, mood swings, or difficulty sleeping  HEME/LYMPH: No easy bruising, or bleeding gums  ALLERGY AND IMMUNOLOGIC: No hives or eczema	    [ ] All others negative	  [ ] Unable to obtain    PHYSICAL EXAM:  T(C): 36.7 (02-14-19 @ 04:39), Max: 36.7 (02-13-19 @ 11:22)  HR: 91 (02-14-19 @ 04:39) (91 - 95)  BP: 120/68 (02-14-19 @ 04:39) (107/70 - 120/68)  RR: 18 (02-14-19 @ 04:39) (18 - 18)  SpO2: 96% (02-14-19 @ 04:39) (93% - 100%)  Wt(kg): --  I&O's Summary    13 Feb 2019 07:01  -  14 Feb 2019 07:00  --------------------------------------------------------  IN: 22 mL / OUT: 650 mL / NET: -628 mL        Appearance: Normal	  HEENT:   Normal oral mucosa, PERRL, EOMI	  Lymphatic: No lymphadenopathy  Cardiovascular: Normal S1 S2, No JVD, + murmurs, No edema  Respiratory: Lungs clear to auscultation	  Psychiatry: A & O x 3, Mood & affect appropriate  Gastrointestinal:  Soft, Non-tender, + BS	  Skin: No rashes, No ecchymoses, No cyanosis	  Neurologic: Non-focal  Extremities: Normal range of motion, No clubbing, cyanosis or edema  Vascular: Peripheral pulses palpable 2+ bilaterally    MEDICATIONS  (STANDING):  ascorbic acid 500 milliGRAM(s) Oral daily  atorvastatin 20 milliGRAM(s) Oral at bedtime  clopidogrel Tablet 75 milliGRAM(s) Oral daily  Dakins Solution - 1/4 Strength 1 Application(s) Topical three times a day  dextrose 5% + sodium chloride 0.45%. 1000 milliLiter(s) (100 mL/Hr) IV Continuous <Continuous>  dextrose 5%. 1000 milliLiter(s) (50 mL/Hr) IV Continuous <Continuous>  dextrose 5%. 1000 milliLiter(s) (50 mL/Hr) IV Continuous <Continuous>  dextrose 50% Injectable 12.5 Gram(s) IV Push once  dextrose 50% Injectable 25 Gram(s) IV Push once  dextrose 50% Injectable 25 Gram(s) IV Push once  enoxaparin Injectable 60 milliGRAM(s) SubCutaneous at bedtime  insulin lispro (HumaLOG) corrective regimen sliding scale   SubCutaneous every 6 hours  lactobacillus acidophilus 1 Tablet(s) Oral two times a day with meals  piperacillin/tazobactam IVPB. 3.375 Gram(s) IV Intermittent every 8 hours  polyethylene glycol 3350 17 Gram(s) Oral daily  potassium chloride  10 mEq/100 mL IVPB 10 milliEquivalent(s) IV Intermittent every 1 hour  senna Syrup 5 milliLiter(s) Oral at bedtime  tamsulosin 0.4 milliGRAM(s) Oral at bedtime  zinc oxide 20% Ointment 1 Application(s) Topical three times a day      TELEMETRY: 	    ECG:  	  RADIOLOGY:  OTHER: 	  	  LABS:	 	    CARDIAC MARKERS:                                9.3    14.1  )-----------( 357      ( 13 Feb 2019 23:09 )             29.7     02-14    144  |  104  |  42<H>  ----------------------------<  118<H>  2.5<LL>   |  28  |  1.72<H>    Ca    6.7<L>      14 Feb 2019 06:46  Mg     1.7     02-14      proBNP: Serum Pro-Brain Natriuretic Peptide: 48185 pg/mL (02-03 @ 13:07)    Lipid Profile:   HgA1c: Hemoglobin A1C, Whole Blood: 7.4 % (02-04 @ 08:56)    TSH:   PTT - ( 14 Feb 2019 06:46 )  PTT:78.3 sec      Assessment and plan  ---------------------------  cjf systolic acute on chronic  replete potassium  peg  aggressiveness need to talk to the family

## 2019-02-14 NOTE — PROGRESS NOTE ADULT - SUBJECTIVE AND OBJECTIVE BOX
f/u OM sacrum    Interval History/ROS:   wife at bedside - patient to be palliative.  no fever.  Unable to obtain ROS - patient confused.    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  Pressure ulcer, unspecified pressure ulcer stage: in lateral rt 4th toe both sides from toe compression  S/P hernia repair    Allergies  No Known Allergies    ANTIMICROBIALS:    vancomycin 2/3-11  cefepime   IVPB  2/4-2/5  ertapenem  IVPB 2/5-2/8  piperacillin/tazobactam IVPB [2/8-]    MEDICATIONS  (STANDING):  atorvastatin 20 at bedtime  clopidogrel Tablet 75 daily  enoxaparin Injectable 40 daily  insulin lispro (HumaLOG) corrective regimen sliding scale  every 6 hours  polyethylene glycol 3350 17 daily  senna Syrup 5 at bedtime  tamsulosin 0.4 at bedtime    Vital Signs Last 24 Hrs  T(F): 97.6 (02-14-19 @ 11:04), Max: 98 (02-14-19 @ 04:39)  HR: 79 (02-14-19 @ 11:04)  BP: 125/68 (02-14-19 @ 11:04)  RR: 18 (02-14-19 @ 11:04)  SpO2: 99% (02-14-19 @ 11:04) (95% - 100%)    PHYSICAL EXAM:  General: non-toxic; in bed  HEAD/EYES: anicteric  ENT:  supple  Cardiovascular:   S1, S2  Respiratory:  clear bilaterally  GI:  soft, non-tender, normal bowel sounds  :  hoskins clear urine  Musculoskeletal:  no synovitis  Neurologic:  awake, confused  Skin:  multiple eschars of the legs b/l  Psychiatric:  not really able to assess  Vascular:  no phlebitis                                      6.3    10.05 )-----------( 297      ( 14 Feb 2019 07:51 )             22.3 02-14    144  |  104  |  42  ----------------------------<  118  2.5   |  28  |  1.72  Ca    6.7      14 Feb 2019 06:46Mg     1.7     02-14    C-Reactive Protein, Serum: 8.89 (02-08-19)    Sedimentation Rate, Erythrocyte: 35 (02-08-19)  Sedimentation Rate, Erythrocyte: 66 (02-03-19)    Vancomycin Level, Trough: 17.6 (02-08 @ 17:46)    MICROBIOLOGY:  Culture - Tissue with Gram Stain (02.08.19 @ 20:13)    Gram Stain:   No polymorphonuclear cells seen per low power field  No organisms seen per oil power field    Specimen Source: .Tissue sacral bone    Culture Results:   Few Citrobacter freundii complex  Rare Enterococcus faecium (vancomycin resistant)  Few Coag Negative Staphylococcus  Rare Enterobacter cloacae complex  Rare Candida albicans Referred to Mycology     Culture - Blood (02.03.19 @ 17:52)    Specimen Source: .Blood Blood-Venous    Culture Results:   No growth to date.    Culture - Blood (02.03.19 @ 17:52)    Specimen Source: .Blood Blood-Peripheral    Culture Results:   No growth to date.    Culture - Urine (02.03.19 @ 16:44)    Specimen Source: .Urine Catheterized    Culture Results:   <10,000 CFU/ml Normal Urogenital nany present    RADIOLOGY:  US Kidney and Bladder (02.06.19 @ 09:54) >  IMPRESSION:  Renal parenchymal disease. No hydronephrosis. Trace perinephric fluid bilaterally.  Trace to small abdominal pelvic free fluid.  Cholelithiasis. 3 mm gallbladder polyp. Follow-up can be performed in one year to assess for interval change in size.    Xray Chest 1 View- PORTABLE-Urgent (02.04.19 @ 03:31) >  IMPRESSION: The patient is status post CABG and heart valve repair. A left cardiac device is noted. There is no pneumothorax. There are small bilateral pleural effusions with associated pulmonary edema demonstrating overall interval improvement since the prior study.     CT Lumbar Spine No Cont (02.03.19 @ 15:34) >  A large sacral decubitus ulcer is noted overlying the mid-lower sacrum and coccyx. Areas of cortical discontinuity are noted involving the dorsal sacrum most consistent with osteomyelitis. No periosteal reaction   is visualized. No large fluid collections are appreciated within the limitations of this noncontrast study.  Severe compression deformity of the T12 vertebral body with mild retropulsion of bone with a mild bony central canal stenosis as previously described. Bilateral spondylolysis defects are again noted at theL5-S1 levels. Multilevel spondylosis. Diffuse demineralization of the bones.  Atheromatous disease with redemonstration of aortobifemoral bypass which is incompletely evaluated with absence of intravenous contrast.  Fluid within the pelvis ofunknown etiology. Hoskins catheter balloon within the bladder.  The patient is status post right hip replacement.  IMPRESSION:  A large sacral decubitus ulcer is noted overlying the mid-lower sacrum and coccyx. Areas of cortical discontinuity are noted involving the dorsal sacrum most consistent with osteomyelitis.

## 2019-02-14 NOTE — CONSULT NOTE ADULT - PROBLEM SELECTOR RECOMMENDATION 3
- trial of IV fluid.  -no routine blood draw as it won't change the disease trajectory   -comfort care only   -no need to check for routine I & O -Will continue low rate IVF for now.   -no routine blood draw as it won't change the disease trajectory   -no need to check for routine I & O

## 2019-02-14 NOTE — PROGRESS NOTE ADULT - PROBLEM SELECTOR PLAN 2
Patient with metabolic alkalosis in setting of volume depletion and diuretic use. Latest serum CO2 has improved to 28 today. Continue to hold off on further diuretic therapy for now. Continue with IV hydration. Monitor serum CO2 level.

## 2019-02-14 NOTE — PROVIDER CONTACT NOTE (CRITICAL VALUE NOTIFICATION) - ASSESSMENT
VSS, patient already on antibiotics
Patient lying in bed. Glucose on glucometer 30. Asymptomatic. VSS.
Patient lying in bed. No signs of distress. VSS.
Pt AAOx1. Unable to verbalize. Pt at baseline. Does no appear to be in distress.
Pt VSS. Does not appear to be in distress.
Pt is in bed, NAD. AOx1
Pt laying in bed, restless and wanting to get OOB, but consolable. VSS, please see flowsheet.

## 2019-02-14 NOTE — PROVIDER CONTACT NOTE (CRITICAL VALUE NOTIFICATION) - NAME OF MD/NP/PA/DO NOTIFIED:
Akosua Bran PA.
Dr. Sierra Team 3
Earl, Milana PA
Milana Elliott, PA
Sydney
Sydney Britton NP
ANTONIO Figueroa

## 2019-02-14 NOTE — PROGRESS NOTE ADULT - SUBJECTIVE AND OBJECTIVE BOX
[facility] Division of Kidney Diseases & Hypertension  FOLLOW UP NOTE  [phone]--------------------------------------------------------------------------------  Chief Complaint: SHINE on CKD    24 hour events/subjective:    Patient noted to be hypoglycemic   PEG tube still not working.     PAST HISTORY  --------------------------------------------------------------------------------  No significant changes to PMH, PSH, FHx, SHx, unless otherwise noted    ALLERGIES & MEDICATIONS  --------------------------------------------------------------------------------  Allergies    No Known Allergies    Intolerances      Standing Inpatient Medications  acetaminophen  IVPB .. 1000 milliGRAM(s) IV Intermittent once  ascorbic acid 500 milliGRAM(s) Oral daily  atorvastatin 20 milliGRAM(s) Oral at bedtime  BACItracin   Ointment 1 Application(s) Topical two times a day  clopidogrel Tablet 75 milliGRAM(s) Oral daily  Dakins Solution - 1/4 Strength 1 Application(s) Topical three times a day  dextrose 5%. 1000 milliLiter(s) IV Continuous <Continuous>  dextrose 5%. 1000 milliLiter(s) IV Continuous <Continuous>  dextrose 5%. 1000 milliLiter(s) IV Continuous <Continuous>  dextrose 50% Injectable 12.5 Gram(s) IV Push once  dextrose 50% Injectable 25 Gram(s) IV Push once  dextrose 50% Injectable 25 Gram(s) IV Push once  enoxaparin Injectable 60 milliGRAM(s) SubCutaneous at bedtime  insulin lispro (HumaLOG) corrective regimen sliding scale   SubCutaneous every 6 hours  lactobacillus acidophilus 1 Tablet(s) Oral two times a day with meals  piperacillin/tazobactam IVPB. 3.375 Gram(s) IV Intermittent every 8 hours  polyethylene glycol 3350 17 Gram(s) Oral daily  potassium chloride  10 mEq/100 mL IVPB 10 milliEquivalent(s) IV Intermittent every 1 hour  senna Syrup 5 milliLiter(s) Oral at bedtime  tamsulosin 0.4 milliGRAM(s) Oral at bedtime  zinc oxide 20% Ointment 1 Application(s) Topical three times a day    PRN Inpatient Medications  acetaminophen   Tablet .. 650 milliGRAM(s) Oral every 6 hours PRN  dextrose 40% Gel 15 Gram(s) Oral once PRN  glucagon  Injectable 1 milliGRAM(s) IntraMuscular once PRN      REVIEW OF SYSTEMS  --------------------------------------------------------------------------------  Unable to obtain.     VITALS/PHYSICAL EXAM  --------------------------------------------------------------------------------  T(C): 36.7 (02-14-19 @ 04:39), Max: 36.7 (02-13-19 @ 11:22)  HR: 91 (02-14-19 @ 04:39) (91 - 95)  BP: 120/68 (02-14-19 @ 04:39) (107/70 - 120/68)  RR: 18 (02-14-19 @ 04:39) (18 - 18)  SpO2: 96% (02-14-19 @ 04:39) (93% - 100%)  Wt(kg): --  Height (cm): 177.8 (02-14-19 @ 08:31)      02-13-19 @ 07:01  -  02-14-19 @ 07:00  --------------------------------------------------------  IN: 22 mL / OUT: 650 mL / NET: -628 mL      Physical Exam:  	  Gen: confused   	mouth- VERY DRY	  	Pulm: CTA B/L  	CV: RRR, S1S2; no rub  	Back: No spinal or CVA tenderness; no sacral edema  	Abd: +BS, soft, nontender/nondistended    LABS/STUDIES  --------------------------------------------------------------------------------              6.3    10.05 >-----------<  297      [02-14-19 @ 07:51]              22.3     144  |  104  |  42  ----------------------------<  118      [02-14-19 @ 06:46]  2.5   |  28  |  1.72        Ca     6.7     [02-14-19 @ 06:46]      Mg     1.7     [02-14-19 @ 06:46]      PT/INR: PT 17.9 , INR 1.55       [02-14-19 @ 07:51]  PTT: 78.3       [02-14-19 @ 06:46]      Creatinine Trend:  SCr 1.72 [02-14 @ 06:46]  SCr 2.24 [02-13 @ 10:27]  SCr 1.93 [02-12 @ 06:11]  SCr 1.75 [02-11 @ 08:13]  SCr 1.66 [02-10 @ 12:02]

## 2019-02-14 NOTE — CONSULT NOTE ADULT - PROBLEM SELECTOR RECOMMENDATION 2
Leakage around the site and redness around PEG side.  -Pegt-T feeding on hold since mallorie 4 days ago  -No NG tube as family wishes (and patient's wishes as per living will, and MOLST)  -No more feeding as per our GOC today with family Leakage around the site and redness around PEG side.  -Pegt-T feeding on hold since 4 days ago  -No NG tube or tube feeds as per living will.   -No more tube or oral feeding (due to risk of aspiration) as per our GOC today with family

## 2019-02-14 NOTE — PROGRESS NOTE ADULT - PROBLEM SELECTOR PLAN 3
Patient with hypokalemia in setting of metabolic alkalosis and insulin use. Latest serum K noted to be low at 2.5. Patient has received IV KCl 10 mEq x 3 doses. Discussed with primary team; patient to receive NG tube placement today. Recommend to give PO KCl 40 mEq x 2 via NG tube as well. Recommend to check EKG as well. Monitor serum K level.

## 2019-02-14 NOTE — PROVIDER CONTACT NOTE (CRITICAL VALUE NOTIFICATION) - ACTION/TREATMENT ORDERED:
continue antibiotic, will continue to monitor
Lab re-drawn. Will continue to monitor.
Potassium supplementation ordered.
Provider made aware, no interventions at this time. Pt maintained on NS 0.45% @ 75cc/hr
As per PA, administer IV push Dextrose D50. Administer apple juice through PEG tube. Recheck blood glucose. Will continue to monitor.
As per PA, continue antibiotics. Will continue to monitor.
Will continue to monitor patient.

## 2019-02-14 NOTE — PROVIDER CONTACT NOTE (CRITICAL VALUE NOTIFICATION) - TEST AND RESULT REPORTED:
Glucose 29
Lactate
Lactate 4.4 VBG
Pt Hgb 6.3
Pt potassium 2.5
Tissue Culture
positive Tissue culture

## 2019-02-14 NOTE — PROVIDER CONTACT NOTE (CRITICAL VALUE NOTIFICATION) - PERSON GIVING RESULT:
Carroll Aaron
Lam Eduardo, Lab
Mercedez Hatfield, Lab
Q Catege
Shabnam Hopson, Lab
Shabnam Hopson, Lab
Meredith O

## 2019-02-14 NOTE — CONSULT NOTE ADULT - PROBLEM SELECTOR RECOMMENDATION 4
We met with family (patient's wife: Lucia and son) today.  Please see [facility] conversation note for more details.  -MOLST form updated and in chart  -DNI/DNR  -PCU transfer if bed available  -No more Peg-T or NG feeding  -Stop ABx  -complete comfort care  -no routine blood draw  emotional support provided. We met with family (patient's wife: Lucia and son) today.  Please see [facility] conversation note for more details.  -MOLST form updated and in chart  -DNI/DNR  -PCU transfer if bed available  -No more artifical nutrition.   -Stop ABx  -no routine blood draw  emotional support provided. Dilaudid as above.   Ativan 0.2 mg IVq 3 PRN for recurrent symptoms

## 2019-02-14 NOTE — CONSULT NOTE ADULT - ASSESSMENT
74 yo male with CAD c/b CABG, A fib (on coumadin), CKD stage 3, PAD s/p stents, PEG tube, in-dwelling [place] who presents to the nursing home for increased confusion and elevated WBC. In November he had a hypoglycemic episode, fell, and broke his hip. He was admitted to the hospital for surgery, which was uneventful and sent to rehab (in early December). During the rehab stay, he developed numerous ulcers, including a decubitus ulcer. He was sent back to [facility] for sepsis from the decubitus ulcer. He was treated with abx and returned to rehab for a second time. This second hospital course was complicated by acute renal failure requiring dialysis, IV abx, and surgery. This time, he has been hospitalized for 11 days. 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 Palliative care is called for helping with GOC in the setting of an elderly patient with multiple comorbid conditions, recurrent hospitalizations (LACE > 10), several complications, and significant functional impairment.

## 2019-02-14 NOTE — PROVIDER CONTACT NOTE (CRITICAL VALUE NOTIFICATION) - SITUATION
tissue culture from 2/8/2019 shows 1. few citrobacter freundii complex 2. rare enterococcus faecium vanco resistant 3. few coag negative staph 4. rare enterobacter cloacae complex
Few Citrobacter freundii complex  Positive tissue culture 2/8:   Rare Enterococcus faecium (vancomycin resistant)  Few Coag Negative Staphylococcus  Rare Enterobacter cloacae complex  Rare Candida albicans Referred to Mycology
Lactate 4.3
Lactate 4.4 VBG
Patient with blood glucose hypercritical 29. PA made aware.
Pt hgb 6.3
Pt potassium 2.5

## 2019-02-14 NOTE — PROGRESS NOTE ADULT - SUBJECTIVE AND OBJECTIVE BOX
Diabetes Follow up note:  Interval Hx:  73 year old male w/T2DM w/nephropathy, CAD, retinopathy here w/metabolic encephalopathy/sepsis c/b SHINE on CKD and hypoglycemia. Pt started on very low dose of Lantus last night. Found to have hypoglycemia in 40s this AM. Improved after D50 IVPush and D5 infusion initiated. Pt more awake today, on restraints but not following commands.       Review of Systems:  Unable to provide ROS.   MEDS:  atorvastatin 20 milliGRAM(s) Oral at bedtime    insulin lispro (HumaLOG) corrective regimen sliding scale   SubCutaneous every 6 hours    piperacillin/tazobactam IVPB. 3.375 Gram(s) IV Intermittent every 8 hours    Allergies    No Known Allergies        PE:  General: Male lying in bed. NAD.  Vital Signs Last 24 Hrs  T(C): 36.7 (14 Feb 2019 04:39), Max: 36.7 (13 Feb 2019 11:22)  T(F): 98 (14 Feb 2019 04:39), Max: 98 (13 Feb 2019 11:22)  HR: 91 (14 Feb 2019 04:39) (91 - 95)  BP: 120/68 (14 Feb 2019 04:39) (107/70 - 120/68)  BP(mean): --  RR: 18 (14 Feb 2019 04:39) (18 - 18)  SpO2: 96% (14 Feb 2019 04:39) (93% - 100%)  Abd: Soft, NT,ND,   Extremities: Warm. no edema  Neuro: Awake. Not following commands.     LABS:    POCT Blood Glucose.: 126 mg/dL (02-14-19 @ 08:24)  POCT Blood Glucose.: 45 mg/dL (02-14-19 @ 07:16)  POCT Blood Glucose.: 66 mg/dL (02-14-19 @ 06:55)  POCT Blood Glucose.: 46 mg/dL (02-14-19 @ 06:36)  POCT Blood Glucose.: 67 mg/dL (02-14-19 @ 06:34)  POCT Blood Glucose.: 227 mg/dL (02-13-19 @ 23:39)  POCT Blood Glucose.: 244 mg/dL (02-13-19 @ 18:27)  POCT Blood Glucose.: 247 mg/dL (02-13-19 @ 11:11)  POCT Blood Glucose.: 223 mg/dL (02-13-19 @ 06:38)  POCT Blood Glucose.: 222 mg/dL (02-13-19 @ 00:50)  POCT Blood Glucose.: 209 mg/dL (02-12-19 @ 22:51)  POCT Blood Glucose.: 331 mg/dL (02-12-19 @ 18:27)  POCT Blood Glucose.: 320 mg/dL (02-12-19 @ 14:25)  POCT Blood Glucose.: 279 mg/dL (02-12-19 @ 10:54)  POCT Blood Glucose.: 174 mg/dL (02-12-19 @ 06:20)  POCT Blood Glucose.: 214 mg/dL (02-12-19 @ 02:18)  POCT Blood Glucose.: 215 mg/dL (02-11-19 @ 22:01)  POCT Blood Glucose.: 147 mg/dL (02-11-19 @ 18:21)  POCT Blood Glucose.: 230 mg/dL (02-11-19 @ 15:31)  POCT Blood Glucose.: 224 mg/dL (02-11-19 @ 11:57)                            9.3    14.1  )-----------( 357      ( 13 Feb 2019 23:09 )             29.7       02-14    144  |  104  |  42<H>  ----------------------------<  118<H>  2.5<LL>   |  28  |  1.72<H>    Ca    6.7<L>      14 Feb 2019 06:46  Mg     1.7     02-14          Hemoglobin A1C, Whole Blood: 7.4 % <H> [4.0 - 5.6] (02-04-19 @ 08:56)            Contact number: fernando [phone] or [phone]

## 2019-02-14 NOTE — PROGRESS NOTE ADULT - ASSESSMENT
Impression:  1) PEG site leakage  2) Ulcerated skin beneath external PEG bumper likely due to continued trauma from bumper vs irritation from gastric contents    Recommendations  - Continue vancomycin / zosyn which are adequate coverage for any skin infection/cellulitis around PEG site  - The entire area underneath and near the PEG bumper is ulcerated or erythematous; the tract of the PEG also appear to be ulcerated; therefore, even if we change the PEG to a large one, the area likely will not heal well and the large PEG will likely cause even more irritation adjacent to it  - Therefore, would not recommend upsizing PEG right now; change it for a large one might also run the risk of continuing to dilate/enlarge the tract  - Would obtain would care evaluation for the ulcerated skin beneath the bumper; would keep PEG bumper VERY LOOSE and place absorbent dressings at the ulcerated site to help the skin heal  - If the skin does not heal, then the possibility of removing the PEG, feeding by NGT in the meantime until it heals, and then replacing the PEG might be needed  - In the meantime, if we are able to dress the ulcerated/irritated skin beneath the bumper of the PEG, would resume PEG feeds at a slow rate    Diaz Alexander MD  Gastroenterology Fellow  Pager number: [phone] / [phone] Impression:  1) PEG site leakage  2) Ulcerated skin beneath external PEG bumper likely due to continued trauma from bumper vs irritation from gastric contents    Recommendations  - Continue vancomycin / zosyn which are adequate coverage for any skin infection/cellulitis around PEG site  - The entire area underneath and near the PEG bumper is ulcerated or erythematous; the tract of the PEG also appear to be ulcerated; therefore, even if we change the PEG to a large one, the area likely will not heal well and the large PEG will likely cause even more irritation adjacent to it  - Therefore, would not recommend upsizing PEG right now; change it for a large one might also run the risk of continuing to dilate/enlarge the tract  - Discussed with wound care attending; no additional dressings recommended other than the current regiment  - Would keep PEG bumper VERY LOOSE and place absorbent dressings at the ulcerated site to help the skin heal  - If the skin does not heal, then the possibility of removing the PEG, feeding by NGT in the meantime until it heals, and then replacing the PEG might be needed  - In the meantime, if we are able to dress the ulcerated/irritated skin beneath the bumper of the PEG, would resume PEG feeds at a slow rate  - Follow-up GOC discussion given patient's debility and comorbidities    Diaz Alexander MD  Gastroenterology Fellow  Pager number: [phone] / [phone]

## 2019-02-14 NOTE — CONSULT NOTE ADULT - CONSULT REASON
Elevated creatinine
GOC in the setting of an elderly patient with multiple comorbid conditions, recurrent hospitalizations (LACE > 10) with several complications and significant functional impairment.
PEG leak
Right cool leg
Uncontrolled Type 2 DM w/ hypoglycemia
compression fx
om
sob/a.fib/cad
Evaluate Rehabilitation Needs
multiple wounds

## 2019-02-14 NOTE — PROGRESS NOTE ADULT - PROBLEM SELECTOR PLAN 4
Patient with hypernatremia in setting of impaired free water intake and volume depletion. Latest serum sodium is WNL(144) today. Recommend to start on free water supplementation once NG tube is placed. Monitor serum sodium level.

## 2019-02-14 NOTE — CHART NOTE - NSCHARTNOTEFT_GEN_A_CORE
Pt's wife Lucia Howard refusing NGT to be placed. Palliative consult placed and to see pt. Dr. Talat gonzalez.    Sydney Britton, NP-c  Spectra-link [id number]

## 2019-02-14 NOTE — PROGRESS NOTE ADULT - ASSESSMENT
73M with multiple medical problems (CAD, CABG, PAD, aorto-bifemoral bypass, CKD), R hip fracture s/p ORIF, s/p PEG/hoskins here with sacral osteomyelitis with sepsis - likely source is osteomyelitis.  Hx also notable for uncontrolled Type 2 DM w/hypoglycemia today after initiating low dose Lantus in setting of persistent hyperglycemia. Pt insulin sensitive so will avoid long acting insulin at this time. Per team, possibly will be started on tube feeds today after placement of NGT. On D5 infusion at this time to prevent further hypoglycemia. No need for tight glycemic control (140-220mg/dl) given insulin sensitivity.

## 2019-02-14 NOTE — PROGRESS NOTE ADULT - ASSESSMENT
74 yo male PMH CAD c/b CABG, A fib (on coumadin), CKD stage 3, PAD s/p stents, PEG tube, in-dwelling Hoskins who presents to the nursing home for increased confusion and elevated WBC. History was taken from wife at bedside.	Per wife, patient had been fully functional up until November. He had a hypoglycemic episode, fell, and broke his hip in November. He was admitted to the hospital for surgery, which was uneventful and sent to rehab (in early December).     During the rehab stay, he developed numerous ulcers, including a decubitus ulcer. He was sent back to [facility] for sepsis from the decubitus ulcer. He was treated with abx and returned to rehab for a second time. This second hospital course was complicated by acute renal failure requiring dialysis, IV abx, and "surgery" to clean the ulcer. In the ED, he received Vanc/Zosyn. He was admitted to medicine for further management and sepsis on arrival from   sacral osteomylitis.     ID: Agree IV Zosyn 3.375 grams every 8 hours for sacral decubitus, Vancomycin stopped. CT chest shows no pneumonia, bilateral effusions. PEG feeds stopped working yesterday, now on hold. Leakage around the site and redness around PEG sie. Chronic hoskins changed this admission. Agree with ID. Had bone biopsy done today which now shows GNR. CT of lumbar spine confirms sacral OM. Need bone culture to guide treatment. PT ordered.      Reanl: CKD III stable, creatinine 1.66. Renal sono confirms MRD. Free water replacement on hold without  PEG.  Creatinine improved to 1.72.   Will hold Lasix for now.     Urology: Add Flomax .4 mg/day. Used to take Cardura at home. No TOV in near future.     CV: Lasix on hold given SHINE. Stop all IVF for now. Resume Plavix 75 mg/day, taken at home with Lipitor.  Hold Coumadin for Midline.     Need INR < 1.50 for PICC.   Changed to Lovenox 60 mg daily given CKD III, creatinine clearance > 30 ml/min.     Endo: episodes of hypoglycemia off insulin. Stopped D 10. Endo consult requested. PEG feeds on hold given leaking. NPH on hold  until PEG feeds working well. Glucose stable at 224. Agree D5W for now.         GI: Peg site leaking past 24 hours. Not able to use now. GI recommends not using tube for one week. May need new one. Might   be better to remove PEG tube so site can heal, then place new one.  GI recommendation for local wound care around PEG site. Agree DNR.      Had phone discussion with wife/proxy this AM. Do not want to procede with NG tube. Favor comfort care, palliative care eval.    wife: [phone] 74 yo male PMH CAD c/b CABG, A fib (on coumadin), CKD stage 3, PAD s/p stents, PEG tube, in-dwelling [facility]kins who presents to the nursing home for increased confusion and elevated WBC. History was taken from wife at bedside.	Per wife, patient had been fully functional up until November. He had a hypoglycemic episode, fell, and broke his hip in November. He was admitted to the hospital for surgery, which was uneventful and sent to rehab (in early December).     During the rehab stay, he developed numerous ulcers, including a decubitus ulcer. He was sent back to [facility] for sepsis from the decubitus ulcer. He was treated with abx and returned to rehab for a second time. This second hospital course was complicated by acute renal failure requiring dialysis, IV abx, and "surgery" to clean the ulcer. In the ED, he received Vanc/Zosyn. He was admitted to medicine for further management and sepsis on arrival from   sacral osteomylitis.     ID: Agree IV Zosyn 3.375 grams every 8 hours for sacral decubitus, Vancomycin stopped. CT chest shows no pneumonia, bilateral effusions. PEG feeds stopped working yesterday, now on hold. Leakage around the site and redness around PEG sie. Chronic hoskins changed this admission. Agree with ID. Had bone biopsy done today which now shows GNR. CT of lumbar spine confirms sacral OM. Need bone culture to guide treatment. PT ordered.      Reanl: CKD III stable, creatinine 1.66. Renal sono confirms MRD. Free water replacement on hold without  PEG.  Creatinine improved to 1.72.   Will hold Lasix for now.     Urology: Add Flomax .4 mg/day. Used to take Cardura at home. No TOV in near future.     CV: Lasix on hold given SHINE. Stop all IVF for now. Resume Plavix 75 mg/day, taken at home with Lipitor.  Hold Coumadin for Midline.     Need INR < 1.50 for PICC.   Changed to Lovenox 60 mg daily given CKD III, creatinine clearance > 30 ml/min.     Endo: episodes of hypoglycemia off insulin. Stopped D 10. Endo consult requested. PEG feeds on hold given leaking. NPH on hold  until PEG feeds working well. Glucose stable at 224. Agree D5W for now.         GI: Peg site leaking past 24 hours. Not able to use now. GI recommends not using tube for one week. May need new one. Might   be better to remove PEG tube so site can heal, then place new one.  GI recommendation for local wound care around PEG site. Agree DNR.      Had phone discussion with wife/proxy this AM. Do not want to procede with NG tube. Favor comfort care, palliative care eval.    wife: [phone]      Note: BMI of 15.8 equals severe protein calorie malnutrition.

## 2019-02-15 DIAGNOSIS — R44.3 HALLUCINATIONS, UNSPECIFIED: ICD-10-CM

## 2019-02-15 DIAGNOSIS — R06.4 HYPERVENTILATION: ICD-10-CM

## 2019-02-15 LAB
ANION GAP SERPL CALC-SCNC: 14 MMOL/L — SIGNIFICANT CHANGE UP (ref 5–17)
BUN SERPL-MCNC: 42 MG/DL — HIGH (ref 7–23)
CALCIUM SERPL-MCNC: 8 MG/DL — LOW (ref 8.4–10.5)
CHLORIDE SERPL-SCNC: 103 MMOL/L — SIGNIFICANT CHANGE UP (ref 96–108)
CO2 SERPL-SCNC: 28 MMOL/L — SIGNIFICANT CHANGE UP (ref 22–31)
CREAT SERPL-MCNC: 1.81 MG/DL — HIGH (ref 0.5–1.3)
GLUCOSE BLDC GLUCOMTR-MCNC: 243 MG/DL — HIGH (ref 70–99)
GLUCOSE BLDC GLUCOMTR-MCNC: 274 MG/DL — HIGH (ref 70–99)
GLUCOSE BLDC GLUCOMTR-MCNC: 369 MG/DL — HIGH (ref 70–99)
GLUCOSE SERPL-MCNC: 305 MG/DL — HIGH (ref 70–99)
HCT VFR BLD CALC: 26.6 % — LOW (ref 39–50)
HGB BLD-MCNC: 7.7 G/DL — LOW (ref 13–17)
MCHC RBC-ENTMCNC: 28.2 PG — SIGNIFICANT CHANGE UP (ref 27–34)
MCHC RBC-ENTMCNC: 28.9 GM/DL — LOW (ref 32–36)
MCV RBC AUTO: 97.4 FL — SIGNIFICANT CHANGE UP (ref 80–100)
PLATELET # BLD AUTO: 350 K/UL — SIGNIFICANT CHANGE UP (ref 150–400)
POTASSIUM SERPL-MCNC: 3.1 MMOL/L — LOW (ref 3.5–5.3)
POTASSIUM SERPL-SCNC: 3.1 MMOL/L — LOW (ref 3.5–5.3)
RBC # BLD: 2.73 M/UL — LOW (ref 4.2–5.8)
RBC # FLD: 20.3 % — HIGH (ref 10.3–14.5)
SODIUM SERPL-SCNC: 145 MMOL/L — SIGNIFICANT CHANGE UP (ref 135–145)
WBC # BLD: 14.28 K/UL — HIGH (ref 3.8–10.5)
WBC # FLD AUTO: 14.28 K/UL — HIGH (ref 3.8–10.5)

## 2019-02-15 PROCEDURE — 99232 SBSQ HOSP IP/OBS MODERATE 35: CPT | Mod: GC

## 2019-02-15 PROCEDURE — 99497 ADVNCD CARE PLAN 30 MIN: CPT | Mod: 25

## 2019-02-15 PROCEDURE — 99232 SBSQ HOSP IP/OBS MODERATE 35: CPT

## 2019-02-15 PROCEDURE — 99223 1ST HOSP IP/OBS HIGH 75: CPT

## 2019-02-15 RX ORDER — HYDROMORPHONE HYDROCHLORIDE 2 MG/ML
0.2 INJECTION INTRAMUSCULAR; INTRAVENOUS; SUBCUTANEOUS EVERY 8 HOURS
Qty: 0 | Refills: 0 | Status: DISCONTINUED | OUTPATIENT
Start: 2019-02-15 | End: 2019-02-19

## 2019-02-15 RX ORDER — HALOPERIDOL DECANOATE 100 MG/ML
0.5 INJECTION INTRAMUSCULAR EVERY 8 HOURS
Qty: 0 | Refills: 0 | Status: DISCONTINUED | OUTPATIENT
Start: 2019-02-15 | End: 2019-02-19

## 2019-02-15 RX ORDER — HYDROMORPHONE HYDROCHLORIDE 2 MG/ML
0.2 INJECTION INTRAMUSCULAR; INTRAVENOUS; SUBCUTANEOUS
Qty: 0 | Refills: 0 | Status: DISCONTINUED | OUTPATIENT
Start: 2019-02-15 | End: 2019-02-19

## 2019-02-15 RX ORDER — ROBINUL 0.2 MG/ML
0.4 INJECTION INTRAMUSCULAR; INTRAVENOUS EVERY 6 HOURS
Qty: 0 | Refills: 0 | Status: DISCONTINUED | OUTPATIENT
Start: 2019-02-15 | End: 2019-02-19

## 2019-02-15 RX ORDER — DEXTROSE MONOHYDRATE, SODIUM CHLORIDE, AND POTASSIUM CHLORIDE 50; .745; 4.5 G/1000ML; G/1000ML; G/1000ML
1000 INJECTION, SOLUTION INTRAVENOUS
Qty: 0 | Refills: 0 | Status: DISCONTINUED | OUTPATIENT
Start: 2019-02-15 | End: 2019-02-16

## 2019-02-15 RX ADMIN — ZINC OXIDE 1 APPLICATION(S): 200 OINTMENT TOPICAL at 16:00

## 2019-02-15 RX ADMIN — HYDROMORPHONE HYDROCHLORIDE 0.2 MILLIGRAM(S): 2 INJECTION INTRAMUSCULAR; INTRAVENOUS; SUBCUTANEOUS at 21:38

## 2019-02-15 RX ADMIN — PIPERACILLIN AND TAZOBACTAM 25 GRAM(S): 4; .5 INJECTION, POWDER, LYOPHILIZED, FOR SOLUTION INTRAVENOUS at 06:52

## 2019-02-15 RX ADMIN — ZINC OXIDE 1 APPLICATION(S): 200 OINTMENT TOPICAL at 21:39

## 2019-02-15 RX ADMIN — Medication 1 APPLICATION(S): at 06:52

## 2019-02-15 RX ADMIN — Medication 5: at 12:44

## 2019-02-15 RX ADMIN — Medication 1 APPLICATION(S): at 16:00

## 2019-02-15 RX ADMIN — HYDROMORPHONE HYDROCHLORIDE 0.2 MILLIGRAM(S): 2 INJECTION INTRAMUSCULAR; INTRAVENOUS; SUBCUTANEOUS at 22:00

## 2019-02-15 RX ADMIN — Medication 1 APPLICATION(S): at 12:00

## 2019-02-15 RX ADMIN — ZINC OXIDE 1 APPLICATION(S): 200 OINTMENT TOPICAL at 06:52

## 2019-02-15 RX ADMIN — Medication 1 APPLICATION(S): at 06:51

## 2019-02-15 RX ADMIN — Medication 1 APPLICATION(S): at 21:39

## 2019-02-15 RX ADMIN — Medication 2: at 00:05

## 2019-02-15 RX ADMIN — DEXTROSE MONOHYDRATE, SODIUM CHLORIDE, AND POTASSIUM CHLORIDE 75 MILLILITER(S): 50; .745; 4.5 INJECTION, SOLUTION INTRAVENOUS at 12:47

## 2019-02-15 RX ADMIN — Medication 3: at 18:29

## 2019-02-15 RX ADMIN — ENOXAPARIN SODIUM 40 MILLIGRAM(S): 100 INJECTION SUBCUTANEOUS at 12:46

## 2019-02-15 NOTE — PROGRESS NOTE ADULT - PROBLEM SELECTOR PLAN 1
-test BG Q6h while NPO  -Do not use Long-acting insulin w/this patient given multiple hypo episodes during this hospitalization  -c/w Humalog modified low correction scale Q6h  No need for tight glycemic control. BG goal (100-220mg/dl).   pager: [phone]

## 2019-02-15 NOTE — PROGRESS NOTE ADULT - SUBJECTIVE AND OBJECTIVE BOX
Chief Complaint: Sepsis    Interval Events:   - NGT placement was attempted by the primary team, however, the patient's wife declined placement of the NGT.  - Palliative care was consulted and a [facility] discussion is pending    Allergies:  No Known Allergies    Hospital Medications:  acetaminophen   Tablet .. 650 milliGRAM(s) Oral every 6 hours PRN  ascorbic acid 500 milliGRAM(s) Oral daily  atorvastatin 20 milliGRAM(s) Oral at bedtime  BACItracin   Ointment 1 Application(s) Topical two times a day  clopidogrel Tablet 75 milliGRAM(s) Oral daily  Dakins Solution - 1/4 Strength 1 Application(s) Topical three times a day  dextrose 40% Gel 15 Gram(s) Oral once PRN  dextrose 5%. 1000 milliLiter(s) IV Continuous <Continuous>  dextrose 5%. 1000 milliLiter(s) IV Continuous <Continuous>  dextrose 50% Injectable 12.5 Gram(s) IV Push once  dextrose 50% Injectable 25 Gram(s) IV Push once  dextrose 50% Injectable 25 Gram(s) IV Push once  enoxaparin Injectable 40 milliGRAM(s) SubCutaneous daily  glucagon  Injectable 1 milliGRAM(s) IntraMuscular once PRN  insulin lispro (HumaLOG) corrective regimen sliding scale   SubCutaneous every 6 hours  lactobacillus acidophilus 1 Tablet(s) Oral two times a day with meals  piperacillin/tazobactam IVPB. 3.375 Gram(s) IV Intermittent every 8 hours  polyethylene glycol 3350 17 Gram(s) Oral daily  senna Syrup 5 milliLiter(s) Oral at bedtime  tamsulosin 0.4 milliGRAM(s) Oral at bedtime  zinc oxide 20% Ointment 1 Application(s) Topical three times a day    PMHX/PSHX:  CKD (chronic kidney disease) stage 3, GFR 30-59 ml/min  Pressure ulcer, unspecified pressure ulcer stage  Coronary artery disease of native artery of native heart with stable angina pectoris  Diastolic congestive heart failure, unspecified congestive heart failure chronicity  Smoker  Gout involving toe, unspecified cause, unspecified chronicity, unspecified laterality  Gouty nephropathy  PAD (peripheral artery disease)  Chronic atrial fibrillation  Hyperlipidemia, unspecified hyperlipidemia type  HTN (hypertension), benign  Type 2 diabetes mellitus with other circulatory complication, with long-term current use of insulin  S/P angioplasty with stent  S/P hernia repair  Artificial cardiac pacemaker  S/P aortobifemoral bypass surgery    ROS: Unable to obtain as patient is non-verbal at baseline    PHYSICAL EXAM:   Vital Signs:  Vital Signs Last 24 Hrs  T(C): 36.4 (15 Feb 2019 05:06), Max: 36.7 (2019 21:59)  T(F): 97.5 (15 Feb 2019 05:06), Max: 98 (2019 21:59)  HR: 88 (15 Feb 2019 05:06) (79 - 90)  BP: 102/46 (15 Feb 2019 05:06) (102/46 - 125/68)  BP(mean): --  RR: 17 (15 Feb 2019 05:06) (17 - 18)  SpO2: 93% (15 Feb 2019 05:06) (93% - 99%)  Daily Height in cm: 177.8 (2019 10:33)    Daily Weight in k.9 (2019 10:33)    GENERAL:  No acute distress  HEENT:  Normocephalic/atraumatic,  no scleral icterus  CHEST:  Decreased inspiratory effort, no accessory muscle use  HEART:  Regular rate and rhythm, no murmurs/rubs/gallops  ABDOMEN:  Soft, non-tender, non-distended, normoactive bowel sounds, erythema and ulcerated skin surrounding PEG tube site, no leakage of gastric contents  EXTREMITIES:  No edema  NEURO: Responds to painful stimuli and verbal stimuli, does not follow commands    LABS:                        8.1    12.7  )-----------( 299      ( 2019 16:51 )             24.9     Mean Cell Volume: 93.3 fl (-19 @ 16:51)        151<H>  |  107  |  43<H>  ----------------------------<  197<H>  2.8<LL>   |  30  |  1.92<H>    Ca    7.7<L>      2019 17:07  Mg     1.7         PT/INR - ( 2019 07:51 )   PT: 17.9 sec;   INR: 1.55 ratio      PTT - ( 2019 06:46 )  PTT:78.3 sec               8.1    12.7  )-----------( 299      ( 2019 16:51 )             24.9                         6.3    10.05 )-----------( 297      ( 2019 07:51 )             22.3                         9.3    14.1  )-----------( 357      ( 2019 23:09 )             29.7                         9.0    15.7  )-----------( 375      ( 2019 10:31 )             28.5     Imaging:    No new imaging

## 2019-02-15 NOTE — PROGRESS NOTE ADULT - ASSESSMENT
Impression:  1) PEG site leakage  2) Ulcerated skin beneath external PEG bumper likely due to continued trauma from bumper vs irritation from gastric contents    Recommendations  - Continue vancomycin / zosyn which are adequate coverage for any skin infection/cellulitis around PEG site  - The entire area underneath and near the PEG bumper is ulcerated or erythematous; the tract of the PEG also appear to be ulcerated; therefore, even if we change the PEG to a large one, the area likely will not heal well and the large PEG will likely cause even more irritation adjacent to it  - Therefore, would not recommend upsizing PEG right now; change it for a large one might also run the risk of continuing to dilate/enlarge the tract  - Discussed with wound care attending; no additional dressings recommended other than the current regiment  - Would keep PEG bumper VERY LOOSE and place absorbent dressings at the ulcerated site to help the skin heal  - If the skin does not heal, then the possibility of removing the PEG, feeding by NGT in the meantime until it heals, and then replacing the PEG might be needed (if the patient's wife agrees to NGT placement)  - In the meantime, if we are able to dress the ulcerated/irritated skin beneath the bumper of the PEG, would resume PEG feeds at a slow rate  - Follow-up [facility] discussion / family meeting    Diaz Alexander MD  Gastroenterology Fellow  Pager number: [phone] / [phone]

## 2019-02-15 NOTE — PROGRESS NOTE ADULT - SUBJECTIVE AND OBJECTIVE BOX
Diabetes Follow up note:  Interval Hx:  73 year old male w/T2DM w/nephropathy, CAD, retinopathy here w/metabolic encephalopathy/sepsis c/b SHINE on CKD and hypoglycemia. BG values elevated on correction scale only. Pt now being started on D5 + 1/2 NS for hypernatremia. Didn't receive AM dose of humalog (agitated per RN documentation). Pt seen at bedside. Increasingly alert and able to answer some of my questions today. Remains restless but not pulling on IVs/hoskins per PCA.       Review of Systems:  General: Denies pain.   GI: Denies n/v/abd pain  CV: No CP/SOB  ENDO: No S&Sx of hypoglycemia  MEDS:  atorvastatin 20 milliGRAM(s) Oral at bedtime    insulin lispro (HumaLOG) corrective regimen sliding scale   SubCutaneous every 6 hours    piperacillin/tazobactam IVPB. 3.375 Gram(s) IV Intermittent every 8 hours    Allergies    No Known Allergies        PE:  General: Male lying in bed. NAD.   Vital Signs Last 24 Hrs  T(C): 36.4 (15 Feb 2019 05:06), Max: 36.7 (14 Feb 2019 21:59)  T(F): 97.5 (15 Feb 2019 05:06), Max: 98 (14 Feb 2019 21:59)  HR: 88 (15 Feb 2019 05:06) (88 - 90)  BP: 102/46 (15 Feb 2019 05:06) (102/46 - 116/76)  BP(mean): --  RR: 17 (15 Feb 2019 05:06) (17 - 18)  SpO2: 93% (15 Feb 2019 05:06) (93% - 96%)  CV: S1, S2. No murmurs  Abd: Soft, NT,ND,   Neuro: A& Ox 1.  Able to answer simple questions.     LABS:    POCT Blood Glucose.: 243 mg/dL (02-15-19 @ 05:53)  POCT Blood Glucose.: 202 mg/dL (02-14-19 @ 23:57)  POCT Blood Glucose.: 305 mg/dL (02-14-19 @ 19:57)  POCT Blood Glucose.: 265 mg/dL (02-14-19 @ 18:08)  POCT Blood Glucose.: 103 mg/dL (02-14-19 @ 11:10)  POCT Blood Glucose.: 126 mg/dL (02-14-19 @ 08:24)  POCT Blood Glucose.: 45 mg/dL (02-14-19 @ 07:16)  POCT Blood Glucose.: 66 mg/dL (02-14-19 @ 06:55)  POCT Blood Glucose.: 46 mg/dL (02-14-19 @ 06:36)  POCT Blood Glucose.: 67 mg/dL (02-14-19 @ 06:34)  POCT Blood Glucose.: 227 mg/dL (02-13-19 @ 23:39)  POCT Blood Glucose.: 244 mg/dL (02-13-19 @ 18:27)  POCT Blood Glucose.: 247 mg/dL (02-13-19 @ 11:11)  POCT Blood Glucose.: 223 mg/dL (02-13-19 @ 06:38)  POCT Blood Glucose.: 222 mg/dL (02-13-19 @ 00:50)  POCT Blood Glucose.: 209 mg/dL (02-12-19 @ 22:51)  POCT Blood Glucose.: 331 mg/dL (02-12-19 @ 18:27)  POCT Blood Glucose.: 320 mg/dL (02-12-19 @ 14:25)                            7.7    14.28 )-----------( 350      ( 15 Feb 2019 10:40 )             26.6       02-15    145  |  103  |  42<H>  ----------------------------<  305<H>  3.1<L>   |  28  |  1.81<H>    Ca    8.0<L>      15 Feb 2019 09:35  Mg     1.7     02-14          Hemoglobin A1C, Whole Blood: 7.4 % <H> [4.0 - 5.6] (02-04-19 @ 08:56)            Contact number: fernando [phone] or [phone]

## 2019-02-15 NOTE — PROGRESS NOTE ADULT - SUBJECTIVE AND OBJECTIVE BOX
CARDIOLOGY     PROGRESS  NOTE   ________________________________________________    CHIEF COMPLAINT:Patient is a 73y old  Male who presents with a chief complaint of Sepsis (14 Feb 2019 15:10)  doing better  	  REVIEW OF SYSTEMS:  CONSTITUTIONAL: No fever, weight loss, or fatigue  EYES: No eye pain, visual disturbances, or discharge  ENT:  No difficulty hearing, tinnitus, vertigo; No sinus or throat pain  NECK: No pain or stiffness  RESPIRATORY: No cough, wheezing, chills or hemoptysis; No Shortness of Breath  CARDIOVASCULAR: No chest pain, palpitations, passing out, dizziness, or leg swelling  GASTROINTESTINAL: No abdominal or epigastric pain. No nausea, vomiting, or hematemesis; No diarrhea or constipation. No melena or hematochezia.  GENITOURINARY: No dysuria, frequency, hematuria, or incontinence  NEUROLOGICAL: No headaches, memory loss, loss of strength, numbness, or tremors  SKIN: No itching, burning, rashes, or lesions   LYMPH Nodes: No enlarged glands  ENDOCRINE: No heat or cold intolerance; No hair loss  MUSCULOSKELETAL: No joint pain or swelling; No muscle, back, or extremity pain  PSYCHIATRIC: No depression, anxiety, mood swings, or difficulty sleeping  HEME/LYMPH: No easy bruising, or bleeding gums  ALLERGY AND IMMUNOLOGIC: No hives or eczema	    [ ] All others negative	  [x ] Unable to obtain    PHYSICAL EXAM:  T(C): 36.4 (02-15-19 @ 05:06), Max: 36.7 (02-14-19 @ 21:59)  HR: 88 (02-15-19 @ 05:06) (79 - 90)  BP: 102/46 (02-15-19 @ 05:06) (102/46 - 125/68)  RR: 17 (02-15-19 @ 05:06) (17 - 18)  SpO2: 93% (02-15-19 @ 05:06) (93% - 99%)  Wt(kg): --  I&O's Summary    14 Feb 2019 07:01  -  15 Feb 2019 07:00  --------------------------------------------------------  IN: 0 mL / OUT: 475 mL / NET: -475 mL        Appearance: Normal	  HEENT:   Normal oral mucosa, PERRL, EOMI	  Lymphatic: No lymphadenopathy  Cardiovascular: Normal S1 S2, No JVD, + murmurs, No edema  Respiratory: Lungs clear to auscultation	  Psychiatry: A & O x 3, Mood & affect appropriate  Gastrointestinal:  Soft, Non-tender, + BS	  Skin: No rashes, No ecchymoses, No cyanosis	  Neurologic: Non-focal  Extremities: Normal range of motion, No clubbing, cyanosis or edema  Vascular: Peripheral pulses palpable 2+ bilaterally    MEDICATIONS  (STANDING):  ascorbic acid 500 milliGRAM(s) Oral daily  atorvastatin 20 milliGRAM(s) Oral at bedtime  BACItracin   Ointment 1 Application(s) Topical two times a day  clopidogrel Tablet 75 milliGRAM(s) Oral daily  Dakins Solution - 1/4 Strength 1 Application(s) Topical three times a day  dextrose 5%. 1000 milliLiter(s) (100 mL/Hr) IV Continuous <Continuous>  dextrose 5%. 1000 milliLiter(s) (50 mL/Hr) IV Continuous <Continuous>  dextrose 50% Injectable 12.5 Gram(s) IV Push once  dextrose 50% Injectable 25 Gram(s) IV Push once  dextrose 50% Injectable 25 Gram(s) IV Push once  enoxaparin Injectable 40 milliGRAM(s) SubCutaneous daily  insulin lispro (HumaLOG) corrective regimen sliding scale   SubCutaneous every 6 hours  lactobacillus acidophilus 1 Tablet(s) Oral two times a day with meals  piperacillin/tazobactam IVPB. 3.375 Gram(s) IV Intermittent every 8 hours  polyethylene glycol 3350 17 Gram(s) Oral daily  senna Syrup 5 milliLiter(s) Oral at bedtime  tamsulosin 0.4 milliGRAM(s) Oral at bedtime  zinc oxide 20% Ointment 1 Application(s) Topical three times a day      TELEMETRY: 	    ECG:  	  RADIOLOGY:  OTHER: 	  	  LABS:	 	    CARDIAC MARKERS:                                8.1    12.7  )-----------( 299      ( 14 Feb 2019 16:51 )             24.9     02-14    151<H>  |  107  |  43<H>  ----------------------------<  197<H>  2.8<LL>   |  30  |  1.92<H>    Ca    7.7<L>      14 Feb 2019 17:07  Mg     1.7     02-14      proBNP: Serum Pro-Brain Natriuretic Peptide: 50966 pg/mL (02-03 @ 13:07)    Lipid Profile:   HgA1c: Hemoglobin A1C, Whole Blood: 7.4 % (02-04 @ 08:56)    TSH:   PT/INR - ( 14 Feb 2019 07:51 )   PT: 17.9 sec;   INR: 1.55 ratio         PTT - ( 14 Feb 2019 06:46 )  PTT:78.3 sec      Assessment and plan  ---------------------------  continue abx for 10 day  severe cardiomyopathy  restart on lasix as needed  nutrition

## 2019-02-15 NOTE — GOALS OF CARE CONVERSATION - PERSONAL ADVANCE DIRECTIVE - TREATMENT GUIDELINES
IV fluid trial/DNR Order/Comfort measures only/No antibiotics/No blood draws/No artificial nutrition

## 2019-02-15 NOTE — PROGRESS NOTE ADULT - SUBJECTIVE AND OBJECTIVE BOX
INFECTIOUS DISEASES FOLLOW UP-- Laurence Grier  [phone]    This is a follow up note for this  73yMale with  Sepsis, dementia, unable to provide interval hsitory      ROS:  CONSTITUTIONAL:  frail, lying in bed    Allergies    No Known Allergies    Intolerances        ANTIBIOTICS/RELEVANT:  antimicrobials    immunologic:    OTHER:  acetaminophen   Tablet .. 650 milliGRAM(s) Oral every 6 hours PRN  BACItracin   Ointment 1 Application(s) Topical two times a day  clopidogrel Tablet 75 milliGRAM(s) Oral daily  Dakins Solution - 1/4 Strength 1 Application(s) Topical three times a day  dextrose 40% Gel 15 Gram(s) Oral once PRN  dextrose 5% + sodium chloride 0.45% with potassium chloride 20 mEq/L 1000 milliLiter(s) IV Continuous <Continuous>  dextrose 5%. 1000 milliLiter(s) IV Continuous <Continuous>  dextrose 50% Injectable 12.5 Gram(s) IV Push once  dextrose 50% Injectable 25 Gram(s) IV Push once  dextrose 50% Injectable 25 Gram(s) IV Push once  enoxaparin Injectable 40 milliGRAM(s) SubCutaneous daily  glucagon  Injectable 1 milliGRAM(s) IntraMuscular once PRN  glycopyrrolate Injectable 0.4 milliGRAM(s) IV Push every 6 hours PRN  haloperidol    Injectable 0.5 milliGRAM(s) IV Push every 8 hours PRN  HYDROmorphone  Injectable 0.2 milliGRAM(s) IV Push every 2 hours PRN  HYDROmorphone  Injectable 0.2 milliGRAM(s) IV Push every 8 hours  HYDROmorphone  Injectable 0.2 milliGRAM(s) IV Push every 2 hours PRN  insulin lispro (HumaLOG) corrective regimen sliding scale   SubCutaneous every 6 hours  LORazepam   Injectable 0.2 milliGRAM(s) IV Push every 3 hours PRN  polyethylene glycol 3350 17 Gram(s) Oral daily  senna Syrup 5 milliLiter(s) Oral at bedtime  zinc oxide 20% Ointment 1 Application(s) Topical three times a day      Objective:  Vital Signs Last 24 Hrs  T(C): 36.3 (15 Feb 2019 12:35), Max: 36.7 (14 Feb 2019 21:59)  T(F): 97.4 (15 Feb 2019 12:35), Max: 98 (14 Feb 2019 21:59)  HR: 75 (15 Feb 2019 12:35) (75 - 90)  BP: 98/67 (15 Feb 2019 12:35) (98/67 - 116/76)  BP(mean): --  RR: 18 (15 Feb 2019 12:35) (17 - 18)  SpO2: 98% (15 Feb 2019 12:35) (93% - 98%)    PHYSICAL EXAM:  Constitutional:no acute distress  Eyes:KAYLEE, EOMI  Ear/Nose/Throat: no oral lesions, 	  Respiratory: clear BL anteriorly  Cardiovascular: S1S2  Gastrointestinal:soft, (+) BS, no tenderness  Extremities:no e/e/c bruises on arms/petechiae  No Lymphadenopathy  IV sites not inflammed.    LABS:                        7.7    14.28 )-----------( 350      ( 15 Feb 2019 10:40 )             26.6     02-15    145  |  103  |  42<H>  ----------------------------<  305<H>  3.1<L>   |  28  |  1.81<H>    Ca    8.0<L>      15 Feb 2019 09:35  Mg     1.7     02-14      PT/INR - ( 14 Feb 2019 07:51 )   PT: 17.9 sec;   INR: 1.55 ratio         PTT - ( 14 Feb 2019 06:46 )  PTT:78.3 sec      MICROBIOLOGY:            RECENT CULTURES:  02-08 @ 20:13  .Tissue sacral bone  Citrobacter freundii complex  Enterococcus faecium (vancomycin resistant)  Coag Negative Staphylococcus  Enterobacter cloacae  Citrobacter freundii complex  TAN    Few Citrobacter freundii complex  Rare Enterococcus faecium (vancomycin resistant)  Few Coag Negative Staphylococcus  Rare Enterobacter cloacae complex  Rare Candida albicans "Susceptibilities not performed"  --      RADIOLOGY & ADDITIONAL STUDIES:

## 2019-02-15 NOTE — PROGRESS NOTE ADULT - ASSESSMENT
73M with multiple medical problems (CAD, CABG, PAD, aorto-bifemoral bypass, CKD), R hip fracture s/p ORIF, s/p PEG/hoskins here with sacral osteomyelitis with sepsis - likely source is osteomyelitis. Hx also notable for uncontrolled Type 2 DM w/hypoglycemia on low dose Lantus so now discontinued. Will observe on correction scale as pt had missed dose this AM so expect glucose to be elevated at 12pm. No need for tight glycemic control (140-220mg/dl) given insulin sensitivity.

## 2019-02-15 NOTE — PROGRESS NOTE ADULT - ASSESSMENT
74 yo male PMH CAD c/b CABG, A fib (on coumadin), CKD stage 3, PAD s/p stents, PEG tube, in-dwelling Hoskins who presents to the nursing home for increased confusion and elevated WBC. History was taken from wife at bedside.	Per wife, patient had been fully functional up until November. He had a hypoglycemic episode, fell, and broke his hip in November. He was admitted to the hospital for surgery, which was uneventful and sent to rehab (in early December).     During the rehab stay, he developed numerous ulcers, including a decubitus ulcer. He was sent back to [facility] for sepsis from the decubitus ulcer. He was treated with abx and returned to rehab for a second time. This second hospital course was complicated by acute renal failure requiring dialysis, IV abx, and "surgery" to clean the ulcer. In the ED, he received Vanc/Zosyn. He was admitted to medicine for further management and sepsis on arrival from   sacral osteomylitis.     ID: Will stop Zosyn given hospice eval in progress. Vancomycin stopped. CT chest shows no pneumonia, bilateral effusions. PEG feeds stopped working yesterday, now on hold. Leakage around the site and redness around PEG sie. Chronic hoskins changed this admission. Agree with ID. Had bone biopsy done today which now shows GNR. CT of lumbar spine confirms sacral OM. .      Reanl: CKD III stable, creatinine 1.81. Renal sono confirms MRD. Free water replacement on hold without  PEG.  May resume Lasix tomorrow just to keep   I & O balanced.      Urology: Stop Flomax, no TOV in near future.     CV: Lasix on hold given SHINE. Stop all IVF for now. Resume Plavix 75 mg/day, taken at home with Lipitor.  Changed to Lovenox 40 mg daily   for DVT prevention.      Endo: episodes of hypoglycemia off insulin. Stopped D 10. Endo consult requested. PEG feeds on hold given leaking. NPH on hold  75/h. until PEG feeds working well. Glucose stable at 224. Agree D5W half normal saline.           GI: Peg site leaking past 24 hours. Not able to use now. GI recommends not using tube for one week. May need new one. Might   be better to remove PEG tube so site can heal, then place new one.  GI recommendation for local wound care around PEG site. Agree DNR.      Had phone discussion with wife/proxy this AM. Do not want to procede with NG tube. Favor comfort care, palliative care eval.    wife: [phone]. Likely needs home hospice referral.       Note: BMI of 15.8 equals severe protein calorie malnutrition.

## 2019-02-15 NOTE — CHART NOTE - NSCHARTNOTEFT_GEN_A_CORE
As per Dr. Gilliland, no more lab draws, zosyn discontinued. Pt's wife requesting comfort care. Palliative to follow.     Adriel Vasquez  Spectra-link [phone]

## 2019-02-15 NOTE — CHART NOTE - NSCHARTNOTEFT_GEN_A_CORE
Nutrition Follow Up Note  Patient seen for: malnutrition follow-up     Chart reviwed, events noted. This is a " 73 year old M with a hx of CAD s/p CABG, Afib on coumadin, CKD stage 3, PAD, aortobifem bypass ~ by Dr. 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 sacral decubitus w/ osteomyelitis, with cool right leg and chronic ulcers to the foot, heel, and shin". S/p CT chest which shows no pneumonia, bilateral effusions, s/p bone biopsy. Pt being followed by GI for leaking PEG. Pt now made comfort care, no artifical nutrition per GOC. Plan for PCU transfer and then hospice. Palliative care following.     Source: medical record, pt     Diet : NPO      Enteral /Parenteral Nutrition: pt previously receiving Glucerna 1.2 @ 60 ml/hr x 24hrs.  Regimen to [id number] ml total volume, 1728 kcal/d (28Kcal/kg), 86.4 gm protein/day (1.4gm/kg), and 1160 ml free water based on weight of 60.7kg. Feeds stopped due to leaking PEG. Per GOC discussion, family does not want temporary NGT placement to allow for PEG to heal. Per GOC discussion pt now made comfort care. Pt with multiple BMs (5x today)      Daily Weight in k.9 (-14), Weight in k.9 (-14), Weight in k (-13), Weight in k.2 (-11)  % Weight Change    Pertinent Medications: MEDICATIONS  (STANDING):  BACItracin   Ointment 1 Application(s) Topical two times a day  clopidogrel Tablet 75 milliGRAM(s) Oral daily  Dakins Solution - 1/4 Strength 1 Application(s) Topical three times a day  dextrose 5% + sodium chloride 0.45% with potassium chloride 20 mEq/L 1000 milliLiter(s) (75 mL/Hr) IV Continuous <Continuous>  dextrose 5%. 1000 milliLiter(s) (50 mL/Hr) IV Continuous <Continuous>  dextrose 50% Injectable 12.5 Gram(s) IV Push once  dextrose 50% Injectable 25 Gram(s) IV Push once  dextrose 50% Injectable 25 Gram(s) IV Push once  enoxaparin Injectable 40 milliGRAM(s) SubCutaneous daily  HYDROmorphone  Injectable 0.2 milliGRAM(s) IV Push every 8 hours  insulin lispro (HumaLOG) corrective regimen sliding scale   SubCutaneous every 6 hours  polyethylene glycol 3350 17 Gram(s) Oral daily  senna Syrup 5 milliLiter(s) Oral at bedtime  zinc oxide 20% Ointment 1 Application(s) Topical three times a day    MEDICATIONS  (PRN):  acetaminophen   Tablet .. 650 milliGRAM(s) Oral every 6 hours PRN Temp greater or equal to 38C (100.4F)  dextrose 40% Gel 15 Gram(s) Oral once PRN Blood Glucose LESS THAN 70 milliGRAM(s)/deciliter  glucagon  Injectable 1 milliGRAM(s) IntraMuscular once PRN Glucose LESS THAN 70 milligrams/deciliter  glycopyrrolate Injectable 0.4 milliGRAM(s) IV Push every 6 hours PRN secretions  haloperidol    Injectable 0.5 milliGRAM(s) IV Push every 8 hours PRN Hallucinations  HYDROmorphone  Injectable 0.2 milliGRAM(s) IV Push every 2 hours PRN Labored breathing or dyspnea  HYDROmorphone  Injectable 0.2 milliGRAM(s) IV Push every 2 hours PRN Moderate Pain (4 - 6)  LORazepam   Injectable 0.2 milliGRAM(s) IV Push every 3 hours PRN Agitation or recurrent labored breathing after back to back opioids are given    Pertinent Labs: 02-15 @ 09:35: Na 145, BUN 42<H>, Cr 1.81<H>, <H>, K+ 3.1<L>, Phos --, Mg --, Alk Phos --, ALT/SGPT --, AST/SGOT --, HbA1c --    Finger Sticks:  POCT Blood Glucose.: 369 mg/dL (02-15 @ 12:26)  POCT Blood Glucose.: 243 mg/dL (02-15 @ 05:53)  POCT Blood Glucose.: 202 mg/dL ( @ 23:57)  POCT Blood Glucose.: 305 mg/dL ( @ 19:57)  POCT Blood Glucose.: 265 mg/dL ( @ 18:08)      Skin per nursing documentation: stage 4 sacrum, unstageable pressure injuries on left heel, unstageable right heel/mallelous.   Edema: +2 right arm     Estimated Needs:   [x ] no change since previous assessment  [ ] recalculated:     Previous Nutrition Diagnosis: malnutrition (severe)  Nutrition Diagnosis is: on going     New Nutrition Diagnosis: N/A     Interventions:   Recommend  1) Pt made comfort care with no artifical nutrition support/ PO diet per GOC discussion  2) RD remains available as needed.     Monitoring and Evaluation:     Continue to monitor Nutritional intake, Tolerance to diet prescription, weights, labs, skin integrity    RD remains available upon request and will follow up per protocol  Lila South RD, CDN, Pager # [phone]

## 2019-02-15 NOTE — GOALS OF CARE CONVERSATION - PERSONAL ADVANCE DIRECTIVE - CONVERSATION DETAILS
We (palliative care fellow and attending) met with patient's wife and son today.   Family are frustrated with multiple recent admissions and rapid decline since November 2018.  They are aware of poor prognosis due to OM and sacral DU (stage IV), underlying CAD, hip fracture, poor oral intake secondary to failed swallow eval and laceration around PEG-Tube.     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     Lucia Howard (patient's wife) understands that continuing antibiotics won't change the coarse of patient's poor prognosis and won't bring any comfort.  She agreed to:  -stop ABx  -no more feeding (through peg or oral feeding)  -Full comfort care  -New MOLST form completed   -Patient remains DNR/DNI  -No more routine blood draw    Palliative care unit (PCU) was discussed, and family agreed to transfer patient to PCU  The look for hospice facilities in [facility]  was contacted and contact information of [facility] facility was given. We (palliative care fellow and attending) met with patient's wife and son today.   Family are frustrated with multiple recent admissions and rapid decline since November 2018.  They are aware of poor prognosis due to OM and sacral DU (stage IV), underlying CAD, hip fracture, poor oral intake secondary to failed swallow eval and laceration around PEG-Tube.     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     Lucia Howard (patient's wife) understands that continuing antibiotics won't change the coarse of patient's poor prognosis and won't bring any comfort.  She agreed to:  -stop ABx  -no more feeding (through peg or oral feeding)  -Full comfort care  -New MOLST form completed   -Patient remains DNR/DNI  -No more routine blood draw    Palliative care unit (PCU) was discussed, and family agreed to transfer patient to PCU  Family looking for Hospice in facilities.  informed, hospice referral was done We (palliative care fellow and attending) met with patient's wife and son today.   Family are frustrated with multiple recent admissions and rapid decline since November 2018.  They are aware of poor prognosis due to OM and sacral DU (stage IV), underlying CAD, hip fracture, poor oral intake secondary to failed swallow eval and laceration around PEG-Tube.     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     Lucia Howard (patient's wife) understands that continuing antibiotics won't change the coarse of patient's poor prognosis and won't bring any comfort.  She agreed to:  -stop ABx  -no more feeding (through peg or oral feeding)  -Full comfort care  -New MOLST form completed   -Patient remains DNR/DNI  -No more routine blood draw    Palliative care unit (PCU) was discussed, and family agreed to transfer patient to PCU  Family looking for Hospice in facilities.  informed, hospice referral was done

## 2019-02-15 NOTE — PROGRESS NOTE ADULT - ASSESSMENT
73M with multiple medical problems (CAD, CABG, PAD, aorto-bifemoral bypass, CKD), R hip fracture s/p ORIF, s/p PEG/hoskins here with sacral osteomyelitis with sepsis - likely source is osteomyelitis.  Prior history  - patient was hospitalized at [facility] and sacral wound was debrided.  Polymicrobial (E coli, Kpna, enterococcus - all "pan-sensitive").  bone scan was negative at that time late December for OM.  s/p debridement of sacral wound.  Culture is polymicrobial.  At this point, patient is heading towards hospice.  No role for a prolonged course of antibiotics.  Long discussion with wife who feels hospice is a better plan for patient as he continues to deteriorate.    Suggest:  Understand that goals of care discussion took place and no further antibiotics to be given at this point    Please call if situation changes  Will sign off    Jamie Grier MD  [phone]  After 5pm/weekends [phone]

## 2019-02-15 NOTE — CHART NOTE - NSCHARTNOTEFT_GEN_A_CORE
Dark bloody stools    Notified by RN that patient has been having dark colored stools on previous shift and tonight.    Discussed with Dr Talat Boss  c.w comfort care  no blood draws  d/c lovenox    Will endorse to primary day team, attending to follow    Briana Couch NP  spectalink [phone]

## 2019-02-15 NOTE — CHART NOTE - NSCHARTNOTEFT_GEN_A_CORE
Palliative care documentation noted. Patient to undergo comfort care and no further blood draws. Will sign off for now. Please recall if needed.     Dr. Phil Gamez   PGY 5 Nephrology fellow Palliative care documentation noted. Patient to undergo comfort care and no further blood draws. Will sign off for now. Please recall if needed.     Dr. Phil Gamez   PGY 5 Nephrology fellow      agree with above

## 2019-02-15 NOTE — PROGRESS NOTE ADULT - SUBJECTIVE AND OBJECTIVE BOX
INTERVAL HPI/OVERNIGHT EVENTS:  Pt seen and examined at bedside.     Allergies/Intolerance: No Known Allergies      MEDICATIONS  (STANDING):  ascorbic acid 500 milliGRAM(s) Oral daily  atorvastatin 20 milliGRAM(s) Oral at bedtime  BACItracin   Ointment 1 Application(s) Topical two times a day  clopidogrel Tablet 75 milliGRAM(s) Oral daily  Dakins Solution - 1/4 Strength 1 Application(s) Topical three times a day  dextrose 5%. 1000 milliLiter(s) (100 mL/Hr) IV Continuous <Continuous>  dextrose 5%. 1000 milliLiter(s) (50 mL/Hr) IV Continuous <Continuous>  dextrose 50% Injectable 12.5 Gram(s) IV Push once  dextrose 50% Injectable 25 Gram(s) IV Push once  dextrose 50% Injectable 25 Gram(s) IV Push once  enoxaparin Injectable 40 milliGRAM(s) SubCutaneous daily  insulin lispro (HumaLOG) corrective regimen sliding scale   SubCutaneous every 6 hours  lactobacillus acidophilus 1 Tablet(s) Oral two times a day with meals  piperacillin/tazobactam IVPB. 3.375 Gram(s) IV Intermittent every 8 hours  polyethylene glycol 3350 17 Gram(s) Oral daily  senna Syrup 5 milliLiter(s) Oral at bedtime  tamsulosin 0.4 milliGRAM(s) Oral at bedtime  zinc oxide 20% Ointment 1 Application(s) Topical three times a day    MEDICATIONS  (PRN):  acetaminophen   Tablet .. 650 milliGRAM(s) Oral every 6 hours PRN Temp greater or equal to 38C (100.4F)  dextrose 40% Gel 15 Gram(s) Oral once PRN Blood Glucose LESS THAN 70 milliGRAM(s)/deciliter  glucagon  Injectable 1 milliGRAM(s) IntraMuscular once PRN Glucose LESS THAN 70 milligrams/deciliter        ROS: all systems reviewed and wnl      PHYSICAL EXAMINATION:  Vital Signs Last 24 Hrs  T(C): 36.4 (15 Feb 2019 05:06), Max: 36.7 (14 Feb 2019 21:59)  T(F): 97.5 (15 Feb 2019 05:06), Max: 98 (14 Feb 2019 21:59)  HR: 88 (15 Feb 2019 05:06) (79 - 90)  BP: 102/46 (15 Feb 2019 05:06) (102/46 - 125/68)  BP(mean): --  RR: 17 (15 Feb 2019 05:06) (17 - 18)  SpO2: 93% (15 Feb 2019 05:06) (93% - 99%)  CAPILLARY BLOOD GLUCOSE      POCT Blood Glucose.: 243 mg/dL (15 Feb 2019 05:53)  POCT Blood Glucose.: 202 mg/dL (14 Feb 2019 23:57)  POCT Blood Glucose.: 305 mg/dL (14 Feb 2019 19:57)  POCT Blood Glucose.: 265 mg/dL (14 Feb 2019 18:08)  POCT Blood Glucose.: 103 mg/dL (14 Feb 2019 11:10)      02-14 @ 07:01  -  02-15 @ 07:00  --------------------------------------------------------  IN: 0 mL / OUT: 475 mL / NET: -475 mL        GENERAL:   NECK: supple, No JVD  CHEST/LUNG: clear to auscultation bilaterally; no rales, rhonchi, or wheezing b/l  HEART: normal S1, S2  ABDOMEN: BS+, soft, ND, NT   EXTREMITIES:  pulses palpable; no clubbing, cyanosis, or edema b/l LEs  SKIN: no rashes or lesions      LABS:                        8.1    12.7  )-----------( 299      ( 14 Feb 2019 16:51 )             24.9     02-15    145  |  103  |  42<H>  ----------------------------<  305<H>  3.1<L>   |  28  |  1.81<H>    Ca    8.0<L>      15 Feb 2019 09:35  Mg     1.7     02-14      PT/INR - ( 14 Feb 2019 07:51 )   PT: 17.9 sec;   INR: 1.55 ratio         PTT - ( 14 Feb 2019 06:46 )  PTT:78.3 sec INTERVAL HPI/OVERNIGHT EVENTS:  Pt seen and examined at bedside.     Allergies/Intolerance: No Known Allergies      MEDICATIONS  (STANDING):  ascorbic acid 500 milliGRAM(s) Oral daily  atorvastatin 20 milliGRAM(s) Oral at bedtime  BACItracin   Ointment 1 Application(s) Topical two times a day  clopidogrel Tablet 75 milliGRAM(s) Oral daily  Dakins Solution - 1/4 Strength 1 Application(s) Topical three times a day  dextrose 5%. 1000 milliLiter(s) (100 mL/Hr) IV Continuous <Continuous>  dextrose 5%. 1000 milliLiter(s) (50 mL/Hr) IV Continuous <Continuous>  dextrose 50% Injectable 12.5 Gram(s) IV Push once  dextrose 50% Injectable 25 Gram(s) IV Push once  dextrose 50% Injectable 25 Gram(s) IV Push once  enoxaparin Injectable 40 milliGRAM(s) SubCutaneous daily  insulin lispro (HumaLOG) corrective regimen sliding scale   SubCutaneous every 6 hours  lactobacillus acidophilus 1 Tablet(s) Oral two times a day with meals  piperacillin/tazobactam IVPB. 3.375 Gram(s) IV Intermittent every 8 hours  polyethylene glycol 3350 17 Gram(s) Oral daily  senna Syrup 5 milliLiter(s) Oral at bedtime  tamsulosin 0.4 milliGRAM(s) Oral at bedtime  zinc oxide 20% Ointment 1 Application(s) Topical three times a day    MEDICATIONS  (PRN):  acetaminophen   Tablet .. 650 milliGRAM(s) Oral every 6 hours PRN Temp greater or equal to 38C (100.4F)  dextrose 40% Gel 15 Gram(s) Oral once PRN Blood Glucose LESS THAN 70 milliGRAM(s)/deciliter  glucagon  Injectable 1 milliGRAM(s) IntraMuscular once PRN Glucose LESS THAN 70 milligrams/deciliter        ROS: all systems reviewed and wnl      PHYSICAL EXAMINATION:  Vital Signs Last 24 Hrs  T(C): 36.4 (15 Feb 2019 05:06), Max: 36.7 (14 Feb 2019 21:59)  T(F): 97.5 (15 Feb 2019 05:06), Max: 98 (14 Feb 2019 21:59)  HR: 88 (15 Feb 2019 05:06) (79 - 90)  BP: 102/46 (15 Feb 2019 05:06) (102/46 - 125/68)  BP(mean): --  RR: 17 (15 Feb 2019 05:06) (17 - 18)  SpO2: 93% (15 Feb 2019 05:06) (93% - 99%)  CAPILLARY BLOOD GLUCOSE      POCT Blood Glucose.: 243 mg/dL (15 Feb 2019 05:53)  POCT Blood Glucose.: 202 mg/dL (14 Feb 2019 23:57)  POCT Blood Glucose.: 305 mg/dL (14 Feb 2019 19:57)  POCT Blood Glucose.: 265 mg/dL (14 Feb 2019 18:08)  POCT Blood Glucose.: 103 mg/dL (14 Feb 2019 11:10)      02-14 @ 07:01  -  02-15 @ 07:00  --------------------------------------------------------  IN: 0 mL / OUT: 475 mL / NET: -475 mL        GENERAL: in bed, non-verbal at baseline, afebrile, no cough or SOB  NECK: supple, No JVD  CHEST/LUNG: clear to auscultation bilaterally; no rales, rhonchi, or wheezing b/l  HEART: normal S1, S2  ABDOMEN: BS+, soft, ND, NT   EXTREMITIES:  pulses palpable; no clubbing, cyanosis, or edema b/l LEs  SKIN: no rashes or lesions      LABS:                        8.1    12.7  )-----------( 299      ( 14 Feb 2019 16:51 )             24.9     02-15    145  |  103  |  42<H>  ----------------------------<  305<H>  3.1<L>   |  28  |  1.81<H>    Ca    8.0<L>      15 Feb 2019 09:35  Mg     1.7     02-14      PT/INR - ( 14 Feb 2019 07:51 )   PT: 17.9 sec;   INR: 1.55 ratio         PTT - ( 14 Feb 2019 06:46 )  PTT:78.3 sec

## 2019-02-16 LAB
GLUCOSE BLDC GLUCOMTR-MCNC: 194 MG/DL — HIGH (ref 70–99)
GLUCOSE BLDC GLUCOMTR-MCNC: 216 MG/DL — HIGH (ref 70–99)
GLUCOSE BLDC GLUCOMTR-MCNC: 316 MG/DL — HIGH (ref 70–99)

## 2019-02-16 PROCEDURE — 99233 SBSQ HOSP IP/OBS HIGH 50: CPT

## 2019-02-16 RX ORDER — DEXTROSE MONOHYDRATE, SODIUM CHLORIDE, AND POTASSIUM CHLORIDE 50; .745; 4.5 G/1000ML; G/1000ML; G/1000ML
1000 INJECTION, SOLUTION INTRAVENOUS
Qty: 0 | Refills: 0 | Status: DISCONTINUED | OUTPATIENT
Start: 2019-02-16 | End: 2019-02-19

## 2019-02-16 RX ORDER — HEPARIN SODIUM 5000 [USP'U]/ML
5000 INJECTION INTRAVENOUS; SUBCUTANEOUS EVERY 12 HOURS
Qty: 0 | Refills: 0 | Status: DISCONTINUED | OUTPATIENT
Start: 2019-02-16 | End: 2019-02-19

## 2019-02-16 RX ADMIN — HYDROMORPHONE HYDROCHLORIDE 0.2 MILLIGRAM(S): 2 INJECTION INTRAMUSCULAR; INTRAVENOUS; SUBCUTANEOUS at 06:30

## 2019-02-16 RX ADMIN — HYDROMORPHONE HYDROCHLORIDE 0.2 MILLIGRAM(S): 2 INJECTION INTRAMUSCULAR; INTRAVENOUS; SUBCUTANEOUS at 13:57

## 2019-02-16 RX ADMIN — HEPARIN SODIUM 5000 UNIT(S): 5000 INJECTION INTRAVENOUS; SUBCUTANEOUS at 18:30

## 2019-02-16 RX ADMIN — ZINC OXIDE 1 APPLICATION(S): 200 OINTMENT TOPICAL at 06:17

## 2019-02-16 RX ADMIN — Medication 1 APPLICATION(S): at 06:18

## 2019-02-16 RX ADMIN — Medication 1 APPLICATION(S): at 13:57

## 2019-02-16 RX ADMIN — Medication 1 APPLICATION(S): at 18:33

## 2019-02-16 RX ADMIN — HYDROMORPHONE HYDROCHLORIDE 0.2 MILLIGRAM(S): 2 INJECTION INTRAMUSCULAR; INTRAVENOUS; SUBCUTANEOUS at 14:10

## 2019-02-16 RX ADMIN — Medication 4: at 06:15

## 2019-02-16 RX ADMIN — HYDROMORPHONE HYDROCHLORIDE 0.2 MILLIGRAM(S): 2 INJECTION INTRAMUSCULAR; INTRAVENOUS; SUBCUTANEOUS at 22:19

## 2019-02-16 RX ADMIN — Medication 1 APPLICATION(S): at 21:53

## 2019-02-16 RX ADMIN — ZINC OXIDE 1 APPLICATION(S): 200 OINTMENT TOPICAL at 23:00

## 2019-02-16 RX ADMIN — ZINC OXIDE 1 APPLICATION(S): 200 OINTMENT TOPICAL at 13:58

## 2019-02-16 RX ADMIN — HYDROMORPHONE HYDROCHLORIDE 0.2 MILLIGRAM(S): 2 INJECTION INTRAMUSCULAR; INTRAVENOUS; SUBCUTANEOUS at 21:49

## 2019-02-16 RX ADMIN — Medication 2: at 12:06

## 2019-02-16 RX ADMIN — HYDROMORPHONE HYDROCHLORIDE 0.2 MILLIGRAM(S): 2 INJECTION INTRAMUSCULAR; INTRAVENOUS; SUBCUTANEOUS at 06:16

## 2019-02-16 RX ADMIN — Medication 0.2 MILLIGRAM(S): at 23:01

## 2019-02-16 RX ADMIN — Medication 1 APPLICATION(S): at 06:16

## 2019-02-16 RX ADMIN — DEXTROSE MONOHYDRATE, SODIUM CHLORIDE, AND POTASSIUM CHLORIDE 75 MILLILITER(S): 50; .745; 4.5 INJECTION, SOLUTION INTRAVENOUS at 12:07

## 2019-02-16 NOTE — PROGRESS NOTE ADULT - SUBJECTIVE AND OBJECTIVE BOX
SUBJECTIVE AND OBJECTIVE:  INTERVAL HPI/OVERNIGHT EVENTS:    DNR on chart: Yes    Allergies    No Known Allergies    Intolerances    MEDICATIONS  (STANDING):  BACItracin   Ointment 1 Application(s) Topical two times a day  Dakins Solution - 1/4 Strength 1 Application(s) Topical three times a day  dextrose 5%. 1000 milliLiter(s) (50 mL/Hr) IV Continuous <Continuous>  dextrose 50% Injectable 12.5 Gram(s) IV Push once  dextrose 50% Injectable 25 Gram(s) IV Push once  dextrose 50% Injectable 25 Gram(s) IV Push once  heparin  Injectable 5000 Unit(s) SubCutaneous every 12 hours  HYDROmorphone  Injectable 0.2 milliGRAM(s) IV Push every 8 hours  insulin lispro (HumaLOG) corrective regimen sliding scale   SubCutaneous every 6 hours  polyethylene glycol 3350 17 Gram(s) Oral daily  senna Syrup 5 milliLiter(s) Oral at bedtime  sodium chloride 0.45% with potassium chloride 20 mEq/L 1000 milliLiter(s) (75 mL/Hr) IV Continuous <Continuous>  zinc oxide 20% Ointment 1 Application(s) Topical three times a day    MEDICATIONS  (PRN):  acetaminophen   Tablet .. 650 milliGRAM(s) Oral every 6 hours PRN Temp greater or equal to 38C (100.4F)  dextrose 40% Gel 15 Gram(s) Oral once PRN Blood Glucose LESS THAN 70 milliGRAM(s)/deciliter  glucagon  Injectable 1 milliGRAM(s) IntraMuscular once PRN Glucose LESS THAN 70 milligrams/deciliter  glycopyrrolate Injectable 0.4 milliGRAM(s) IV Push every 6 hours PRN secretions  haloperidol    Injectable 0.5 milliGRAM(s) IV Push every 8 hours PRN Hallucinations  HYDROmorphone  Injectable 0.2 milliGRAM(s) IV Push every 2 hours PRN Labored breathing or dyspnea  HYDROmorphone  Injectable 0.2 milliGRAM(s) IV Push every 2 hours PRN Moderate Pain (4 - 6)  LORazepam   Injectable 0.2 milliGRAM(s) IV Push every 3 hours PRN Agitation or recurrent labored breathing after back to back opioids are given      ITEMS UNCHECKED ARE NOT PRESENT    PRESENT SYMPTOMS: [ ]Unable to obtain due to poor mentation   Source if other than patient:  [ ]Family   [ ]Team     Pain (Impact on QOL):    Location:  Minimal acceptable level (0-10 scale):                   Aggrevating factors:  Quality:  Radiation:  Severity (0-10 scale):    Timing:    Dyspnea:                           [ ]Mild [ ]Moderate [ ]Severe  Anxiety:                             [ ]Mild [ ]Moderate [ ]Severe  Fatigue:                             [ ]Mild [ ]Moderate [ ]Severe  Nausea:                             [ ]Mild [ ]Moderate [ ]Severe  Loss of appetite:              [ ]Mild [ ]Moderate [ ]Severe  Constipation:                    [ ]Mild [ ]Moderate [ ]Severe    PAIN AD Score:	  [email or web address] (Ctrl + left click to view)    Other Symptoms:  [ ]All other review of systems negative     Karnofsky Performance Score/Palliative Performance Status Version 2:         %  PHYSICAL EXAM:  Vital Signs Last 24 Hrs  T(C): 36.5 (16 Feb 2019 05:08), Max: 36.5 (16 Feb 2019 05:08)  T(F): 97.7 (16 Feb 2019 05:08), Max: 97.7 (16 Feb 2019 05:08)  HR: 90 (16 Feb 2019 05:08) (75 - 90)  BP: 121/65 (16 Feb 2019 05:08) (91/44 - 121/65)  BP(mean): --  RR: 18 (16 Feb 2019 05:08) (17 - 18)  SpO2: 94% (16 Feb 2019 05:08) (94% - 100%) I&O's Summary    15 Feb 2019 07:01  -  16 Feb 2019 07:00  --------------------------------------------------------  IN: 0 mL / OUT: 350 mL / NET: -350 mL    16 Feb 2019 07:01  -  16 Feb 2019 10:20  --------------------------------------------------------  IN: 240 mL / OUT: 0 mL / NET: 240 mL     GENERAL:  [ ]Alert  [ ]Oriented x   [ ]Lethargic  [ ]Cachexia  [ ]Unarousable  [ ]Verbal  [ ]Non-Verbal  Behavioral:   [ ] Anxiety  [ ] Delirium [ ] Agitation [ ] Other  HEENT:  [ ]Normal   [ ]Dry mouth   [ ]ET Tube/Trach  [ ]Oral lesions  PULMONARY:   [ ]Clear [ ]Tachypnea  [ ]Audible excessive secretions   [ ]Rhonchi        [ ]Right [ ]Left [ ]Bilateral  [ ]Crackles        [ ]Right [ ]Left [ ]Bilateral  [ ]Wheezing     [ ]Right [ ]Left [ ]Bilateral  CARDIOVASCULAR:    [ ]Regular [ ]Irregular [ ]Tachy  [ ]Marvin [ ]Murmur [ ]Other  GASTROINTESTINAL:  [ ]Soft  [ ]Distended   [ ]+BS  [ ]Non tender [ ]Tender  [ ]PEG [ ]OGT/ NGT   Last BM:    GENITOURINARY:  [ ]Normal [ ] Incontinent   [ ]Oliguria/Anuria   [ ]Tejeda  MUSCULOSKELETAL:   [ ]Normal   [ ]Weakness  [ ]Bed/Wheelchair bound [ ]Edema  NEUROLOGIC:   [ ]No focal deficits  [ ] Cognitive impairment  [ ] Dysphagia [ ]Dysarthria [ ] Paresis [ ]Other   SKIN:   [ ]Normal   [ ]Pressure ulcer(s)  [ ]Rash    CRITICAL CARE:  [ ] Shock Present  [ ]Septic [ ]Cardiogenic [ ]Neurologic [ ]Hypovolemic  [ ]  Vasopressors [ ]  Inotropes   [ ] Respiratory failure present  [ ] Acute  [ ] Chronic [ ] Hypoxic  [ ] Hypercarbic [ ] Other  [ ] Other organ failure     LABS:                        7.7    14.28 )-----------( 350      ( 15 Feb 2019 10:40 )             26.6   02-15    145  |  103  |  42<H>  ----------------------------<  305<H>  3.1<L>   |  28  |  1.81<H>    Ca    8.0<L>      15 Feb 2019 09:35          RADIOLOGY & ADDITIONAL STUDIES:    Protein Calorie Malnutrition:  [ ] PPSV2 < or = 30%  [ ] significant weight loss [ ] poor nutritional intake [ ] amasarca [ ] catabolic state Albumin, Serum: 2.2 g/dL (02-07-19 @ 05:54)  Artificial Nutrition [ ]     REFERRALS:   [ ]Chaplaincy  [ ] Hospice  [ ]Child Life  [ ]Social Work  [ ]Case management [ ]Holistic Therapy   Goals of Care Document: Goals of Care Conversation - Personal Advance Directive [TANK Banuelos] (02-15-19 @ 14:31) SUBJECTIVE AND OBJECTIVE: Patient seen and examined. Appears comfortable. Meds ordered for symptom management but no BTD required. Awaiting bed on PCU.    INTERVAL HPI/OVERNIGHT EVENTS: no acute overnight events.     DNR on chart: Yes    Allergies    No Known Allergies    Intolerances    MEDICATIONS  (STANDING):  BACItracin   Ointment 1 Application(s) Topical two times a day  Dakins Solution - 1/4 Strength 1 Application(s) Topical three times a day  dextrose 5%. 1000 milliLiter(s) (50 mL/Hr) IV Continuous <Continuous>  dextrose 50% Injectable 12.5 Gram(s) IV Push once  dextrose 50% Injectable 25 Gram(s) IV Push once  dextrose 50% Injectable 25 Gram(s) IV Push once  heparin  Injectable 5000 Unit(s) SubCutaneous every 12 hours  HYDROmorphone  Injectable 0.2 milliGRAM(s) IV Push every 8 hours  insulin lispro (HumaLOG) corrective regimen sliding scale   SubCutaneous every 6 hours  polyethylene glycol 3350 17 Gram(s) Oral daily  senna Syrup 5 milliLiter(s) Oral at bedtime  sodium chloride 0.45% with potassium chloride 20 mEq/L 1000 milliLiter(s) (75 mL/Hr) IV Continuous <Continuous>  zinc oxide 20% Ointment 1 Application(s) Topical three times a day    MEDICATIONS  (PRN):  acetaminophen   Tablet .. 650 milliGRAM(s) Oral every 6 hours PRN Temp greater or equal to 38C (100.4F)  dextrose 40% Gel 15 Gram(s) Oral once PRN Blood Glucose LESS THAN 70 milliGRAM(s)/deciliter  glucagon  Injectable 1 milliGRAM(s) IntraMuscular once PRN Glucose LESS THAN 70 milligrams/deciliter  glycopyrrolate Injectable 0.4 milliGRAM(s) IV Push every 6 hours PRN secretions  haloperidol    Injectable 0.5 milliGRAM(s) IV Push every 8 hours PRN Hallucinations  HYDROmorphone  Injectable 0.2 milliGRAM(s) IV Push every 2 hours PRN Labored breathing or dyspnea  HYDROmorphone  Injectable 0.2 milliGRAM(s) IV Push every 2 hours PRN Moderate Pain (4 - 6)  LORazepam   Injectable 0.2 milliGRAM(s) IV Push every 3 hours PRN Agitation or recurrent labored breathing after back to back opioids are given      ITEMS UNCHECKED ARE NOT PRESENT    PRESENT SYMPTOMS: [x ]Unable to obtain due to poor mentation   Source if other than patient:  [ ]Family   [ ]Team     Pain (Impact on QOL):    Location:  Minimal acceptable level (0-10 scale):                   Aggrevating factors:  Quality:  Radiation:  Severity (0-10 scale):    Timing:    Dyspnea:                           [ ]Mild [ ]Moderate [ ]Severe  Anxiety:                             [ ]Mild [ ]Moderate [ ]Severe  Fatigue:                             [ ]Mild [ ]Moderate [ ]Severe  Nausea:                             [ ]Mild [ ]Moderate [ ]Severe  Loss of appetite:              [ ]Mild [ ]Moderate [ ]Severe  Constipation:                    [ ]Mild [ ]Moderate [ ]Severe    PAIN AD Score:	  [email or web address] (Ctrl + left click to view)    Other Symptoms:  [ ]All other review of systems negative     Karnofsky Performance Score/Palliative Performance Status Version 2:       10-20  %  PHYSICAL EXAM:  Vital Signs Last 24 Hrs  T(C): 36.5 (16 Feb 2019 05:08), Max: 36.5 (16 Feb 2019 05:08)  T(F): 97.7 (16 Feb 2019 05:08), Max: 97.7 (16 Feb 2019 05:08)  HR: 90 (16 Feb 2019 05:08) (75 - 90)  BP: 121/65 (16 Feb 2019 05:08) (91/44 - 121/65)  BP(mean): --  RR: 18 (16 Feb 2019 05:08) (17 - 18)  SpO2: 94% (16 Feb 2019 05:08) (94% - 100%) I&O's Summary    15 Feb 2019 07:01  -  16 Feb 2019 07:00  --------------------------------------------------------  IN: 0 mL / OUT: 350 mL / NET: -350 mL    16 Feb 2019 07:01  -  16 Feb 2019 10:20  --------------------------------------------------------  IN: 240 mL / OUT: 0 mL / NET: 240 mL     GENERAL:  [ ]Alert  [ ]Oriented x   [ x]Lethargic  [ ]Cachexia  [ ]Unarousable  [ ]Verbal  [ ]Non-Verbal  Behavioral:   [ ] Anxiety  [ ] Delirium [ ] Agitation [ ] Other  HEENT:  [ ]Normal   [x ]Dry mouth   [ ]ET Tube/Trach  [ ]Oral lesions  PULMONARY:   [ ]Clear [ ]Tachypnea  [ ]Audible excessive secretions   [ ]Rhonchi        [ ]Right [ ]Left [ ]Bilateral  [x ]Crackles        [ ]Right [ ]Left [x ]Bilateral  [ ]Wheezing     [ ]Right [ ]Left [ ]Bilateral  CARDIOVASCULAR:    [ x]Regular [ ]Irregular [ ]Tachy  [ ]Marvin [ ]Murmur [ ]Other  GASTROINTESTINAL:  [ x]Soft  [ ]Distended   [ ]+BS  [ ]Non tender [ ]Tender  [ ]PEG [ ]OGT/ NGT   Last BM:    GENITOURINARY:  [ ]Normal [x ] Incontinent   [ ]Oliguria/Anuria   [ ]Tejeda  MUSCULOSKELETAL:   [ ]Normal   [ x]Weakness  [ ]Bed/Wheelchair bound [ ]Edema  NEUROLOGIC:   [ ]No focal deficits  [x] Cognitive impairment  [ ] Dysphagia [ ]Dysarthria [ ] Paresis [ ]Other   SKIN: ecchymotic, skin lesions  [ ]Normal   [ ]Pressure ulcer(s)  [ ]Rash    CRITICAL CARE:  [ ] Shock Present  [ ]Septic [ ]Cardiogenic [ ]Neurologic [ ]Hypovolemic  [ ]  Vasopressors [ ]  Inotropes   [ ] Respiratory failure present  [ ] Acute  [ ] Chronic [ ] Hypoxic  [ ] Hypercarbic [ ] Other  [ ] Other organ failure     LABS:                        7.7    14.28 )-----------( 350      ( 15 Feb 2019 10:40 )             26.6   02-15    145  |  103  |  42<H>  ----------------------------<  305<H>  3.1<L>   |  28  |  1.81<H>    Ca    8.0<L>      15 Feb 2019 09:35      RADIOLOGY & ADDITIONAL STUDIES: no further imaging studies    Protein Calorie Malnutrition:  [x ] PPSV2 < or = 30%  [ ] significant weight loss [x ] poor nutritional intake [ ] anasarca [x ] catabolic state Albumin, Serum: 2.2 g/dL (02-07-19 @ 05:54)  Artificial Nutrition [ ]     REFERRALS:   [ ]Chaplaincy  [ ] Hospice  [ ]Child Life  [ ]Social Work  [x ]Case management [ ]Holistic Therapy   Goals of Care Document: Goals of Care Conversation - Personal Advance Directive [TANK Banuelos] (02-15-19 @ 14:31)

## 2019-02-16 NOTE — PROGRESS NOTE ADULT - SUBJECTIVE AND OBJECTIVE BOX
INTERVAL HPI/OVERNIGHT EVENTS:  Pt seen and examined at bedside.     Allergies/Intolerance: No Known Allergies      MEDICATIONS  (STANDING):  BACItracin   Ointment 1 Application(s) Topical two times a day  clopidogrel Tablet 75 milliGRAM(s) Oral daily  Dakins Solution - 1/4 Strength 1 Application(s) Topical three times a day  dextrose 5% + sodium chloride 0.45% with potassium chloride 20 mEq/L 1000 milliLiter(s) (75 mL/Hr) IV Continuous <Continuous>  dextrose 5%. 1000 milliLiter(s) (50 mL/Hr) IV Continuous <Continuous>  dextrose 50% Injectable 12.5 Gram(s) IV Push once  dextrose 50% Injectable 25 Gram(s) IV Push once  dextrose 50% Injectable 25 Gram(s) IV Push once  HYDROmorphone  Injectable 0.2 milliGRAM(s) IV Push every 8 hours  insulin lispro (HumaLOG) corrective regimen sliding scale   SubCutaneous every 6 hours  polyethylene glycol 3350 17 Gram(s) Oral daily  senna Syrup 5 milliLiter(s) Oral at bedtime  zinc oxide 20% Ointment 1 Application(s) Topical three times a day    MEDICATIONS  (PRN):  acetaminophen   Tablet .. 650 milliGRAM(s) Oral every 6 hours PRN Temp greater or equal to 38C (100.4F)  dextrose 40% Gel 15 Gram(s) Oral once PRN Blood Glucose LESS THAN 70 milliGRAM(s)/deciliter  glucagon  Injectable 1 milliGRAM(s) IntraMuscular once PRN Glucose LESS THAN 70 milligrams/deciliter  glycopyrrolate Injectable 0.4 milliGRAM(s) IV Push every 6 hours PRN secretions  haloperidol    Injectable 0.5 milliGRAM(s) IV Push every 8 hours PRN Hallucinations  HYDROmorphone  Injectable 0.2 milliGRAM(s) IV Push every 2 hours PRN Labored breathing or dyspnea  HYDROmorphone  Injectable 0.2 milliGRAM(s) IV Push every 2 hours PRN Moderate Pain (4 - 6)  LORazepam   Injectable 0.2 milliGRAM(s) IV Push every 3 hours PRN Agitation or recurrent labored breathing after back to back opioids are given        ROS: all systems reviewed and wnl      PHYSICAL EXAMINATION:  Vital Signs Last 24 Hrs  T(C): 36.5 (16 Feb 2019 05:08), Max: 36.5 (16 Feb 2019 05:08)  T(F): 97.7 (16 Feb 2019 05:08), Max: 97.7 (16 Feb 2019 05:08)  HR: 90 (16 Feb 2019 05:08) (75 - 90)  BP: 121/65 (16 Feb 2019 05:08) (91/44 - 121/65)  BP(mean): --  RR: 18 (16 Feb 2019 05:08) (17 - 18)  SpO2: 94% (16 Feb 2019 05:08) (94% - 100%)  CAPILLARY BLOOD GLUCOSE      POCT Blood Glucose.: 316 mg/dL (16 Feb 2019 06:12)  POCT Blood Glucose.: 274 mg/dL (15 Feb 2019 18:18)  POCT Blood Glucose.: 369 mg/dL (15 Feb 2019 12:26)      02-15 @ 07:01  -  02-16 @ 07:00  --------------------------------------------------------  IN: 0 mL / OUT: 350 mL / NET: -350 mL        GENERAL:   NECK: supple, No JVD  CHEST/LUNG: clear to auscultation bilaterally; no rales, rhonchi, or wheezing b/l  HEART: normal S1, S2  ABDOMEN: BS+, soft, ND, NT   EXTREMITIES:  pulses palpable; no clubbing, cyanosis, or edema b/l LEs  SKIN: no rashes or lesions      LABS:                        7.7    14.28 )-----------( 350      ( 15 Feb 2019 10:40 )             26.6     02-15    145  |  103  |  42<H>  ----------------------------<  305<H>  3.1<L>   |  28  |  1.81<H>    Ca    8.0<L>      15 Feb 2019 09:35      PT/INR - ( 14 Feb 2019 07:51 )   PT: 17.9 sec;   INR: 1.55 ratio INTERVAL HPI/OVERNIGHT EVENTS:  Pt seen and examined at bedside.     Allergies/Intolerance: No Known Allergies      MEDICATIONS  (STANDING):  BACItracin   Ointment 1 Application(s) Topical two times a day  clopidogrel Tablet 75 milliGRAM(s) Oral daily  Dakins Solution - 1/4 Strength 1 Application(s) Topical three times a day  dextrose 5% + sodium chloride 0.45% with potassium chloride 20 mEq/L 1000 milliLiter(s) (75 mL/Hr) IV Continuous <Continuous>  dextrose 5%. 1000 milliLiter(s) (50 mL/Hr) IV Continuous <Continuous>  dextrose 50% Injectable 12.5 Gram(s) IV Push once  dextrose 50% Injectable 25 Gram(s) IV Push once  dextrose 50% Injectable 25 Gram(s) IV Push once  HYDROmorphone  Injectable 0.2 milliGRAM(s) IV Push every 8 hours  insulin lispro (HumaLOG) corrective regimen sliding scale   SubCutaneous every 6 hours  polyethylene glycol 3350 17 Gram(s) Oral daily  senna Syrup 5 milliLiter(s) Oral at bedtime  zinc oxide 20% Ointment 1 Application(s) Topical three times a day    MEDICATIONS  (PRN):  acetaminophen   Tablet .. 650 milliGRAM(s) Oral every 6 hours PRN Temp greater or equal to 38C (100.4F)  dextrose 40% Gel 15 Gram(s) Oral once PRN Blood Glucose LESS THAN 70 milliGRAM(s)/deciliter  glucagon  Injectable 1 milliGRAM(s) IntraMuscular once PRN Glucose LESS THAN 70 milligrams/deciliter  glycopyrrolate Injectable 0.4 milliGRAM(s) IV Push every 6 hours PRN secretions  haloperidol    Injectable 0.5 milliGRAM(s) IV Push every 8 hours PRN Hallucinations  HYDROmorphone  Injectable 0.2 milliGRAM(s) IV Push every 2 hours PRN Labored breathing or dyspnea  HYDROmorphone  Injectable 0.2 milliGRAM(s) IV Push every 2 hours PRN Moderate Pain (4 - 6)  LORazepam   Injectable 0.2 milliGRAM(s) IV Push every 3 hours PRN Agitation or recurrent labored breathing after back to back opioids are given        ROS: all systems reviewed and wnl      PHYSICAL EXAMINATION:  Vital Signs Last 24 Hrs  T(C): 36.5 (16 Feb 2019 05:08), Max: 36.5 (16 Feb 2019 05:08)  T(F): 97.7 (16 Feb 2019 05:08), Max: 97.7 (16 Feb 2019 05:08)  HR: 90 (16 Feb 2019 05:08) (75 - 90)  BP: 121/65 (16 Feb 2019 05:08) (91/44 - 121/65)  BP(mean): --  RR: 18 (16 Feb 2019 05:08) (17 - 18)  SpO2: 94% (16 Feb 2019 05:08) (94% - 100%)  CAPILLARY BLOOD GLUCOSE      POCT Blood Glucose.: 316 mg/dL (16 Feb 2019 06:12)  POCT Blood Glucose.: 274 mg/dL (15 Feb 2019 18:18)  POCT Blood Glucose.: 369 mg/dL (15 Feb 2019 12:26)      02-15 @ 07:01  -  02-16 @ 07:00  --------------------------------------------------------  IN: 0 mL / OUT: 350 mL / NET: -350 mL        GENERAL: in bed, NAD, so SOB, CP or fevers   NECK: supple, No JVD  CHEST/LUNG: clear to auscultation bilaterally; no rales, rhonchi, or wheezing b/l  HEART: normal S1, S2  ABDOMEN: BS+, soft, ND, NT   EXTREMITIES:  pulses palpable; no clubbing, cyanosis, or edema b/l LEs  SKIN: no rashes or lesions      LABS:                        7.7    14.28 )-----------( 350      ( 15 Feb 2019 10:40 )             26.6     02-15    145  |  103  |  42<H>  ----------------------------<  305<H>  3.1<L>   |  28  |  1.81<H>    Ca    8.0<L>      15 Feb 2019 09:35      PT/INR - ( 14 Feb 2019 07:51 )   PT: 17.9 sec;   INR: 1.55 ratio

## 2019-02-16 NOTE — PROVIDER CONTACT NOTE (OTHER) - REASON
Blood sugar was 426 and the repeat was 425.
Bradycardia, hypoxia
Dark stools
Hypoglycemia
Hypoglycemia
Peg TF
Pt's FS 42
Scab on tip of penis
pEG leakage and no iv access
pt AM blood sugar 45
pt FS 90; PEG tube feed leaking at site/skin macerated
pt hypoglycemic. pt IVF stopped @ 0310. currently not receiving PEG feedings due to malfunction
pt extremely agitated, pulling at lines

## 2019-02-16 NOTE — PROVIDER CONTACT NOTE (OTHER) - SITUATION
pt extremely agitated, pulling at lines
Dark stools noted during bedtime patient care.
FS 30, repeat 42.
Patient's blood sugar was above 400.
Pt FS at 0658 was 69.
Pt unable to tolerate TF, PEG site leaking around site, residual 50cc.
Pt's FS 42.
RN observed pt's HR drop from 89 to 26, maintain for less than a min and fluctuate between 39-95. O2 also maintaining at 78 on 2LPM NC.
Tip of penis scab noted, chronic hoskins in place and changed 2/3/19.
gave meds through PEG tube, noticble leakage with just medications and flush under leg insertion site. in addition, pt with no IV access, unable to place pIV
pt AM blood sugar 45
pt FS 90; PEG tube feed leaking at site/skin macerated
pt hypoglycemic. pt IVF stopped @ 0310. currently not receiving PEG feedings due to malfunction

## 2019-02-16 NOTE — PROGRESS NOTE ADULT - ASSESSMENT
74 yo male with CAD c/b CABG, A fib (on coumadin), CKD stage 3, PAD s/p stents, PEG tube, in-dwelling Tejeda who presents to the nursing home for increased confusion and elevated WBC. In November he had a hypoglycemic episode, fell, and broke his hip. He was admitted to the hospital for surgery, which was uneventful and sent to rehab (in early December). During the rehab stay, he developed numerous ulcers, including a decubitus ulcer. He was sent back to [facility] for sepsis from the decubitus ulcer. He was treated with abx and returned to rehab for a second time. This second hospital course was complicated by acute renal failure requiring dialysis, IV abx, and surgery. This time, he has been hospitalized for 11 days. 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 Palliative care is called for helping with GOC in the setting of an elderly patient with multiple comorbid conditions, recurrent hospitalizations (LACE > 10), several complications, and significant functional impairment.

## 2019-02-16 NOTE — PROVIDER CONTACT NOTE (OTHER) - ASSESSMENT
Pt laying in bed, incoherent speech, AAOX0-1. As per day RN, dark BM's noted throughout the day, no interventions. Comfort measures initiated during GOC discussion w/ family during the day. Pt laying in bed, incoherent speech, AAOX0-1. As per day RN, dark BM's noted throughout the day, no interventions. D5 1/2NS w/ 20mEq potassium IVF continued as ordered, tolerating well. Comfort measures initiated during GOC discussion w/ family during the day.

## 2019-02-16 NOTE — PROGRESS NOTE ADULT - PROBLEM SELECTOR PLAN 6
will continue to follow for symptom management  pending transfer to PCU, currently no beds available  please call palliative care on call if any acute issues

## 2019-02-16 NOTE — PROGRESS NOTE ADULT - SUBJECTIVE AND OBJECTIVE BOX
CARDIOLOGY     PROGRESS  NOTE   ________________________________________________    CHIEF COMPLAINT:Patient is a 73y old  Male who presents with a chief complaint of Sepsis (16 Feb 2019 07:28)  lethargic  	  REVIEW OF SYSTEMS:  CONSTITUTIONAL: No fever, weight loss, or fatigue  EYES: No eye pain, visual disturbances, or discharge  ENT:  No difficulty hearing, tinnitus, vertigo; No sinus or throat pain  NECK: No pain or stiffness  RESPIRATORY: No cough, wheezing, chills or hemoptysis; No Shortness of Breath  CARDIOVASCULAR: No chest pain, palpitations, passing out, dizziness, or leg swelling  GASTROINTESTINAL: No abdominal or epigastric pain. No nausea, vomiting, or hematemesis; No diarrhea or constipation. No melena or hematochezia.  GENITOURINARY: No dysuria, frequency, hematuria, or incontinence  NEUROLOGICAL: No headaches, memory loss, loss of strength, numbness, or tremors  SKIN: No itching, burning, rashes, or lesions   LYMPH Nodes: No enlarged glands  ENDOCRINE: No heat or cold intolerance; No hair loss  MUSCULOSKELETAL: No joint pain or swelling; No muscle, back, or extremity pain  PSYCHIATRIC: No depression, anxiety, mood swings, or difficulty sleeping  HEME/LYMPH: No easy bruising, or bleeding gums  ALLERGY AND IMMUNOLOGIC: No hives or eczema	    [ ] All others negative	  [ x] Unable to obtain    PHYSICAL EXAM:  T(C): 36.5 (02-16-19 @ 05:08), Max: 36.5 (02-16-19 @ 05:08)  HR: 90 (02-16-19 @ 05:08) (75 - 90)  BP: 121/65 (02-16-19 @ 05:08) (91/44 - 121/65)  RR: 18 (02-16-19 @ 05:08) (17 - 18)  SpO2: 94% (02-16-19 @ 05:08) (94% - 100%)  Wt(kg): --  I&O's Summary    15 Feb 2019 07:01  -  16 Feb 2019 07:00  --------------------------------------------------------  IN: 0 mL / OUT: 350 mL / NET: -350 mL        Appearance: Normal	  HEENT:   Normal oral mucosa, PERRL, EOMI	  Lymphatic: No lymphadenopathy  Cardiovascular: Normal S1 S2, No JVD, + murmurs, No edema  Respiratory: Lungs clear to auscultation	  Psychiatry: A & O x 3, Mood & affect appropriate  Gastrointestinal:  Soft, Non-tender, + BS	  Skin: No rashes, No ecchymoses, No cyanosis	  Neurologic: Non-focal  Extremities: Normal range of motion, No clubbing, cyanosis or edema  Vascular: Peripheral pulses palpable 2+ bilaterally    MEDICATIONS  (STANDING):  BACItracin   Ointment 1 Application(s) Topical two times a day  clopidogrel Tablet 75 milliGRAM(s) Oral daily  Dakins Solution - 1/4 Strength 1 Application(s) Topical three times a day  dextrose 5%. 1000 milliLiter(s) (50 mL/Hr) IV Continuous <Continuous>  dextrose 50% Injectable 12.5 Gram(s) IV Push once  dextrose 50% Injectable 25 Gram(s) IV Push once  dextrose 50% Injectable 25 Gram(s) IV Push once  HYDROmorphone  Injectable 0.2 milliGRAM(s) IV Push every 8 hours  insulin lispro (HumaLOG) corrective regimen sliding scale   SubCutaneous every 6 hours  polyethylene glycol 3350 17 Gram(s) Oral daily  senna Syrup 5 milliLiter(s) Oral at bedtime  sodium chloride 0.45% with potassium chloride 20 mEq/L 1000 milliLiter(s) (75 mL/Hr) IV Continuous <Continuous>  zinc oxide 20% Ointment 1 Application(s) Topical three times a day      TELEMETRY: 	    ECG:  	  RADIOLOGY:  OTHER: 	  	  LABS:	 	    CARDIAC MARKERS:                                7.7    14.28 )-----------( 350      ( 15 Feb 2019 10:40 )             26.6     02-15    145  |  103  |  42<H>  ----------------------------<  305<H>  3.1<L>   |  28  |  1.81<H>    Ca    8.0<L>      15 Feb 2019 09:35      proBNP: Serum Pro-Brain Natriuretic Peptide: 30279 pg/mL (02-03 @ 13:07)    Lipid Profile:   HgA1c: Hemoglobin A1C, Whole Blood: 7.4 % (02-04 @ 08:56)    TSH:         Assessment and plan  ---------------------------  severe cardiomyopathy  ? peg replacement for nutrition ? palliative care juliana discuss with wife  continue cardiac meds

## 2019-02-16 NOTE — PROVIDER CONTACT NOTE (OTHER) - ACTION/TREATMENT ORDERED:
MD notified and made aware. MD at bedside. 1:1 & restraints initiated. will continue to monitor.
Administer D50 & recheck FS.
MD notified and made aware. protocol followed. will repeat FS. will d/c sliding scale. continue to monitor.
NP aware, will pass on report and continue to monitor
As per NP, soak tip of penis with NS and soap with gauze and then attempt to clean tip. Will continue to monitor patient.
As per PA, hold 3 units NPH and hold tube feeds at this time  Will cont to monitor
Humalog 6 units given SC as ordered.
Provider made aware. Hold TF for now, will endorse to day team re: restarting TF. Will consider imaging to confirm placement of PEG.
RN attempted to contact Team 3 Dr. Meléndez. NATHAN Oseguera was given report on pt, LUPIS Britton contacted. Hypoglycemia protocol followed. Repeat . Pt safety maintained. NATHAN Oseguera will defer to Team 3
Repeat FS 35, 38. Will continue to monitor patient.
Will continue to monitor patient.
RN requested provider to bedside to assess pt. Provider arrived. Stat ABG, CXR ordered, BIPAP, and RN directed to supply NRBR if pt O2 drops below 90. Pt currently maintained on NC.
As per provider, IV dextrose ordered  will cont to monitor patient.

## 2019-02-16 NOTE — PROGRESS NOTE ADULT - ASSESSMENT
72 yo male PMH CAD c/b CABG, A fib (on coumadin), CKD stage 3, PAD s/p stents, PEG tube, in-dwelling Hoskins who presents to the nursing home for increased confusion and elevated WBC. History was taken from wife at bedside.	Per wife, patient had been fully functional up until November. He had a hypoglycemic episode, fell, and broke his hip in November. He was admitted to the hospital for surgery, which was uneventful and sent to rehab (in early December).     During the rehab stay, he developed numerous ulcers, including a decubitus ulcer. He was sent back to [facility] for sepsis from the decubitus ulcer. He was treated with abx and returned to rehab for a second time. This second hospital course was complicated by acute renal failure requiring dialysis, IV abx, and "surgery" to clean the ulcer. In the ED, he received Vanc/Zosyn. 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     ID: Will stop Zosyn given hospice eval in progress. Vancomycin stopped. CT chest shows no pneumonia, bilateral effusions. PEG feeds stopped working, now on hold. Leakage around the site and redness around PEG sie. Chronic hoskins changed this admission. Agree with ID. Had bone biopsy done today which now shows GNR. CT of lumbar spine confirms sacral OM.       Reanl: CKD III stable, creatinine 1.81. Renal sono confirms MRD. Free water replacement on hold without  PEG.  May resume Lasix just to keep I & O balanced.  Hold for now.     Urology: Stop Flomax, no TOV in near future.     CV: Lasix on hold given SHINE. Stop Plavix. Changed to Heparin 5,000 units bid for DVT prevention.       Endo: episodes of hypoglycemia off insulin. Stopped D5W in IVF given elevated blood sugars. Endo consult requested. PEG feeds on hold given leaking. NPH on hold. Glucose elevated 316. Agree half normal saline 75/h, let blood sugars stabilize off insulin as not currently eating.           GI: Peg site leaking past 24 hours. Not able to use now. GI recommends not using tube for one week. GI recommendation for local wound care around PEG site. Agree DNR.  Had phone discussion with wife/proxy this AM. Do not want to procede with NG tube. Favor comfort care, palliative care eval.  wife: [phone]. Likely needs home hospice referral. Awaiting bed in PCU. All blood draws stopped.       Note: BMI of 15.8 equals severe protein calorie malnutrition. 74 yo male PMH CAD c/b CABG, A fib (on coumadin), CKD stage 3, PAD s/p stents, PEG tube, in-dwelling Hoskins who presents to the nursing home for increased confusion and elevated WBC. History was taken from wife at bedside.	Per wife, patient had been fully functional up until November. He had a hypoglycemic episode, fell, and broke his hip in November. He was admitted to the hospital for surgery, which was uneventful and sent to rehab (in early December).     During the rehab stay, he developed numerous ulcers, including a decubitus ulcer. He was sent back to [facility] for sepsis from the decubitus ulcer. He was treated with abx and returned to rehab for a second time. This second hospital course was complicated by acute renal failure requiring dialysis, IV abx, and "surgery" to clean the ulcer. In the ED, he received Vanc/Zosyn. 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     ID: Will stop Zosyn given hospice eval in progress. Vancomycin stopped. CT chest shows no pneumonia, bilateral effusions. PEG feeds stopped working, now on hold. Leakage around the site and redness around PEG sie. Chronic hoskins changed this admission. Agree with ID. Had bone biopsy done today which now shows GNR. CT of lumbar spine confirms sacral OM.       Reanl: CKD III stable, creatinine 1.81. Renal sono confirms MRD. Free water replacement on hold without  PEG.  May resume Lasix just to keep I & O balanced.  Hold for now.     Urology: Stop Flomax, no TOV in near future.     CV: Lasix on hold given SHINE. Stop Plavix. Changed to Heparin 5,000 units bid for DVT prevention.       Endo: episodes of hypoglycemia off insulin. Stopped D5W in IVF given elevated blood sugars. Endo consult requested. PEG feeds on hold given leaking. NPH on hold. Glucose elevated 316. Agree half normal saline 75/h, let blood sugars stabilize off insulin as not currently eating.           GI: Peg site leaking past 24 hours. Not able to use now. GI recommends not using tube for one week. GI recommendation for local wound care around PEG site. Agree DNR.  Had phone discussion with wife/proxy this AM. Do not want to procede with NG tube. Favor comfort care, palliative care eval.  wife: [phone]. Likely needs home hospice referral. Awaiting bed in PCU. All blood draws stopped. IVP Dilaudid started by palliative care for pain control.        Note: BMI of 15.8 equals severe protein calorie malnutrition.

## 2019-02-16 NOTE — PROVIDER CONTACT NOTE (OTHER) - RECOMMENDATIONS
Administer D50 & recheck FS.
Continue to monitor patient.
Follow hypoglycemia protocol, 12.5mg IVP dextrose given, pt is NPO for TF/PEG.
Give 6 units of humalog as per sliding scale.
Hold TF.
Hypoglycemia protocol followed - D50 IV push 25g given x1.
NP made aware, RN questioned if necrotic?
notify provider
BP increased to 6LPM, pt sustaining at 90%.
notify provider, IVP dextrose needed for patient is NPO

## 2019-02-17 DIAGNOSIS — R45.1 RESTLESSNESS AND AGITATION: ICD-10-CM

## 2019-02-17 PROCEDURE — 99233 SBSQ HOSP IP/OBS HIGH 50: CPT

## 2019-02-17 RX ADMIN — Medication 1 APPLICATION(S): at 06:08

## 2019-02-17 RX ADMIN — Medication 1 APPLICATION(S): at 22:56

## 2019-02-17 RX ADMIN — Medication 0.2 MILLIGRAM(S): at 22:54

## 2019-02-17 RX ADMIN — HYDROMORPHONE HYDROCHLORIDE 0.2 MILLIGRAM(S): 2 INJECTION INTRAMUSCULAR; INTRAVENOUS; SUBCUTANEOUS at 13:51

## 2019-02-17 RX ADMIN — ZINC OXIDE 1 APPLICATION(S): 200 OINTMENT TOPICAL at 23:10

## 2019-02-17 RX ADMIN — ZINC OXIDE 1 APPLICATION(S): 200 OINTMENT TOPICAL at 06:11

## 2019-02-17 RX ADMIN — ZINC OXIDE 1 APPLICATION(S): 200 OINTMENT TOPICAL at 14:04

## 2019-02-17 RX ADMIN — HYDROMORPHONE HYDROCHLORIDE 0.2 MILLIGRAM(S): 2 INJECTION INTRAMUSCULAR; INTRAVENOUS; SUBCUTANEOUS at 13:43

## 2019-02-17 RX ADMIN — Medication 0.2 MILLIGRAM(S): at 13:51

## 2019-02-17 RX ADMIN — HEPARIN SODIUM 5000 UNIT(S): 5000 INJECTION INTRAVENOUS; SUBCUTANEOUS at 18:20

## 2019-02-17 RX ADMIN — HYDROMORPHONE HYDROCHLORIDE 0.2 MILLIGRAM(S): 2 INJECTION INTRAMUSCULAR; INTRAVENOUS; SUBCUTANEOUS at 06:06

## 2019-02-17 RX ADMIN — Medication 1 APPLICATION(S): at 18:20

## 2019-02-17 RX ADMIN — Medication 1 APPLICATION(S): at 06:09

## 2019-02-17 RX ADMIN — Medication 0.2 MILLIGRAM(S): at 18:21

## 2019-02-17 RX ADMIN — Medication 0.2 MILLIGRAM(S): at 11:16

## 2019-02-17 RX ADMIN — HYDROMORPHONE HYDROCHLORIDE 0.2 MILLIGRAM(S): 2 INJECTION INTRAMUSCULAR; INTRAVENOUS; SUBCUTANEOUS at 23:07

## 2019-02-17 RX ADMIN — Medication 1 APPLICATION(S): at 14:04

## 2019-02-17 RX ADMIN — HYDROMORPHONE HYDROCHLORIDE 0.2 MILLIGRAM(S): 2 INJECTION INTRAMUSCULAR; INTRAVENOUS; SUBCUTANEOUS at 15:53

## 2019-02-17 RX ADMIN — HYDROMORPHONE HYDROCHLORIDE 0.2 MILLIGRAM(S): 2 INJECTION INTRAMUSCULAR; INTRAVENOUS; SUBCUTANEOUS at 23:37

## 2019-02-17 RX ADMIN — HYDROMORPHONE HYDROCHLORIDE 0.2 MILLIGRAM(S): 2 INJECTION INTRAMUSCULAR; INTRAVENOUS; SUBCUTANEOUS at 06:36

## 2019-02-17 RX ADMIN — HEPARIN SODIUM 5000 UNIT(S): 5000 INJECTION INTRAVENOUS; SUBCUTANEOUS at 07:13

## 2019-02-17 RX ADMIN — HYDROMORPHONE HYDROCHLORIDE 0.2 MILLIGRAM(S): 2 INJECTION INTRAMUSCULAR; INTRAVENOUS; SUBCUTANEOUS at 10:49

## 2019-02-17 NOTE — PROGRESS NOTE ADULT - ASSESSMENT
72 yo male with CAD c/b CABG, A fib (on coumadin), CKD stage 3, PAD s/p stents, PEG tube, in-dwelling [facility]jeda who presents to the nursing home for increased confusion and elevated WBC. In November he had a hypoglycemic episode, fell, and broke his hip. He was admitted to the hospital for surgery, which was uneventful and sent to rehab (in early December). During the rehab stay, he developed numerous ulcers, including a decubitus ulcer. He was sent back to [facility] for sepsis from the decubitus ulcer. He was treated with abx and returned to rehab for a second time. This second hospital course was complicated by acute renal failure requiring dialysis, IV abx, and surgery. This time, he has been hospitalized for 11 days. 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 Palliative care is called for helping with GOC in the setting of an elderly patient with multiple comorbid conditions, recurrent hospitalizations (LACE > 10), several complications, and significant functional impairment.

## 2019-02-17 NOTE — PROGRESS NOTE ADULT - SUBJECTIVE AND OBJECTIVE BOX
INTERVAL HPI/OVERNIGHT EVENTS:  Pt seen and examined at bedside.     Allergies/Intolerance: No Known Allergies      MEDICATIONS  (STANDING):  BACItracin   Ointment 1 Application(s) Topical two times a day  Dakins Solution - 1/4 Strength 1 Application(s) Topical three times a day  dextrose 5%. 1000 milliLiter(s) (50 mL/Hr) IV Continuous <Continuous>  dextrose 50% Injectable 12.5 Gram(s) IV Push once  dextrose 50% Injectable 25 Gram(s) IV Push once  dextrose 50% Injectable 25 Gram(s) IV Push once  heparin  Injectable 5000 Unit(s) SubCutaneous every 12 hours  HYDROmorphone  Injectable 0.2 milliGRAM(s) IV Push every 8 hours  insulin lispro (HumaLOG) corrective regimen sliding scale   SubCutaneous every 6 hours  polyethylene glycol 3350 17 Gram(s) Oral daily  senna Syrup 5 milliLiter(s) Oral at bedtime  sodium chloride 0.45% with potassium chloride 20 mEq/L 1000 milliLiter(s) (75 mL/Hr) IV Continuous <Continuous>  zinc oxide 20% Ointment 1 Application(s) Topical three times a day    MEDICATIONS  (PRN):  acetaminophen   Tablet .. 650 milliGRAM(s) Oral every 6 hours PRN Temp greater or equal to 38C (100.4F)  dextrose 40% Gel 15 Gram(s) Oral once PRN Blood Glucose LESS THAN 70 milliGRAM(s)/deciliter  glucagon  Injectable 1 milliGRAM(s) IntraMuscular once PRN Glucose LESS THAN 70 milligrams/deciliter  glycopyrrolate Injectable 0.4 milliGRAM(s) IV Push every 6 hours PRN secretions  haloperidol    Injectable 0.5 milliGRAM(s) IV Push every 8 hours PRN Hallucinations  HYDROmorphone  Injectable 0.2 milliGRAM(s) IV Push every 2 hours PRN Labored breathing or dyspnea  HYDROmorphone  Injectable 0.2 milliGRAM(s) IV Push every 2 hours PRN Moderate Pain (4 - 6)  LORazepam   Injectable 0.2 milliGRAM(s) IV Push every 3 hours PRN Agitation or recurrent labored breathing after back to back opioids are given        ROS: all systems reviewed and wnl      PHYSICAL EXAMINATION:  Vital Signs Last 24 Hrs  T(C): 36.6 (17 Feb 2019 04:18), Max: 36.6 (17 Feb 2019 04:18)  T(F): 97.9 (17 Feb 2019 04:18), Max: 97.9 (17 Feb 2019 04:18)  HR: 90 (17 Feb 2019 04:18) (81 - 90)  BP: 127/73 (17 Feb 2019 04:18) (121/73 - 133/74)  BP(mean): --  RR: 20 (17 Feb 2019 04:18) (18 - 20)  SpO2: 98% (17 Feb 2019 04:18) (96% - 100%)  CAPILLARY BLOOD GLUCOSE      POCT Blood Glucose.: 194 mg/dL (16 Feb 2019 18:26)  POCT Blood Glucose.: 216 mg/dL (16 Feb 2019 12:01)      02-16 @ 07:01  -  02-17 @ 07:00  --------------------------------------------------------  IN: 900 mL / OUT: 120 mL / NET: 780 mL        GENERAL:   NECK: supple, No JVD  CHEST/LUNG: clear to auscultation bilaterally; no rales, rhonchi, or wheezing b/l  HEART: normal S1, S2  ABDOMEN: BS+, soft, ND, NT   EXTREMITIES:  pulses palpable; no clubbing, cyanosis, or edema b/l LEs  SKIN: no rashes or lesions      LABS:                        7.7    14.28 )-----------( 350      ( 15 Feb 2019 10:40 )             26.6     02-15    145  |  103  |  42<H>  ----------------------------<  305<H>  3.1<L>   |  28  |  1.81<H>    Ca    8.0<L>      15 Feb 2019 09:35 INTERVAL HPI/OVERNIGHT EVENTS:  Pt seen and examined at bedside.     Allergies/Intolerance: No Known Allergies      MEDICATIONS  (STANDING):  BACItracin   Ointment 1 Application(s) Topical two times a day  Dakins Solution - 1/4 Strength 1 Application(s) Topical three times a day  dextrose 5%. 1000 milliLiter(s) (50 mL/Hr) IV Continuous <Continuous>  dextrose 50% Injectable 12.5 Gram(s) IV Push once  dextrose 50% Injectable 25 Gram(s) IV Push once  dextrose 50% Injectable 25 Gram(s) IV Push once  heparin  Injectable 5000 Unit(s) SubCutaneous every 12 hours  HYDROmorphone  Injectable 0.2 milliGRAM(s) IV Push every 8 hours  insulin lispro (HumaLOG) corrective regimen sliding scale   SubCutaneous every 6 hours  polyethylene glycol 3350 17 Gram(s) Oral daily  senna Syrup 5 milliLiter(s) Oral at bedtime  sodium chloride 0.45% with potassium chloride 20 mEq/L 1000 milliLiter(s) (75 mL/Hr) IV Continuous <Continuous>  zinc oxide 20% Ointment 1 Application(s) Topical three times a day    MEDICATIONS  (PRN):  acetaminophen   Tablet .. 650 milliGRAM(s) Oral every 6 hours PRN Temp greater or equal to 38C (100.4F)  dextrose 40% Gel 15 Gram(s) Oral once PRN Blood Glucose LESS THAN 70 milliGRAM(s)/deciliter  glucagon  Injectable 1 milliGRAM(s) IntraMuscular once PRN Glucose LESS THAN 70 milligrams/deciliter  glycopyrrolate Injectable 0.4 milliGRAM(s) IV Push every 6 hours PRN secretions  haloperidol    Injectable 0.5 milliGRAM(s) IV Push every 8 hours PRN Hallucinations  HYDROmorphone  Injectable 0.2 milliGRAM(s) IV Push every 2 hours PRN Labored breathing or dyspnea  HYDROmorphone  Injectable 0.2 milliGRAM(s) IV Push every 2 hours PRN Moderate Pain (4 - 6)  LORazepam   Injectable 0.2 milliGRAM(s) IV Push every 3 hours PRN Agitation or recurrent labored breathing after back to back opioids are given        ROS: all systems reviewed and wnl      PHYSICAL EXAMINATION:  Vital Signs Last 24 Hrs  T(C): 36.6 (17 Feb 2019 04:18), Max: 36.6 (17 Feb 2019 04:18)  T(F): 97.9 (17 Feb 2019 04:18), Max: 97.9 (17 Feb 2019 04:18)  HR: 90 (17 Feb 2019 04:18) (81 - 90)  BP: 127/73 (17 Feb 2019 04:18) (121/73 - 133/74)  BP(mean): --  RR: 20 (17 Feb 2019 04:18) (18 - 20)  SpO2: 98% (17 Feb 2019 04:18) (96% - 100%)  CAPILLARY BLOOD GLUCOSE      POCT Blood Glucose.: 194 mg/dL (16 Feb 2019 18:26)  POCT Blood Glucose.: 216 mg/dL (16 Feb 2019 12:01)      02-16 @ 07:01  -  02-17 @ 07:00  --------------------------------------------------------  IN: 900 mL / OUT: 120 mL / NET: 780 mL        GENERAL: in bed, anxious, no SOB, fevers or cough  NECK: supple, No JVD  CHEST/LUNG: clear to auscultation bilaterally; no rales, rhonchi, or wheezing b/l  HEART: normal S1, S2  ABDOMEN: BS+, soft, ND, NT   EXTREMITIES:  pulses palpable; no clubbing, cyanosis, or edema b/l LEs  SKIN: no rashes or lesions      LABS:                        7.7    14.28 )-----------( 350      ( 15 Feb 2019 10:40 )             26.6     02-15    145  |  103  |  42<H>  ----------------------------<  305<H>  3.1<L>   |  28  |  1.81<H>    Ca    8.0<L>      15 Feb 2019 09:35

## 2019-02-17 NOTE — PROGRESS NOTE ADULT - SUBJECTIVE AND OBJECTIVE BOX
CARDIOLOGY     PROGRESS  NOTE   ________________________________________________    CHIEF COMPLAINT:Patient is a 73y old  Male who presents with a chief complaint of Sepsis (17 Feb 2019 08:25)  no complain  	  REVIEW OF SYSTEMS:  CONSTITUTIONAL: No fever, weight loss, or fatigue  EYES: No eye pain, visual disturbances, or discharge  ENT:  No difficulty hearing, tinnitus, vertigo; No sinus or throat pain  NECK: No pain or stiffness  RESPIRATORY: No cough, wheezing, chills or hemoptysis; No Shortness of Breath  CARDIOVASCULAR: No chest pain, palpitations, passing out, dizziness, or leg swelling  GASTROINTESTINAL: No abdominal or epigastric pain. No nausea, vomiting, or hematemesis; No diarrhea or constipation. No melena or hematochezia.  GENITOURINARY: No dysuria, frequency, hematuria, or incontinence  NEUROLOGICAL: No headaches, memory loss, loss of strength, numbness, or tremors  SKIN: No itching, burning, rashes, or lesions   LYMPH Nodes: No enlarged glands  ENDOCRINE: No heat or cold intolerance; No hair loss  MUSCULOSKELETAL: No joint pain or swelling; No muscle, back, or extremity pain  PSYCHIATRIC: No depression, anxiety, mood swings, or difficulty sleeping  HEME/LYMPH: No easy bruising, or bleeding gums  ALLERGY AND IMMUNOLOGIC: No hives or eczema	    [ ] All others negative	  [x ] Unable to obtain    PHYSICAL EXAM:  T(C): 36.6 (02-17-19 @ 04:18), Max: 36.6 (02-17-19 @ 04:18)  HR: 90 (02-17-19 @ 04:18) (81 - 90)  BP: 127/73 (02-17-19 @ 04:18) (121/73 - 133/74)  RR: 20 (02-17-19 @ 04:18) (18 - 20)  SpO2: 98% (02-17-19 @ 04:18) (96% - 100%)  Wt(kg): --  I&O's Summary    16 Feb 2019 07:01  -  17 Feb 2019 07:00  --------------------------------------------------------  IN: 900 mL / OUT: 120 mL / NET: 780 mL        Appearance: Normal	  HEENT:   Normal oral mucosa, PERRL, EOMI	  Lymphatic: No lymphadenopathy  Cardiovascular: Normal S1 S2, No JVD, No murmurs, No edema  Respiratory: Lungs clear to auscultation	  Psychiatry: A & O x 3, Mood & affect appropriate  Gastrointestinal:  Soft, Non-tender, + BS	  Skin: No rashes, No ecchymoses, No cyanosis	  Neurologic: Non-focal  Extremities: Normal range of motion, No clubbing, cyanosis or edema  Vascular: Peripheral pulses palpable 2+ bilaterally    MEDICATIONS  (STANDING):  BACItracin   Ointment 1 Application(s) Topical two times a day  Dakins Solution - 1/4 Strength 1 Application(s) Topical three times a day  dextrose 5%. 1000 milliLiter(s) (50 mL/Hr) IV Continuous <Continuous>  dextrose 50% Injectable 12.5 Gram(s) IV Push once  dextrose 50% Injectable 25 Gram(s) IV Push once  dextrose 50% Injectable 25 Gram(s) IV Push once  heparin  Injectable 5000 Unit(s) SubCutaneous every 12 hours  HYDROmorphone  Injectable 0.2 milliGRAM(s) IV Push every 8 hours  insulin lispro (HumaLOG) corrective regimen sliding scale   SubCutaneous every 6 hours  polyethylene glycol 3350 17 Gram(s) Oral daily  senna Syrup 5 milliLiter(s) Oral at bedtime  sodium chloride 0.45% with potassium chloride 20 mEq/L 1000 milliLiter(s) (75 mL/Hr) IV Continuous <Continuous>  zinc oxide 20% Ointment 1 Application(s) Topical three times a day      TELEMETRY: 	    ECG:  	  RADIOLOGY:  OTHER: 	  	  LABS:	 	    CARDIAC MARKERS:                                7.7    14.28 )-----------( 350      ( 15 Feb 2019 10:40 )             26.6     02-15    145  |  103  |  42<H>  ----------------------------<  305<H>  3.1<L>   |  28  |  1.81<H>    Ca    8.0<L>      15 Feb 2019 09:35      proBNP: Serum Pro-Brain Natriuretic Peptide: 04684 pg/mL (02-03 @ 13:07)    Lipid Profile:   HgA1c: Hemoglobin A1C, Whole Blood: 7.4 % (02-04 @ 08:56)    TSH:         Assessment and plan  ---------------------------  no peg as per family request   ? palliative care

## 2019-02-17 NOTE — PROGRESS NOTE ADULT - ASSESSMENT
74 yo male PMH CAD c/b CABG, A fib (on coumadin), CKD stage 3, PAD s/p stents, PEG tube, in-dwelling Hoskins who presents to the nursing home for increased confusion and elevated WBC. History was taken from wife at bedside.	Per wife, patient had been fully functional up until November. He had a hypoglycemic episode, fell, and broke his hip in November. He was admitted to the hospital for surgery, which was uneventful and sent to rehab (in early December).     During the rehab stay, he developed numerous ulcers, including a decubitus ulcer. He was sent back to [facility] for sepsis from the decubitus ulcer. He was treated with abx and returned to rehab for a second time. This second hospital course was complicated by acute renal failure requiring dialysis, IV abx, and "surgery" to clean the ulcer. In the ED, he received Vanc/Zosyn. 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     ID: Will stop Zosyn given hospice eval in progress. Vancomycin stopped. CT chest shows no pneumonia, bilateral effusions. PEG feeds stopped working, now on hold. Leakage around the site and redness around PEG sie. Chronic hoskins changed this admission. Agree with ID. Had bone biopsy done today which now shows GNR. CT of lumbar spine confirms sacral OM.  ABX stopped as now awaiting palliative care placement.      Reanl: CKD III stable, creatinine 1.81. Renal sono confirms MRD. Free water replacement on hold without  PEG.  May resume Lasix just to keep I & O balanced.  Hold for now.     Urology: Stop Flomax, no TOV in near future.     CV: Lasix on hold given SHINE. Stop Plavix. Changed to Heparin 5,000 units bid for DVT prevention.       Endo: episodes of hypoglycemia off insulin. Stopped D5W in IVF given elevated blood sugars. Endo consult requested. PEG feeds on hold given leaking. NPH on hold. Glucose elevated 316. Agree half normal saline 75/h, let blood sugars stabilize off insulin as not currently eating.  BS at goal 192, off NPH.          GI: Peg site leaking past 24 hours. Not able to use now. GI recommends not using tube for one week. GI recommendation for local wound care around PEG site. Agree DNR.  Had phone discussion with wife/proxy this AM. Do not want to procede with NG tube. Favor comfort care, palliative care eval.  wife: [phone]. Likely needs home hospice referral. Awaiting bed in PCU. All blood draws stopped. IVP Dilaudid started by palliative care for pain control.    Await transfer to inpatient palliative care.       Note: BMI of 15.8 equals severe protein calorie malnutrition.

## 2019-02-17 NOTE — PROGRESS NOTE ADULT - SUBJECTIVE AND OBJECTIVE BOX
SUBJECTIVE AND OBJECTIVE:  INTERVAL HPI/OVERNIGHT EVENTS:    DNR on chart: Yes    Allergies    No Known Allergies    Intolerances    MEDICATIONS  (STANDING):  BACItracin   Ointment 1 Application(s) Topical two times a day  Dakins Solution - 1/4 Strength 1 Application(s) Topical three times a day  dextrose 5%. 1000 milliLiter(s) (50 mL/Hr) IV Continuous <Continuous>  dextrose 50% Injectable 12.5 Gram(s) IV Push once  dextrose 50% Injectable 25 Gram(s) IV Push once  dextrose 50% Injectable 25 Gram(s) IV Push once  heparin  Injectable 5000 Unit(s) SubCutaneous every 12 hours  HYDROmorphone  Injectable 0.2 milliGRAM(s) IV Push every 8 hours  insulin lispro (HumaLOG) corrective regimen sliding scale   SubCutaneous every 6 hours  LORazepam   Injectable 0.2 milliGRAM(s) IV Push every 8 hours  polyethylene glycol 3350 17 Gram(s) Oral daily  senna Syrup 5 milliLiter(s) Oral at bedtime  sodium chloride 0.45% with potassium chloride 20 mEq/L 1000 milliLiter(s) (75 mL/Hr) IV Continuous <Continuous>  zinc oxide 20% Ointment 1 Application(s) Topical three times a day    MEDICATIONS  (PRN):  acetaminophen   Tablet .. 650 milliGRAM(s) Oral every 6 hours PRN Temp greater or equal to 38C (100.4F)  dextrose 40% Gel 15 Gram(s) Oral once PRN Blood Glucose LESS THAN 70 milliGRAM(s)/deciliter  glucagon  Injectable 1 milliGRAM(s) IntraMuscular once PRN Glucose LESS THAN 70 milligrams/deciliter  glycopyrrolate Injectable 0.4 milliGRAM(s) IV Push every 6 hours PRN secretions  haloperidol    Injectable 0.5 milliGRAM(s) IV Push every 8 hours PRN Hallucinations  HYDROmorphone  Injectable 0.2 milliGRAM(s) IV Push every 2 hours PRN Labored breathing or dyspnea  HYDROmorphone  Injectable 0.2 milliGRAM(s) IV Push every 2 hours PRN Moderate Pain (4 - 6)  LORazepam   Injectable 0.2 milliGRAM(s) IV Push every 3 hours PRN Agitation or recurrent labored breathing after back to back opioids are given      ITEMS UNCHECKED ARE NOT PRESENT    PRESENT SYMPTOMS: [ ]Unable to obtain due to poor mentation   Source if other than patient:  [ ]Family   [ ]Team     Pain (Impact on QOL):    Location:  Minimal acceptable level (0-10 scale):                   Aggrevating factors:  Quality:  Radiation:  Severity (0-10 scale):    Timing:    Dyspnea:                           [ ]Mild [ ]Moderate [ ]Severe  Anxiety:                             [ ]Mild [ ]Moderate [ ]Severe  Fatigue:                             [ ]Mild [ ]Moderate [ ]Severe  Nausea:                             [ ]Mild [ ]Moderate [ ]Severe  Loss of appetite:              [ ]Mild [ ]Moderate [ ]Severe  Constipation:                    [ ]Mild [ ]Moderate [ ]Severe    PAIN AD Score:	  [email or web address] (Ctrl + left click to view)    Other Symptoms:  [ ]All other review of systems negative     Karnofsky Performance Score/Palliative Performance Status Version 2:         %  PHYSICAL EXAM:  Vital Signs Last 24 Hrs  T(C): 36.6 (17 Feb 2019 04:18), Max: 36.6 (17 Feb 2019 04:18)  T(F): 97.9 (17 Feb 2019 04:18), Max: 97.9 (17 Feb 2019 04:18)  HR: 90 (17 Feb 2019 04:18) (81 - 90)  BP: 127/73 (17 Feb 2019 04:18) (121/73 - 133/74)  BP(mean): --  RR: 20 (17 Feb 2019 04:18) (18 - 20)  SpO2: 98% (17 Feb 2019 04:18) (96% - 100%) I&O's Summary    16 Feb 2019 07:01  -  17 Feb 2019 07:00  --------------------------------------------------------  IN: 900 mL / OUT: 120 mL / NET: 780 mL     GENERAL:  [ ]Alert  [ ]Oriented x   [ ]Lethargic  [ ]Cachexia  [ ]Unarousable  [ ]Verbal  [ ]Non-Verbal  Behavioral:   [ ] Anxiety  [ ] Delirium [ ] Agitation [ ] Other  HEENT:  [ ]Normal   [ ]Dry mouth   [ ]ET Tube/Trach  [ ]Oral lesions  PULMONARY:   [ ]Clear [ ]Tachypnea  [ ]Audible excessive secretions   [ ]Rhonchi        [ ]Right [ ]Left [ ]Bilateral  [ ]Crackles        [ ]Right [ ]Left [ ]Bilateral  [ ]Wheezing     [ ]Right [ ]Left [ ]Bilateral  CARDIOVASCULAR:    [ ]Regular [ ]Irregular [ ]Tachy  [ ]Marvin [ ]Murmur [ ]Other  GASTROINTESTINAL:  [ ]Soft  [ ]Distended   [ ]+BS  [ ]Non tender [ ]Tender  [ ]PEG [ ]OGT/ NGT   Last BM:    GENITOURINARY:  [ ]Normal [ ] Incontinent   [ ]Oliguria/Anuria   [ ]Tejeda  MUSCULOSKELETAL:   [ ]Normal   [ ]Weakness  [ ]Bed/Wheelchair bound [ ]Edema  NEUROLOGIC:   [ ]No focal deficits  [ ] Cognitive impairment  [ ] Dysphagia [ ]Dysarthria [ ] Paresis [ ]Other   SKIN:   [ ]Normal   [ ]Pressure ulcer(s)  [ ]Rash    CRITICAL CARE:  [ ] Shock Present  [ ]Septic [ ]Cardiogenic [ ]Neurologic [ ]Hypovolemic  [ ]  Vasopressors [ ]  Inotropes   [ ] Respiratory failure present  [ ] Acute  [ ] Chronic [ ] Hypoxic  [ ] Hypercarbic [ ] Other  [ ] Other organ failure     LABS:                        7.7    14.28 )-----------( 350      ( 15 Feb 2019 10:40 )             26.6             RADIOLOGY & ADDITIONAL STUDIES:    Protein Calorie Malnutrition:  [ ] PPSV2 < or = 30%  [ ] significant weight loss [ ] poor nutritional intake [ ] amasarca [ ] catabolic state Albumin, Serum: 2.2 g/dL (02-07-19 @ 05:54)  Artificial Nutrition [ ]     REFERRALS:   [ ]Chaplaincy  [ ] Hospice  [ ]Child Life  [ ]Social Work  [ ]Case management [ ]Holistic Therapy   Goals of Care Document: Goals of Care Conversation - Personal Advance Directive [TANK Banuelos] (02-15-19 @ 14:31) SUBJECTIVE AND OBJECTIVE: Patient seen and examined. Agitated, trying to get out of bed, kicking legs, moaning.  RN notified, will scheduled ativan ATC. on board to transfer to PCU when bed available.     INTERVAL HPI/OVERNIGHT EVENTS: received x1 dose of ativan overnight    DNR on chart: Yes    Allergies    No Known Allergies    Intolerances    MEDICATIONS  (STANDING):  BACItracin   Ointment 1 Application(s) Topical two times a day  Dakins Solution - 1/4 Strength 1 Application(s) Topical three times a day  dextrose 5%. 1000 milliLiter(s) (50 mL/Hr) IV Continuous <Continuous>  dextrose 50% Injectable 12.5 Gram(s) IV Push once  dextrose 50% Injectable 25 Gram(s) IV Push once  dextrose 50% Injectable 25 Gram(s) IV Push once  heparin  Injectable 5000 Unit(s) SubCutaneous every 12 hours  HYDROmorphone  Injectable 0.2 milliGRAM(s) IV Push every 8 hours  insulin lispro (HumaLOG) corrective regimen sliding scale   SubCutaneous every 6 hours  LORazepam   Injectable 0.2 milliGRAM(s) IV Push every 8 hours  polyethylene glycol 3350 17 Gram(s) Oral daily  senna Syrup 5 milliLiter(s) Oral at bedtime  sodium chloride 0.45% with potassium chloride 20 mEq/L 1000 milliLiter(s) (75 mL/Hr) IV Continuous <Continuous>  zinc oxide 20% Ointment 1 Application(s) Topical three times a day    MEDICATIONS  (PRN):  acetaminophen   Tablet .. 650 milliGRAM(s) Oral every 6 hours PRN Temp greater or equal to 38C (100.4F)  dextrose 40% Gel 15 Gram(s) Oral once PRN Blood Glucose LESS THAN 70 milliGRAM(s)/deciliter  glucagon  Injectable 1 milliGRAM(s) IntraMuscular once PRN Glucose LESS THAN 70 milligrams/deciliter  glycopyrrolate Injectable 0.4 milliGRAM(s) IV Push every 6 hours PRN secretions  haloperidol    Injectable 0.5 milliGRAM(s) IV Push every 8 hours PRN Hallucinations  HYDROmorphone  Injectable 0.2 milliGRAM(s) IV Push every 2 hours PRN Labored breathing or dyspnea  HYDROmorphone  Injectable 0.2 milliGRAM(s) IV Push every 2 hours PRN Moderate Pain (4 - 6)  LORazepam   Injectable 0.2 milliGRAM(s) IV Push every 3 hours PRN Agitation or recurrent labored breathing after back to back opioids are given      ITEMS UNCHECKED ARE NOT PRESENT    PRESENT SYMPTOMS: [x ]Unable to obtain due to poor mentation   Source if other than patient:  [ ]Family   [ ]Team     Pain (Impact on QOL):    Location:  Minimal acceptable level (0-10 scale):                   Aggrevating factors:  Quality:  Radiation:  Severity (0-10 scale):    Timing:    Dyspnea:                           [ ]Mild [ ]Moderate [ ]Severe  Anxiety:                             [ ]Mild [ ]Moderate [ ]Severe  Fatigue:                             [ ]Mild [ ]Moderate [ ]Severe  Nausea:                             [ ]Mild [ ]Moderate [ ]Severe  Loss of appetite:              [ ]Mild [ ]Moderate [ ]Severe  Constipation:                    [ ]Mild [ ]Moderate [ ]Severe    PAIN AD Score:	  [email or web address] (Ctrl + left click to view)    Other Symptoms:  [ ]All other review of systems negative     Karnofsky Performance Score/Palliative Performance Status Version 2:     30%  PHYSICAL EXAM:  Vital Signs Last 24 Hrs  T(C): 36.6 (17 Feb 2019 04:18), Max: 36.6 (17 Feb 2019 04:18)  T(F): 97.9 (17 Feb 2019 04:18), Max: 97.9 (17 Feb 2019 04:18)  HR: 90 (17 Feb 2019 04:18) (81 - 90)  BP: 127/73 (17 Feb 2019 04:18) (121/73 - 133/74)  BP(mean): --  RR: 20 (17 Feb 2019 04:18) (18 - 20)  SpO2: 98% (17 Feb 2019 04:18) (96% - 100%) I&O's Summary    16 Feb 2019 07:01  -  17 Feb 2019 07:00  --------------------------------------------------------  IN: 900 mL / OUT: 120 mL / NET: 780 mL     GENERAL:  [ ]Alert  [ ]Oriented x   [ ]Lethargic  [ x]Cachexia  [ ]Unarousable  [ ]Verbal  [ ]Non-Verbal  Behavioral:   [ ] Anxiety  [ ] Delirium [ x] Agitation [ ] Other  HEENT:  [ ]Normal   [ x]Dry mouth   [ ]ET Tube/Trach  [ ]Oral lesions  PULMONARY:   [x ]Clear [ ]Tachypnea  [ ]Audible excessive secretions   [ ]Rhonchi        [ ]Right [ ]Left [ ]Bilateral  [ ]Crackles        [ ]Right [ ]Left [ ]Bilateral  [ ]Wheezing     [ ]Right [ ]Left [ ]Bilateral  CARDIOVASCULAR:    [ ]Regular [ ]Irregular [ x]Tachy  [ ]Marvin [ ]Murmur [ ]Other  GASTROINTESTINAL:  [ x]Soft  [ ]Distended   [x ]+BS  [x ]Non tender [ ]Tender  [ ]PEG [ ]OGT/ NGT   Last BM: RN documentation reviewed   GENITOURINARY:  [ ]Normal [ ] Incontinent   [ ]Oliguria/Anuria   [x ]Tejeda  MUSCULOSKELETAL:   [ ]Normal   [ ]Weakness  [x ]Bed/Wheelchair bound [ ]Edema  NEUROLOGIC:   [ ]No focal deficits  [x ] Cognitive impairment  [ ] Dysphagia [ ]Dysarthria [ ] Paresis [ ]Other   SKIN:   [ ]Normal   [x ]Pressure ulcer(s)  [ ]Rash    CRITICAL CARE:  [ ] Shock Present  [ ]Septic [ ]Cardiogenic [ ]Neurologic [ ]Hypovolemic  [ ]  Vasopressors [ ]  Inotropes   [ ] Respiratory failure present  [ ] Acute  [ ] Chronic [ ] Hypoxic  [ ] Hypercarbic [ ] Other  [ ] Other organ failure     LABS:                        7.7    14.28 )-----------( 350      ( 15 Feb 2019 10:40 )             26.6     RADIOLOGY & ADDITIONAL STUDIES: no new imaging studies for review    Protein Calorie Malnutrition:  [x ] PPSV2 < or = 30%  [x ] significant weight loss [ ] poor nutritional intake [ ] anasarca [x ] catabolic state Albumin, Serum: 2.2 g/dL (02-07-19 @ 05:54)  Artificial Nutrition [ ]     REFERRALS:   [ ]Chaplaincy  [ ] Hospice  [ ]Child Life  [ ]Social Work  [ ]Case management [ ]Holistic Therapy   Goals of Care Document: Goals of Care Conversation - Personal Advance Directive [TANK Banuelos] (02-15-19 @ 14:31)

## 2019-02-18 PROCEDURE — 99233 SBSQ HOSP IP/OBS HIGH 50: CPT

## 2019-02-18 RX ADMIN — Medication 1 APPLICATION(S): at 05:52

## 2019-02-18 RX ADMIN — Medication 0.2 MILLIGRAM(S): at 23:39

## 2019-02-18 RX ADMIN — ZINC OXIDE 1 APPLICATION(S): 200 OINTMENT TOPICAL at 05:56

## 2019-02-18 RX ADMIN — HYDROMORPHONE HYDROCHLORIDE 0.2 MILLIGRAM(S): 2 INJECTION INTRAMUSCULAR; INTRAVENOUS; SUBCUTANEOUS at 23:40

## 2019-02-18 RX ADMIN — HYDROMORPHONE HYDROCHLORIDE 0.2 MILLIGRAM(S): 2 INJECTION INTRAMUSCULAR; INTRAVENOUS; SUBCUTANEOUS at 13:35

## 2019-02-18 RX ADMIN — HEPARIN SODIUM 5000 UNIT(S): 5000 INJECTION INTRAVENOUS; SUBCUTANEOUS at 05:55

## 2019-02-18 RX ADMIN — HYDROMORPHONE HYDROCHLORIDE 0.2 MILLIGRAM(S): 2 INJECTION INTRAMUSCULAR; INTRAVENOUS; SUBCUTANEOUS at 13:19

## 2019-02-18 RX ADMIN — HEPARIN SODIUM 5000 UNIT(S): 5000 INJECTION INTRAVENOUS; SUBCUTANEOUS at 18:05

## 2019-02-18 RX ADMIN — Medication 1 APPLICATION(S): at 18:06

## 2019-02-18 RX ADMIN — ZINC OXIDE 1 APPLICATION(S): 200 OINTMENT TOPICAL at 13:19

## 2019-02-18 RX ADMIN — HYDROMORPHONE HYDROCHLORIDE 0.2 MILLIGRAM(S): 2 INJECTION INTRAMUSCULAR; INTRAVENOUS; SUBCUTANEOUS at 06:19

## 2019-02-18 RX ADMIN — ZINC OXIDE 1 APPLICATION(S): 200 OINTMENT TOPICAL at 23:40

## 2019-02-18 RX ADMIN — Medication 0.2 MILLIGRAM(S): at 05:49

## 2019-02-18 RX ADMIN — Medication 1 APPLICATION(S): at 13:19

## 2019-02-18 RX ADMIN — Medication 1 APPLICATION(S): at 23:40

## 2019-02-18 RX ADMIN — HYDROMORPHONE HYDROCHLORIDE 0.2 MILLIGRAM(S): 2 INJECTION INTRAMUSCULAR; INTRAVENOUS; SUBCUTANEOUS at 05:49

## 2019-02-18 NOTE — PROGRESS NOTE ADULT - SUBJECTIVE AND OBJECTIVE BOX
CARDIOLOGY     PROGRESS  NOTE   ________________________________________________    CHIEF COMPLAINT:Patient is a 73y old  Male who presents with a chief complaint of Sepsis (17 Feb 2019 10:38)  lethargic.  	  REVIEW OF SYSTEMS:  CONSTITUTIONAL: No fever, weight loss, or fatigue  EYES: No eye pain, visual disturbances, or discharge  ENT:  No difficulty hearing, tinnitus, vertigo; No sinus or throat pain  NECK: No pain or stiffness  RESPIRATORY: No cough, wheezing, chills or hemoptysis; + Shortness of Breath  CARDIOVASCULAR: No chest pain, palpitations, passing out, dizziness, or leg swelling  GASTROINTESTINAL: No abdominal or epigastric pain. No nausea, vomiting, or hematemesis; No diarrhea or constipation. No melena or hematochezia.  GENITOURINARY: No dysuria, frequency, hematuria, or incontinence  NEUROLOGICAL: No headaches, memory loss, loss of strength, numbness, or tremors  SKIN: No itching, burning, rashes, or lesions   LYMPH Nodes: No enlarged glands  ENDOCRINE: No heat or cold intolerance; No hair loss  MUSCULOSKELETAL: No joint pain or swelling; No muscle, back, or extremity pain  PSYCHIATRIC: No depression, anxiety, mood swings, or difficulty sleeping  HEME/LYMPH: No easy bruising, or bleeding gums  ALLERGY AND IMMUNOLOGIC: No hives or eczema	    [ ] All others negative	  [ ] Unable to obtain    PHYSICAL EXAM:  T(C): 36.4 (02-18-19 @ 04:16), Max: 36.5 (02-17-19 @ 11:40)  HR: 93 (02-18-19 @ 04:16) (93 - 99)  BP: 112/72 (02-18-19 @ 05:47) (99/60 - 112/72)  RR: 20 (02-18-19 @ 05:47) (18 - 20)  SpO2: 91% (02-18-19 @ 04:16) (91% - 100%)  Wt(kg): --  I&O's Summary    17 Feb 2019 07:01  -  18 Feb 2019 07:00  --------------------------------------------------------  IN: 900 mL / OUT: 650 mL / NET: 250 mL        Appearance: Normal	  HEENT:   Normal oral mucosa, PERRL, EOMI	  Lymphatic: No lymphadenopathy  Cardiovascular: Normal S1 S2, No JVD, + murmurs, No edema  Respiratory: Lungs clear to auscultation	  Psychiatry: A & O x 3, Mood & affect appropriate  Gastrointestinal:  Soft, Non-tender, + BS	  Skin: No rashes, No ecchymoses, No cyanosis	  Neurologic: Non-focal  Extremities: Normal range of motion, No clubbing, cyanosis or edema  Vascular: Peripheral pulses palpable 2+ bilaterally    MEDICATIONS  (STANDING):  BACItracin   Ointment 1 Application(s) Topical two times a day  Dakins Solution - 1/4 Strength 1 Application(s) Topical three times a day  dextrose 5%. 1000 milliLiter(s) (50 mL/Hr) IV Continuous <Continuous>  dextrose 50% Injectable 12.5 Gram(s) IV Push once  dextrose 50% Injectable 25 Gram(s) IV Push once  dextrose 50% Injectable 25 Gram(s) IV Push once  heparin  Injectable 5000 Unit(s) SubCutaneous every 12 hours  HYDROmorphone  Injectable 0.2 milliGRAM(s) IV Push every 8 hours  insulin lispro (HumaLOG) corrective regimen sliding scale   SubCutaneous every 6 hours  LORazepam   Injectable 0.2 milliGRAM(s) IV Push every 8 hours  polyethylene glycol 3350 17 Gram(s) Oral daily  senna Syrup 5 milliLiter(s) Oral at bedtime  sodium chloride 0.45% with potassium chloride 20 mEq/L 1000 milliLiter(s) (75 mL/Hr) IV Continuous <Continuous>  zinc oxide 20% Ointment 1 Application(s) Topical three times a day      TELEMETRY: 	    ECG:  	  RADIOLOGY:  OTHER: 	  	  LABS:	 	    CARDIAC MARKERS:                  proBNP: Serum Pro-Brain Natriuretic Peptide: 32087 pg/mL (02-03 @ 13:07)    Lipid Profile:   HgA1c: Hemoglobin A1C, Whole Blood: 7.4 % (02-04 @ 08:56)    TSH:         Assessment and plan  ---------------------------  pt with severe cardiomyopathy  pt is dnr  palliative care

## 2019-02-18 NOTE — PROGRESS NOTE ADULT - SUBJECTIVE AND OBJECTIVE BOX
INTERVAL HPI/OVERNIGHT EVENTS:  Pt seen and examined at bedside.     Allergies/Intolerance: No Known Allergies      MEDICATIONS  (STANDING):  BACItracin   Ointment 1 Application(s) Topical two times a day  Dakins Solution - 1/4 Strength 1 Application(s) Topical three times a day  dextrose 5%. 1000 milliLiter(s) (50 mL/Hr) IV Continuous <Continuous>  dextrose 50% Injectable 12.5 Gram(s) IV Push once  dextrose 50% Injectable 25 Gram(s) IV Push once  dextrose 50% Injectable 25 Gram(s) IV Push once  heparin  Injectable 5000 Unit(s) SubCutaneous every 12 hours  HYDROmorphone  Injectable 0.2 milliGRAM(s) IV Push every 8 hours  LORazepam   Injectable 0.2 milliGRAM(s) IV Push every 8 hours  polyethylene glycol 3350 17 Gram(s) Oral daily  senna Syrup 5 milliLiter(s) Oral at bedtime  sodium chloride 0.45% with potassium chloride 20 mEq/L 1000 milliLiter(s) (75 mL/Hr) IV Continuous <Continuous>  zinc oxide 20% Ointment 1 Application(s) Topical three times a day    MEDICATIONS  (PRN):  acetaminophen   Tablet .. 650 milliGRAM(s) Oral every 6 hours PRN Temp greater or equal to 38C (100.4F)  dextrose 40% Gel 15 Gram(s) Oral once PRN Blood Glucose LESS THAN 70 milliGRAM(s)/deciliter  glucagon  Injectable 1 milliGRAM(s) IntraMuscular once PRN Glucose LESS THAN 70 milligrams/deciliter  glycopyrrolate Injectable 0.4 milliGRAM(s) IV Push every 6 hours PRN secretions  haloperidol    Injectable 0.5 milliGRAM(s) IV Push every 8 hours PRN Hallucinations  HYDROmorphone  Injectable 0.2 milliGRAM(s) IV Push every 2 hours PRN Labored breathing or dyspnea  HYDROmorphone  Injectable 0.2 milliGRAM(s) IV Push every 2 hours PRN Moderate Pain (4 - 6)  LORazepam   Injectable 0.2 milliGRAM(s) IV Push every 3 hours PRN Agitation or recurrent labored breathing after back to back opioids are given        ROS: all systems reviewed and wnl      PHYSICAL EXAMINATION:  Vital Signs Last 24 Hrs  T(C): 36.4 (18 Feb 2019 04:16), Max: 36.5 (17 Feb 2019 11:40)  T(F): 97.5 (18 Feb 2019 04:16), Max: 97.7 (17 Feb 2019 11:40)  HR: 93 (18 Feb 2019 04:16) (93 - 99)  BP: 112/72 (18 Feb 2019 05:47) (99/60 - 112/72)  BP(mean): --  RR: 20 (18 Feb 2019 05:47) (18 - 20)  SpO2: 91% (18 Feb 2019 04:16) (91% - 100%)  CAPILLARY BLOOD GLUCOSE          02-17 @ 07:01  -  02-18 @ 07:00  --------------------------------------------------------  IN: 900 mL / OUT: 650 mL / NET: 250 mL        GENERAL:   NECK: supple, No JVD  CHEST/LUNG: clear to auscultation bilaterally; no rales, rhonchi, or wheezing b/l  HEART: normal S1, S2  ABDOMEN: BS+, soft, ND, NT   EXTREMITIES:  pulses palpable; no clubbing, cyanosis, or edema b/l LEs  SKIN: no rashes or lesions      LABS: INTERVAL HPI/OVERNIGHT EVENTS:  Pt seen and examined at bedside.     Allergies/Intolerance: No Known Allergies      MEDICATIONS  (STANDING):  BACItracin   Ointment 1 Application(s) Topical two times a day  Dakins Solution - 1/4 Strength 1 Application(s) Topical three times a day  dextrose 5%. 1000 milliLiter(s) (50 mL/Hr) IV Continuous <Continuous>  dextrose 50% Injectable 12.5 Gram(s) IV Push once  dextrose 50% Injectable 25 Gram(s) IV Push once  dextrose 50% Injectable 25 Gram(s) IV Push once  heparin  Injectable 5000 Unit(s) SubCutaneous every 12 hours  HYDROmorphone  Injectable 0.2 milliGRAM(s) IV Push every 8 hours  LORazepam   Injectable 0.2 milliGRAM(s) IV Push every 8 hours  polyethylene glycol 3350 17 Gram(s) Oral daily  senna Syrup 5 milliLiter(s) Oral at bedtime  sodium chloride 0.45% with potassium chloride 20 mEq/L 1000 milliLiter(s) (75 mL/Hr) IV Continuous <Continuous>  zinc oxide 20% Ointment 1 Application(s) Topical three times a day    MEDICATIONS  (PRN):  acetaminophen   Tablet .. 650 milliGRAM(s) Oral every 6 hours PRN Temp greater or equal to 38C (100.4F)  dextrose 40% Gel 15 Gram(s) Oral once PRN Blood Glucose LESS THAN 70 milliGRAM(s)/deciliter  glucagon  Injectable 1 milliGRAM(s) IntraMuscular once PRN Glucose LESS THAN 70 milligrams/deciliter  glycopyrrolate Injectable 0.4 milliGRAM(s) IV Push every 6 hours PRN secretions  haloperidol    Injectable 0.5 milliGRAM(s) IV Push every 8 hours PRN Hallucinations  HYDROmorphone  Injectable 0.2 milliGRAM(s) IV Push every 2 hours PRN Labored breathing or dyspnea  HYDROmorphone  Injectable 0.2 milliGRAM(s) IV Push every 2 hours PRN Moderate Pain (4 - 6)  LORazepam   Injectable 0.2 milliGRAM(s) IV Push every 3 hours PRN Agitation or recurrent labored breathing after back to back opioids are given        ROS: all systems reviewed and wnl      PHYSICAL EXAMINATION:  Vital Signs Last 24 Hrs  T(C): 36.4 (18 Feb 2019 04:16), Max: 36.5 (17 Feb 2019 11:40)  T(F): 97.5 (18 Feb 2019 04:16), Max: 97.7 (17 Feb 2019 11:40)  HR: 93 (18 Feb 2019 04:16) (93 - 99)  BP: 112/72 (18 Feb 2019 05:47) (99/60 - 112/72)  BP(mean): --  RR: 20 (18 Feb 2019 05:47) (18 - 20)  SpO2: 91% (18 Feb 2019 04:16) (91% - 100%)  CAPILLARY BLOOD GLUCOSE          02-17 @ 07:01  -  02-18 @ 07:00  --------------------------------------------------------  IN: 900 mL / OUT: 650 mL / NET: 250 mL        GENERAL: in bed, non-verbal, no SOB or fevers  NECK: supple, No JVD  CHEST/LUNG: clear to auscultation bilaterally; no rales, rhonchi, or wheezing b/l  HEART: normal S1, S2  ABDOMEN: BS+, soft, ND, NT, old PEG tube in place   EXTREMITIES:  pulses palpable; no clubbing, cyanosis, or edema b/l LEs  SKIN: no rashes or lesions      LABS:

## 2019-02-18 NOTE — PROGRESS NOTE ADULT - PROBLEM SELECTOR PLAN 4
dilaudid PRN for dyspnea ordered, x1 BTD of dilaudid in last 24 hours  would given ativan if refractory

## 2019-02-18 NOTE — PROGRESS NOTE ADULT - ASSESSMENT
74 yo male PMH CAD c/b CABG, A fib (on coumadin), CKD stage 3, PAD s/p stents, PEG tube, in-dwelling Hoskins who presents to the nursing home for increased confusion and elevated WBC. History was taken from wife at bedside.	Per wife, patient had been fully functional up until November. He had a hypoglycemic episode, fell, and broke his hip in November. He was admitted to the hospital for surgery, which was uneventful and sent to rehab (in early December).     During the rehab stay, he developed numerous ulcers, including a decubitus ulcer. He was sent back to [facility] for sepsis from the decubitus ulcer. He was treated with abx and returned to rehab for a second time. This second hospital course was complicated by acute renal failure requiring dialysis, IV abx, and "surgery" to clean the ulcer. In the ED, he received Vanc/Zosyn. 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     ID: Will stop Zosyn given hospice eval in progress. Vancomycin stopped. CT chest shows no pneumonia, bilateral effusions. PEG feeds stopped working, now on hold. PEG site looks necrotic. Leakage around the site and redness around PEG sie. Chronic hoskins changed this admission. Agree with ID. Had bone biopsy done today which now shows GNR. CT of lumbar spine confirms sacral OM.  ABX stopped as now awaiting palliative care placement. PEG feeds cannot be restarted. Family refuse NGT.      Reanl: CKD III stable, creatinine 1.81. Renal sono confirms MRD. Free water replacement on hold without  PEG.  May resume Lasix just to keep I & O balanced.  Hold for now.     Urology: Stop Flomax, no TOV in near future.     CV: Lasix on hold given SHINE. Stop Plavix. Changed to Heparin 5,000 units bid for DVT prevention.       Endo: episodes of hypoglycemia off insulin. Stopped D5W in IVF given elevated blood sugars. Endo consult requested. PEG feeds on hold given leaking. NPH on hold. Glucose elevated 316. Agree half normal saline 75/h, let blood sugars stabilize off insulin as not currently eating.  BS at goal 192, off NPH.          GI: Peg site leaking past 24 hours. Not able to use now. GI recommends not using tube for one week. GI recommendation for local wound care around PEG site. Agree DNR.  Had phone discussion with wife/proxy this AM. Do not want to procede with NG tube. Favor comfort care, palliative care eval.  wife: [phone]. Likely needs home hospice referral. Awaiting bed in PCU. All blood draws stopped. IVP Dilaudid started by palliative care for pain control.    Await transfer to inpatient palliative care.       Note: BMI of 15.8 equals severe protein calorie malnutrition.

## 2019-02-18 NOTE — PROGRESS NOTE ADULT - SUBJECTIVE AND OBJECTIVE BOX
SUBJECTIVE AND OBJECTIVE: Patient seen and examined, less agitated today compared to yesterday  appears comfortable, breathing non labored. minimally responsive today    INTERVAL HPI/OVERNIGHT EVENTS: none    DNR on chart: Yes    Allergies    No Known Allergies    Intolerances    MEDICATIONS  (STANDING):  BACItracin   Ointment 1 Application(s) Topical two times a day  Dakins Solution - 1/4 Strength 1 Application(s) Topical three times a day  dextrose 5%. 1000 milliLiter(s) (50 mL/Hr) IV Continuous <Continuous>  dextrose 50% Injectable 12.5 Gram(s) IV Push once  dextrose 50% Injectable 25 Gram(s) IV Push once  dextrose 50% Injectable 25 Gram(s) IV Push once  heparin  Injectable 5000 Unit(s) SubCutaneous every 12 hours  HYDROmorphone  Injectable 0.2 milliGRAM(s) IV Push every 8 hours  LORazepam   Injectable 0.2 milliGRAM(s) IV Push every 8 hours  polyethylene glycol 3350 17 Gram(s) Oral daily  senna Syrup 5 milliLiter(s) Oral at bedtime  sodium chloride 0.45% with potassium chloride 20 mEq/L 1000 milliLiter(s) (75 mL/Hr) IV Continuous <Continuous>  zinc oxide 20% Ointment 1 Application(s) Topical three times a day    MEDICATIONS  (PRN):  acetaminophen   Tablet .. 650 milliGRAM(s) Oral every 6 hours PRN Temp greater or equal to 38C (100.4F)  dextrose 40% Gel 15 Gram(s) Oral once PRN Blood Glucose LESS THAN 70 milliGRAM(s)/deciliter  glucagon  Injectable 1 milliGRAM(s) IntraMuscular once PRN Glucose LESS THAN 70 milligrams/deciliter  glycopyrrolate Injectable 0.4 milliGRAM(s) IV Push every 6 hours PRN secretions  haloperidol    Injectable 0.5 milliGRAM(s) IV Push every 8 hours PRN Hallucinations  HYDROmorphone  Injectable 0.2 milliGRAM(s) IV Push every 2 hours PRN Labored breathing or dyspnea  HYDROmorphone  Injectable 0.2 milliGRAM(s) IV Push every 2 hours PRN Moderate Pain (4 - 6)  LORazepam   Injectable 0.2 milliGRAM(s) IV Push every 3 hours PRN Agitation or recurrent labored breathing after back to back opioids are given      ITEMS UNCHECKED ARE NOT PRESENT    PRESENT SYMPTOMS: [ x]Unable to obtain due to poor mentation   Source if other than patient:  [ ]Family   [ ]Team     Pain (Impact on QOL):    Location:  Minimal acceptable level (0-10 scale):                   Aggrevating factors:  Quality:  Radiation:  Severity (0-10 scale):    Timing:    Dyspnea:                           [ ]Mild [ ]Moderate [ ]Severe  Anxiety:                             [ ]Mild [ ]Moderate [ ]Severe  Fatigue:                             [ ]Mild [ ]Moderate [ ]Severe  Nausea:                             [ ]Mild [ ]Moderate [ ]Severe  Loss of appetite:              [ ]Mild [ ]Moderate [ ]Severe  Constipation:                    [ ]Mild [ ]Moderate [ ]Severe    PAIN AD Score:	  [email or web address] (Ctrl + left click to view)    Other Symptoms:  [ ]All other review of systems negative     Karnofsky Performance Score/Palliative Performance Status Version 2:        10 %  PHYSICAL EXAM:  Vital Signs Last 24 Hrs  T(C): 36.2 (18 Feb 2019 11:19), Max: 36.4 (18 Feb 2019 04:16)  T(F): 97.2 (18 Feb 2019 11:19), Max: 97.5 (18 Feb 2019 04:16)  HR: 90 (18 Feb 2019 11:19) (90 - 93)  BP: 112/72 (18 Feb 2019 05:47) (99/60 - 112/72)  BP(mean): --  RR: 19 (18 Feb 2019 11:19) (18 - 20)  SpO2: 100% (18 Feb 2019 11:19) (91% - 100%) I&O's Summary    17 Feb 2019 07:01  -  18 Feb 2019 07:00  --------------------------------------------------------  IN: 900 mL / OUT: 650 mL / NET: 250 mL     GENERAL:  [ ]Alert  [ ]Oriented x   [ x]Lethargic  [ ]Cachexia  [ ]Unarousable  [ ]Verbal  [ ]Non-Verbal  Behavioral:   [ ] Anxiety  [ ] Delirium [ ] Agitation [ ] Other  HEENT:  [ ]Normal   [x ]Dry mouth   [ ]ET Tube/Trach  [ ]Oral lesions  PULMONARY:   [ x]Clear [ ]Tachypnea  [ ]Audible excessive secretions   [ ]Rhonchi        [ ]Right [ ]Left [ ]Bilateral  [ ]Crackles        [ ]Right [ ]Left [ ]Bilateral  [ ]Wheezing     [ ]Right [ ]Left [ ]Bilateral  CARDIOVASCULAR:    [ ]Regular [ ]Irregular [x ]Tachy  [ ]Marvin [ ]Murmur [ ]Other  GASTROINTESTINAL:  [x ]Soft  [ ]Distended   [ ]+BS  [x ]Non tender [ ]Tender  [ ]PEG [ ]OGT/ NGT   Last BM: RN documentation reviewed   GENITOURINARY:  [ ]Normal [ ] Incontinent   [ ]Oliguria/Anuria   [x ]Tejeda  MUSCULOSKELETAL:   [ ]Normal   [x ]Weakness  [ ]Bed/Wheelchair bound [ ]Edema  NEUROLOGIC:   [ ]No focal deficits  [x ] Cognitive impairment  [ ] Dysphagia [ ]Dysarthria [ ] Paresis [ ]Other   SKIN: ecchymoses, skin lesions  [ ]Normal   [ x]Pressure ulcer(s)  [ ]Rash    CRITICAL CARE:  [ ] Shock Present  [ ]Septic [ ]Cardiogenic [ ]Neurologic [ ]Hypovolemic  [ ]  Vasopressors [ ]  Inotropes   [ ] Respiratory failure present  [ ] Acute  [ ] Chronic [ ] Hypoxic  [ ] Hypercarbic [ ] Other  [ ] Other organ failure     LABS: no additional labs      RADIOLOGY & ADDITIONAL STUDIES: no additional images for review    Protein Calorie Malnutrition:  [x ] PPSV2 < or = 30%  [ ] significant weight loss [x ] poor nutritional intake [ ] anasarca [x ] catabolic state Albumin, Serum: 2.2 g/dL (02-07-19 @ 05:54)  Artificial Nutrition [ ]     REFERRALS:   [ ]Chaplaincy  [ x] Hospice  [ ]Child Life  [ ]Social Work  [ ]Case management [ ]Holistic Therapy   Goals of Care Document: Goals of Care Conversation - Personal Advance Directive [TANK Banuelos] (02-15-19 @ 14:31)

## 2019-02-18 NOTE — PROGRESS NOTE ADULT - PROBLEM SELECTOR PLAN 6
scheduled ativan q8h. please given haldol prn for hallucination as above  appears more comfortable on examination today  x2 BTD of ativan

## 2019-02-18 NOTE — PROGRESS NOTE ADULT - ASSESSMENT
74 yo male with CAD c/b CABG, A fib (on coumadin), CKD stage 3, PAD s/p stents, PEG tube, in-dwelling Tejeda who presents to the nursing home for increased confusion and elevated WBC. In November he had a hypoglycemic episode, fell, and broke his hip. He was admitted to the hospital for surgery, which was uneventful and sent to rehab (in early December). During the rehab stay, he developed numerous ulcers, including a decubitus ulcer. He was sent back to [facility] for sepsis from the decubitus ulcer. He was treated with abx and returned to rehab for a second time. This second hospital course was complicated by acute renal failure requiring dialysis, IV abx, and surgery. This time, he has been hospitalized for 11 days. 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 Palliative care is called for helping with GOC in the setting of an elderly patient with multiple comorbid conditions, recurrent hospitalizations (LACE > 10), several complications, and significant functional impairment.

## 2019-02-18 NOTE — PROGRESS NOTE ADULT - PROBLEM SELECTOR PLAN 7
pending transfer to PCU, currently no beds available  please call palliative care on call if any acute issues

## 2019-02-19 VITALS — RESPIRATION RATE: 34 BRPM

## 2019-02-19 LAB — SURGICAL PATHOLOGY STUDY: SIGNIFICANT CHANGE UP

## 2019-02-19 PROCEDURE — 82565 ASSAY OF CREATININE: CPT

## 2019-02-19 PROCEDURE — 82746 ASSAY OF FOLIC ACID SERUM: CPT

## 2019-02-19 PROCEDURE — 84484 ASSAY OF TROPONIN QUANT: CPT

## 2019-02-19 PROCEDURE — 83735 ASSAY OF MAGNESIUM: CPT

## 2019-02-19 PROCEDURE — 84133 ASSAY OF URINE POTASSIUM: CPT

## 2019-02-19 PROCEDURE — 86850 RBC ANTIBODY SCREEN: CPT

## 2019-02-19 PROCEDURE — 87075 CULTR BACTERIA EXCEPT BLOOD: CPT

## 2019-02-19 PROCEDURE — 93005 ELECTROCARDIOGRAM TRACING: CPT

## 2019-02-19 PROCEDURE — 82947 ASSAY GLUCOSE BLOOD QUANT: CPT

## 2019-02-19 PROCEDURE — 76770 US EXAM ABDO BACK WALL COMP: CPT

## 2019-02-19 PROCEDURE — 82962 GLUCOSE BLOOD TEST: CPT

## 2019-02-19 PROCEDURE — 74150 CT ABDOMEN W/O CONTRAST: CPT

## 2019-02-19 PROCEDURE — 82436 ASSAY OF URINE CHLORIDE: CPT

## 2019-02-19 PROCEDURE — 84300 ASSAY OF URINE SODIUM: CPT

## 2019-02-19 PROCEDURE — 72131 CT LUMBAR SPINE W/O DYE: CPT

## 2019-02-19 PROCEDURE — 80048 BASIC METABOLIC PNL TOTAL CA: CPT

## 2019-02-19 PROCEDURE — 84295 ASSAY OF SERUM SODIUM: CPT

## 2019-02-19 PROCEDURE — 85730 THROMBOPLASTIN TIME PARTIAL: CPT

## 2019-02-19 PROCEDURE — 85652 RBC SED RATE AUTOMATED: CPT

## 2019-02-19 PROCEDURE — 82570 ASSAY OF URINE CREATININE: CPT

## 2019-02-19 PROCEDURE — 83550 IRON BINDING TEST: CPT

## 2019-02-19 PROCEDURE — 84132 ASSAY OF SERUM POTASSIUM: CPT

## 2019-02-19 PROCEDURE — 96374 THER/PROPH/DIAG INJ IV PUSH: CPT

## 2019-02-19 PROCEDURE — 71250 CT THORAX DX C-: CPT

## 2019-02-19 PROCEDURE — 87040 BLOOD CULTURE FOR BACTERIA: CPT

## 2019-02-19 PROCEDURE — 80202 ASSAY OF VANCOMYCIN: CPT

## 2019-02-19 PROCEDURE — 81001 URINALYSIS AUTO W/SCOPE: CPT

## 2019-02-19 PROCEDURE — 93971 EXTREMITY STUDY: CPT

## 2019-02-19 PROCEDURE — 83540 ASSAY OF IRON: CPT

## 2019-02-19 PROCEDURE — 86140 C-REACTIVE PROTEIN: CPT

## 2019-02-19 PROCEDURE — 97161 PT EVAL LOW COMPLEX 20 MIN: CPT

## 2019-02-19 PROCEDURE — 83880 ASSAY OF NATRIURETIC PEPTIDE: CPT

## 2019-02-19 PROCEDURE — 97530 THERAPEUTIC ACTIVITIES: CPT

## 2019-02-19 PROCEDURE — 82550 ASSAY OF CK (CPK): CPT

## 2019-02-19 PROCEDURE — 84100 ASSAY OF PHOSPHORUS: CPT

## 2019-02-19 PROCEDURE — 99285 EMERGENCY DEPT VISIT HI MDM: CPT | Mod: 25

## 2019-02-19 PROCEDURE — 85027 COMPLETE CBC AUTOMATED: CPT

## 2019-02-19 PROCEDURE — 82435 ASSAY OF BLOOD CHLORIDE: CPT

## 2019-02-19 PROCEDURE — 86901 BLOOD TYPING SEROLOGIC RH(D): CPT

## 2019-02-19 PROCEDURE — 97116 GAIT TRAINING THERAPY: CPT

## 2019-02-19 PROCEDURE — 87186 SC STD MICRODIL/AGAR DIL: CPT

## 2019-02-19 PROCEDURE — 83036 HEMOGLOBIN GLYCOSYLATED A1C: CPT

## 2019-02-19 PROCEDURE — 82330 ASSAY OF CALCIUM: CPT

## 2019-02-19 PROCEDURE — 49465 FLUORO EXAM OF G/COLON TUBE: CPT

## 2019-02-19 PROCEDURE — 92610 EVALUATE SWALLOWING FUNCTION: CPT

## 2019-02-19 PROCEDURE — 86900 BLOOD TYPING SEROLOGIC ABO: CPT

## 2019-02-19 PROCEDURE — 71045 X-RAY EXAM CHEST 1 VIEW: CPT

## 2019-02-19 PROCEDURE — 93306 TTE W/DOPPLER COMPLETE: CPT

## 2019-02-19 PROCEDURE — 82553 CREATINE MB FRACTION: CPT

## 2019-02-19 PROCEDURE — 83605 ASSAY OF LACTIC ACID: CPT

## 2019-02-19 PROCEDURE — 88304 TISSUE EXAM BY PATHOLOGIST: CPT

## 2019-02-19 PROCEDURE — 93923 UPR/LXTR ART STDY 3+ LVLS: CPT

## 2019-02-19 PROCEDURE — 82728 ASSAY OF FERRITIN: CPT

## 2019-02-19 PROCEDURE — 82803 BLOOD GASES ANY COMBINATION: CPT

## 2019-02-19 PROCEDURE — 80053 COMPREHEN METABOLIC PANEL: CPT

## 2019-02-19 PROCEDURE — 85014 HEMATOCRIT: CPT

## 2019-02-19 PROCEDURE — 83935 ASSAY OF URINE OSMOLALITY: CPT

## 2019-02-19 PROCEDURE — 87070 CULTURE OTHR SPECIMN AEROBIC: CPT

## 2019-02-19 PROCEDURE — 85610 PROTHROMBIN TIME: CPT

## 2019-02-19 PROCEDURE — 87086 URINE CULTURE/COLONY COUNT: CPT

## 2019-02-19 RX ORDER — HYDROMORPHONE HYDROCHLORIDE 2 MG/ML
0.4 INJECTION INTRAMUSCULAR; INTRAVENOUS; SUBCUTANEOUS
Qty: 0 | Refills: 0 | Status: DISCONTINUED | OUTPATIENT
Start: 2019-02-19 | End: 2019-02-19

## 2019-02-19 RX ORDER — HYDROMORPHONE HYDROCHLORIDE 2 MG/ML
0.2 INJECTION INTRAMUSCULAR; INTRAVENOUS; SUBCUTANEOUS EVERY 6 HOURS
Qty: 0 | Refills: 0 | Status: DISCONTINUED | OUTPATIENT
Start: 2019-02-19 | End: 2019-02-19

## 2019-02-19 RX ADMIN — ZINC OXIDE 1 APPLICATION(S): 200 OINTMENT TOPICAL at 14:17

## 2019-02-19 RX ADMIN — Medication 1 APPLICATION(S): at 14:17

## 2019-02-19 RX ADMIN — Medication 1 APPLICATION(S): at 06:40

## 2019-02-19 RX ADMIN — HYDROMORPHONE HYDROCHLORIDE 0.2 MILLIGRAM(S): 2 INJECTION INTRAMUSCULAR; INTRAVENOUS; SUBCUTANEOUS at 14:17

## 2019-02-19 RX ADMIN — HEPARIN SODIUM 5000 UNIT(S): 5000 INJECTION INTRAVENOUS; SUBCUTANEOUS at 06:43

## 2019-02-19 RX ADMIN — DEXTROSE MONOHYDRATE, SODIUM CHLORIDE, AND POTASSIUM CHLORIDE 75 MILLILITER(S): 50; .745; 4.5 INJECTION, SOLUTION INTRAVENOUS at 04:52

## 2019-02-19 RX ADMIN — Medication 0.2 MILLIGRAM(S): at 14:16

## 2019-02-19 RX ADMIN — HYDROMORPHONE HYDROCHLORIDE 0.2 MILLIGRAM(S): 2 INJECTION INTRAMUSCULAR; INTRAVENOUS; SUBCUTANEOUS at 06:29

## 2019-02-19 RX ADMIN — Medication 1 APPLICATION(S): at 06:41

## 2019-02-19 RX ADMIN — HYDROMORPHONE HYDROCHLORIDE 0.2 MILLIGRAM(S): 2 INJECTION INTRAMUSCULAR; INTRAVENOUS; SUBCUTANEOUS at 18:26

## 2019-02-19 RX ADMIN — Medication 0.2 MILLIGRAM(S): at 17:30

## 2019-02-19 RX ADMIN — HYDROMORPHONE HYDROCHLORIDE 0.2 MILLIGRAM(S): 2 INJECTION INTRAMUSCULAR; INTRAVENOUS; SUBCUTANEOUS at 14:20

## 2019-02-19 RX ADMIN — HYDROMORPHONE HYDROCHLORIDE 0.2 MILLIGRAM(S): 2 INJECTION INTRAMUSCULAR; INTRAVENOUS; SUBCUTANEOUS at 18:27

## 2019-02-19 RX ADMIN — ZINC OXIDE 1 APPLICATION(S): 200 OINTMENT TOPICAL at 06:40

## 2019-02-19 RX ADMIN — Medication 0.2 MILLIGRAM(S): at 07:05

## 2019-02-19 RX ADMIN — HYDROMORPHONE HYDROCHLORIDE 0.2 MILLIGRAM(S): 2 INJECTION INTRAMUSCULAR; INTRAVENOUS; SUBCUTANEOUS at 07:05

## 2019-02-19 NOTE — PROGRESS NOTE ADULT - SUBJECTIVE AND OBJECTIVE BOX
INTERVAL HPI/OVERNIGHT EVENTS:  Pt seen and examined at bedside.     Allergies/Intolerance: No Known Allergies      MEDICATIONS  (STANDING):  Dakins Solution - 1/4 Strength 1 Application(s) Topical three times a day  dextrose 5%. 1000 milliLiter(s) (50 mL/Hr) IV Continuous <Continuous>  dextrose 50% Injectable 12.5 Gram(s) IV Push once  dextrose 50% Injectable 25 Gram(s) IV Push once  dextrose 50% Injectable 25 Gram(s) IV Push once  heparin  Injectable 5000 Unit(s) SubCutaneous every 12 hours  HYDROmorphone  Injectable 0.2 milliGRAM(s) IV Push every 8 hours  LORazepam   Injectable 0.2 milliGRAM(s) IV Push every 8 hours  polyethylene glycol 3350 17 Gram(s) Oral daily  senna Syrup 5 milliLiter(s) Oral at bedtime  sodium chloride 0.45% with potassium chloride 20 mEq/L 1000 milliLiter(s) (75 mL/Hr) IV Continuous <Continuous>  zinc oxide 20% Ointment 1 Application(s) Topical three times a day    MEDICATIONS  (PRN):  acetaminophen   Tablet .. 650 milliGRAM(s) Oral every 6 hours PRN Temp greater or equal to 38C (100.4F)  dextrose 40% Gel 15 Gram(s) Oral once PRN Blood Glucose LESS THAN 70 milliGRAM(s)/deciliter  glucagon  Injectable 1 milliGRAM(s) IntraMuscular once PRN Glucose LESS THAN 70 milligrams/deciliter  glycopyrrolate Injectable 0.4 milliGRAM(s) IV Push every 6 hours PRN secretions  haloperidol    Injectable 0.5 milliGRAM(s) IV Push every 8 hours PRN Hallucinations  HYDROmorphone  Injectable 0.2 milliGRAM(s) IV Push every 2 hours PRN Labored breathing or dyspnea  HYDROmorphone  Injectable 0.2 milliGRAM(s) IV Push every 2 hours PRN Moderate Pain (4 - 6)  LORazepam   Injectable 0.2 milliGRAM(s) IV Push every 3 hours PRN Agitation or recurrent labored breathing after back to back opioids are given        ROS: all systems reviewed and wnl      PHYSICAL EXAMINATION:  Vital Signs Last 24 Hrs  T(C): 36.2 (19 Feb 2019 06:07), Max: 36.5 (18 Feb 2019 21:06)  T(F): 97.2 (19 Feb 2019 06:07), Max: 97.7 (18 Feb 2019 21:06)  HR: 79 (18 Feb 2019 21:06) (79 - 90)  BP: 97/65 (19 Feb 2019 06:46) (84/51 - 97/65)  BP(mean): --  RR: 19 (18 Feb 2019 21:06) (19 - 19)  SpO2: 98% (18 Feb 2019 21:06) (98% - 100%)  CAPILLARY BLOOD GLUCOSE          02-18 @ 07:01  -  02-19 @ 07:00  --------------------------------------------------------  IN: 0 mL / OUT: 200 mL / NET: -200 mL        GENERAL:   NECK: supple, No JVD  CHEST/LUNG: clear to auscultation bilaterally; no rales, rhonchi, or wheezing b/l  HEART: normal S1, S2  ABDOMEN: BS+, soft, ND, NT   EXTREMITIES:  pulses palpable; no clubbing, cyanosis, or edema b/l LEs  SKIN: no rashes or lesions      LABS: INTERVAL HPI/OVERNIGHT EVENTS:  Pt seen and examined at bedside.     Allergies/Intolerance: No Known Allergies      MEDICATIONS  (STANDING):  Dakins Solution - 1/4 Strength 1 Application(s) Topical three times a day  dextrose 5%. 1000 milliLiter(s) (50 mL/Hr) IV Continuous <Continuous>  dextrose 50% Injectable 12.5 Gram(s) IV Push once  dextrose 50% Injectable 25 Gram(s) IV Push once  dextrose 50% Injectable 25 Gram(s) IV Push once  heparin  Injectable 5000 Unit(s) SubCutaneous every 12 hours  HYDROmorphone  Injectable 0.2 milliGRAM(s) IV Push every 8 hours  LORazepam   Injectable 0.2 milliGRAM(s) IV Push every 8 hours  polyethylene glycol 3350 17 Gram(s) Oral daily  senna Syrup 5 milliLiter(s) Oral at bedtime  sodium chloride 0.45% with potassium chloride 20 mEq/L 1000 milliLiter(s) (75 mL/Hr) IV Continuous <Continuous>  zinc oxide 20% Ointment 1 Application(s) Topical three times a day    MEDICATIONS  (PRN):  acetaminophen   Tablet .. 650 milliGRAM(s) Oral every 6 hours PRN Temp greater or equal to 38C (100.4F)  dextrose 40% Gel 15 Gram(s) Oral once PRN Blood Glucose LESS THAN 70 milliGRAM(s)/deciliter  glucagon  Injectable 1 milliGRAM(s) IntraMuscular once PRN Glucose LESS THAN 70 milligrams/deciliter  glycopyrrolate Injectable 0.4 milliGRAM(s) IV Push every 6 hours PRN secretions  haloperidol    Injectable 0.5 milliGRAM(s) IV Push every 8 hours PRN Hallucinations  HYDROmorphone  Injectable 0.2 milliGRAM(s) IV Push every 2 hours PRN Labored breathing or dyspnea  HYDROmorphone  Injectable 0.2 milliGRAM(s) IV Push every 2 hours PRN Moderate Pain (4 - 6)  LORazepam   Injectable 0.2 milliGRAM(s) IV Push every 3 hours PRN Agitation or recurrent labored breathing after back to back opioids are given        ROS: all systems reviewed and wnl      PHYSICAL EXAMINATION:  Vital Signs Last 24 Hrs  T(C): 36.2 (19 Feb 2019 06:07), Max: 36.5 (18 Feb 2019 21:06)  T(F): 97.2 (19 Feb 2019 06:07), Max: 97.7 (18 Feb 2019 21:06)  HR: 79 (18 Feb 2019 21:06) (79 - 90)  BP: 97/65 (19 Feb 2019 06:46) (84/51 - 97/65)  BP(mean): --  RR: 19 (18 Feb 2019 21:06) (19 - 19)  SpO2: 98% (18 Feb 2019 21:06) (98% - 100%)  CAPILLARY BLOOD GLUCOSE          02-18 @ 07:01  -  02-19 @ 07:00  --------------------------------------------------------  IN: 0 mL / OUT: 200 mL / NET: -200 mL        GENERAL: in bed, no clear pain, non-verbal at baseline  NECK: supple, No JVD  CHEST/LUNG: clear to auscultation bilaterally; no rales, rhonchi, or wheezing b/l  HEART: normal S1, S2  ABDOMEN: BS+, soft, ND, NT   EXTREMITIES:  pulses palpable; no clubbing, cyanosis, or edema b/l LEs  SKIN: no rashes or lesions      LABS:

## 2019-02-19 NOTE — PROGRESS NOTE ADULT - SUBJECTIVE AND OBJECTIVE BOX
SUBJECTIVE AND OBJECTIVE: Patient seen and examined, although he does not appear to be having any pain ore relevant distress, he is tachypneic at 36 x'. He is not verbal and not able to f/u any commands.     INTERVAL HPI/OVERNIGHT EVENTS: none    DNR on chart: Yes    Allergies    No Known Allergies    Intolerances    MEDICATIONS  (STANDING):  Dakins Solution - 1/4 Strength 1 Application(s) Topical three times a day  dextrose 5%. 1000 milliLiter(s) (50 mL/Hr) IV Continuous <Continuous>  dextrose 50% Injectable 12.5 Gram(s) IV Push once  dextrose 50% Injectable 25 Gram(s) IV Push once  dextrose 50% Injectable 25 Gram(s) IV Push once  HYDROmorphone  Injectable 0.2 milliGRAM(s) IV Push every 8 hours  LORazepam   Injectable 0.2 milliGRAM(s) IV Push every 8 hours  polyethylene glycol 3350 17 Gram(s) Oral daily  senna Syrup 5 milliLiter(s) Oral at bedtime  zinc oxide 20% Ointment 1 Application(s) Topical three times a day    MEDICATIONS  (PRN):  acetaminophen   Tablet .. 650 milliGRAM(s) Oral every 6 hours PRN Temp greater or equal to 38C (100.4F)  dextrose 40% Gel 15 Gram(s) Oral once PRN Blood Glucose LESS THAN 70 milliGRAM(s)/deciliter  glucagon  Injectable 1 milliGRAM(s) IntraMuscular once PRN Glucose LESS THAN 70 milligrams/deciliter  glycopyrrolate Injectable 0.4 milliGRAM(s) IV Push every 6 hours PRN secretions  haloperidol    Injectable 0.5 milliGRAM(s) IV Push every 8 hours PRN Hallucinations  HYDROmorphone  Injectable 0.2 milliGRAM(s) IV Push every 2 hours PRN Labored breathing or dyspnea  HYDROmorphone  Injectable 0.2 milliGRAM(s) IV Push every 2 hours PRN Moderate Pain (4 - 6)  LORazepam   Injectable 0.2 milliGRAM(s) IV Push every 3 hours PRN Agitation or recurrent labored breathing after back to back opioids are given        ITEMS UNCHECKED ARE NOT PRESENT    PRESENT SYMPTOMS: [ x]Unable to obtain due to poor mentation   Source if other than patient:  [ ]Family   [ ]Team     Pain (Impact on QOL):    Location:  Minimal acceptable level (0-10 scale):                   Aggrevating factors:  Quality:  Radiation:  Severity (0-10 scale):    Timing:    Dyspnea:                           [ ]Mild [ ]Moderate [ ]Severe  Anxiety:                             [ ]Mild [ ]Moderate [ ]Severe  Fatigue:                             [ ]Mild [ ]Moderate [ ]Severe  Nausea:                             [ ]Mild [ ]Moderate [ ]Severe  Loss of appetite:              [ ]Mild [ ]Moderate [ ]Severe  Constipation:                    [ ]Mild [ ]Moderate [ ]Severe    PAIN AD Score:	  [email or web address] (Ctrl + left click to view)    Other Symptoms:  [ ]All other review of systems negative     Karnofsky Performance Score/Palliative Performance Status Version 2:        10 %  PHYSICAL EXAM:  Vital Signs Last 24 Hrs  T(C): 36.3 (19 Feb 2019 12:15), Max: 36.5 (18 Feb 2019 21:06)  T(F): 97.3 (19 Feb 2019 12:15), Max: 97.7 (18 Feb 2019 21:06)  HR: 75 (19 Feb 2019 12:15) (75 - 79)  BP: 109/87 (19 Feb 2019 12:15) (84/51 - 109/87)  BP(mean): --  RR: 36 (19 Feb 2019 17:31) (17 - 34)  SpO2: 99% (19 Feb 2019 12:15) (98% - 99%)     GENERAL:  [ ]Alert  [ ]Oriented x   [ x]Lethargic  [ ]Cachexia  [ ]Unarousable  [ ]Verbal  [x ]Non-Verbal  Behavioral:   [ ] Anxiety  [ ] Delirium [ ] Agitation [ ] Other  HEENT:  [ ]Normal   [x ]Dry mouth   [ ]ET Tube/Trach  [ ]Oral lesions  PULMONARY:   [ x]Clear [ ]Tachypnea  [ ]Audible excessive secretions   [ ]Rhonchi        [ ]Right [ ]Left [ ]Bilateral  [ ]Crackles        [ ]Right [ ]Left [ ]Bilateral  [ ]Wheezing     [ ]Right [ ]Left [ ]Bilateral  CARDIOVASCULAR:    [ ]Regular [ ]Irregular [x ]Tachy  [ ]Marvin [ ]Murmur [ ]Other  GASTROINTESTINAL:  [x ]Soft  [ ]Distended   [ ]+BS  [x ]Non tender [ ]Tender  [ ]PEG [ ]OGT/ NGT   Last BM: RN documentation reviewed   GENITOURINARY:  [ ]Normal [ ] Incontinent   [ ]Oliguria/Anuria   [x ]Tejeda  MUSCULOSKELETAL:   [ ]Normal   [x ]Weakness  [ ]Bed/Wheelchair bound [ ]Edema  NEUROLOGIC:   [ ]No focal deficits  [x ] Cognitive impairment  [ ] Dysphagia [ ]Dysarthria [ ] Paresis [ ]Other   SKIN: ecchymoses, skin lesions  [ ]Normal   [ x]Pressure ulcer(s)  [ ]Rash    CRITICAL CARE:  [ ] Shock Present  [ ]Septic [ ]Cardiogenic [ ]Neurologic [ ]Hypovolemic  [ ]  Vasopressors [ ]  Inotropes   [ ] Respiratory failure present  [ ] Acute  [ ] Chronic [ ] Hypoxic  [ ] Hypercarbic [ ] Other  [ ] Other organ failure     LABS: no additional labs      RADIOLOGY & ADDITIONAL STUDIES: no additional images for review    Protein Calorie Malnutrition:  [x ] PPSV2 < or = 30%  [ ] significant weight loss [x ] poor nutritional intake [ ] anasarca [x ] catabolic state Albumin, Serum: 2.2 g/dL (02-07-19 @ 05:54)  Artificial Nutrition [ ]     REFERRALS:   [ ]Chaplaincy  [ x] Hospice  [ ]Child Life  [ ]Social Work  [ ]Case management [ ]Holistic Therapy   Goals of Care Document: Goals of Care Conversation - Personal Advance Directive [TANK Banuelos] (02-15-19 @ 14:31) SUBJECTIVE AND OBJECTIVE: Patient seen and examined, although he does not appear to be having any pain ore relevant distress, he is tachypneic at 36 x'. He is not verbal and not able to f/u any commands.     INTERVAL HPI/OVERNIGHT EVENTS: none    DNR on chart: Yes    Allergies    No Known Allergies    Intolerances    MEDICATIONS  (STANDING):  Dakins Solution - 1/4 Strength 1 Application(s) Topical three times a day  dextrose 5%. 1000 milliLiter(s) (50 mL/Hr) IV Continuous <Continuous>  dextrose 50% Injectable 12.5 Gram(s) IV Push once  dextrose 50% Injectable 25 Gram(s) IV Push once  dextrose 50% Injectable 25 Gram(s) IV Push once  HYDROmorphone  Injectable 0.2 milliGRAM(s) IV Push every 6 hours  LORazepam   Injectable 0.2 milliGRAM(s) IV Push every 8 hours  polyethylene glycol 3350 17 Gram(s) Oral daily  senna Syrup 5 milliLiter(s) Oral at bedtime  zinc oxide 20% Ointment 1 Application(s) Topical three times a day    MEDICATIONS  (PRN):  acetaminophen   Tablet .. 650 milliGRAM(s) Oral every 6 hours PRN Temp greater or equal to 38C (100.4F)  dextrose 40% Gel 15 Gram(s) Oral once PRN Blood Glucose LESS THAN 70 milliGRAM(s)/deciliter  glucagon  Injectable 1 milliGRAM(s) IntraMuscular once PRN Glucose LESS THAN 70 milligrams/deciliter  glycopyrrolate Injectable 0.4 milliGRAM(s) IV Push every 6 hours PRN secretions  haloperidol    Injectable 0.5 milliGRAM(s) IV Push every 8 hours PRN Hallucinations  HYDROmorphone  Injectable 0.4 milliGRAM(s) IV Push every 2 hours PRN Labored breathing or dyspnea  HYDROmorphone  Injectable 0.2 milliGRAM(s) IV Push every 2 hours PRN Moderate Pain (4 - 6)  LORazepam   Injectable 0.2 milliGRAM(s) IV Push every 3 hours PRN Agitation or recurrent labored breathing after back to back opioids are given          ITEMS UNCHECKED ARE NOT PRESENT    PRESENT SYMPTOMS: [ x]Unable to obtain due to poor mentation   Source if other than patient:  [ ]Family   [ ]Team     Pain (Impact on QOL):    Location:  Minimal acceptable level (0-10 scale):                   Aggrevating factors:  Quality:  Radiation:  Severity (0-10 scale):    Timing:    Dyspnea:                           [ ]Mild [ ]Moderate [ ]Severe  Anxiety:                             [ ]Mild [ ]Moderate [ ]Severe  Fatigue:                             [ ]Mild [ ]Moderate [ ]Severe  Nausea:                             [ ]Mild [ ]Moderate [ ]Severe  Loss of appetite:              [ ]Mild [ ]Moderate [ ]Severe  Constipation:                    [ ]Mild [ ]Moderate [ ]Severe    PAIN AD Score:	  [email or web address] (Ctrl + left click to view)    Other Symptoms:  [ ]All other review of systems negative     Karnofsky Performance Score/Palliative Performance Status Version 2:        10 %  PHYSICAL EXAM:  Vital Signs Last 24 Hrs  T(C): 36.3 (19 Feb 2019 12:15), Max: 36.5 (18 Feb 2019 21:06)  T(F): 97.3 (19 Feb 2019 12:15), Max: 97.7 (18 Feb 2019 21:06)  HR: 75 (19 Feb 2019 12:15) (75 - 79)  BP: 109/87 (19 Feb 2019 12:15) (84/51 - 109/87)  BP(mean): --  RR: 36 (19 Feb 2019 17:31) (17 - 34)  SpO2: 99% (19 Feb 2019 12:15) (98% - 99%)     GENERAL:  [ ]Alert  [ ]Oriented x   [ x]Lethargic  [ ]Cachexia  [ ]Unarousable  [ ]Verbal  [x ]Non-Verbal  Behavioral:   [ ] Anxiety  [ ] Delirium [ ] Agitation [ ] Other  HEENT:  [ ]Normal   [x ]Dry mouth   [ ]ET Tube/Trach  [ ]Oral lesions  PULMONARY:   [ x]Clear [ ]Tachypnea  [ ]Audible excessive secretions   [ ]Rhonchi        [ ]Right [ ]Left [ ]Bilateral  [ ]Crackles        [ ]Right [ ]Left [ ]Bilateral  [ ]Wheezing     [ ]Right [ ]Left [ ]Bilateral  CARDIOVASCULAR:    [ ]Regular [ ]Irregular [x ]Tachy  [ ]Marvin [ ]Murmur [ ]Other  GASTROINTESTINAL:  [x ]Soft  [ ]Distended   [ ]+BS  [x ]Non tender [ ]Tender  [ ]PEG [ ]OGT/ NGT   Last BM: RN documentation reviewed   GENITOURINARY:  [ ]Normal [ ] Incontinent   [ ]Oliguria/Anuria   [x ]Tejeda  MUSCULOSKELETAL:   [ ]Normal   [x ]Weakness  [ ]Bed/Wheelchair bound [ ]Edema  NEUROLOGIC:   [ ]No focal deficits  [x ] Cognitive impairment  [ ] Dysphagia [ ]Dysarthria [ ] Paresis [ ]Other   SKIN: ecchymoses, skin lesions  [ ]Normal   [ x]Pressure ulcer(s)  [ ]Rash    CRITICAL CARE:  [ ] Shock Present  [ ]Septic [ ]Cardiogenic [ ]Neurologic [ ]Hypovolemic  [ ]  Vasopressors [ ]  Inotropes   [ ] Respiratory failure present  [ ] Acute  [ ] Chronic [ ] Hypoxic  [ ] Hypercarbic [ ] Other  [ ] Other organ failure     LABS: no additional labs      RADIOLOGY & ADDITIONAL STUDIES: no additional images for review    Protein Calorie Malnutrition:  [x ] PPSV2 < or = 30%  [ ] significant weight loss [x ] poor nutritional intake [ ] anasarca [x ] catabolic state Albumin, Serum: 2.2 g/dL (02-07-19 @ 05:54)  Artificial Nutrition [ ]     REFERRALS:   [ ]Chaplaincy  [ x] Hospice  [ ]Child Life  [ ]Social Work  [ ]Case management [ ]Holistic Therapy   Goals of Care Document: Goals of Care Conversation - Personal Advance Directive [TANK Banuelos] (02-15-19 @ 14:31) SUBJECTIVE AND OBJECTIVE: Patient seen and examined, although he does not appear to be having any pain ore relevant distress, he is tachypneic at 36 x'. He is not verbal and not able to f/u any commands.     INTERVAL HPI/OVERNIGHT EVENTS: none    DNR on chart: Yes    Allergies    No Known Allergies    Intolerances    MEDICATIONS  (STANDING):  Dakins Solution - 1/4 Strength 1 Application(s) Topical three times a day  dextrose 5%. 1000 milliLiter(s) (50 mL/Hr) IV Continuous <Continuous>  dextrose 50% Injectable 12.5 Gram(s) IV Push once  dextrose 50% Injectable 25 Gram(s) IV Push once  dextrose 50% Injectable 25 Gram(s) IV Push once  HYDROmorphone  Injectable 0.2 milliGRAM(s) IV Push every 6 hours  LORazepam   Injectable 0.2 milliGRAM(s) IV Push every 8 hours  polyethylene glycol 3350 17 Gram(s) Oral daily  senna Syrup 5 milliLiter(s) Oral at bedtime  zinc oxide 20% Ointment 1 Application(s) Topical three times a day    MEDICATIONS  (PRN):  acetaminophen   Tablet .. 650 milliGRAM(s) Oral every 6 hours PRN Temp greater or equal to 38C (100.4F)  dextrose 40% Gel 15 Gram(s) Oral once PRN Blood Glucose LESS THAN 70 milliGRAM(s)/deciliter  glucagon  Injectable 1 milliGRAM(s) IntraMuscular once PRN Glucose LESS THAN 70 milligrams/deciliter  glycopyrrolate Injectable 0.4 milliGRAM(s) IV Push every 6 hours PRN secretions  haloperidol    Injectable 0.5 milliGRAM(s) IV Push every 8 hours PRN Hallucinations  HYDROmorphone  Injectable 0.4 milliGRAM(s) IV Push every 2 hours PRN Labored breathing or dyspnea  HYDROmorphone  Injectable 0.2 milliGRAM(s) IV Push every 2 hours PRN Moderate Pain (4 - 6)  LORazepam   Injectable 0.2 milliGRAM(s) IV Push every 3 hours PRN Agitation or recurrent labored breathing after back to back opioids are given          ITEMS UNCHECKED ARE NOT PRESENT    PRESENT SYMPTOMS: [ x]Unable to obtain due to poor mentation   Source if other than patient:  [ ]Family   [ ]Team     Pain (Impact on QOL):    Location:  Minimal acceptable level (0-10 scale):                   Aggrevating factors:  Quality:  Radiation:  Severity (0-10 scale):    Timing:    Dyspnea:                           [ ]Mild [ ]Moderate [ ]Severe  Anxiety:                             [ ]Mild [ ]Moderate [ ]Severe  Fatigue:                             [ ]Mild [ ]Moderate [ ]Severe  Nausea:                             [ ]Mild [ ]Moderate [ ]Severe  Loss of appetite:              [ ]Mild [ ]Moderate [ ]Severe  Constipation:                    [ ]Mild [ ]Moderate [ ]Severe    PAIN AD Score:	  [email or web address] (Ctrl + left click to view)    Other Symptoms:  [ ]All other review of systems negative     Karnofsky Performance Score/Palliative Performance Status Version 2:        10 %  PHYSICAL EXAM:  Vital Signs Last 24 Hrs  T(C): 36.3 (19 Feb 2019 12:15), Max: 36.5 (18 Feb 2019 21:06)  T(F): 97.3 (19 Feb 2019 12:15), Max: 97.7 (18 Feb 2019 21:06)  HR: 75 (19 Feb 2019 12:15) (75 - 79)  BP: 109/87 (19 Feb 2019 12:15) (84/51 - 109/87)  BP(mean): --  RR: 36 (19 Feb 2019 17:31) (17 - 34)  SpO2: 99% (19 Feb 2019 12:15) (98% - 99%)     GENERAL:  [ ]Alert  [ ]Oriented x   [ x]Lethargic  [ ]Cachexia  [ ]Unarousable  [ ]Verbal  [x ]Non-Verbal [x] Distress due to tachypnea   Behavioral:   [ ] Anxiety  [ ] Delirium [ ] Agitation [ ] Other  HEENT:  [ ]Normal   [x ]Dry mouth   [ ]ET Tube/Trach  [ ]Oral lesions  PULMONARY:   [ x]Clear [x ]Tachypnea  [ ]Audible excessive secretions   [ ]Rhonchi        [ ]Right [ ]Left [ ]Bilateral  [ ]Crackles        [ ]Right [ ]Left [ ]Bilateral  [ ]Wheezing     [ ]Right [ ]Left [ ]Bilateral  CARDIOVASCULAR:    [ ]Regular [ ]Irregular [x ]Tachy  [ ]Marvin [ ]Murmur [ ]Other  GASTROINTESTINAL:  [x ]Soft  [ ]Distended   [ ]+BS  [x ]Non tender [ ]Tender  [ ]PEG [ ]OGT/ NGT   Last BM: 2/19   GENITOURINARY:  [ ]Normal [ ] Incontinent   [ ]Oliguria/Anuria   [x ]Tejeda  MUSCULOSKELETAL:   [ ]Normal   [x ]Weakness  [ ]Bed/Wheelchair bound [ ]Edema  NEUROLOGIC:   [ ]No focal deficits  [x ] Cognitive impairment  [ ] Dysphagia [ ]Dysarthria [ ] Paresis [ ]Other   SKIN: ecchymoses, skin lesions  [ ]Normal   [ x]Pressure ulcer(s)  [ ]Rash    CRITICAL CARE:  [ ] Shock Present  [ ]Septic [ ]Cardiogenic [ ]Neurologic [ ]Hypovolemic  [ ]  Vasopressors [ ]  Inotropes   [ ] Respiratory failure present  [ ] Acute  [ ] Chronic [ ] Hypoxic  [ ] Hypercarbic [ ] Other  [ ] Other organ failure     LABS: no additional labs      RADIOLOGY & ADDITIONAL STUDIES: no additional images for review    Protein Calorie Malnutrition:  [x ] PPSV2 < or = 30%  [ ] significant weight loss [x ] poor nutritional intake [ ] anasarca [x ] catabolic state Albumin, Serum: 2.2 g/dL (02-07-19 @ 05:54)  Artificial Nutrition [ ]     REFERRALS:   [ ]Chaplaincy  [ x] Hospice  [ ]Child Life  [ ]Social Work  [ ]Case management [ ]Holistic Therapy   Goals of Care Document: Goals of Care Conversation - Personal Advance Directive [TANK Banuelos] (02-15-19 @ 14:31)

## 2019-02-19 NOTE — PROGRESS NOTE ADULT - REASON FOR ADMISSION
Sepsis

## 2019-02-19 NOTE — PROGRESS NOTE ADULT - PROBLEM SELECTOR PLAN 1
Dilaudid 0.2 mg IV were given without any significant effect on tachypnea. Will increase Dilaudid to 0.2 mg IV q 6 ATC and increase PRN to 0.4 mg q 2 PRN   Will continue Ativan 0.2 mg IV q 8 ATC and q 2 PRN Dilaudid 0.2 mg IV was given without any significant effect on tachypnea. Will increase Dilaudid to 0.2 mg IV q 6 ATC and increase PRN to 0.4 mg q 2 PRN   Will continue Ativan 0.2 mg IV q 8 ATC and q 2 PRN

## 2019-02-19 NOTE — PROGRESS NOTE ADULT - SUBJECTIVE AND OBJECTIVE BOX
CARDIOLOGY     PROGRESS  NOTE   ________________________________________________    CHIEF COMPLAINT:Patient is a 73y old  Male who presents with a chief complaint of Sepsis (19 Feb 2019 07:18)  lethargic.  	  REVIEW OF SYSTEMS:  CONSTITUTIONAL: No fever, weight loss, or fatigue  EYES: No eye pain, visual disturbances, or discharge  ENT:  No difficulty hearing, tinnitus, vertigo; No sinus or throat pain  NECK: No pain or stiffness  RESPIRATORY: No cough, wheezing, chills or hemoptysis; No Shortness of Breath  CARDIOVASCULAR: No chest pain, palpitations, passing out, dizziness, or leg swelling  GASTROINTESTINAL: No abdominal or epigastric pain. No nausea, vomiting, or hematemesis; No diarrhea or constipation. No melena or hematochezia.  GENITOURINARY: No dysuria, frequency, hematuria, or incontinence  NEUROLOGICAL: No headaches, memory loss, loss of strength, numbness, or tremors  SKIN: No itching, burning, rashes, or lesions   LYMPH Nodes: No enlarged glands  ENDOCRINE: No heat or cold intolerance; No hair loss  MUSCULOSKELETAL: No joint pain or swelling; No muscle, back, or extremity pain  PSYCHIATRIC: No depression, anxiety, mood swings, or difficulty sleeping  HEME/LYMPH: No easy bruising, or bleeding gums  ALLERGY AND IMMUNOLOGIC: No hives or eczema	    [ ] All others negative	  [x ] Unable to obtain    PHYSICAL EXAM:  T(C): 36.2 (02-19-19 @ 06:07), Max: 36.5 (02-18-19 @ 21:06)  HR: 79 (02-18-19 @ 21:06) (79 - 90)  BP: 97/65 (02-19-19 @ 06:46) (84/51 - 97/65)  RR: 19 (02-18-19 @ 21:06) (19 - 19)  SpO2: 98% (02-18-19 @ 21:06) (98% - 100%)  Wt(kg): --  I&O's Summary    18 Feb 2019 07:01  -  19 Feb 2019 07:00  --------------------------------------------------------  IN: 0 mL / OUT: 200 mL / NET: -200 mL        Appearance: Normal	  HEENT:   Normal oral mucosa, PERRL, EOMI	  Lymphatic: No lymphadenopathy  Cardiovascular: Normal S1 S2, No JVD, No murmurs, No edema  Respiratory: Lungs clear to auscultation	  Psychiatry: A & O x 3, Mood & affect appropriate  Gastrointestinal:  Soft, Non-tender, + BS	  Skin: No rashes, No ecchymoses, No cyanosis	  Neurologic: Non-focal  Extremities: Normal range of motion, No clubbing, cyanosis or edema  Vascular: Peripheral pulses palpable 2+ bilaterally    MEDICATIONS  (STANDING):  Dakins Solution - 1/4 Strength 1 Application(s) Topical three times a day  dextrose 5%. 1000 milliLiter(s) (50 mL/Hr) IV Continuous <Continuous>  dextrose 50% Injectable 12.5 Gram(s) IV Push once  dextrose 50% Injectable 25 Gram(s) IV Push once  dextrose 50% Injectable 25 Gram(s) IV Push once  heparin  Injectable 5000 Unit(s) SubCutaneous every 12 hours  HYDROmorphone  Injectable 0.2 milliGRAM(s) IV Push every 8 hours  LORazepam   Injectable 0.2 milliGRAM(s) IV Push every 8 hours  polyethylene glycol 3350 17 Gram(s) Oral daily  senna Syrup 5 milliLiter(s) Oral at bedtime  sodium chloride 0.45% with potassium chloride 20 mEq/L 1000 milliLiter(s) (75 mL/Hr) IV Continuous <Continuous>  zinc oxide 20% Ointment 1 Application(s) Topical three times a day      TELEMETRY: 	    ECG:  	  RADIOLOGY:  OTHER: 	  	  LABS:	 	    CARDIAC MARKERS:                  proBNP: Serum Pro-Brain Natriuretic Peptide: 81355 pg/mL (02-03 @ 13:07)    Lipid Profile:   HgA1c: Hemoglobin A1C, Whole Blood: 7.4 % (02-04 @ 08:56)    TSH:         Assessment and plan  ---------------------------  pt is dnr  pt is palliative care  will sign off

## 2019-02-19 NOTE — PROGRESS NOTE ADULT - PROBLEM SELECTOR PLAN 7
pending transfer to PCU, currently no beds available  please call palliative care on call if any acute issues pending transfer to PCU, currently no beds available  please call palliative care on call if any acute issues  I called the patient's wife [phone]) twice in order to update her about the patient's conditions but she did not answer.

## 2019-02-19 NOTE — PROGRESS NOTE ADULT - ASSESSMENT
72 yo male with CAD c/b CABG, A fib (on coumadin), CKD stage 3, PAD s/p stents, PEG tube, in-dwelling Tejeda who presents to the nursing home for increased confusion and elevated WBC. In November he had a hypoglycemic episode, fell, and broke his hip. He was admitted to the hospital for surgery, which was uneventful and sent to rehab (in early December). During the rehab stay, he developed numerous ulcers, including a decubitus ulcer. He was sent back to [facility] for sepsis from the decubitus ulcer. He was treated with abx and returned to rehab for a second time. This second hospital course was complicated by acute renal failure requiring dialysis, IV abx, and surgery. This time, he has been hospitalized for 11 days. 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 Palliative care is called for helping with GOC in the setting of an elderly patient with multiple comorbid conditions, recurrent hospitalizations (LACE > 10), several complications, and significant functional impairment.

## 2019-02-19 NOTE — CHART NOTE - NSCHARTNOTEFT_GEN_A_CORE
Called by bedside by nurse for cardiopulmonary arrest    On physical exam, no spontaneous movements were present. Patient did not respond to verbal or physical stimuli. Pupils were mid-dilated and fixed. No breath sounds were appreciated over either lung field. No carotid pulses were palpable. No heart sounds were auscultated.   Patient pronounced dead at 8:41pm. Dr. Gilliland was notified. Lucia Howard (wife) was notified. Family ___ autopsy.    Zeina WASHINGTON  #[phone]

## 2019-02-19 NOTE — PROGRESS NOTE ADULT - ASSESSMENT
72 yo male PMH CAD c/b CABG, A fib (on coumadin), CKD stage 3, PAD s/p stents, PEG tube, in-dwelling Hoskins who presents to the nursing home for increased confusion and elevated WBC. History was taken from wife at bedside.	Per wife, patient had been fully functional up until November. He had a hypoglycemic episode, fell, and broke his hip in November. He was admitted to the hospital for surgery, which was uneventful and sent to rehab (in early December).     During the rehab stay, he developed numerous ulcers, including a decubitus ulcer. He was sent back to [facility] for sepsis from the decubitus ulcer. He was treated with abx and returned to rehab for a second time. This second hospital course was complicated by acute renal failure requiring dialysis, IV abx, and "surgery" to clean the ulcer. In the ED, he received Vanc/Zosyn. 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     ID: Will stop Zosyn given hospice eval in progress. Vancomycin stopped. CT chest shows no pneumonia, bilateral effusions. PEG feeds stopped working, now on hold. PEG site looks necrotic. Leakage around the site and redness around PEG sie. Chronic hoskins changed this admission. Agree with ID. Had bone biopsy done today which now shows GNR. CT of lumbar spine confirms sacral OM.  ABX stopped as now awaiting palliative care placement. PEG feeds cannot be restarted. Family refuse NGT.      Reanl: CKD III stable, creatinine 1.81. Renal sono confirms MRD. Free water replacement on hold without  PEG.  May resume Lasix just to keep I & O balanced.  Hold for now.     Urology: Stop Flomax, no TOV in near future.     CV: Lasix on hold given SHINE. Stop Plavix and Heparin.        Endo: episodes of hypoglycemia off insulin. Stopped D5W in IVF given elevated blood sugars. Endo consult requested. PEG feeds on hold given leaking. NPH on hold. Glucose elevated 316. Discussed with palliative care, will stop   IVF. BS at goal 192, off NPH.          GI: Peg site leaking past 24 hours. Not able to use now. GI recommends not using tube for one week. GI recommendation for local wound care around PEG site. Agree DNR.  Had phone discussion with wife/proxy this AM. Do not want to procede with NG tube. Favor comfort care, palliative care eval.  wife: [phone]. Likely needs home hospice referral. Awaiting bed in PCU. All blood draws stopped. IVP Dilaudid started by palliative care for pain control.    Await transfer to inpatient palliative care.       Note: BMI of 15.8 equals severe protein calorie malnutrition.

## 2019-02-19 NOTE — PROGRESS NOTE ADULT - PROVIDER SPECIALTY LIST ADULT
Cardiology
Endocrinology
Gastroenterology
Hospitalist
Infectious Disease
Internal Medicine
Internal Medicine
Nephrology
Palliative Care
Vascular Surgery
Wound Care
Wound Care
Cardiology
Cardiology
Hospitalist
Infectious Disease
Internal Medicine

## 2019-02-19 NOTE — PROGRESS NOTE ADULT - PROBLEM SELECTOR PLAN 2
scheduled ativan q8h. please given haldol prn for hallucination as above  appears more comfortable on examination today  NO BT Ativan over the last 24 hours. scheduled ativan q8h.  Continue Haldol PRN Hallucinations.   NO BT Ativan over the last 24 hours.

## 2019-04-24 NOTE — DISCHARGE NOTE ADULT - CARE PLAN
[] : Resident Goal:	left foot surgery  Instructions for follow-up, activity and diet:	Keep dressing clean, dry and intact until appointment with Dr. Vergara  - Follow up with Dr. Vergara early next week, call for an appointment at [phone]  - ambulate with surgical shoe  - finish course of antibiotics Principal Discharge DX:	Hypoglycemia  Goal:	symptoms improved  Instructions for follow-up, activity and diet:	You will need to follow up with your endocrinologist, Dr. Tillman, [phone] within one week of discharge - please call to make an appointment.  Secondary Diagnosis:	Osteomyelitis of toe of left foot  Goal:	left foot surgery  Instructions for follow-up, activity and diet:	Keep dressing clean, dry and intact until appointment with Dr. Vergara  - Follow up with Dr. Vergara early next week, call for an appointment at [phone]  - ambulate with surgical shoe  - finish course of antibiotics  Secondary Diagnosis:	Chronic atrial fibrillation  Instructions for follow-up, activity and diet:	You will hold your coumadin tonight, and have your INR checked tomorrow (3/13/17) by your primary medical doctor, and your Coumadin will be restarted accordingly.  Atrial fibrillation is the most common heart rhythm problem.  The condition puts you at risk for has stroke and heart attack  It helps if you control your blood pressure, not drink more than 1-2 alcohol drinks per day, cut down on caffeine, getting treatment for over active thyroid gland, and get regular exercise  Call your doctor if you feel your heart racing or beating unusually, chest tightness or pain, lightheaded, faint, shortness of breath especially with exercise  It is important to take your heart medication as prescribed  You may be on anticoagulation which is very important to take as directed - you may need blood work to monitor drug levels  Secondary Diagnosis:	Type 2 diabetes mellitus with other circulatory complication, with long-term current use of insulin  Instructions for follow-up, activity and diet:	HgA1C this admission - 7.9  Make sure you get your HgA1c checked every three months.  If you take oral diabetes medications, check your blood glucose two times a day.  If you take insulin, check your blood glucose before meals and at bedtime.  It's important not to skip any meals.  Keep a log of your blood glucose results and always take it with you to your doctor appointments.  Keep a list of your current medications including injectables and over the counter medications and bring this medication list with you to all your doctor appointments.  If you have not seen your ophthalmologist this year call for appointment.  Check your feet daily for redness, sores, or openings. Do not self treat. If no improvement in two days call your primary care physician for an appointment.  Low blood sugar (hypoglycemia) is a blood sugar below 70mg/dl. Check your blood sugar if you feel signs/symptoms of hypoglycemia. If your blood sugar is below 70 take 15 grams of carbohydrates (ex 4 oz of apple juice, 3-4 glucose tablets, or 4-6 oz of regular soda) wait 15 minutes and repeat blood sugar to make sure it comes up above 70.  If your blood sugar is above 70 and you are due for a meal, have a meal.  If you are not due for a meal have a snack.  This snack helps keeps your blood sugar at a safe range.  Secondary Diagnosis:	HTN (hypertension), benign  Instructions for follow-up, activity and diet:	Low salt diet  Activity as tolerated.  Take all medication as prescribed.  Follow up with your medical doctor for routine blood pressure monitoring at your next visit.  Notify your doctor if you have any of the following symptoms:   Dizziness, Lightheadedness, Blurry vision, Headache, Chest pain, Shortness of breath  Secondary Diagnosis:	CKD (chronic kidney disease) stage 3, GFR 30-59 ml/min  Instructions for follow-up, activity and diet:	Avoid taking (NSAIDs) - (ex: Ibuprofen, Advil, Celebrex, Naprosyn)  Avoid taking any nephrotoxic agents (can harm kidneys) - Intravenous contrast for diagnostic testing, combination cold medications.  Have all medications adjusted for your renal function by your Health Care Provider.  Blood pressure control is important.  Take all medication as prescribed.  Secondary Diagnosis:	Coronary artery disease of native artery of native heart with stable angina pectoris  Instructions for follow-up, activity and diet:	Coronary artery disease is a condition where the arteries the supply the heart muscle get clogges with fatty deposits & puts you at risk for a heart attack  Call your doctor if you have any new pain, pressure, or discomfort in the center of your chest, pain, tingling or discomfort in arms, back, neck, jaw, or stomach, shortness of breath, nausea, vomiting, burping or heartburn, sweating, cold and clammy skin, racing or abnormal heartbeat for more than 10 minutes or if they keep coming & going.  Call 911 and do not tr to get to hospital by care  You can help yourself with lefestyle changes (quitting smoking if you smoke), eat lots of fruits & vegetables & low fat dairy products, not a lot of meat & fatty foods, walk or some form of physical activity most days of the week, lose weight if you are overweight  Take your cardiac medication as prescribed to lower cholesterol, to lower blood pressure, aspirin to prevent blood clots, and diabetes control  Make sure to keep appointments with doctor for cardiac follow up care Principal Discharge DX:	Hypoglycemia  Goal:	symptoms improved  Instructions for follow-up, activity and diet:	You will need to follow up with your endocrinologist, Dr. Tillman, [phone] within one week of discharge - please call to make an appointment.  Secondary Diagnosis:	Osteomyelitis of toe of left foot  Goal:	left foot surgery  Instructions for follow-up, activity and diet:	Keep dressing clean, dry and intact until appointment with Dr. Vergara  - Follow up with Dr. Vergara early next week, call for an appointment at [phone]  - ambulate with surgical shoe  - finish course of antibiotics  Secondary Diagnosis:	Chronic atrial fibrillation  Instructions for follow-up, activity and diet:	You will hold your coumadin tonight, and have your INR checked tomorrow (3/13/17) by your primary medical doctor, and your Coumadin will be restarted accordingly.  Atrial fibrillation is the most common heart rhythm problem.  The condition puts you at risk for has stroke and heart attack  It helps if you control your blood pressure, not drink more than 1-2 alcohol drinks per day, cut down on caffeine, getting treatment for over active thyroid gland, and get regular exercise  Call your doctor if you feel your heart racing or beating unusually, chest tightness or pain, lightheaded, faint, shortness of breath especially with exercise  It is important to take your heart medication as prescribed  You may be on anticoagulation which is very important to take as directed - you may need blood work to monitor drug levels  Secondary Diagnosis:	Type 2 diabetes mellitus with other circulatory complication, with long-term current use of insulin  Instructions for follow-up, activity and diet:	HgA1C this admission - 7.9  Make sure you get your HgA1c checked every three months.  If you take oral diabetes medications, check your blood glucose two times a day.  If you take insulin, check your blood glucose before meals and at bedtime.  It's important not to skip any meals.  Keep a log of your blood glucose results and always take it with you to your doctor appointments.  Keep a list of your current medications including injectables and over the counter medications and bring this medication list with you to all your doctor appointments.  If you have not seen your ophthalmologist this year call for appointment.  Check your feet daily for redness, sores, or openings. Do not self treat. If no improvement in two days call your primary care physician for an appointment.  Low blood sugar (hypoglycemia) is a blood sugar below 70mg/dl. Check your blood sugar if you feel signs/symptoms of hypoglycemia. If your blood sugar is below 70 take 15 grams of carbohydrates (ex 4 oz of apple juice, 3-4 glucose tablets, or 4-6 oz of regular soda) wait 15 minutes and repeat blood sugar to make sure it comes up above 70.  If your blood sugar is above 70 and you are due for a meal, have a meal.  If you are not due for a meal have a snack.  This snack helps keeps your blood sugar at a safe range.  Secondary Diagnosis:	HTN (hypertension), benign  Instructions for follow-up, activity and diet:	Low salt diet  Activity as tolerated.  Take all medication as prescribed.  Follow up with your medical doctor for routine blood pressure monitoring at your next visit.  Notify your doctor if you have any of the following symptoms:   Dizziness, Lightheadedness, Blurry vision, Headache, Chest pain, Shortness of breath  Secondary Diagnosis:	CKD (chronic kidney disease) stage 3, GFR 30-59 ml/min  Instructions for follow-up, activity and diet:	Avoid taking (NSAIDs) - (ex: Ibuprofen, Advil, Celebrex, Naprosyn)  Avoid taking any nephrotoxic agents (can harm kidneys) - Intravenous contrast for diagnostic testing, combination cold medications.  Have all medications adjusted for your renal function by your Health Care Provider.  Blood pressure control is important.  Take all medication as prescribed.  Secondary Diagnosis:	Coronary artery disease of native artery of native heart with stable angina pectoris  Instructions for follow-up, activity and diet:	Coronary artery disease is a condition where the arteries the supply the heart muscle get clogges with fatty deposits & puts you at risk for a heart attack  Call your doctor if you have any new pain, pressure, or discomfort in the center of your chest, pain, tingling or discomfort in arms, back, neck, jaw, or stomach, shortness of breath, nausea, vomiting, burping or heartburn, sweating, cold and clammy skin, racing or abnormal heartbeat for more than 10 minutes or if they keep coming & going.  Call 911 and do not tr to get to hospital by care  You can help yourself with lefestyle changes (quitting smoking if you smoke), eat lots of fruits & vegetables & low fat dairy products, not a lot of meat & fatty foods, walk or some form of physical activity most days of the week, lose weight if you are overweight  Take your cardiac medication as prescribed to lower cholesterol, to lower blood pressure, aspirin to prevent blood clots, and diabetes control  Make sure to keep appointments with doctor for cardiac follow up care Principal Discharge DX:	Hypoglycemia  Goal:	symptoms improved  Instructions for follow-up, activity and diet:	You will need to follow up with your endocrinologist, Dr. Tillman, [phone] within one week of discharge - please call to make an appointment.  Secondary Diagnosis:	Osteomyelitis of toe of left foot  Goal:	left foot surgery  Instructions for follow-up, activity and diet:	Keep dressing clean, dry and intact until appointment with Dr. Vergara  - Follow up with Dr. Vergara early next week, call for an appointment at [phone]  - ambulate with surgical shoe  - finish course of antibiotics  Secondary Diagnosis:	Chronic atrial fibrillation  Instructions for follow-up, activity and diet:	You will hold your coumadin tonight, and have your INR checked tomorrow (3/13/17) by your primary medical doctor, and your Coumadin will be restarted accordingly.  Atrial fibrillation is the most common heart rhythm problem.  The condition puts you at risk for has stroke and heart attack  It helps if you control your blood pressure, not drink more than 1-2 alcohol drinks per day, cut down on caffeine, getting treatment for over active thyroid gland, and get regular exercise  Call your doctor if you feel your heart racing or beating unusually, chest tightness or pain, lightheaded, faint, shortness of breath especially with exercise  It is important to take your heart medication as prescribed  You may be on anticoagulation which is very important to take as directed - you may need blood work to monitor drug levels  Secondary Diagnosis:	Type 2 diabetes mellitus with other circulatory complication, with long-term current use of insulin  Instructions for follow-up, activity and diet:	HgA1C this admission - 7.9  Make sure you get your HgA1c checked every three months.  If you take oral diabetes medications, check your blood glucose two times a day.  If you take insulin, check your blood glucose before meals and at bedtime.  It's important not to skip any meals.  Keep a log of your blood glucose results and always take it with you to your doctor appointments.  Keep a list of your current medications including injectables and over the counter medications and bring this medication list with you to all your doctor appointments.  If you have not seen your ophthalmologist this year call for appointment.  Check your feet daily for redness, sores, or openings. Do not self treat. If no improvement in two days call your primary care physician for an appointment.  Low blood sugar (hypoglycemia) is a blood sugar below 70mg/dl. Check your blood sugar if you feel signs/symptoms of hypoglycemia. If your blood sugar is below 70 take 15 grams of carbohydrates (ex 4 oz of apple juice, 3-4 glucose tablets, or 4-6 oz of regular soda) wait 15 minutes and repeat blood sugar to make sure it comes up above 70.  If your blood sugar is above 70 and you are due for a meal, have a meal.  If you are not due for a meal have a snack.  This snack helps keeps your blood sugar at a safe range.  Secondary Diagnosis:	HTN (hypertension), benign  Instructions for follow-up, activity and diet:	Low salt diet  Activity as tolerated.  Take all medication as prescribed.  Follow up with your medical doctor for routine blood pressure monitoring at your next visit.  Notify your doctor if you have any of the following symptoms:   Dizziness, Lightheadedness, Blurry vision, Headache, Chest pain, Shortness of breath  Secondary Diagnosis:	CKD (chronic kidney disease) stage 3, GFR 30-59 ml/min  Instructions for follow-up, activity and diet:	Avoid taking (NSAIDs) - (ex: Ibuprofen, Advil, Celebrex, Naprosyn)  Avoid taking any nephrotoxic agents (can harm kidneys) - Intravenous contrast for diagnostic testing, combination cold medications.  Have all medications adjusted for your renal function by your Health Care Provider.  Blood pressure control is important.  Take all medication as prescribed.  Secondary Diagnosis:	Coronary artery disease of native artery of native heart with stable angina pectoris  Instructions for follow-up, activity and diet:	Coronary artery disease is a condition where the arteries the supply the heart muscle get clogges with fatty deposits & puts you at risk for a heart attack  Call your doctor if you have any new pain, pressure, or discomfort in the center of your chest, pain, tingling or discomfort in arms, back, neck, jaw, or stomach, shortness of breath, nausea, vomiting, burping or heartburn, sweating, cold and clammy skin, racing or abnormal heartbeat for more than 10 minutes or if they keep coming & going.  Call 911 and do not tr to get to hospital by care  You can help yourself with lefestyle changes (quitting smoking if you smoke), eat lots of fruits & vegetables & low fat dairy products, not a lot of meat & fatty foods, walk or some form of physical activity most days of the week, lose weight if you are overweight  Take your cardiac medication as prescribed to lower cholesterol, to lower blood pressure, aspirin to prevent blood clots, and diabetes control  Make sure to keep appointments with doctor for cardiac follow up care Principal Discharge DX:	Hypoglycemia  Goal:	symptoms improved  Instructions for follow-up, activity and diet:	You will need to follow up with your endocrinologist, Dr. Tillman, [phone] within one week of discharge - please call to make an appointment.  You stated that you have Lantus and Novolog insulins at home.  You will take Lantus 12units subcutaneous at bedtime, and Novolog 6 units subcutaneous before each meal.  Secondary Diagnosis:	Osteomyelitis of toe of left foot  Goal:	left foot surgery  Instructions for follow-up, activity and diet:	Keep dressing clean, dry and intact until appointment with Dr. Vergara  - Follow up with Dr. Vergara early next week, call for an appointment at [phone]  - ambulate with surgical shoe  - finish course of antibiotics  Secondary Diagnosis:	Chronic atrial fibrillation  Instructions for follow-up, activity and diet:	You will hold your coumadin tonight, and have your INR checked tomorrow (3/13/17) by your primary medical doctor, and your Coumadin will be restarted accordingly.  Atrial fibrillation is the most common heart rhythm problem.  The condition puts you at risk for has stroke and heart attack  It helps if you control your blood pressure, not drink more than 1-2 alcohol drinks per day, cut down on caffeine, getting treatment for over active thyroid gland, and get regular exercise  Call your doctor if you feel your heart racing or beating unusually, chest tightness or pain, lightheaded, faint, shortness of breath especially with exercise  It is important to take your heart medication as prescribed  You may be on anticoagulation which is very important to take as directed - you may need blood work to monitor drug levels  Secondary Diagnosis:	Type 2 diabetes mellitus with other circulatory complication, with long-term current use of insulin  Instructions for follow-up, activity and diet:	HgA1C this admission - 7.9  Make sure you get your HgA1c checked every three months.  If you take oral diabetes medications, check your blood glucose two times a day.  If you take insulin, check your blood glucose before meals and at bedtime.  It's important not to skip any meals.  Keep a log of your blood glucose results and always take it with you to your doctor appointments.  Keep a list of your current medications including injectables and over the counter medications and bring this medication list with you to all your doctor appointments.  If you have not seen your ophthalmologist this year call for appointment.  Check your feet daily for redness, sores, or openings. Do not self treat. If no improvement in two days call your primary care physician for an appointment.  Low blood sugar (hypoglycemia) is a blood sugar below 70mg/dl. Check your blood sugar if you feel signs/symptoms of hypoglycemia. If your blood sugar is below 70 take 15 grams of carbohydrates (ex 4 oz of apple juice, 3-4 glucose tablets, or 4-6 oz of regular soda) wait 15 minutes and repeat blood sugar to make sure it comes up above 70.  If your blood sugar is above 70 and you are due for a meal, have a meal.  If you are not due for a meal have a snack.  This snack helps keeps your blood sugar at a safe range.  Secondary Diagnosis:	HTN (hypertension), benign  Instructions for follow-up, activity and diet:	Low salt diet  Activity as tolerated.  Take all medication as prescribed.  Follow up with your medical doctor for routine blood pressure monitoring at your next visit.  Notify your doctor if you have any of the following symptoms:   Dizziness, Lightheadedness, Blurry vision, Headache, Chest pain, Shortness of breath  Secondary Diagnosis:	CKD (chronic kidney disease) stage 3, GFR 30-59 ml/min  Instructions for follow-up, activity and diet:	Avoid taking (NSAIDs) - (ex: Ibuprofen, Advil, Celebrex, Naprosyn)  Avoid taking any nephrotoxic agents (can harm kidneys) - Intravenous contrast for diagnostic testing, combination cold medications.  Have all medications adjusted for your renal function by your Health Care Provider.  Blood pressure control is important.  Take all medication as prescribed.  Secondary Diagnosis:	Coronary artery disease of native artery of native heart with stable angina pectoris  Instructions for follow-up, activity and diet:	Coronary artery disease is a condition where the arteries the supply the heart muscle get clogges with fatty deposits & puts you at risk for a heart attack  Call your doctor if you have any new pain, pressure, or discomfort in the center of your chest, pain, tingling or discomfort in arms, back, neck, jaw, or stomach, shortness of breath, nausea, vomiting, burping or heartburn, sweating, cold and clammy skin, racing or abnormal heartbeat for more than 10 minutes or if they keep coming & going.  Call 911 and do not tr to get to hospital by care  You can help yourself with lefestyle changes (quitting smoking if you smoke), eat lots of fruits & vegetables & low fat dairy products, not a lot of meat & fatty foods, walk or some form of physical activity most days of the week, lose weight if you are overweight  Take your cardiac medication as prescribed to lower cholesterol, to lower blood pressure, aspirin to prevent blood clots, and diabetes control  Make sure to keep appointments with doctor for cardiac follow up care Principal Discharge DX:	Hypoglycemia  Goal:	symptoms improved  Instructions for follow-up, activity and diet:	You will need to follow up with your endocrinologist, Dr. Tillman, [phone] within one week of discharge - please call to make an appointment.  You stated that you have Lantus and Novolog insulins at home.  You will take Lantus 12units subcutaneous at bedtime, and Novolog 6 units subcutaneous before each meal.  Secondary Diagnosis:	Osteomyelitis of toe of left foot  Goal:	left foot surgery  Instructions for follow-up, activity and diet:	Keep dressing clean, dry and intact until appointment with Dr. Vergara  - Follow up with Dr. Vergara early next week, call for an appointment at [phone]  - ambulate with surgical shoe  - finish course of antibiotics  Secondary Diagnosis:	Chronic atrial fibrillation  Instructions for follow-up, activity and diet:	You will hold your coumadin tonight, and have your INR checked tomorrow (3/13/17) by your primary medical doctor, and your Coumadin will be restarted accordingly.  Atrial fibrillation is the most common heart rhythm problem.  The condition puts you at risk for has stroke and heart attack  It helps if you control your blood pressure, not drink more than 1-2 alcohol drinks per day, cut down on caffeine, getting treatment for over active thyroid gland, and get regular exercise  Call your doctor if you feel your heart racing or beating unusually, chest tightness or pain, lightheaded, faint, shortness of breath especially with exercise  It is important to take your heart medication as prescribed  You may be on anticoagulation which is very important to take as directed - you may need blood work to monitor drug levels  Secondary Diagnosis:	Type 2 diabetes mellitus with other circulatory complication, with long-term current use of insulin  Instructions for follow-up, activity and diet:	HgA1C this admission - 7.9  Make sure you get your HgA1c checked every three months.  If you take oral diabetes medications, check your blood glucose two times a day.  If you take insulin, check your blood glucose before meals and at bedtime.  It's important not to skip any meals.  Keep a log of your blood glucose results and always take it with you to your doctor appointments.  Keep a list of your current medications including injectables and over the counter medications and bring this medication list with you to all your doctor appointments.  If you have not seen your ophthalmologist this year call for appointment.  Check your feet daily for redness, sores, or openings. Do not self treat. If no improvement in two days call your primary care physician for an appointment.  Low blood sugar (hypoglycemia) is a blood sugar below 70mg/dl. Check your blood sugar if you feel signs/symptoms of hypoglycemia. If your blood sugar is below 70 take 15 grams of carbohydrates (ex 4 oz of apple juice, 3-4 glucose tablets, or 4-6 oz of regular soda) wait 15 minutes and repeat blood sugar to make sure it comes up above 70.  If your blood sugar is above 70 and you are due for a meal, have a meal.  If you are not due for a meal have a snack.  This snack helps keeps your blood sugar at a safe range.  Secondary Diagnosis:	HTN (hypertension), benign  Instructions for follow-up, activity and diet:	Low salt diet  Activity as tolerated.  Take all medication as prescribed.  Follow up with your medical doctor for routine blood pressure monitoring at your next visit.  Notify your doctor if you have any of the following symptoms:   Dizziness, Lightheadedness, Blurry vision, Headache, Chest pain, Shortness of breath  Secondary Diagnosis:	CKD (chronic kidney disease) stage 3, GFR 30-59 ml/min  Instructions for follow-up, activity and diet:	Avoid taking (NSAIDs) - (ex: Ibuprofen, Advil, Celebrex, Naprosyn)  Avoid taking any nephrotoxic agents (can harm kidneys) - Intravenous contrast for diagnostic testing, combination cold medications.  Have all medications adjusted for your renal function by your Health Care Provider.  Blood pressure control is important.  Take all medication as prescribed.  Secondary Diagnosis:	Coronary artery disease of native artery of native heart with stable angina pectoris  Instructions for follow-up, activity and diet:	Coronary artery disease is a condition where the arteries the supply the heart muscle get clogges with fatty deposits & puts you at risk for a heart attack  Call your doctor if you have any new pain, pressure, or discomfort in the center of your chest, pain, tingling or discomfort in arms, back, neck, jaw, or stomach, shortness of breath, nausea, vomiting, burping or heartburn, sweating, cold and clammy skin, racing or abnormal heartbeat for more than 10 minutes or if they keep coming & going.  Call 911 and do not tr to get to hospital by care  You can help yourself with lefestyle changes (quitting smoking if you smoke), eat lots of fruits & vegetables & low fat dairy products, not a lot of meat & fatty foods, walk or some form of physical activity most days of the week, lose weight if you are overweight  Take your cardiac medication as prescribed to lower cholesterol, to lower blood pressure, aspirin to prevent blood clots, and diabetes control  Make sure to keep appointments with doctor for cardiac follow up care Principal Discharge DX:	Hypoglycemia  Goal:	symptoms improved  Instructions for follow-up, activity and diet:	You will need to follow up with your endocrinologist, Dr. Tillman, [phone] within one week of discharge - please call to make an appointment.  You stated that you have Lantus and Novolog insulins at home.  You will take Lantus 12units subcutaneous at bedtime, and Novolog 6 units subcutaneous before each meal.  Secondary Diagnosis:	Osteomyelitis of toe of left foot  Goal:	left foot surgery  Instructions for follow-up, activity and diet:	Keep dressing clean, dry and intact until appointment with Dr. Vergara  - Follow up with Dr. Vergara early next week, call for an appointment at [phone]  - ambulate with surgical shoe  - finish course of antibiotics  Secondary Diagnosis:	Chronic atrial fibrillation  Instructions for follow-up, activity and diet:	You will hold your coumadin tonight, and have your INR checked tomorrow (3/13/17) by your primary medical doctor, and your Coumadin will be restarted accordingly.  Atrial fibrillation is the most common heart rhythm problem.  The condition puts you at risk for has stroke and heart attack  It helps if you control your blood pressure, not drink more than 1-2 alcohol drinks per day, cut down on caffeine, getting treatment for over active thyroid gland, and get regular exercise  Call your doctor if you feel your heart racing or beating unusually, chest tightness or pain, lightheaded, faint, shortness of breath especially with exercise  It is important to take your heart medication as prescribed  You may be on anticoagulation which is very important to take as directed - you may need blood work to monitor drug levels  Secondary Diagnosis:	Type 2 diabetes mellitus with other circulatory complication, with long-term current use of insulin  Instructions for follow-up, activity and diet:	HgA1C this admission - 7.9  Make sure you get your HgA1c checked every three months.  If you take oral diabetes medications, check your blood glucose two times a day.  If you take insulin, check your blood glucose before meals and at bedtime.  It's important not to skip any meals.  Keep a log of your blood glucose results and always take it with you to your doctor appointments.  Keep a list of your current medications including injectables and over the counter medications and bring this medication list with you to all your doctor appointments.  If you have not seen your ophthalmologist this year call for appointment.  Check your feet daily for redness, sores, or openings. Do not self treat. If no improvement in two days call your primary care physician for an appointment.  Low blood sugar (hypoglycemia) is a blood sugar below 70mg/dl. Check your blood sugar if you feel signs/symptoms of hypoglycemia. If your blood sugar is below 70 take 15 grams of carbohydrates (ex 4 oz of apple juice, 3-4 glucose tablets, or 4-6 oz of regular soda) wait 15 minutes and repeat blood sugar to make sure it comes up above 70.  If your blood sugar is above 70 and you are due for a meal, have a meal.  If you are not due for a meal have a snack.  This snack helps keeps your blood sugar at a safe range.  Secondary Diagnosis:	HTN (hypertension), benign  Instructions for follow-up, activity and diet:	Low salt diet  Activity as tolerated.  Take all medication as prescribed.  Follow up with your medical doctor for routine blood pressure monitoring at your next visit.  Notify your doctor if you have any of the following symptoms:   Dizziness, Lightheadedness, Blurry vision, Headache, Chest pain, Shortness of breath  Secondary Diagnosis:	CKD (chronic kidney disease) stage 3, GFR 30-59 ml/min  Instructions for follow-up, activity and diet:	Avoid taking (NSAIDs) - (ex: Ibuprofen, Advil, Celebrex, Naprosyn)  Avoid taking any nephrotoxic agents (can harm kidneys) - Intravenous contrast for diagnostic testing, combination cold medications.  Have all medications adjusted for your renal function by your Health Care Provider.  Blood pressure control is important.  Take all medication as prescribed.  Secondary Diagnosis:	Coronary artery disease of native artery of native heart with stable angina pectoris  Instructions for follow-up, activity and diet:	Coronary artery disease is a condition where the arteries the supply the heart muscle get clogges with fatty deposits & puts you at risk for a heart attack  Call your doctor if you have any new pain, pressure, or discomfort in the center of your chest, pain, tingling or discomfort in arms, back, neck, jaw, or stomach, shortness of breath, nausea, vomiting, burping or heartburn, sweating, cold and clammy skin, racing or abnormal heartbeat for more than 10 minutes or if they keep coming & going.  Call 911 and do not tr to get to hospital by care  You can help yourself with lefestyle changes (quitting smoking if you smoke), eat lots of fruits & vegetables & low fat dairy products, not a lot of meat & fatty foods, walk or some form of physical activity most days of the week, lose weight if you are overweight  Take your cardiac medication as prescribed to lower cholesterol, to lower blood pressure, aspirin to prevent blood clots, and diabetes control  Make sure to keep appointments with doctor for cardiac follow up care

## 2020-08-07 NOTE — PROVIDER CONTACT NOTE (OTHER) - DATE AND TIME:
Problem: Activity/Exercise Intolerance  Goal: Patient will tolerate activity/exercise  Outcome: Outcome Met, Continue evaluating goal progress toward completion  Intervention: Early Mobilization  Intervention Status  Done  Intervention: Upright posture (HOB elevated or dangle) at least 20 minutes  Intervention Status  Done  Intervention: Progress activity per Cardiac Rehab protocol  Intervention Status  Done  Intervention: Progressive graded ambulation  Intervention Status  Done  Intervention: Collaborate with nursing to ensure ambulation 4-6 times per day  Intervention Status  Done  Intervention: Monitor and document progressive activity response  Intervention Status  Done  Intervention: Encourage hourly incentive spirometry, cough and deep breathe  Intervention Status  Done         04-Feb-2019 01:00

## 2021-10-19 NOTE — PROVIDER CONTACT NOTE (OTHER) - ASSESSMENT
Pt is in bed, no s/s of distress/discomfort, no abdominal distention. PEG leaking around site. DISPLAY PLAN FREE TEXT

## 2022-09-06 NOTE — PATIENT PROFILE ADULT. - FUNCTIONAL SCREEN CURRENT LEVEL: BATHING, MLM
Patient notified and verbalized understanding.   He agrees to adding Zetia   States he has not gotten into a chiropractor yet. I advised him to call his insurance and get a list of chiropractors that are covered and see if he can get into one.   He said he would do that and call us back with any concerns     (0) independent

## 2022-11-17 NOTE — ED CLERICAL - DIVISION
[facility]... Orbicularis Oris Muscle Flap Text: The defect edges were debeveled with a #15 scalpel blade.  Given that the defect affected the competency of the oral sphincter an orbicularis oris muscle flap was deemed most appropriate to restore this competency and normal muscle function.  Using a sterile surgical marker, an appropriate flap was drawn incorporating the defect. The area thus outlined was incised with a #15 scalpel blade.

## 2025-01-26 NOTE — ED ADULT NURSE NOTE - SKIN INTEGRITY
pressure ulcer(s) [Clinical Interview] : Clinical Interview [Collateral Sources] : Collateral Sources [No] : No [Identifies reasons for living] : identifies reasons for living [Supportive social network of family or friends] : supportive social network of family or friends [Ability to cope with stress] : ability to cope with stress [Positive therapeutic relationships] : positive therapeutic relationships [Responsibility to children, family, or others] : responsibility to children, family, or others [Frustration tolerance] : frustration tolerance [Engaged in work or school] : engaged in work or school [Fear of death/actual act of killing self] : fear of death or the actual act of killing self